# Patient Record
Sex: MALE | Race: BLACK OR AFRICAN AMERICAN | NOT HISPANIC OR LATINO | Employment: FULL TIME | URBAN - METROPOLITAN AREA
[De-identification: names, ages, dates, MRNs, and addresses within clinical notes are randomized per-mention and may not be internally consistent; named-entity substitution may affect disease eponyms.]

---

## 2020-09-30 ENCOUNTER — TELEPHONE (OUTPATIENT)
Dept: GASTROENTEROLOGY | Facility: CLINIC | Age: 60
End: 2020-09-30

## 2020-09-30 NOTE — TELEPHONE ENCOUNTER
----- Message from Roger Rollins sent at 9/22/2020  3:34 PM EDT -----  Regarding: ROUTINE COLONOSCOPY  PT WOULD LIKE TO SCHEDULE COLONOSCOPY. [phone]

## 2020-09-30 NOTE — TELEPHONE ENCOUNTER
Spoke with patient and completed colonoscopy screening questionnaire per phone.  Patient does need to call back with medication list and insurance info.

## 2020-10-01 ENCOUNTER — PREP FOR SURGERY (OUTPATIENT)
Dept: OTHER | Facility: HOSPITAL | Age: 60
End: 2020-10-01

## 2020-10-01 DIAGNOSIS — K63.5 COLON POLYPS: Primary | ICD-10-CM

## 2020-12-02 ENCOUNTER — TELEPHONE (OUTPATIENT)
Dept: GASTROENTEROLOGY | Facility: CLINIC | Age: 60
End: 2020-12-02

## 2020-12-02 NOTE — TELEPHONE ENCOUNTER
----- Message from Roger Bhat sent at 12/1/2020  1:46 PM EST -----  Regarding: FW: AUTHORIZATION/Capsule order  I'm still not seeing the order. Dr. Mars confirmed. Can you assist?  ----- Message -----  From: Clive Mars MD  Sent: 12/1/2020   9:14 AM EST  To: Lorenza Beck RN, #  Subject: RE: AUTHORIZATION/Capsule order                  Ok , please schedule thx  ----- Message -----  From: Brook Adamson RegSched Rep  Sent: 12/1/2020   8:37 AM EST  To: Clive Mars MD, Lorenza Beck RN  Subject: FW: AUTHORIZATION/Capsule order                  Dr. Mars could you place the order for the capsule for this pt and I will complete the auth before scheduling the pt.  Thank you  Deborah Adamson  ----- Message -----  From: Lorenza Beck RN  Sent: 11/30/2020  10:22 AM EST  To: Roger Bhat  Subject: FW: AUTHORIZATION/Capsule order                  I don't see an order for a capsule study.   ----- Message -----  From: Brook Adamson RegSched Rep  Sent: 11/30/2020  10:03 AM EST  To: Lorenza Beck RN  Subject: FW: AUTHORIZATION/Capsule order                  Could you place order for capsule study and let me know once completed so that I may get precert before scheduling?  Thanks  Cisalina  ----- Message -----  From: Rose Ibrahim  Sent: 11/20/2020   3:55 PM EST  To: Roger Bhat  Subject: AUTHORIZATION                                    Please authorize capsule and notify scheduling when ok to schedule

## 2021-02-23 ENCOUNTER — TELEPHONE (OUTPATIENT)
Dept: GASTROENTEROLOGY | Facility: CLINIC | Age: 61
End: 2021-02-23

## 2021-02-23 NOTE — TELEPHONE ENCOUNTER
----- Message from Roger Casas Rep sent at 2/23/2021 12:03 PM EST -----  Regarding: Reschedule  Contact: [phone]  Pt is needing to reschedule procedure

## 2021-05-03 ENCOUNTER — OFFICE VISIT (OUTPATIENT)
Dept: FAMILY MEDICINE CLINIC | Facility: CLINIC | Age: 61
End: 2021-05-03

## 2021-05-03 VITALS
TEMPERATURE: 98.2 F | WEIGHT: 215 LBS | SYSTOLIC BLOOD PRESSURE: 124 MMHG | BODY MASS INDEX: 31.84 KG/M2 | HEART RATE: 70 BPM | HEIGHT: 69 IN | OXYGEN SATURATION: 99 % | DIASTOLIC BLOOD PRESSURE: 80 MMHG

## 2021-05-03 DIAGNOSIS — Z12.11 SCREEN FOR COLON CANCER: Primary | ICD-10-CM

## 2021-05-03 DIAGNOSIS — J30.2 SEASONAL ALLERGIES: ICD-10-CM

## 2021-05-03 DIAGNOSIS — I10 HYPERTENSION, ESSENTIAL: ICD-10-CM

## 2021-05-03 DIAGNOSIS — R42 DIZZINESS: ICD-10-CM

## 2021-05-03 DIAGNOSIS — Z13.6 SCREENING FOR CARDIOVASCULAR CONDITION: ICD-10-CM

## 2021-05-03 PROCEDURE — 99204 OFFICE O/P NEW MOD 45 MIN: CPT | Performed by: FAMILY MEDICINE

## 2021-05-03 RX ORDER — MECLIZINE HYDROCHLORIDE 25 MG/1
25 TABLET ORAL 2 TIMES DAILY
COMMUNITY
Start: 2021-04-24 | End: 2022-05-18

## 2021-05-03 RX ORDER — LISINOPRIL 30 MG/1
30 TABLET ORAL DAILY
Qty: 90 TABLET | Refills: 1 | Status: SHIPPED | OUTPATIENT
Start: 2021-05-03 | End: 2021-09-09

## 2021-05-03 RX ORDER — LISINOPRIL 30 MG/1
30 TABLET ORAL DAILY
COMMUNITY
Start: 2021-03-11 | End: 2021-05-03 | Stop reason: SDUPTHER

## 2021-05-03 NOTE — PROGRESS NOTES
"Chief Complaint  Establish Care (Hypertension)    Subjective          Duy Owens presents to [facility]  History of Present Illness  New patient here to get established.  He has not seen a doc I a while.    HTN- no CP, HA, or blurred vision.  He occasionally gets some dizziness occasionally when his bp goes up over past couple of weeks.    He is due for a cscope so I will schedule it.   He has had both his covid vaccines.  Seasonal allergies with some drainage in his throat.  He takes otc med.  He will need labs today as well.    Objective   Vital Signs:   /80   Pulse 70   Temp 98.2 °F (36.8 °C)   Ht 175.3 cm (69\")   Wt 97.5 kg (215 lb)   SpO2 99%   BMI 31.75 kg/m²     Physical Exam  Constitutional:       General: He is not in acute distress.     Appearance: Normal appearance. He is normal weight. He is not ill-appearing, toxic-appearing or diaphoretic.   HENT:      Head: Normocephalic and atraumatic.      Right Ear: Tympanic membrane, ear canal and external ear normal. There is no impacted cerumen.      Left Ear: Tympanic membrane, ear canal and external ear normal. There is no impacted cerumen.      Nose: Nose normal.      Comments: Post nasal drip     Mouth/Throat:      Mouth: Mucous membranes are moist.      Pharynx: No oropharyngeal exudate or posterior oropharyngeal erythema.   Cardiovascular:      Rate and Rhythm: Normal rate and regular rhythm.      Heart sounds: Normal heart sounds. No murmur heard.     Pulmonary:      Effort: Pulmonary effort is normal. No respiratory distress.      Breath sounds: Normal breath sounds. No stridor. No wheezing or rhonchi.   Neurological:      Mental Status: He is alert and oriented to person, place, and time.   Psychiatric:         Mood and Affect: Mood normal.         Behavior: Behavior normal.        Result Review :                 Assessment and Plan    Diagnoses and all orders for this visit:    1. Screen for colon cancer " (Primary)  -     Amb referral for Screening Colonoscopy    2. Hypertension, essential  -     lisinopril (PRINIVIL,ZESTRIL) 30 MG tablet; Take 1 tablet by mouth Daily.  Dispense: 90 tablet; Refill: 1  -     Comprehensive Metabolic Panel    3. Dizziness    4. Seasonal allergies    5. Screening for cardiovascular condition  -     Lipid Panel        Follow Up   Return in about 6 months (around 11/3/2021) for hypertension.  Patient was given instructions and counseling regarding his condition or for health maintenance advice. Please see specific information pulled into the AVS if appropriate.   Will get labs today and refill med.  Given warning signs for stroke and MI.    Patient to monitor BP over next week and call me with readings at that time and we can make adjustments accordingly if needed.

## 2021-05-04 ENCOUNTER — TELEPHONE (OUTPATIENT)
Dept: GASTROENTEROLOGY | Facility: CLINIC | Age: 61
End: 2021-05-04

## 2021-05-04 PROBLEM — E78.2 HYPERLIPEMIA, MIXED: Status: ACTIVE | Noted: 2021-05-04

## 2021-05-04 LAB
ALBUMIN SERPL-MCNC: 5.2 G/DL (ref 3.5–5.2)
ALBUMIN/GLOB SERPL: 2.6 G/DL
ALP SERPL-CCNC: 92 U/L (ref 39–117)
ALT SERPL-CCNC: 39 U/L (ref 1–41)
AST SERPL-CCNC: 32 U/L (ref 1–40)
BILIRUB SERPL-MCNC: 0.3 MG/DL (ref 0–1.2)
BUN SERPL-MCNC: 15 MG/DL (ref 6–20)
BUN/CREAT SERPL: 16.3 (ref 7–25)
CALCIUM SERPL-MCNC: 10.4 MG/DL (ref 8.6–10.5)
CHLORIDE SERPL-SCNC: 104 MMOL/L (ref 98–107)
CHOLEST SERPL-MCNC: 193 MG/DL (ref 0–200)
CO2 SERPL-SCNC: 27 MMOL/L (ref 22–29)
CREAT SERPL-MCNC: 0.92 MG/DL (ref 0.76–1.27)
GLOBULIN SER CALC-MCNC: 2 GM/DL
GLUCOSE SERPL-MCNC: 99 MG/DL (ref 65–99)
HDLC SERPL-MCNC: 46 MG/DL (ref 40–60)
LDLC SERPL CALC-MCNC: 124 MG/DL (ref 0–100)
POTASSIUM SERPL-SCNC: 4.5 MMOL/L (ref 3.5–5.2)
PROT SERPL-MCNC: 7.2 G/DL (ref 6–8.5)
SODIUM SERPL-SCNC: 140 MMOL/L (ref 136–145)
TRIGL SERPL-MCNC: 126 MG/DL (ref 0–150)
VLDLC SERPL CALC-MCNC: 23 MG/DL (ref 5–40)

## 2021-05-24 ENCOUNTER — TELEPHONE (OUTPATIENT)
Dept: GASTROENTEROLOGY | Facility: CLINIC | Age: 61
End: 2021-05-24

## 2021-05-31 ENCOUNTER — PREP FOR SURGERY (OUTPATIENT)
Dept: OTHER | Facility: HOSPITAL | Age: 61
End: 2021-05-31

## 2021-05-31 DIAGNOSIS — K63.5 COLON POLYP: Primary | ICD-10-CM

## 2021-06-23 PROBLEM — K63.5 COLON POLYP: Status: ACTIVE | Noted: 2021-06-23

## 2021-09-09 ENCOUNTER — OFFICE VISIT (OUTPATIENT)
Dept: FAMILY MEDICINE CLINIC | Facility: CLINIC | Age: 61
End: 2021-09-09

## 2021-09-09 VITALS
HEART RATE: 78 BPM | OXYGEN SATURATION: 99 % | HEIGHT: 62 IN | BODY MASS INDEX: 40.3 KG/M2 | TEMPERATURE: 98.6 F | DIASTOLIC BLOOD PRESSURE: 82 MMHG | SYSTOLIC BLOOD PRESSURE: 140 MMHG | WEIGHT: 219 LBS

## 2021-09-09 DIAGNOSIS — I10 HYPERTENSION, ESSENTIAL: Primary | ICD-10-CM

## 2021-09-09 DIAGNOSIS — R05.3 CHRONIC COUGH: ICD-10-CM

## 2021-09-09 DIAGNOSIS — K21.9 GASTROESOPHAGEAL REFLUX DISEASE WITHOUT ESOPHAGITIS: ICD-10-CM

## 2021-09-09 DIAGNOSIS — J30.2 SEASONAL ALLERGIES: ICD-10-CM

## 2021-09-09 PROCEDURE — 99214 OFFICE O/P EST MOD 30 MIN: CPT | Performed by: FAMILY MEDICINE

## 2021-09-09 RX ORDER — LOSARTAN POTASSIUM 100 MG/1
100 TABLET ORAL DAILY
Qty: 30 TABLET | Refills: 2 | Status: SHIPPED | OUTPATIENT
Start: 2021-09-09 | End: 2021-12-23 | Stop reason: SDUPTHER

## 2021-09-09 NOTE — PROGRESS NOTES
"Chief Complaint  Cough    Subjective          Duy Owens presents to [facility]  History of Present Illness  Pt has been coughing for the past few months.  It is mostly dry.  He was given antibiotic at urgent care but didn't really help much.    HTN- he has been on the lisinopril for the past year or so.  No chest pains or palpitaitons  He does not some and stopped about 10 years ago.  He has occasional reflux.  He has some drainage in his throat.  Objective   Vital Signs:   /82 (BP Location: Right arm, Patient Position: Sitting)   Pulse 78   Temp 98.6 °F (37 °C)   Ht 157.5 cm (62.01\")   Wt 99.3 kg (219 lb)   SpO2 99%   BMI 40.05 kg/m²     Physical Exam  Vitals and nursing note reviewed.   Constitutional:       Appearance: Normal appearance.   HENT:      Head: Normocephalic and atraumatic.      Mouth/Throat:      Comments: Post nasal drip.  Cardiovascular:      Rate and Rhythm: Normal rate and regular rhythm.      Heart sounds: Normal heart sounds. No murmur heard.     Pulmonary:      Effort: Pulmonary effort is normal. No respiratory distress.      Breath sounds: Normal breath sounds. No stridor. No wheezing or rhonchi.   Neurological:      Mental Status: He is alert.   Psychiatric:         Mood and Affect: Mood normal.         Behavior: Behavior normal.        Result Review :                 Assessment and Plan    Diagnoses and all orders for this visit:    1. Hypertension, essential (Primary)  -     losartan (Cozaar) 100 MG tablet; Take 1 tablet by mouth Daily.  Dispense: 30 tablet; Refill: 2    2. Chronic cough  -     XR Chest 2 View    3. Gastroesophageal reflux disease without esophagitis    4. Seasonal allergies        Follow Up   No follow-ups on file.  Patient was given instructions and counseling regarding his condition or for health maintenance advice. Please see specific information pulled into the AVS if appropriate.     Will get xray ordered.  Will stop " lisin and start losartan  I discussed other possibilities such as reflux or allergies causing the cough and will evaluate accordingly after a trial of no lisinopril

## 2021-09-10 ENCOUNTER — TELEPHONE (OUTPATIENT)
Dept: FAMILY MEDICINE CLINIC | Facility: CLINIC | Age: 61
End: 2021-09-10

## 2021-09-10 ENCOUNTER — TELEPHONE (OUTPATIENT)
Dept: GASTROENTEROLOGY | Facility: CLINIC | Age: 61
End: 2021-09-10

## 2021-09-10 ENCOUNTER — HOSPITAL ENCOUNTER (OUTPATIENT)
Dept: GENERAL RADIOLOGY | Facility: HOSPITAL | Age: 61
Discharge: HOME | End: 2021-09-10
Admitting: FAMILY MEDICINE

## 2021-09-10 DIAGNOSIS — R05.3 CHRONIC COUGH: ICD-10-CM

## 2021-09-10 DIAGNOSIS — R93.89 ABNORMAL CHEST X-RAY: Primary | ICD-10-CM

## 2021-09-10 PROCEDURE — 71046 X-RAY EXAM CHEST 2 VIEWS: CPT

## 2021-09-10 NOTE — TELEPHONE ENCOUNTER
----- Message from Nara Dobson MD sent at 9/10/2021  2:13 PM EDT -----  Your x-ray shows a possible abnormality in the right lung.  In order to further evaluate it, we need to get a CT scan done.  This will help us determine what this is.  I will set this up for you.

## 2021-09-10 NOTE — TELEPHONE ENCOUNTER
lmtcb     Okay for hub to read    Your x-ray shows a possible abnormality in the right lung.  In order to further evaluate it, we need to get a CT scan done.  This will help us determine what this is.  I will set this up for you.

## 2021-09-16 ENCOUNTER — TELEPHONE (OUTPATIENT)
Dept: FAMILY MEDICINE CLINIC | Facility: CLINIC | Age: 61
End: 2021-09-16

## 2021-09-16 NOTE — TELEPHONE ENCOUNTER
Caller: Dyu Owens    Relationship: Self    Best call back number: [phone]    What is the best time to reach you: ANY TIME    Who are you requesting to speak with (clinical staff, provider,  specific staff member): CLINICAL STAFF    What was the call regarding: PATIENT CALLED INQUIRING ABOUT A CT SCAN THAT IS SCHEDULED FOR HIM ON 9/23/21. PATIENT STATES HE ALREADY HAD A CHEST XRAY ON 9/10/21 AND DIDN'T KNOW WHY HE WAS SCHEDULED FOR THE CT SCAN AFTER THE XRAY. PATIENT WOULD LIKE A CALLBACK TO DISCUSS THE REASON FOR THE CT SCAN.    PLEASE ADVISE PATIENT.    Do you require a callback: YES

## 2021-09-16 NOTE — TELEPHONE ENCOUNTER
Lmtcb    Ok for hub to read    Your x-ray shows a possible abnormality in the right lung.  In order to further evaluate it, we need to get a CT scan done.  This will help us determine what this is.  Dr. Dobson will set this up for you.   Subjective      The patient is a 66-year-old male with history of hypertension, hyperlipidemia, chronic obstructive pulmonary disease, history of chronic low back pain and lumbar stenosis.  The patient states that over the past 4-5 days, he has been weak, had poor appetite, could not eat anything.  He is coughing a lot and he has been short of breath.  However, the patient denied having fever.  He had some chest pain.  The patient presented to ER [facility].  He was found having COPD exacerbation:  COVID test came back negative.  The patient was admitted to general medical floor for further care.  He was consulted with Dr. Fairbanks.   He was getting better; he is less short of breath; he is still coughing; not in acute distress; afebrile.  He had BP on the low side in am; his BP meds are on hold for now; will follow. He states he vomited twice today.   He had CT chest done the found severely distended stomach.  The patient is NPO, he refused NGT down; he was seen by GI; the patient is had EGD done on 09/24/20; no significant problems found; the patient had swallow evlauation.  He started eating today; he is tolerating it OK so far.  Continue current [facility]    Objective     I/O's    Intake/Output Summary (Last 24 hours) at 9/26/2020 1555  Last data filed at 9/26/2020 1400  Gross per 24 hour   Intake 1080 ml   Output 3625 ml   Net -2545 ml       Last Recorded Vitals  Blood pressure 103/63, pulse 62, temperature 98.2 °F (36.8 °C), temperature source Oral, resp. rate 16, height 6' 2\" (1.88 m), weight 73.6 kg (162 lb 4.1 oz), SpO2 96 %.  Body mass index is 20.83 kg/m².      General:  A&O x 3  Skin:  Warm and dry without rash.    Head:  Normocephalic-atraumatic.   Neck:  Trachea is midline.     Eyes:  Normal conjunctivae and sclerae.   ENT:  Mucous membranes are moist.   Cardiovascular:  Symmetrical pulses.  Regular rate and rhythm without murmur.  Respiratory:   Normal respiratory effort. Very  diminished bronchial BS. Mild wheezing, no crackles  Gastrointestinal:  Soft and minimally tender. Mildly distended.  No peritoneal signs.  No hepatomegaly or splenomegaly.   Musculoskeletal:  No deformity or edema.   Back:  Normal alignment.  No costovertebral angle tenderness.  Neurologic: No focal deficits.  Psychiatric:  Cooperative.  Appropriate mood and affect.           Labs   Today’s Results:    Recent Labs   Lab 09/26/20  0528 09/25/20  0651   WBC 7.7 6.5   HCT 39.1 46.1   HGB 12.6* 14.1    133*     Recent Labs   Lab 09/26/20  0528 09/25/20  0651   SODIUM 139 137   POTASSIUM 4.6 5.1   CHLORIDE 104 104   CO2 32 32   GLUCOSE 93 141*   BUN 20 15   CREATININE 0.57* 0.50*       CT chest:    IMPRESSION:  1.  Moderate centrilobular emphysematous changes.  2.  No evidence of focal consolidation, pleural effusion or pneumothorax.  3.  Bibasilar streaky opacities likely atelectasis or scarring.  Mild mucous plugging is seen in the left lower lobe.  4.  Distended stomach seen in the upper abdomen.          Assessment & Plan     COPD; h/o heavy smoking  Shortness of breath upon admission  Vomiting, severely distended stomach.   Chronic low back pain  Bilateral lumbar radiculopathy; h/o lumbar surgery  HTN  Severe bradycardia upon admission  Hyperlipidemia  Severe protein calorie malnutrition:  poor oral intake, muscle wasting, low BMI  Smoking      DVT Prophylaxis  SCD    Smoking Cessation  Keeps smoking 0.5 ppd; smoking cessation extensively d/w the patient.        Gregory Levy MD

## 2021-09-20 NOTE — TELEPHONE ENCOUNTER
Lmtcb     Ok for hub to read     Your x-ray shows a possible abnormality in the right lung.  In order to further evaluate it, we need to get a CT scan done.  This will help us determine what this is.  Dr. Dobson will set this up for you.

## 2021-09-23 ENCOUNTER — HOSPITAL ENCOUNTER (OUTPATIENT)
Dept: CT IMAGING | Facility: HOSPITAL | Age: 61
Discharge: HOME | End: 2021-09-23
Admitting: FAMILY MEDICINE

## 2021-09-23 PROCEDURE — 82565 ASSAY OF CREATININE: CPT

## 2021-09-23 PROCEDURE — 71260 CT THORAX DX C+: CPT

## 2021-09-23 PROCEDURE — 25010000002 IOPAMIDOL 61 % SOLUTION: Performed by: FAMILY MEDICINE

## 2021-09-23 RX ADMIN — IOPAMIDOL 75 ML: 612 INJECTION, SOLUTION INTRAVENOUS at 14:28

## 2021-09-24 ENCOUNTER — HOSPITAL ENCOUNTER (OUTPATIENT)
Dept: CT IMAGING | Facility: HOSPITAL | Age: 61
Discharge: HOME | End: 2021-09-24
Admitting: NURSE PRACTITIONER

## 2021-09-24 ENCOUNTER — TELEPHONE (OUTPATIENT)
Dept: FAMILY MEDICINE CLINIC | Facility: CLINIC | Age: 61
End: 2021-09-24

## 2021-09-24 DIAGNOSIS — R93.89 ABNORMAL CT OF THE CHEST: ICD-10-CM

## 2021-09-24 DIAGNOSIS — R93.89 ABNORMAL CT OF THE CHEST: Primary | ICD-10-CM

## 2021-09-24 LAB
CREAT BLDA-MCNC: 1 MG/DL (ref 0.6–1.3)
CREAT BLDA-MCNC: 1 MG/DL (ref 0.6–1.3)

## 2021-09-24 PROCEDURE — 0 DIATRIZOATE MEGLUMINE & SODIUM PER 1 ML: Performed by: NURSE PRACTITIONER

## 2021-09-24 PROCEDURE — 74177 CT ABD & PELVIS W/CONTRAST: CPT

## 2021-09-24 PROCEDURE — 25010000002 IOPAMIDOL 61 % SOLUTION: Performed by: NURSE PRACTITIONER

## 2021-09-24 PROCEDURE — 82565 ASSAY OF CREATININE: CPT

## 2021-09-24 RX ADMIN — DIATRIZOATE MEGLUMINE AND DIATRIZOATE SODIUM 30 ML: 600; 100 SOLUTION ORAL; RECTAL at 13:47

## 2021-09-24 RX ADMIN — IOPAMIDOL 85 ML: 612 INJECTION, SOLUTION INTRAVENOUS at 13:47

## 2021-09-24 NOTE — TELEPHONE ENCOUNTER
There are findings on CT chest that are suspicious for lymphoma or metastatic disease so will need to see hematology/oncology and have further imaging with CT abdomen and pelvis with contrast.     Patient aware of results and recommendations.

## 2021-09-27 ENCOUNTER — TELEPHONE (OUTPATIENT)
Dept: FAMILY MEDICINE CLINIC | Facility: CLINIC | Age: 61
End: 2021-09-27

## 2021-09-27 NOTE — TELEPHONE ENCOUNTER
CT abdomen and pelvis shows further lymphadenopathy which likely represents lymphoma.    Patient aware of results.  Patient has appointment with oncology on 9/29/2021.

## 2021-09-27 NOTE — TELEPHONE ENCOUNTER
Caller: Duy Owens    Relationship: Self    Best call back number: [phone]    Caller requesting test results: PATIENT    What test was performed: CT SCAN    When was the test performed: 09/24/2021    Where was the test performed: [facility]    Additional notes: PLEASE CALL AND ADVISE OF THE RESULTS OF THIS TEST

## 2021-09-29 ENCOUNTER — CONSULT (OUTPATIENT)
Dept: ONCOLOGY | Facility: CLINIC | Age: 61
End: 2021-09-29

## 2021-09-29 ENCOUNTER — APPOINTMENT (OUTPATIENT)
Dept: OTHER | Facility: HOSPITAL | Age: 61
End: 2021-09-29

## 2021-09-29 ENCOUNTER — TELEPHONE (OUTPATIENT)
Dept: ONCOLOGY | Facility: CLINIC | Age: 61
End: 2021-09-29

## 2021-09-29 VITALS
SYSTOLIC BLOOD PRESSURE: 174 MMHG | WEIGHT: 215.2 LBS | DIASTOLIC BLOOD PRESSURE: 98 MMHG | HEART RATE: 79 BPM | BODY MASS INDEX: 31.87 KG/M2 | TEMPERATURE: 98.5 F | OXYGEN SATURATION: 98 % | HEIGHT: 69 IN | RESPIRATION RATE: 16 BRPM

## 2021-09-29 DIAGNOSIS — R59.1 LYMPHADENOPATHY: Primary | ICD-10-CM

## 2021-09-29 DIAGNOSIS — D50.9 MICROCYTIC ANEMIA: ICD-10-CM

## 2021-09-29 LAB
ALBUMIN SERPL-MCNC: 4.8 G/DL (ref 3.5–5.2)
ALBUMIN/GLOB SERPL: 1.8 G/DL
ALP SERPL-CCNC: 76 U/L (ref 39–117)
ALT SERPL W P-5'-P-CCNC: 21 U/L (ref 1–41)
ANION GAP SERPL CALCULATED.3IONS-SCNC: 9.9 MMOL/L (ref 5–15)
AST SERPL-CCNC: 23 U/L (ref 1–40)
B2 MICROGLOB SERPL-MCNC: 1.8 MG/L (ref 0.8–2.2)
BASOPHILS # BLD AUTO: 0.06 10*3/MM3 (ref 0–0.2)
BASOPHILS NFR BLD AUTO: 0.9 % (ref 0–1.5)
BILIRUB SERPL-MCNC: 0.3 MG/DL (ref 0–1.2)
BUN SERPL-MCNC: 17 MG/DL (ref 6–20)
BUN/CREAT SERPL: 13.6 (ref 7–25)
CALCIUM SPEC-SCNC: 9.6 MG/DL (ref 8.6–10.5)
CHLORIDE SERPL-SCNC: 104 MMOL/L (ref 98–107)
CO2 SERPL-SCNC: 25.1 MMOL/L (ref 22–29)
CREAT SERPL-MCNC: 1.25 MG/DL (ref 0.76–1.27)
DEPRECATED RDW RBC AUTO: 40.3 FL (ref 37–54)
EOSINOPHIL # BLD AUTO: 0.34 10*3/MM3 (ref 0–0.4)
EOSINOPHIL NFR BLD AUTO: 4.8 % (ref 0.3–6.2)
ERYTHROCYTE [DISTWIDTH] IN BLOOD BY AUTOMATED COUNT: 14.6 % (ref 12.3–15.4)
FERRITIN SERPL-MCNC: 368.1 NG/ML (ref 30–400)
GFR SERPL CREATININE-BSD FRML MDRD: 72 ML/MIN/1.73
GLOBULIN UR ELPH-MCNC: 2.6 GM/DL
GLUCOSE SERPL-MCNC: 99 MG/DL (ref 65–99)
HCT VFR BLD AUTO: 42.8 % (ref 37.5–51)
HGB BLD-MCNC: 13.1 G/DL (ref 13–17.7)
IMM GRANULOCYTES # BLD AUTO: 0.02 10*3/MM3 (ref 0–0.05)
IMM GRANULOCYTES NFR BLD AUTO: 0.3 % (ref 0–0.5)
IRON 24H UR-MRATE: 104 MCG/DL (ref 59–158)
IRON SATN MFR SERPL: 25 % (ref 20–50)
LDH SERPL-CCNC: 202 U/L (ref 135–225)
LYMPHOCYTES # BLD AUTO: 1.15 10*3/MM3 (ref 0.7–3.1)
LYMPHOCYTES NFR BLD AUTO: 16.3 % (ref 19.6–45.3)
MCH RBC QN AUTO: 23.3 PG (ref 26.6–33)
MCHC RBC AUTO-ENTMCNC: 30.6 G/DL (ref 31.5–35.7)
MCV RBC AUTO: 76.2 FL (ref 79–97)
MONOCYTES # BLD AUTO: 0.77 10*3/MM3 (ref 0.1–0.9)
MONOCYTES NFR BLD AUTO: 10.9 % (ref 5–12)
NEUTROPHILS NFR BLD AUTO: 4.7 10*3/MM3 (ref 1.7–7)
NEUTROPHILS NFR BLD AUTO: 66.8 % (ref 42.7–76)
NRBC BLD AUTO-RTO: 0 /100 WBC (ref 0–0.2)
PLATELET # BLD AUTO: 268 10*3/MM3 (ref 140–450)
PMV BLD AUTO: 9.7 FL (ref 6–12)
POTASSIUM SERPL-SCNC: 4.2 MMOL/L (ref 3.5–5.2)
PROT SERPL-MCNC: 7.4 G/DL (ref 6–8.5)
RBC # BLD AUTO: 5.62 10*6/MM3 (ref 4.14–5.8)
SODIUM SERPL-SCNC: 139 MMOL/L (ref 136–145)
TIBC SERPL-MCNC: 410 MCG/DL (ref 298–536)
TRANSFERRIN SERPL-MCNC: 275 MG/DL (ref 200–360)
URATE SERPL-MCNC: 6.7 MG/DL (ref 3.4–7)
WBC # BLD AUTO: 7.04 10*3/MM3 (ref 3.4–10.8)

## 2021-09-29 PROCEDURE — 82232 ASSAY OF BETA-2 PROTEIN: CPT | Performed by: INTERNAL MEDICINE

## 2021-09-29 PROCEDURE — 36415 COLL VENOUS BLD VENIPUNCTURE: CPT | Performed by: INTERNAL MEDICINE

## 2021-09-29 PROCEDURE — 99244 OFF/OP CNSLTJ NEW/EST MOD 40: CPT | Performed by: INTERNAL MEDICINE

## 2021-09-29 PROCEDURE — 85025 COMPLETE CBC W/AUTO DIFF WBC: CPT | Performed by: INTERNAL MEDICINE

## 2021-09-29 PROCEDURE — 80053 COMPREHEN METABOLIC PANEL: CPT | Performed by: INTERNAL MEDICINE

## 2021-09-29 PROCEDURE — 83615 LACTATE (LD) (LDH) ENZYME: CPT | Performed by: INTERNAL MEDICINE

## 2021-09-29 PROCEDURE — 82728 ASSAY OF FERRITIN: CPT | Performed by: INTERNAL MEDICINE

## 2021-09-29 PROCEDURE — 83540 ASSAY OF IRON: CPT | Performed by: INTERNAL MEDICINE

## 2021-09-29 PROCEDURE — 84550 ASSAY OF BLOOD/URIC ACID: CPT | Performed by: INTERNAL MEDICINE

## 2021-09-29 PROCEDURE — 84466 ASSAY OF TRANSFERRIN: CPT | Performed by: INTERNAL MEDICINE

## 2021-09-29 NOTE — TELEPHONE ENCOUNTER
Clinical Case Management/Aravind and Monica:    OSW received a referral from Dina Washburn MA due to patient's 8/10 score on the NCCN Distress Thermometer during today's consultation with Dr. Mendez. Patient marked the following problems: depression, fear, nervousness, sadness, worry, loss of interest/activities, spiritual/Islam concerns, memory/concentration, pain, and sleep.    OSW called patient to introduce self/role and to assess for needs. Patient was unable to answer this call. OSW left a message with contact information for patient to return call.     OSW to remain available.     SARAH Alatorre, CSW  Oncology Social Worker   [facility]

## 2021-09-29 NOTE — PROGRESS NOTES
Subjective     REASON FOR CONSULTATION:  Provide an opinion on any further workup or treatment on:    Lymphadenopathy                       REQUESTING PHYSICIAN: Meena Avina APRN    RECORDS OBTAINED: Records of the patients history including those obtained from the referring provider were reviewed and summarized in detail.    HISTORY OF PRESENT ILLNESS:    Duy Owens is a 60 y.o. patient who was referred for evaluation of lymphadenopathy.  Patient started about 2 months ago having cough.  It was mainly a dry cough.  It was initially suspected of being secondary to his antihypertensive medication which was changed. However, the cough did not resolve.  Therefore, he had a chest x-ray on 9/10/2021 which revealed a rounded density in the right infrahilar region.  CT of the scan was recommended which was performed on 9/23/2021.  It revealed inferior mediastinal lymphadenopathy.  In addition, there was retroperitoneal lymphadenopathy in the abdomen.  CT of the abdomen pelvis was obtained on 9/24/2021 confirming the presence of the lymphadenopathy.  He was therefore referred to our office for further evaluation.    Patient reports developing pain in the right hip posterior aspect.  Started 1-2 weeks ago.  The pain is intermittent.  It is different from her chronic back pain he has been having for some time.    Patient denies having fever or chills.  He had a few episodes of night sweats but he feels that they were related to the stress since the recent CT scan findings.  No loss of appetite or loss of weight.  He did not notice lymph node enlargement in the neck, axillary or inguinal areas.       REVIEW OF SYSTEMS:  Review of Systems   Constitutional: Negative for fatigue, fever and unexpected weight change.   HENT: Negative for nosebleeds and voice change.    Eyes: Negative for visual disturbance.   Respiratory: Positive for cough. Negative for shortness of breath.    Cardiovascular: Negative for chest pain and  "leg swelling.   Gastrointestinal: Negative for abdominal pain, blood in stool, constipation, diarrhea, nausea and vomiting.   Genitourinary: Negative for frequency and hematuria.   Musculoskeletal: Positive for back pain. Negative for joint swelling.   Skin: Negative for rash.   Neurological: Negative for dizziness and headaches.   Hematological: Negative for adenopathy. Does not bruise/bleed easily.   Psychiatric/Behavioral: Negative for dysphoric mood. The patient is not nervous/anxious.        Past Medical History:   Diagnosis Date   • GERD (gastroesophageal reflux disease)    • History of colon polyps    • History of snoring    • History of urinary frequency        Past Surgical History:   Procedure Laterality Date   • COLONOSCOPY W/ POLYPECTOMY     • CYSTOSCOPY TRANSURETHRAL RESECTION OF PROSTATE         Social History     Socioeconomic History   • Marital status: Single     Spouse name: Not on file   • Number of children: Not on file   • Years of education: Not on file   • Highest education level: Not on file   Tobacco Use   • Smoking status: Former Smoker     Types: Cigarettes     Quit date:      Years since quittin.7   • Smokeless tobacco: Never Used   • Tobacco comment: Quit 10 yrs ago   Substance and Sexual Activity   • Alcohol use: Yes     Comment: 1-2x weekly    • Drug use: Never   • Sexual activity: Defer      Family history:  Positive for cancer in the patient's brother (prostate versus testicular cancer).    MEDICATIONS:    Current Outpatient Medications:   •  losartan (Cozaar) 100 MG tablet, Take 1 tablet by mouth Daily., Disp: 30 tablet, Rfl: 2  •  meclizine (ANTIVERT) 25 MG tablet, Take 25 mg by mouth 2 (two) times a day., Disp: , Rfl:      ALLERGIES:  No Known Allergies     Objective   VITAL SIGNS:  Vitals:    21 0757   BP: 174/98   Pulse: 79   Resp: 16   Temp: 98.5 °F (36.9 °C)   TempSrc: Temporal   SpO2: 98%   Weight: 97.6 kg (215 lb 3.2 oz)   Height: 175 cm (68.9\")  Comment: New " Ht   PainSc: 0-No pain     Wt Readings from Last 3 Encounters:   09/29/21 97.6 kg (215 lb 3.2 oz)   09/09/21 99.3 kg (219 lb)   08/18/21 95.3 kg (210 lb)     PHYSICAL EXAMINATION  GENERAL:  The patient appears in good general condition, not in acute distress.  SKIN: No skin rashes. No ecchymosis.  HEAD:  Normocephalic.  EYES:  No Jaundice. No Pallor. Pupils equal. EOMI.  NECK:  Supple with Good ROM. No Thyromegaly. No Masses.  LYMPHATICS:  No cervical,  supraclavicular or axillary lymphadenopathy.  CHEST: Normal respiratory effort. Lungs clear to auscultation.   CARDIAC:  Normal S1 & S2. No murmur.   ABDOMEN:  Soft. No tenderness. No Hepatomegaly. No Splenomegaly. No masses.  NEUROLOGICAL:  No Focal neurological deficits.     RESULT REVIEW:   Results from last 7 days   Lab Units 09/29/21  0749   WBC 10*3/mm3 7.04   NEUTROS ABS 10*3/mm3 4.70   HEMOGLOBIN g/dL 13.1   HEMATOCRIT % 42.8   PLATELETS 10*3/mm3 268     Results from last 7 days   Lab Units 09/29/21  0749 09/24/21  1313 09/23/21  1427   SODIUM mmol/L 139  --   --    POTASSIUM mmol/L 4.2  --   --    CHLORIDE mmol/L 104  --   --    CO2 mmol/L 25.1  --   --    BUN mg/dL 17  --   --    CREATININE mg/dL 1.25 1.00 1.00   CALCIUM mg/dL 9.6  --   --    ALBUMIN g/dL 4.80  --   --    BILIRUBIN mg/dL 0.3  --   --    ALK PHOS U/L 76  --   --    ALT (SGPT) U/L 21  --   --    AST (SGOT) U/L 23  --   --      Component      Latest Ref Rng & Units 9/29/2021   LDH      135 - 225 U/L 202     Component      Latest Ref Rng & Units 9/29/2021   Iron      59 - 158 mcg/dL 104   Iron Saturation      20 - 50 % 25   Transferrin      200 - 360 mg/dL 275   TIBC      298 - 536 mcg/dL 410   Ferritin      30.00 - 400.00 ng/mL 368.10     CT chest on 9/23/2021:  There are multiple enlarged lower mediastinal lymph nodes and enlarged  lymph nodes in the visualized retroperitoneum. These findings are  suspicious for a lymphoproliferative disorder such as lymphoma or could  reflect metastatic  disease to lymph nodes.     CT abdomen pelvis on 9/24/2021:  There is bulky retroperitoneal lymphadenopathy and there is  lymphadenopathy throughout the pelvis which likely represents lymphoma.  Some of the retroperitoneal nodes are accessible for CT-guided biopsy if  Needed.      Assessment/Plan   *Lymphadenopathy.  Patient started having dry cough around July 2021.  CT chest revealed inferior mediastinal adenopathy.  CT abdomen revealed retroperitoneal lymphadenopathy extending to the iliac chain lymph nodes in the right.  Largest lymph node is in the retroperitoneal area and measures 4.8 x 3.5 cm.  The lymphadenopathy in the common iliac chain lymph node measured 3.5 x 2.7 cm.  The left external iliac chain lymph node measured 2.9 x 2 cm.  The lymphadenopathy is concerning for lymphoma.    I explained the findings of the CT scan to the patient and his significant other today.  I recommended obtaining a PET scan to determine the extent of lymphadenopathy and the degree of hypermetabolism.  I also recommended CT-guided biopsy of one of the retroperitoneal lymph nodes.  I explained the procedure to the patient.  He expressed his understanding and agreed to proceed.    *Microcytic anemia.  Hemoglobin is 13.1.  We obtained ferritin iron panel today and there is no evidence of iron deficiency.    PLAN:    1.  Obtain a PET scan.  2.  Obtain CT-guided biopsy of one of the enlarged retroperitoneal lymph nodes.  3.  Obtain beta-2 microglobulin and uric acid levels.  4.  We will see him in 2 weeks for follow-up.        Tima Mendez MD  09/29/21

## 2021-09-30 ENCOUNTER — TELEPHONE (OUTPATIENT)
Dept: ONCOLOGY | Facility: CLINIC | Age: 61
End: 2021-09-30

## 2021-09-30 NOTE — TELEPHONE ENCOUNTER
"Clinical Case Management/Aravind and Monica:    OSW missed a return call from patient after OSW called patient yesterday. OSW called patient back today to introduce self/role, check in, and assess for any needs.     Patient confirmed that he completed the NCCN Distress Thermometer and scored 8/10. Patient explained that he was just feeling really anxious before meeting with Dr. Mendez but that he felt more hopeful after the visit.     Patient reported that he has a CT scan tomorrow and a biopsy next week that will determine what exactly is going. Per patient, Dr. 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 Patient reported to have 3 daughters and a son, all of whom live in or around [place]. Patient lives with his girlfriend who he said is a great support to him. Patient denied any needs at this time.     OSW provided contact information and information on how to reach oncology social workers in the event needs arise. OSW to remain available.     Leisa Traylor, SARAH, CSW  Oncology Social Worker   [facility]    "

## 2021-10-01 ENCOUNTER — APPOINTMENT (OUTPATIENT)
Dept: PET IMAGING | Facility: HOSPITAL | Age: 61
End: 2021-10-01

## 2021-10-05 ENCOUNTER — HOSPITAL ENCOUNTER (OUTPATIENT)
Dept: PET IMAGING | Facility: HOSPITAL | Age: 61
Discharge: HOME | End: 2021-10-05

## 2021-10-05 DIAGNOSIS — R59.1 LYMPHADENOPATHY: ICD-10-CM

## 2021-10-05 LAB — GLUCOSE BLDC GLUCOMTR-MCNC: 90 MG/DL (ref 70–130)

## 2021-10-05 PROCEDURE — 78815 PET IMAGE W/CT SKULL-THIGH: CPT

## 2021-10-05 PROCEDURE — 0 FLUDEOXYGLUCOSE F18 SOLUTION: Performed by: INTERNAL MEDICINE

## 2021-10-05 PROCEDURE — A9552 F18 FDG: HCPCS | Performed by: INTERNAL MEDICINE

## 2021-10-05 PROCEDURE — 82962 GLUCOSE BLOOD TEST: CPT

## 2021-10-05 RX ADMIN — FLUDEOXYGLUCOSE F18 1 DOSE: 300 INJECTION INTRAVENOUS at 06:44

## 2021-10-06 ENCOUNTER — HOSPITAL ENCOUNTER (OUTPATIENT)
Dept: CT IMAGING | Facility: HOSPITAL | Age: 61
Discharge: HOME | End: 2021-10-06
Admitting: INTERNAL MEDICINE

## 2021-10-06 VITALS
RESPIRATION RATE: 20 BRPM | HEIGHT: 70 IN | TEMPERATURE: 98.4 F | WEIGHT: 215 LBS | HEART RATE: 59 BPM | DIASTOLIC BLOOD PRESSURE: 85 MMHG | OXYGEN SATURATION: 97 % | BODY MASS INDEX: 30.78 KG/M2 | SYSTOLIC BLOOD PRESSURE: 168 MMHG

## 2021-10-06 DIAGNOSIS — R59.1 LYMPHADENOPATHY: ICD-10-CM

## 2021-10-06 LAB
BASOPHILS # BLD MANUAL: 0.06 10*3/MM3 (ref 0–0.2)
BASOPHILS NFR BLD AUTO: 1 % (ref 0–1.5)
DEPRECATED RDW RBC AUTO: 36.7 FL (ref 37–54)
EOSINOPHIL # BLD MANUAL: 0.35 10*3/MM3 (ref 0–0.4)
EOSINOPHIL NFR BLD MANUAL: 6 % (ref 0.3–6.2)
ERYTHROCYTE [DISTWIDTH] IN BLOOD BY AUTOMATED COUNT: 13.8 % (ref 12.3–15.4)
HCT VFR BLD AUTO: 40.4 % (ref 37.5–51)
HGB BLD-MCNC: 12.6 G/DL (ref 13–17.7)
INR PPP: 1 (ref 0.8–1.2)
LYMPHOCYTES # BLD MANUAL: 1.05 10*3/MM3 (ref 0.7–3.1)
LYMPHOCYTES NFR BLD MANUAL: 18 % (ref 19.6–45.3)
LYMPHOCYTES NFR BLD MANUAL: 7 % (ref 5–12)
MCH RBC QN AUTO: 23.2 PG (ref 26.6–33)
MCHC RBC AUTO-ENTMCNC: 31.2 G/DL (ref 31.5–35.7)
MCV RBC AUTO: 74.5 FL (ref 79–97)
MONOCYTES # BLD AUTO: 0.41 10*3/MM3 (ref 0.1–0.9)
NEUTROPHILS # BLD AUTO: 3.96 10*3/MM3 (ref 1.7–7)
NEUTROPHILS NFR BLD MANUAL: 68 % (ref 42.7–76)
PLAT MORPH BLD: NORMAL
PLATELET # BLD AUTO: 249 10*3/MM3 (ref 140–450)
PLATELET # BLD AUTO: 257 10*3/MM3 (ref 140–450)
PMV BLD AUTO: 10.9 FL (ref 6–12)
PROTHROMBIN TIME: 11.9 SECONDS (ref 12.8–15.2)
RBC # BLD AUTO: 5.42 10*6/MM3 (ref 4.14–5.8)
RBC MORPH BLD: NORMAL
WBC # BLD AUTO: 5.82 10*3/MM3 (ref 3.4–10.8)
WBC MORPH BLD: NORMAL

## 2021-10-06 PROCEDURE — 99152 MOD SED SAME PHYS/QHP 5/>YRS: CPT

## 2021-10-06 PROCEDURE — 88341 IMHCHEM/IMCYTCHM EA ADD ANTB: CPT | Performed by: INTERNAL MEDICINE

## 2021-10-06 PROCEDURE — 88342 IMHCHEM/IMCYTCHM 1ST ANTB: CPT | Performed by: INTERNAL MEDICINE

## 2021-10-06 PROCEDURE — 99153 MOD SED SAME PHYS/QHP EA: CPT

## 2021-10-06 PROCEDURE — 85007 BL SMEAR W/DIFF WBC COUNT: CPT | Performed by: RADIOLOGY

## 2021-10-06 PROCEDURE — 88365 INSITU HYBRIDIZATION (FISH): CPT

## 2021-10-06 PROCEDURE — 88305 TISSUE EXAM BY PATHOLOGIST: CPT | Performed by: INTERNAL MEDICINE

## 2021-10-06 PROCEDURE — 88300 SURGICAL PATH GROSS: CPT | Performed by: INTERNAL MEDICINE

## 2021-10-06 PROCEDURE — 25010000002 MIDAZOLAM PER 1 MG: Performed by: RADIOLOGY

## 2021-10-06 PROCEDURE — 85049 AUTOMATED PLATELET COUNT: CPT | Performed by: RADIOLOGY

## 2021-10-06 PROCEDURE — 88380 MICRODISSECTION LASER: CPT

## 2021-10-06 PROCEDURE — 82542 COL CHROMOTOGRAPHY QUAL/QUAN: CPT

## 2021-10-06 PROCEDURE — 0 LIDOCAINE 1 % SOLUTION: Performed by: RADIOLOGY

## 2021-10-06 PROCEDURE — 85025 COMPLETE CBC W/AUTO DIFF WBC: CPT | Performed by: RADIOLOGY

## 2021-10-06 PROCEDURE — 77012 CT SCAN FOR NEEDLE BIOPSY: CPT

## 2021-10-06 PROCEDURE — 85610 PROTHROMBIN TIME: CPT

## 2021-10-06 PROCEDURE — 88313 SPECIAL STAINS GROUP 2: CPT

## 2021-10-06 PROCEDURE — 25010000002 FENTANYL CITRATE (PF) 50 MCG/ML SOLUTION: Performed by: RADIOLOGY

## 2021-10-06 PROCEDURE — 88364 INSITU HYBRIDIZATION (FISH): CPT

## 2021-10-06 PROCEDURE — 25010000003 LIDOCAINE 1 % SOLUTION: Performed by: RADIOLOGY

## 2021-10-06 RX ORDER — FENTANYL CITRATE 50 UG/ML
INJECTION, SOLUTION INTRAMUSCULAR; INTRAVENOUS
Status: COMPLETED | OUTPATIENT
Start: 2021-10-06 | End: 2021-10-06

## 2021-10-06 RX ORDER — LIDOCAINE HYDROCHLORIDE 10 MG/ML
20 INJECTION, SOLUTION INFILTRATION; PERINEURAL ONCE
Status: COMPLETED | OUTPATIENT
Start: 2021-10-06 | End: 2021-10-06

## 2021-10-06 RX ORDER — SODIUM CHLORIDE 0.9 % (FLUSH) 0.9 %
10 SYRINGE (ML) INJECTION AS NEEDED
Status: DISCONTINUED | OUTPATIENT
Start: 2021-10-06 | End: 2021-10-07 | Stop reason: HOSPADM

## 2021-10-06 RX ORDER — MIDAZOLAM HYDROCHLORIDE 1 MG/ML
INJECTION INTRAMUSCULAR; INTRAVENOUS
Status: COMPLETED | OUTPATIENT
Start: 2021-10-06 | End: 2021-10-06

## 2021-10-06 RX ORDER — SODIUM CHLORIDE 0.9 % (FLUSH) 0.9 %
3 SYRINGE (ML) INJECTION EVERY 12 HOURS SCHEDULED
Status: DISCONTINUED | OUTPATIENT
Start: 2021-10-06 | End: 2021-10-07 | Stop reason: HOSPADM

## 2021-10-06 RX ORDER — SODIUM CHLORIDE 9 MG/ML
25 INJECTION, SOLUTION INTRAVENOUS ONCE
Status: COMPLETED | OUTPATIENT
Start: 2021-10-06 | End: 2021-10-06

## 2021-10-06 RX ADMIN — LIDOCAINE HYDROCHLORIDE 20 ML: 10 INJECTION, SOLUTION INFILTRATION; PERINEURAL at 09:29

## 2021-10-06 RX ADMIN — SODIUM CHLORIDE 25 ML/HR: 9 INJECTION, SOLUTION INTRAVENOUS at 09:26

## 2021-10-06 RX ADMIN — MIDAZOLAM 1 MG: 1 INJECTION INTRAMUSCULAR; INTRAVENOUS at 09:33

## 2021-10-06 RX ADMIN — FENTANYL CITRATE 50 MCG: 0.05 INJECTION, SOLUTION INTRAMUSCULAR; INTRAVENOUS at 09:33

## 2021-10-06 NOTE — NURSING NOTE
Patient dressed and to wheelchair, taken to patient discharge with staff member, his girlfriend is driving him home now.

## 2021-10-06 NOTE — NURSING NOTE
Patient arrived to radiology triage bay 7.  Patient and girlfriend are mask compliant.  I am wearing a mask and eye protection to care for patient.

## 2021-10-06 NOTE — POST-PROCEDURE NOTE
POST PROCEDURE NOTE    Procedure: retroperit.LN bx    Pre-Procedure Diagnosis: lymphadenopathy    Post-procedure Diagnosis: same    Findings: successf.bx,gelfoam used    Complications: non    Blood loss: min    Specimen Removed: 2x18G form.,1x18g flow    Disposition:   Expected discharge home/Transfer back to inpatient room

## 2021-10-06 NOTE — DISCHARGE INSTRUCTIONS

## 2021-10-13 ENCOUNTER — OFFICE VISIT (OUTPATIENT)
Dept: ONCOLOGY | Facility: CLINIC | Age: 61
End: 2021-10-13

## 2021-10-13 ENCOUNTER — LAB (OUTPATIENT)
Dept: OTHER | Facility: HOSPITAL | Age: 61
End: 2021-10-13

## 2021-10-13 VITALS
BODY MASS INDEX: 31.61 KG/M2 | SYSTOLIC BLOOD PRESSURE: 156 MMHG | DIASTOLIC BLOOD PRESSURE: 87 MMHG | OXYGEN SATURATION: 98 % | TEMPERATURE: 98.2 F | WEIGHT: 213.4 LBS | RESPIRATION RATE: 18 BRPM | HEIGHT: 69 IN | HEART RATE: 73 BPM

## 2021-10-13 DIAGNOSIS — D50.9 MICROCYTIC ANEMIA: ICD-10-CM

## 2021-10-13 DIAGNOSIS — R59.1 LYMPHADENOPATHY: Primary | ICD-10-CM

## 2021-10-13 DIAGNOSIS — E78.2 HYPERLIPEMIA, MIXED: Primary | ICD-10-CM

## 2021-10-13 LAB
BASOPHILS # BLD AUTO: 0.05 10*3/MM3 (ref 0–0.2)
BASOPHILS NFR BLD AUTO: 0.8 % (ref 0–1.5)
DEPRECATED RDW RBC AUTO: 38.5 FL (ref 37–54)
EOSINOPHIL # BLD AUTO: 0.39 10*3/MM3 (ref 0–0.4)
EOSINOPHIL NFR BLD AUTO: 6.2 % (ref 0.3–6.2)
ERYTHROCYTE [DISTWIDTH] IN BLOOD BY AUTOMATED COUNT: 14.5 % (ref 12.3–15.4)
HCT VFR BLD AUTO: 40.6 % (ref 37.5–51)
HGB BLD-MCNC: 12.6 G/DL (ref 13–17.7)
HYPOCHROMIA BLD QL: NORMAL
IMM GRANULOCYTES # BLD AUTO: 0.04 10*3/MM3 (ref 0–0.05)
IMM GRANULOCYTES NFR BLD AUTO: 0.6 % (ref 0–0.5)
LYMPHOCYTES # BLD AUTO: 1.59 10*3/MM3 (ref 0.7–3.1)
LYMPHOCYTES NFR BLD AUTO: 25.4 % (ref 19.6–45.3)
MCH RBC QN AUTO: 22.8 PG (ref 26.6–33)
MCHC RBC AUTO-ENTMCNC: 31 G/DL (ref 31.5–35.7)
MCV RBC AUTO: 73.6 FL (ref 79–97)
MONOCYTES # BLD AUTO: 0.77 10*3/MM3 (ref 0.1–0.9)
MONOCYTES NFR BLD AUTO: 12.3 % (ref 5–12)
NEUTROPHILS NFR BLD AUTO: 3.41 10*3/MM3 (ref 1.7–7)
NEUTROPHILS NFR BLD AUTO: 54.7 % (ref 42.7–76)
NRBC BLD AUTO-RTO: 0 /100 WBC (ref 0–0.2)
PLAT MORPH BLD: NORMAL
PLATELET # BLD AUTO: 253 10*3/MM3 (ref 140–450)
PMV BLD AUTO: 10.5 FL (ref 6–12)
RBC # BLD AUTO: 5.52 10*6/MM3 (ref 4.14–5.8)
WBC # BLD AUTO: 6.25 10*3/MM3 (ref 3.4–10.8)
WBC MORPH BLD: NORMAL

## 2021-10-13 PROCEDURE — 36415 COLL VENOUS BLD VENIPUNCTURE: CPT

## 2021-10-13 PROCEDURE — 99215 OFFICE O/P EST HI 40 MIN: CPT | Performed by: INTERNAL MEDICINE

## 2021-10-13 PROCEDURE — 85025 COMPLETE CBC W/AUTO DIFF WBC: CPT | Performed by: INTERNAL MEDICINE

## 2021-10-13 PROCEDURE — 85007 BL SMEAR W/DIFF WBC COUNT: CPT | Performed by: INTERNAL MEDICINE

## 2021-10-19 ENCOUNTER — TELEPHONE (OUTPATIENT)
Dept: ONCOLOGY | Facility: CLINIC | Age: 61
End: 2021-10-19

## 2021-10-19 DIAGNOSIS — E88.09 PLASMA CELL DYSCRASIA: ICD-10-CM

## 2021-10-19 DIAGNOSIS — R59.1 LYMPHADENOPATHY: Primary | ICD-10-CM

## 2021-10-19 NOTE — TELEPHONE ENCOUNTER
Caller: Joey Owens    Relationship: Self    Best call back number: [phone]    What is the best time to reach you: ANY      Who are you requesting to speak with (clinical staff, provider,  specific staff member): DR. MESSER    Do you know the name of the person who called: JOEY    What was the call regarding:DRBindu & PATIENT SPOKE THIS MORNING & PATIENT HAS MORE QUESTIONS FOR THE DR.    Do you require a callback: YES, PLEASE

## 2021-10-25 ENCOUNTER — OFFICE VISIT (OUTPATIENT)
Dept: SURGERY | Facility: CLINIC | Age: 61
End: 2021-10-25

## 2021-10-25 VITALS — HEIGHT: 69 IN | WEIGHT: 212 LBS | BODY MASS INDEX: 31.4 KG/M2

## 2021-10-25 DIAGNOSIS — C90.00 MULTIPLE MYELOMA, REMISSION STATUS UNSPECIFIED (HCC): Primary | ICD-10-CM

## 2021-10-25 PROCEDURE — 99203 OFFICE O/P NEW LOW 30 MIN: CPT | Performed by: SURGERY

## 2021-10-27 ENCOUNTER — APPOINTMENT (OUTPATIENT)
Dept: OTHER | Facility: HOSPITAL | Age: 61
End: 2021-10-27

## 2021-10-29 ENCOUNTER — HOSPITAL ENCOUNTER (OUTPATIENT)
Dept: CT IMAGING | Facility: HOSPITAL | Age: 61
Discharge: HOME | End: 2021-10-29
Admitting: INTERNAL MEDICINE

## 2021-10-29 VITALS
BODY MASS INDEX: 30.49 KG/M2 | OXYGEN SATURATION: 99 % | WEIGHT: 213 LBS | HEIGHT: 70 IN | RESPIRATION RATE: 17 BRPM | HEART RATE: 67 BPM | DIASTOLIC BLOOD PRESSURE: 86 MMHG | SYSTOLIC BLOOD PRESSURE: 142 MMHG | TEMPERATURE: 98.7 F

## 2021-10-29 DIAGNOSIS — E88.09 PLASMA CELL DYSCRASIA: ICD-10-CM

## 2021-10-29 DIAGNOSIS — R59.1 LYMPHADENOPATHY: ICD-10-CM

## 2021-10-29 DIAGNOSIS — D50.9 MICROCYTIC ANEMIA: ICD-10-CM

## 2021-10-29 LAB
BASOPHILS # BLD AUTO: 0.06 10*3/MM3 (ref 0–0.2)
BASOPHILS NFR BLD AUTO: 1 % (ref 0–1.5)
DEPRECATED RDW RBC AUTO: 35.5 FL (ref 37–54)
EOSINOPHIL # BLD AUTO: 0.34 10*3/MM3 (ref 0–0.4)
EOSINOPHIL NFR BLD AUTO: 5.7 % (ref 0.3–6.2)
ERYTHROCYTE [DISTWIDTH] IN BLOOD BY AUTOMATED COUNT: 13.5 % (ref 12.3–15.4)
FOLATE SERPL-MCNC: 6.06 NG/ML (ref 4.78–24.2)
HCT VFR BLD AUTO: 42.5 % (ref 37.5–51)
HGB BLD-MCNC: 12.9 G/DL (ref 13–17.7)
IMM GRANULOCYTES # BLD AUTO: 0.03 10*3/MM3 (ref 0–0.05)
IMM GRANULOCYTES NFR BLD AUTO: 0.5 % (ref 0–0.5)
LDH SERPL-CCNC: 184 U/L (ref 135–225)
LYMPHOCYTES # BLD AUTO: 1.24 10*3/MM3 (ref 0.7–3.1)
LYMPHOCYTES NFR BLD AUTO: 20.7 % (ref 19.6–45.3)
MCH RBC QN AUTO: 22.8 PG (ref 26.6–33)
MCHC RBC AUTO-ENTMCNC: 30.4 G/DL (ref 31.5–35.7)
MCV RBC AUTO: 75.1 FL (ref 79–97)
MONOCYTES # BLD AUTO: 0.68 10*3/MM3 (ref 0.1–0.9)
MONOCYTES NFR BLD AUTO: 11.4 % (ref 5–12)
NEUTROPHILS NFR BLD AUTO: 3.64 10*3/MM3 (ref 1.7–7)
NEUTROPHILS NFR BLD AUTO: 60.7 % (ref 42.7–76)
NRBC BLD AUTO-RTO: 0 /100 WBC (ref 0–0.2)
PLATELET # BLD AUTO: 273 10*3/MM3 (ref 140–450)
PMV BLD AUTO: 9.8 FL (ref 6–12)
RBC # BLD AUTO: 5.66 10*6/MM3 (ref 4.14–5.8)
VIT B12 BLD-MCNC: 869 PG/ML (ref 211–946)
WBC # BLD AUTO: 5.99 10*3/MM3 (ref 3.4–10.8)

## 2021-10-29 PROCEDURE — 88313 SPECIAL STAINS GROUP 2: CPT | Performed by: INTERNAL MEDICINE

## 2021-10-29 PROCEDURE — 82607 VITAMIN B-12: CPT | Performed by: INTERNAL MEDICINE

## 2021-10-29 PROCEDURE — 85097 BONE MARROW INTERPRETATION: CPT | Performed by: INTERNAL MEDICINE

## 2021-10-29 PROCEDURE — 85025 COMPLETE CBC W/AUTO DIFF WBC: CPT | Performed by: RADIOLOGY

## 2021-10-29 PROCEDURE — 88300 SURGICAL PATH GROSS: CPT | Performed by: INTERNAL MEDICINE

## 2021-10-29 PROCEDURE — 82746 ASSAY OF FOLIC ACID SERUM: CPT | Performed by: INTERNAL MEDICINE

## 2021-10-29 PROCEDURE — 88311 DECALCIFY TISSUE: CPT | Performed by: INTERNAL MEDICINE

## 2021-10-29 PROCEDURE — 25010000002 MIDAZOLAM PER 1 MG: Performed by: RADIOLOGY

## 2021-10-29 PROCEDURE — 25010000002 FENTANYL CITRATE (PF) 50 MCG/ML SOLUTION: Performed by: RADIOLOGY

## 2021-10-29 PROCEDURE — 83615 LACTATE (LD) (LDH) ENZYME: CPT | Performed by: INTERNAL MEDICINE

## 2021-10-29 PROCEDURE — 88305 TISSUE EXAM BY PATHOLOGIST: CPT | Performed by: INTERNAL MEDICINE

## 2021-10-29 PROCEDURE — 77012 CT SCAN FOR NEEDLE BIOPSY: CPT

## 2021-10-29 PROCEDURE — 0 LIDOCAINE 1 % SOLUTION: Performed by: RADIOLOGY

## 2021-10-29 RX ORDER — FENTANYL CITRATE 50 UG/ML
INJECTION, SOLUTION INTRAMUSCULAR; INTRAVENOUS
Status: COMPLETED | OUTPATIENT
Start: 2021-10-29 | End: 2021-10-29

## 2021-10-29 RX ORDER — MIDAZOLAM HYDROCHLORIDE 1 MG/ML
INJECTION INTRAMUSCULAR; INTRAVENOUS
Status: COMPLETED | OUTPATIENT
Start: 2021-10-29 | End: 2021-10-29

## 2021-10-29 RX ORDER — LIDOCAINE HYDROCHLORIDE 10 MG/ML
20 INJECTION, SOLUTION INFILTRATION; PERINEURAL ONCE
Status: COMPLETED | OUTPATIENT
Start: 2021-10-29 | End: 2021-10-29

## 2021-10-29 RX ORDER — SODIUM CHLORIDE 0.9 % (FLUSH) 0.9 %
3 SYRINGE (ML) INJECTION EVERY 12 HOURS SCHEDULED
Status: DISCONTINUED | OUTPATIENT
Start: 2021-10-29 | End: 2021-10-30 | Stop reason: HOSPADM

## 2021-10-29 RX ORDER — SODIUM CHLORIDE 9 MG/ML
25 INJECTION, SOLUTION INTRAVENOUS ONCE
Status: COMPLETED | OUTPATIENT
Start: 2021-10-29 | End: 2021-10-29

## 2021-10-29 RX ORDER — SODIUM CHLORIDE 0.9 % (FLUSH) 0.9 %
10 SYRINGE (ML) INJECTION AS NEEDED
Status: DISCONTINUED | OUTPATIENT
Start: 2021-10-29 | End: 2021-10-30 | Stop reason: HOSPADM

## 2021-10-29 RX ADMIN — LIDOCAINE HYDROCHLORIDE 20 ML: 10 INJECTION, SOLUTION INFILTRATION; PERINEURAL at 07:53

## 2021-10-29 RX ADMIN — FENTANYL CITRATE 50 MCG: 0.05 INJECTION, SOLUTION INTRAMUSCULAR; INTRAVENOUS at 07:51

## 2021-10-29 RX ADMIN — MIDAZOLAM 1 MG: 1 INJECTION INTRAMUSCULAR; INTRAVENOUS at 07:52

## 2021-10-29 RX ADMIN — SODIUM CHLORIDE 25 ML/HR: 9 INJECTION, SOLUTION INTRAVENOUS at 07:45

## 2021-10-30 ENCOUNTER — TELEPHONE (OUTPATIENT)
Dept: INTERVENTIONAL RADIOLOGY/VASCULAR | Facility: HOSPITAL | Age: 61
End: 2021-10-30

## 2021-11-05 ENCOUNTER — TELEPHONE (OUTPATIENT)
Dept: ONCOLOGY | Facility: CLINIC | Age: 61
End: 2021-11-05

## 2021-11-05 DIAGNOSIS — E88.09 PLASMA CELL DYSCRASIA: ICD-10-CM

## 2021-11-05 DIAGNOSIS — R59.1 LYMPHADENOPATHY: Primary | ICD-10-CM

## 2021-11-05 NOTE — TELEPHONE ENCOUNTER
Reviewed Dr. Mendez's note and new appt schedule with pt and let him know scheduling will reach out to set up the follow up time, he v/u.

## 2021-11-05 NOTE — TELEPHONE ENCOUNTER
----- Message from Tima Mendez MD sent at 11/5/2021  8:07 AM EDT -----  I recommend obtaining SPEP REGINO FLC and 24 hour urine protein electrophoresis and immunofixation before his follow up visit. The follow up appointment is going to be delayed (1 wk) while waiting for more tests on the bone marrow.    Thank you

## 2021-11-09 ENCOUNTER — LAB (OUTPATIENT)
Dept: LAB | Facility: HOSPITAL | Age: 61
End: 2021-11-09

## 2021-11-09 DIAGNOSIS — R59.1 LYMPHADENOPATHY: ICD-10-CM

## 2021-11-09 DIAGNOSIS — D50.9 MICROCYTIC ANEMIA: ICD-10-CM

## 2021-11-09 DIAGNOSIS — E88.09 PLASMA CELL DYSCRASIA: ICD-10-CM

## 2021-11-09 LAB
BASOPHILS # BLD AUTO: 0.06 10*3/MM3 (ref 0–0.2)
BASOPHILS NFR BLD AUTO: 0.8 % (ref 0–1.5)
DEPRECATED RDW RBC AUTO: 38.6 FL (ref 37–54)
EOSINOPHIL # BLD AUTO: 0.52 10*3/MM3 (ref 0–0.4)
EOSINOPHIL NFR BLD AUTO: 7.2 % (ref 0.3–6.2)
ERYTHROCYTE [DISTWIDTH] IN BLOOD BY AUTOMATED COUNT: 14.4 % (ref 12.3–15.4)
HCT VFR BLD AUTO: 43.2 % (ref 37.5–51)
HGB BLD-MCNC: 13.2 G/DL (ref 13–17.7)
IMM GRANULOCYTES # BLD AUTO: 0.04 10*3/MM3 (ref 0–0.05)
IMM GRANULOCYTES NFR BLD AUTO: 0.6 % (ref 0–0.5)
LYMPHOCYTES # BLD AUTO: 1.64 10*3/MM3 (ref 0.7–3.1)
LYMPHOCYTES NFR BLD AUTO: 22.7 % (ref 19.6–45.3)
MCH RBC QN AUTO: 23 PG (ref 26.6–33)
MCHC RBC AUTO-ENTMCNC: 30.6 G/DL (ref 31.5–35.7)
MCV RBC AUTO: 75.1 FL (ref 79–97)
MONOCYTES # BLD AUTO: 0.85 10*3/MM3 (ref 0.1–0.9)
MONOCYTES NFR BLD AUTO: 11.7 % (ref 5–12)
NEUTROPHILS NFR BLD AUTO: 4.13 10*3/MM3 (ref 1.7–7)
NEUTROPHILS NFR BLD AUTO: 57 % (ref 42.7–76)
NRBC BLD AUTO-RTO: 0 /100 WBC (ref 0–0.2)
PLATELET # BLD AUTO: 254 10*3/MM3 (ref 140–450)
PMV BLD AUTO: 9.4 FL (ref 6–12)
RBC # BLD AUTO: 5.75 10*6/MM3 (ref 4.14–5.8)
WBC # BLD AUTO: 7.24 10*3/MM3 (ref 3.4–10.8)

## 2021-11-09 PROCEDURE — 36415 COLL VENOUS BLD VENIPUNCTURE: CPT

## 2021-11-09 PROCEDURE — 85025 COMPLETE CBC W/AUTO DIFF WBC: CPT

## 2021-11-10 LAB
ALBUMIN SERPL ELPH-MCNC: 4.3 G/DL (ref 2.9–4.4)
ALBUMIN/GLOB SERPL: 1.7 {RATIO} (ref 0.7–1.7)
ALPHA1 GLOB SERPL ELPH-MCNC: 0.2 G/DL (ref 0–0.4)
ALPHA2 GLOB SERPL ELPH-MCNC: 0.5 G/DL (ref 0.4–1)
B-GLOBULIN SERPL ELPH-MCNC: 1.1 G/DL (ref 0.7–1.3)
GAMMA GLOB SERPL ELPH-MCNC: 0.8 G/DL (ref 0.4–1.8)
GLOBULIN SER-MCNC: 2.6 G/DL (ref 2.2–3.9)
IGA SERPL-MCNC: 58 MG/DL (ref 90–386)
IGG SERPL-MCNC: 712 MG/DL (ref 603–1613)
IGM SERPL-MCNC: 16 MG/DL (ref 20–172)
INTERPRETATION SERPL IEP-IMP: ABNORMAL
KAPPA LC FREE SER-MCNC: 12.9 MG/L (ref 3.3–19.4)
KAPPA LC FREE/LAMBDA FREE SER: 0.05 {RATIO} (ref 0.26–1.65)
LABORATORY COMMENT REPORT: ABNORMAL
LAMBDA LC FREE SERPL-MCNC: 246.1 MG/L (ref 5.7–26.3)
M PROTEIN SERPL ELPH-MCNC: ABNORMAL G/DL
PROT SERPL-MCNC: 6.9 G/DL (ref 6–8.5)

## 2021-11-16 ENCOUNTER — OFFICE VISIT (OUTPATIENT)
Dept: ONCOLOGY | Facility: CLINIC | Age: 61
End: 2021-11-16

## 2021-11-16 ENCOUNTER — APPOINTMENT (OUTPATIENT)
Dept: LAB | Facility: HOSPITAL | Age: 61
End: 2021-11-16

## 2021-11-16 VITALS
WEIGHT: 217.4 LBS | HEIGHT: 70 IN | RESPIRATION RATE: 16 BRPM | SYSTOLIC BLOOD PRESSURE: 162 MMHG | DIASTOLIC BLOOD PRESSURE: 92 MMHG | TEMPERATURE: 98.2 F | OXYGEN SATURATION: 99 % | BODY MASS INDEX: 31.12 KG/M2 | HEART RATE: 86 BPM

## 2021-11-16 DIAGNOSIS — E88.09 PLASMA CELL DYSCRASIA: Primary | ICD-10-CM

## 2021-11-16 DIAGNOSIS — D50.9 MICROCYTIC ANEMIA: ICD-10-CM

## 2021-11-16 DIAGNOSIS — R59.1 LYMPHADENOPATHY: ICD-10-CM

## 2021-11-16 PROCEDURE — 99215 OFFICE O/P EST HI 40 MIN: CPT | Performed by: INTERNAL MEDICINE

## 2021-11-16 NOTE — PROGRESS NOTES
"Subjective     CHIEF COMPLAINT:      Chief Complaint   Patient presents with   • Follow-up       HISTORY OF PRESENT ILLNESS:     Duy Owens is a 60 y.o. male patient who returns today for follow up on his lymphadenopathy.  He returns today for follow-up accompanied by his significant other.  He reports improvement in the cough since he had a change in his antihypertensive medicine.  He reports having back pain but he has a longstanding history of back pain with frequent exacerbations.    Patient tolerated the CT-guided biopsy on 10/6/2021 well.      ROS:  Pertinent ROS is in the HPI.     Past medical, surgical, social and family history were reviewed.     MEDICATIONS:    Current Outpatient Medications:   •  losartan (Cozaar) 100 MG tablet, Take 1 tablet by mouth Daily., Disp: 30 tablet, Rfl: 2  •  meclizine (ANTIVERT) 25 MG tablet, Take 25 mg by mouth 2 (two) times a day., Disp: , Rfl:     Objective   VITAL SIGNS:     Vitals:    11/16/21 1453   Height: 177.8 cm (70\")     Body mass index is 30.56 kg/m².     Wt Readings from Last 5 Encounters:   10/29/21 96.6 kg (213 lb)   10/25/21 96.2 kg (212 lb)   10/13/21 96.8 kg (213 lb 6.4 oz)   10/06/21 97.5 kg (215 lb)   09/29/21 97.6 kg (215 lb 3.2 oz)       PHYSICAL EXAMINATION:   GENERAL: The patient appears in good general condition, not in acute distress.   SKIN: No ecchymosis.  EYES: No jaundice.  LYMPHATICS: No cervical lymphadenopathy.  CHEST: Normal respiratory effort.   CVS: No edema.    DIAGNOSTIC DATA:           Component      Latest Ref Rng & Units 9/29/2021   LDH      135 - 225 U/L 202   Beta-2 Microglobulin      0.8 - 2.2 mg/L 1.8   Uric Acid      3.4 - 7.0 mg/dL 6.7     PET scan on 10/5/2021:  1.  FDG avid mediastinal and abdominopelvic adenopathy likely  representing malignancy and most suggestive of lymphoma. Correlation  with most recent biopsy results is recommended.  2.  A few subcentimeter pulmonary nodules bilaterally are below PET  resolution and " indeterminate. Continued attention on follow-up is  recommended with chest CT in 3 months to ensure stability.    I personally reviewed the PET scan with the patient during today's visit.  There is significant hypermetabolism in the left para-aortic lymphadenopathy and the left pelvic sidewall lymph nodes.  Mild hyper metabolism was seen in the lymph nodes in the inferior mediastinum posterior to the esophagus.    Pathology exam from 10/6/2021:  1. Lymph Node, Retroperitoneal, Biopsy: Benign lymph node with                 A. Numerous foreign body giant cells surrounding homogenous slightly haematoxyphilic  material.     Comment: The lymph node section shows numerous histiocytes and foreign body giant cells.  These cells surround haematoxyphilic material.    beryl/jse      2. Lymph Node, Retroperitoneal, Flow Cytometry:                 A. Immunophenotyping fails to reveal a monoclonal B-cell papulation.               B.  Flow cytometry revealed no evidence of lymphoma or leukemia.  They were CD45 negative population suspicious for plasma cells representing 3% of the events.    Assessment/Plan   *Lymphadenopathy.    · Patient started having dry cough around July 2021.    · CT chest on 9/23/2021 revealed inferior mediastinal adenopathy.    · CT abdomen and pelvis on 9/24/2021 revealed retroperitoneal lymphadenopathy extending to the iliac chain lymph nodes in the right.    · Largest lymph node is in the retroperitoneal area and measures 4.8 x 3.5 cm.  The lymphadenopathy in the common iliac chain lymph node measured 3.5 x 2.7 cm.  The left external iliac chain lymph node measured 2.9 x 2 cm.    · The lymphadenopathy was concerning for lymphoma.  · PET scan on 10/5/2021 revealed the retroperitoneal and left pelvic lymphadenopathy to be hypermetabolic.  The left periaortic lymph nodes had SUV of 6.9 and the left pelvic sidewall lymph nodes had an SUV of 5.4.  · Patient had CT-guided biopsy on 10/6/2021.  · Pathology exam  revealed benign lymph nodes with foreign body giant cells surrounding homogeneous material.  There were numerous histiocytes and foreign body giant cells.  · Flow cytometry of the lymph node showed no leukemia or lymphoma.  There were CD45 negative population suspicious for plasma cells representing 3% of the cells.  · I discussed the case with the pathologist, Dr. Foley.  According to Dr. Foley, the findings are not consistent with sarcoidosis.  · I explained the findings to the patient.  I explained that the findings are more consistent with inflammation than malignancy.  · The patient did not have any prior history of procedures to the area of the retroperitoneum to explain the presence of the foreign body reaction.  · Since the exact diagnosis could not be made, I requested the specimen to be sent to [facility] for second opinion.    *Microcytic anemia.    · Hemoglobin was 13.1 on 9/29/2021.    · Ferritin and B12 were normal.    · Hemoglobin is 12.6 today.    · This likely represents anemia secondary to inflammation.     PLAN:    1.  Await second opinion review of the biopsy at [facility].  2.  I will see the patient in follow-up in 2 weeks.  We will obtain CBC LDH vitamin B12 and folate levels    I spent 40 minutes caring for Duy on this date of service. This time includes time spent by me in the following activities: preparing for the visit, reviewing tests, obtaining and/or reviewing a separately obtained history, counseling and educating the patient/family/caregiver, ordering medications, tests, or procedures, referring and communicating with other health care professionals, documenting information in the medical record, independently interpreting results and communicating that information with the patient/family/caregiver and care coordination     Dina Washburn MA  11/16/21

## 2021-11-16 NOTE — PROGRESS NOTES
"Subjective     CHIEF COMPLAINT:      Chief Complaint   Patient presents with   • Follow-up       HISTORY OF PRESENT ILLNESS:     Duy Owens is a 60 y.o. male patient who returns today for follow up on his lymphadenopathy. He returns today for follow up and reports having intermittent back pain. The pain is chronic and unchanged from his baseline. It usually increases in intensity if he is inactive and improves with activity. He is not having abdominal pain or early satiety. No fever or chills. He has occasional sweating that is more related to the temp of his home.     Patient reports that he has been gaining weight. No problem with fatigue.     ROS:  Pertinent ROS is in the HPI.     Past medical, surgical, social and family history were reviewed.     MEDICATIONS:    Current Outpatient Medications:   •  losartan (Cozaar) 100 MG tablet, Take 1 tablet by mouth Daily., Disp: 30 tablet, Rfl: 2  •  meclizine (ANTIVERT) 25 MG tablet, Take 25 mg by mouth 2 (two) times a day., Disp: , Rfl:     Objective   VITAL SIGNS:     Vitals:    11/16/21 1453   BP: 162/92   Pulse: 86   Resp: 16   Temp: 98.2 °F (36.8 °C)   TempSrc: Temporal   SpO2: 99%   Weight: 98.6 kg (217 lb 6.4 oz)   Height: 177.8 cm (70\")   PainSc: 0-No pain     Body mass index is 31.19 kg/m².     Wt Readings from Last 5 Encounters:   11/16/21 98.6 kg (217 lb 6.4 oz)   10/29/21 96.6 kg (213 lb)   10/25/21 96.2 kg (212 lb)   10/13/21 96.8 kg (213 lb 6.4 oz)   10/06/21 97.5 kg (215 lb)       PHYSICAL EXAMINATION:   GENERAL: The patient appears in good general condition, not in acute distress.   SKIN: No ecchymosis.  EYES: No jaundice.  LYMPHATICS: No cervical lymphadenopathy.  CHEST: Normal respiratory effort.   CVS: No edema.  ABDOMEN: Soft. No tenderness. No Hepatomegaly. No Splenomegaly. No masses.  EXTREMITIES: Bone marrow biopsy site is well healed.     DIAGNOSTIC DATA:   Component      Latest Ref Rng & Units 11/9/2021   WBC      3.40 - 10.80 10*3/mm3 7.24 "   RBC      4.14 - 5.80 10*6/mm3 5.75   Hemoglobin      13.0 - 17.7 g/dL 13.2   Hematocrit      37.5 - 51.0 % 43.2   MCV      79.0 - 97.0 fL 75.1 (L)   MCH      26.6 - 33.0 pg 23.0 (L)   MCHC      31.5 - 35.7 g/dL 30.6 (L)   RDW      12.3 - 15.4 % 14.4   RDW-SD      37.0 - 54.0 fl 38.6   MPV      6.0 - 12.0 fL 9.4   Platelets      140 - 450 10*3/mm3 254   Neutrophil Rel %      42.7 - 76.0 % 57.0   Lymphocyte Rel %      19.6 - 45.3 % 22.7   Monocyte Rel %      5.0 - 12.0 % 11.7   Eosinophil Rel %      0.3 - 6.2 % 7.2 (H)   Basophil Rel %      0.0 - 1.5 % 0.8   Immature Granulocyte Rel %      0.0 - 0.5 % 0.6 (H)   Neutrophils Absolute      1.70 - 7.00 10*3/mm3 4.13   Lymphocytes Absolute      0.70 - 3.10 10*3/mm3 1.64   Monocytes Absolute      0.10 - 0.90 10*3/mm3 0.85   Eosinophils Absolute      0.00 - 0.40 10*3/mm3 0.52 (H)   Basophils Absolute      0.00 - 0.20 10*3/mm3 0.06   Immature Grans, Absolute      0.00 - 0.05 10*3/mm3 0.04   nRBC      0.0 - 0.2 /100 WBC 0.0       Component      Latest Ref Rng & Units 11/9/2021   IgG      603 - 1613 mg/dL 712   IgA      90 - 386 mg/dL 58 (L)   IgM      20 - 172 mg/dL 16 (L)   Total Protein      6.0 - 8.5 g/dL 6.9   Albumin      2.9 - 4.4 g/dL 4.3   Alpha-1-Globulin      0.0 - 0.4 g/dL 0.2   Alpha-2-Globulin      0.4 - 1.0 g/dL 0.5   Beta Globulin      0.7 - 1.3 g/dL 1.1   Gamma Globulin      0.4 - 1.8 g/dL 0.8   M-Werner      Not Observed g/dL Comment:   Globulin      2.2 - 3.9 g/dL 2.6   A/G Ratio      0.7 - 1.7 1.7   Immunofixation Reflex, Serum       Comment:   Please note       Comment   Kappa FLC      3.3 - 19.4 mg/L 12.9   Free Lambda Light Chains      5.7 - 26.3 mg/L 246.1 (H)   Kappa/Lambda Ratio      0.26 - 1.65 0.05 (L)     Component      Latest Ref Rng & Units 9/29/2021 10/29/2021   Iron      59 - 158 mcg/dL 104    Iron Saturation      20 - 50 % 25    Transferrin      200 - 360 mg/dL 275    TIBC      298 - 536 mcg/dL 410    Ferritin      30.00 - 400.00 ng/mL 368.10     Vitamin B-12      211 - 946 pg/mL  869   Folate      4.78 - 24.20 ng/mL  6.06     Pathology exam from 10/29/2021:  A. Normocellular bone marrow (50%) with plasma cell dyscrasia (plasma cells comprise up to 20-30% of       total cells by  stain).  See comment.               B. Mildly increased iron stores.     Comment:   The patient's history of lymphadenopathy is noted (review of submitted outside medical records/clinical notes is performed).  No atypical lymphoid infiltrate is identified by flow cytometry ([id number]) or by morphologic assessment.  Given this, overall findings favor a primary plasma cell dyscrasia.     Assessment/Plan   *Plasma cell disorder concerning for multiple myeloma with lymph node involvement and deposition of amyloid in the LNs.  · Patient started having dry cough around July 2021.    · CT chest on 9/23/2021 revealed inferior mediastinal adenopathy.    · CT abdomen and pelvis on 9/24/2021 revealed retroperitoneal lymphadenopathy extending to the iliac chain lymph nodes on the right.    · The Largest lymph node was in the retroperitoneal area measuring 4.8 x 3.5 cm.  The common iliac lymph node measured 3.5 x 2.7 cm.  The left external iliac chain lymph node measured 2.9 x 2 cm.    · PET scan on 10/5/2021 revealed the retroperitoneal and left pelvic lymphadenopathy to be hypermetabolic.  The left periaortic lymph nodes had SUV of 6.9 and the left pelvic sidewall lymph nodes had an SUV of 5.4.  · Patient had CT-guided biopsy on 10/6/2021.  · The specimen was sent to [facility] for consultation. The pathologist reported involvement with monotypic lambda restricted plasma cells concerning for involvement with plasma cell dyscrasia. Lymphoma was considered less likely.  · Patient was referred to surgery for an excisional biopsy. However, the surgery was considered to be very extensive due to the posterior location of the lymph nodes.   · Bone marrow biopsy on 10/29/2021 revealed a  normocellular marrow (50%) with involvement with plasma cell dyscrasia (plasma cells representing 20-30% of total cells by  stain).   · FISH was negative for gain of 1q, monosomy/deletions of chromosomes 13 and 17, IGH rearrangement, gain of chromosomes 9 and 11, IGH-CCND1 (11;14) fusion.   · I explained the findings to the patient and his s.o. I explained that the picture is most consistent with multiple myeloma with lymph node involvement. I explained that this is an atypical presentation of multiple myeloma since he does not have evidence of bone involvement on the PET scan.   · I explained the disease, the presentation and the treatment. I explained that this is a treatable condition that can be put in remission with induction chemotherapy, bone marrow transplantation, +/- consolidation and maintenance.   · Due to the atypical presentation and the presence of amyloid deposit in the lymph node, I recommended a referral to the myeloma clinic at [facility].     *Microcytic anemia.    · Hemoglobin was 13.1 on 9/29/2021.    · Iron, folate and vitamin B12 were normal in September/October.    · Hemoglobin was 13.2 on 11/9/2021.    PLAN:    1.  Refer to the myeloma clinic at [facility].  2.  Await 24 hr urine protein electrophoresis and immunofixation.  3.  Follow up in 3 weeks. We will obtain a CBC and CMP at his return visit.     I spent 45 minutes caring for Duy on this date of service. This time includes time spent by me in the following activities: preparing for the visit, reviewing tests, obtaining and/or reviewing a separately obtained history, performing a medically appropriate examination and/or evaluation, counseling and educating the patient/family/caregiver, ordering medications, tests, or procedures, referring and communicating with other health care professionals, documenting information in the medical record, independently interpreting results and communicating that  information with the patient/family/caregiver and care coordination       Tima Mendez MD  11/16/21

## 2021-11-17 ENCOUNTER — TELEPHONE (OUTPATIENT)
Dept: ONCOLOGY | Facility: CLINIC | Age: 61
End: 2021-11-17

## 2021-11-17 LAB
ALBUMIN 24H MFR UR ELPH: 55.9 %
ALPHA1 GLOB 24H MFR UR ELPH: 5.7 %
ALPHA2 GLOB 24H MFR UR ELPH: 13.1 %
B-GLOBULIN MFR UR ELPH: 16.3 %
GAMMA GLOB 24H MFR UR ELPH: 9.1 %
HIV 1 & 2 AB SER-IMP: ABNORMAL
INTERPRETATION UR IFE-IMP: ABNORMAL
M PROTEIN 24H MFR UR ELPH: ABNORMAL %
PROT 24H UR-MRATE: 213 MG/24 HR (ref 30–150)
PROT UR-MCNC: 15.2 MG/DL

## 2021-11-18 ENCOUNTER — TELEPHONE (OUTPATIENT)
Dept: ONCOLOGY | Facility: CLINIC | Age: 61
End: 2021-11-18

## 2021-11-18 NOTE — TELEPHONE ENCOUNTER
Caller: Duy Owens    Relationship: Self    Best call back number: [phone]    What is the best time to reach you: ASAP    Who are you requesting to speak with (clinical staff, provider,  specific staff member): NURSE    Do you know the name of the person who called:     What was the call regarding: PT HAS QUESTIONS ABOUT UPCOMING APPTS    Do you require a callback: YES

## 2021-11-18 NOTE — TELEPHONE ENCOUNTER
Pt was asking to see someone at [facility] sooner than 12/4. Scheduling attempted to get an appt sooner without success. The pt talked to [facility] and was put on a wait list if someone else ahead of him cancels. Pt understands.

## 2021-11-19 NOTE — TELEPHONE ENCOUNTER
JOEY MINER CALLED TO ASK ABOUT HIS APPT. AT [facility] AS YOU TOLD HIM 12/4 BUT HE HAD WRITTEN DOWN 12/14, I CALLED [facility] & CONFIRMED IT WAS 12/14 AT 2 O'CLOCK WITH DR. RICE.  JOEY IS INQUIRING IF THERE IS ANOTHER PLACE HE COULD BE REF. TO TO GET IN SOONER.  PLEASE CALL TO [facility], IF NO ANSWER, PLEASE LEAVE A V/M.

## 2021-11-23 NOTE — TELEPHONE ENCOUNTER
Encouraged patient to keep appointment and occasionally call to see if there is an earlier appt.  Very difficult with holidays.  Pt verbalized understanding.

## 2021-12-07 ENCOUNTER — TELEPHONE (OUTPATIENT)
Dept: ONCOLOGY | Facility: CLINIC | Age: 61
End: 2021-12-07

## 2021-12-07 NOTE — TELEPHONE ENCOUNTER
Spoke with the pt and let him know that Dr. Mendez wants our scheduling department to work on the referral and choose a different physician to see the pt so he does not have to wait until January. We will keep him updated. He v/u

## 2021-12-07 NOTE — TELEPHONE ENCOUNTER
Caller: Joey Owens    Relationship: Self    Best call back number:[phone]    What is the best time to reach you: ANYTIME     Who are you requesting to speak with (clinical staff, provider,  specific staff member): DR MESSER    Do you know the name of the person who called: JOEY    What was the call regarding:   WAS SCHEDULED TO GO TO [facility] NEXT WEEK AND [facility] CALLED TODAY AND JOEY WAS TOLD THE DR HE WAS TO  SEE, WILL BE OUT AND WILL NOT BE ABLE TO BE SEE JOEY UNTIL [facility] COOK  WANTED TO KNOW WHAT DR MESSER WOULD LIKE TO DO FROM HERE SINCE NOT ABLE TO BE SEEN AT [facility] NEXT WEEK     Do you require a callback:  YES

## 2021-12-10 ENCOUNTER — APPOINTMENT (OUTPATIENT)
Dept: LAB | Facility: HOSPITAL | Age: 61
End: 2021-12-10

## 2021-12-15 ENCOUNTER — TELEPHONE (OUTPATIENT)
Dept: ONCOLOGY | Facility: CLINIC | Age: 61
End: 2021-12-15

## 2021-12-15 NOTE — TELEPHONE ENCOUNTER
Caller: Duy Owens    Relationship: Self    Best call back number: [phone]    What is the best time to reach you: ANYTIME    Who are you requesting to speak with (clinical staff, provider,  specific staff member): DR MESSER OR NURSE    What was the call regarding: PT CALLED, SAID HE WENT TO [place] LAST WEEK FOR A CONSULT AND WAS JUST WONDERING IF DR MESSER HAD REVIEWED THE TREATMENT AND WHAT THE PLAN WAS.     Do you require a callback: YES, PLS CALL PT TO [facility].

## 2021-12-15 NOTE — TELEPHONE ENCOUNTER
Let pt know that Dr. Craig will discuss all future plan of care with him on Friday 12/17/21. He v/u.

## 2021-12-15 NOTE — TELEPHONE ENCOUNTER
PATIENT CALLED BACK STATING THAT HIS PREVIOUS CALL HAD DROPPED.  ASSURED PATIENT THAT HIS MESSAGE HAD BEEN SENT THROUGH AND THAT THE OFFICE WOULD BE CALLING HIM BACK.  PATIENT WAS FRUSTRATED BUT V/U. PATIENT STATED THAT HE HAD ALSO CALLED YESTERDAY AND HAD NOT REC'D A CALL BACK.

## 2021-12-15 NOTE — TELEPHONE ENCOUNTER
----- Message from Milka Anthony RN sent at 12/15/2021  3:12 PM EST -----  Hello  This pt needs to be on Dr. Mendez's schedule tomorrow in place of Mikal Enamorado, per Dr. Mendez. Mikal does not need to follow up with our office anymore as he is receiving treatment with [facility], please cancel [facility] appt and place Duy Owens there instead. 12/16/1 @ 8:20    Thank you   Please call me with any questions

## 2021-12-16 ENCOUNTER — LAB (OUTPATIENT)
Dept: LAB | Facility: HOSPITAL | Age: 61
End: 2021-12-16

## 2021-12-16 ENCOUNTER — DOCUMENTATION (OUTPATIENT)
Dept: ONCOLOGY | Facility: CLINIC | Age: 61
End: 2021-12-16

## 2021-12-16 ENCOUNTER — DOCUMENTATION (OUTPATIENT)
Dept: PHARMACY | Facility: HOSPITAL | Age: 61
End: 2021-12-16

## 2021-12-16 ENCOUNTER — SPECIALTY PHARMACY (OUTPATIENT)
Dept: PHARMACY | Facility: HOSPITAL | Age: 61
End: 2021-12-16

## 2021-12-16 ENCOUNTER — OFFICE VISIT (OUTPATIENT)
Dept: ONCOLOGY | Facility: CLINIC | Age: 61
End: 2021-12-16

## 2021-12-16 VITALS
DIASTOLIC BLOOD PRESSURE: 82 MMHG | WEIGHT: 216.2 LBS | SYSTOLIC BLOOD PRESSURE: 135 MMHG | OXYGEN SATURATION: 100 % | TEMPERATURE: 98.5 F | HEIGHT: 70 IN | RESPIRATION RATE: 20 BRPM | HEART RATE: 68 BPM | BODY MASS INDEX: 30.95 KG/M2

## 2021-12-16 DIAGNOSIS — E85.81 AL AMYLOIDOSIS (HCC): ICD-10-CM

## 2021-12-16 DIAGNOSIS — D50.9 MICROCYTIC ANEMIA: ICD-10-CM

## 2021-12-16 DIAGNOSIS — Z79.899 ENCOUNTER FOR LONG-TERM (CURRENT) USE OF HIGH-RISK MEDICATION: ICD-10-CM

## 2021-12-16 DIAGNOSIS — R59.1 LYMPHADENOPATHY: ICD-10-CM

## 2021-12-16 DIAGNOSIS — C90.00 MULTIPLE MYELOMA NOT HAVING ACHIEVED REMISSION (HCC): Primary | ICD-10-CM

## 2021-12-16 DIAGNOSIS — E88.09 PLASMA CELL DYSCRASIA: ICD-10-CM

## 2021-12-16 DIAGNOSIS — D63.0 ANEMIA IN NEOPLASTIC DISEASE: ICD-10-CM

## 2021-12-16 LAB
ALBUMIN SERPL-MCNC: 4.8 G/DL (ref 3.5–5.2)
ALBUMIN/GLOB SERPL: 2.2 G/DL (ref 1.1–2.4)
ALP SERPL-CCNC: 84 U/L (ref 38–116)
ALT SERPL W P-5'-P-CCNC: 23 U/L (ref 0–41)
ANION GAP SERPL CALCULATED.3IONS-SCNC: 10.8 MMOL/L (ref 5–15)
AST SERPL-CCNC: 22 U/L (ref 0–40)
BASOPHILS # BLD AUTO: 0.06 10*3/MM3 (ref 0–0.2)
BASOPHILS NFR BLD AUTO: 0.8 % (ref 0–1.5)
BILIRUB SERPL-MCNC: 0.5 MG/DL (ref 0.2–1.2)
BUN SERPL-MCNC: 10 MG/DL (ref 6–20)
BUN/CREAT SERPL: 10.4 (ref 7.3–30)
CALCIUM SPEC-SCNC: 9.5 MG/DL (ref 8.5–10.2)
CHLORIDE SERPL-SCNC: 103 MMOL/L (ref 98–107)
CO2 SERPL-SCNC: 26.2 MMOL/L (ref 22–29)
CREAT SERPL-MCNC: 0.96 MG/DL (ref 0.7–1.3)
DEPRECATED RDW RBC AUTO: 39 FL (ref 37–54)
EOSINOPHIL # BLD AUTO: 0.57 10*3/MM3 (ref 0–0.4)
EOSINOPHIL NFR BLD AUTO: 8 % (ref 0.3–6.2)
ERYTHROCYTE [DISTWIDTH] IN BLOOD BY AUTOMATED COUNT: 14.5 % (ref 12.3–15.4)
GFR SERPL CREATININE-BSD FRML MDRD: 97 ML/MIN/1.73
GLOBULIN UR ELPH-MCNC: 2.2 GM/DL (ref 1.8–3.5)
GLUCOSE SERPL-MCNC: 133 MG/DL (ref 74–124)
HCT VFR BLD AUTO: 41.4 % (ref 37.5–51)
HGB BLD-MCNC: 12.8 G/DL (ref 13–17.7)
IMM GRANULOCYTES # BLD AUTO: 0.04 10*3/MM3 (ref 0–0.05)
IMM GRANULOCYTES NFR BLD AUTO: 0.6 % (ref 0–0.5)
LYMPHOCYTES # BLD AUTO: 1.73 10*3/MM3 (ref 0.7–3.1)
LYMPHOCYTES NFR BLD AUTO: 24.2 % (ref 19.6–45.3)
MCH RBC QN AUTO: 23.3 PG (ref 26.6–33)
MCHC RBC AUTO-ENTMCNC: 30.9 G/DL (ref 31.5–35.7)
MCV RBC AUTO: 75.3 FL (ref 79–97)
MONOCYTES # BLD AUTO: 0.77 10*3/MM3 (ref 0.1–0.9)
MONOCYTES NFR BLD AUTO: 10.8 % (ref 5–12)
NEUTROPHILS NFR BLD AUTO: 3.99 10*3/MM3 (ref 1.7–7)
NEUTROPHILS NFR BLD AUTO: 55.6 % (ref 42.7–76)
NRBC BLD AUTO-RTO: 0 /100 WBC (ref 0–0.2)
PLATELET # BLD AUTO: 256 10*3/MM3 (ref 140–450)
PMV BLD AUTO: 9.2 FL (ref 6–12)
POTASSIUM SERPL-SCNC: 4.6 MMOL/L (ref 3.5–4.7)
PROT SERPL-MCNC: 7 G/DL (ref 6.3–8)
RBC # BLD AUTO: 5.5 10*6/MM3 (ref 4.14–5.8)
SODIUM SERPL-SCNC: 140 MMOL/L (ref 134–145)
WBC NRBC COR # BLD: 7.16 10*3/MM3 (ref 3.4–10.8)

## 2021-12-16 PROCEDURE — 85025 COMPLETE CBC W/AUTO DIFF WBC: CPT

## 2021-12-16 PROCEDURE — 99215 OFFICE O/P EST HI 40 MIN: CPT | Performed by: INTERNAL MEDICINE

## 2021-12-16 PROCEDURE — 36415 COLL VENOUS BLD VENIPUNCTURE: CPT

## 2021-12-16 PROCEDURE — 80053 COMPREHEN METABOLIC PANEL: CPT

## 2021-12-16 NOTE — PROGRESS NOTES
"Subjective     CHIEF COMPLAINT:      Chief Complaint   Patient presents with   • Follow-up     no new concerns       HISTORY OF PRESENT ILLNESS:     Duy Owens is a 60 y.o. male patient who returns today for follow up on his multiple myeloma. He returns today for follow up reporting intermittent cough. The cough is dry. He is not currently having pain in the chest or back.       ROS:  Pertinent ROS is in the HPI.     Past medical, surgical, social and family history were reviewed.     MEDICATIONS:    Current Outpatient Medications:   •  losartan (Cozaar) 100 MG tablet, Take 1 tablet by mouth Daily., Disp: 30 tablet, Rfl: 2  •  meclizine (ANTIVERT) 25 MG tablet, Take 25 mg by mouth 2 (two) times a day., Disp: , Rfl:     Objective   VITAL SIGNS:     Vitals:    12/16/21 0816   BP: 135/82   Pulse: 68   Resp: 20   Temp: 98.5 °F (36.9 °C)   TempSrc: Oral   SpO2: 100%   Weight: 98.1 kg (216 lb 3.2 oz)   Height: 177.8 cm (70\")   PainSc: 0-No pain     Body mass index is 31.02 kg/m².     Wt Readings from Last 5 Encounters:   12/16/21 98.1 kg (216 lb 3.2 oz)   11/16/21 98.6 kg (217 lb 6.4 oz)   10/29/21 96.6 kg (213 lb)   10/25/21 96.2 kg (212 lb)   10/13/21 96.8 kg (213 lb 6.4 oz)       PHYSICAL EXAMINATION:   GENERAL: The patient appears in good general condition, not in acute distress.   SKIN: No ecchymosis.  EYES: No jaundice.  LYMPHATICS: No cervical lymphadenopathy.  CHEST: Normal respiratory effort. Lungs clear bilaterally. No added sounds.   CVS: Normal S1 and S2. No murmurs.  ABDOMEN: Soft. No tenderness. No Hepatomegaly. No Splenomegaly. No masses.  EXTREMITIES: No edema or calf tenderness.     DIAGNOSTIC DATA:     Results from last 7 days   Lab Units 12/16/21  0737   WBC 10*3/mm3 7.16   NEUTROS ABS 10*3/mm3 3.99   HEMOGLOBIN g/dL 12.8*   HEMATOCRIT % 41.4   PLATELETS 10*3/mm3 256     Results from last 7 days   Lab Units 12/16/21  0737   SODIUM mmol/L 140   POTASSIUM mmol/L 4.6   CHLORIDE mmol/L 103   CO2 mmol/L " 26.2   BUN mg/dL 10   CREATININE mg/dL 0.96   CALCIUM mg/dL 9.5   ALBUMIN g/dL 4.80   BILIRUBIN mg/dL 0.5   ALK PHOS U/L 84   ALT (SGPT) U/L 23   AST (SGOT) U/L 22   GLUCOSE mg/dL 133*       Pathology exam from [facility]:  1) LYMPH NODE, RETROPERITONEAL, CORE BIOPSY ([id number], 10/06/2021): PLASMA CELL NEOPLASM WITH AL (LAMBDA)-TYPE AMYLOIDOSIS; SEE IMPRESSION AND COMMENT #1     2) BONE MARROW - ASPIRATE SMEAR AND CORE BIOPSY ([id number], 10/29/2021): NORMOCELLULAR MARROW WITH INVOLVEMENT BY A PLASMA CELL NEOPLASM; CONGO RED STAIN NEGATIVE FOR AMYLOID;     IMPRESSION: Specimen #1 shows small fragments of lymph node involved by a lambda-restricted plasma cell infiltrate with associated AL (lambda)-type amyloid deposition. Specimen #2 shows a normocellular marrow (40% cellular) with maturing trilineage hematopoiesis and increased plasma cells (18.5% by manual differential), which show monotypic lambda light chain expression by reported flow cytometry. A Congo red stain performed on the bone marrow is negative for amyloid deposition. The findings are consistent with bone marrow involvement by a plasma cell neoplasm. Flow cytometry performed on both specimens showed no evidence of a monoclonal B cell population. Correlation with clinical and other laboratory findings is advised.       Assessment/Plan   *Plasma cell disorder most consistent with multiple myeloma with lymph node involvement and amyloid deposition in the involved lymph nodes.  · Patient started having dry cough around July 2021.    · CT chest on 9/23/2021 revealed inferior mediastinal adenopathy.    · CT abdomen and pelvis on 9/24/2021 revealed retroperitoneal lymphadenopathy extending to the iliac chain lymph nodes on the right.    · The Largest lymph node was in the retroperitoneal area measuring 4.8 x 3.5 cm.  The common iliac lymph node measured 3.5 x 2.7 cm.  The left external iliac chain lymph node measured 2.9 x 2 cm.    · PET scan on  10/5/2021 revealed the retroperitoneal and left pelvic lymphadenopathy to be hypermetabolic.  The left periaortic lymph nodes had SUV of 6.9 and the left pelvic sidewall lymph nodes had an SUV of 5.4.  · Patient had CT-guided biopsy on 10/6/2021.  · The specimen was sent to [facility] for consultation. The pathologist reported involvement with monotypic lambda restricted plasma cells concerning for involvement with plasma cell dyscrasia. Lymphoma was considered less likely.  · Patient was referred to surgery for an excisional biopsy. However, the surgery was considered to be very extensive due to the posterior location of the lymph nodes.   · Bone marrow biopsy on 10/29/2021 revealed a normocellular marrow (50%) with involvement with plasma cell dyscrasia (plasma cells representing 20-30% of total cells by  stain).   · FISH was negative for gain of 1q, monosomy/deletions of chromosomes 13 and 17, IGH rearrangement, gain of chromosomes 9 and 11, IGH-CCND1 (11;14) fusion.   · I this was considered to represent multiple myeloma with lymph node involvement and amyloid deposition.    · There was no bone involvement on PET scan.   · Patient was referred to [facility] [place], operated by [facility].  He was seen by Dr. Felix.  They concurred with the diagnosis of multiple myeloma with AL amyloidosis and involvement of the lymph nodes.  · I discussed the case with Dr. Felix.  He recommended treatment with the VRD regimen.  He recommended repeating PET scan after 4 cycles.   · He recommended obtaining cardiac MRI. However, the cardiac MRI could not be requested.  · I recommended a referral to the Cardiac Amyloid clinic at [facility].      *Microcytic anemia.    · Hemoglobin was 13.1 on 9/29/2021.    · Iron, folate and vitamin B12 were normal in September/October.    · Hemoglobin was 13.2 on 11/9/2021.   · Hemoglobin is today at 12.8.   · The anemia is attributed to the multiple myeloma.     *Prophylaxis.  · I recommended starting ASA  81 mg daily for DVT prophylaxis while on Revlimid.  · I recommended Acyclovir 400 mg twice daily for VZV prophylaxis.  · I recommended Bactrim DS three days weekly for PCP prophylaxis.     PLAN:    1.  We will refer the patient to Cardiology - Dr. Stephenson.  2.  We will schedule the patient for chemotherapy education with the Pharmacist.   3.  We will start the patient on the VRD regimen starting next week.   4.  I will see the patient in follow up in 2 weeks.     I spent 50 minutes caring for Duy on this date of service. This time includes time spent by me in the following activities: preparing for the visit, reviewing tests, obtaining and/or reviewing a separately obtained history, performing a medically appropriate examination and/or evaluation, counseling and educating the patient/family/caregiver, ordering medications, tests, or procedures, referring and communicating with other health care professionals, documenting information in the medical record, independently interpreting results and communicating that information with the patient/family/caregiver and care coordination     Tima Mendez MD  12/16/21

## 2021-12-16 NOTE — PROGRESS NOTES
I received the following staff msg:     Sepideh Avila, Diane Hou, Pharmacy Technician           Previous Messages       ----- Message -----   From: Tima Mendez MD   Sent: 12/16/2021   8:54 AM EST   To: [facility] Oral Onc Pool     I saw the patient today and I recommended starting Revlimid 25 mg daily for 14 days of a 21-day cycle as part of the VRD regimen.  We are working on starting treatment next Wednesday.     Thank you     I have submitted the Prior Auth.    Diane Higginbotham - Care Coordinator   12/16/2021  10:20 EST

## 2021-12-17 ENCOUNTER — SPECIALTY PHARMACY (OUTPATIENT)
Dept: ONCOLOGY | Facility: HOSPITAL | Age: 61
End: 2021-12-17

## 2021-12-17 ENCOUNTER — APPOINTMENT (OUTPATIENT)
Dept: LAB | Facility: HOSPITAL | Age: 61
End: 2021-12-17

## 2021-12-17 ENCOUNTER — APPOINTMENT (OUTPATIENT)
Dept: ONCOLOGY | Facility: HOSPITAL | Age: 61
End: 2021-12-17

## 2021-12-17 DIAGNOSIS — C90.00 MULTIPLE MYELOMA NOT HAVING ACHIEVED REMISSION (HCC): Primary | ICD-10-CM

## 2021-12-17 RX ORDER — SULFAMETHOXAZOLE AND TRIMETHOPRIM 800; 160 MG/1; MG/1
1 TABLET ORAL 3 TIMES WEEKLY
Qty: 12 TABLET | Refills: 5 | Status: SHIPPED | OUTPATIENT
Start: 2021-12-17 | End: 2022-05-19 | Stop reason: HOSPADM

## 2021-12-17 RX ORDER — ASPIRIN 81 MG/1
81 TABLET, CHEWABLE ORAL DAILY
COMMUNITY
End: 2022-05-04

## 2021-12-17 RX ORDER — ACYCLOVIR 400 MG/1
400 TABLET ORAL 2 TIMES DAILY
Qty: 60 TABLET | Refills: 11 | Status: SHIPPED | OUTPATIENT
Start: 2021-12-17 | End: 2022-08-24 | Stop reason: ALTCHOICE

## 2021-12-17 RX ORDER — DEXAMETHASONE 4 MG/1
40 TABLET ORAL WEEKLY
Qty: 40 TABLET | Refills: 3 | Status: SHIPPED | OUTPATIENT
Start: 2021-12-17 | End: 2022-04-11

## 2021-12-17 RX ORDER — LENALIDOMIDE 25 MG/1
25 CAPSULE ORAL DAILY
Qty: 14 CAPSULE | Refills: 0 | Status: SHIPPED | OUTPATIENT
Start: 2021-12-17 | End: 2022-01-03

## 2021-12-17 RX ORDER — ONDANSETRON HYDROCHLORIDE 8 MG/1
8 TABLET, FILM COATED ORAL 3 TIMES DAILY PRN
Qty: 30 TABLET | Refills: 5 | Status: SHIPPED | OUTPATIENT
Start: 2021-12-17

## 2021-12-17 NOTE — PROGRESS NOTES
[facility] Oral Chemo Education Appointment:     Patient Name/:  Duy Owens  1960    Medication Regimen (including dosing/administration):  Velcade 1.3 mg/m2 subcutaneously weekly, Dexamethasone 40 mg po weekly, and Revlimid 25 mg po daily for 14 days on, then 7 days off  Date to Start Medication: 21    Diagnosis/Indication: Multiple Myeloma  Expected duration of therapy: at least 4 cycle and then reevaulate  Side effects reviewed with patient including: decreased WBCs, decreased platelets, diarrhea, anemia, fatigue, constipation, rash/itchy skin, back pain/muscle spasms, nasuea/vomiting, birth defects, and blood clots  Preventative/supportive medications: ondansetron, acyclovir, imodium, and Bactrim DS, aspirin       Additional counseling:   - Reviewed proper administration: Discussed if the patient is handling their own medications, then they need to wash their hands properly after touching the medication.  If a caregiver is assisting with handling medication, need to wear disposal gloves and wash hands properly afterwards. Patient was counseled to take Revlimid with or without food, at the same time each day.   - Reviewed prior storage of medication: Store medication safe away from children and pets, away from light, and at room temperature. If you use a pill box, use a separate pill box from other medication.   - Counseled patient on safe handling of soiled linen and proper flush technique and reviewed proper disposal of medication.   - Reviewed what to do in the event of a missed dose: Do not take the missed dose if it has been more than 12 hours since you should have taken it. Simply take the next dose at the regularly scheduled time.  - Reviewed expected goals/outcomes, contraindications and safety precautions, including when to seek medical care.  - Counseled that women should not become pregnant and men should not get a partner pregnant while taking; men and women of childbearing age and potential  should use effective contraception during and after therapy.    Provided patient with:   Chemo calendar to help improve adherence., Education sheets about the medication, 24-hour clinic phone number and my contact information and instructions to call should additional questions arise.     Completed medication reconciliation today to assess for drug interactions.   Reviewed concomitant medications, allergies, labs, comorbidities/medical history, quality of life( newly diagnosed multiple myeloma dn he could not comment yet- treatment has not started, and immunization history. Verified he has taken 3 COVID 19 vaccines  Drug-drug interactions noted: no significant drug interactions noted.   Advised pt to call the clinic if any new medications are started so we can assess for drug-drug interactions     Wrap-up:  Discussed aforementioned material with patient in person, face-to-face, in clinic.   Chemo consents/CCA were signed at today's visit.   Medication availability: [facility] is calling [facility] to expedite Revlimid deliver for a 12/22 planned arrival  Patient expressed understanding.   Patient expressed ability to self-administer medication. All questions and concerns addressed.     Dexamethasone side effects were reviewed:     Increased appetite, irritability, insomnai, fluid retention, heartburn, muscle weakness, impaired wound healing, hyperglycemia, cataracts and bone thinning.    Velcade side effects were reviewed- overlapping side effects with Revlimid as well as injection site redness and peripheral neuropathy.,    Sepideh Avila RPH  12/17/2021  09:36 EST

## 2021-12-20 ENCOUNTER — DOCUMENTATION (OUTPATIENT)
Dept: PHARMACY | Facility: HOSPITAL | Age: 61
End: 2021-12-20

## 2021-12-20 PROBLEM — Z79.899 ENCOUNTER FOR LONG-TERM (CURRENT) USE OF HIGH-RISK MEDICATION: Status: ACTIVE | Noted: 2021-12-20

## 2021-12-20 NOTE — PROGRESS NOTES
On 12/17/21 I connected Mr. Owens with a rep from [facility] to coordinate shipment of his revlimid.     Diane Higginbotham - Care Coordinator   12/20/2021  10:11 EST

## 2021-12-21 ENCOUNTER — SPECIALTY PHARMACY (OUTPATIENT)
Dept: PHARMACY | Facility: HOSPITAL | Age: 61
End: 2021-12-21

## 2021-12-22 ENCOUNTER — INFUSION (OUTPATIENT)
Dept: ONCOLOGY | Facility: HOSPITAL | Age: 61
End: 2021-12-22

## 2021-12-22 ENCOUNTER — LAB (OUTPATIENT)
Dept: OTHER | Facility: HOSPITAL | Age: 61
End: 2021-12-22

## 2021-12-22 VITALS
WEIGHT: 219 LBS | OXYGEN SATURATION: 100 % | HEART RATE: 72 BPM | TEMPERATURE: 98.8 F | HEIGHT: 70 IN | SYSTOLIC BLOOD PRESSURE: 154 MMHG | DIASTOLIC BLOOD PRESSURE: 83 MMHG | BODY MASS INDEX: 31.35 KG/M2 | RESPIRATION RATE: 18 BRPM

## 2021-12-22 DIAGNOSIS — C90.00 MULTIPLE MYELOMA NOT HAVING ACHIEVED REMISSION (HCC): Primary | ICD-10-CM

## 2021-12-22 DIAGNOSIS — Z79.899 ENCOUNTER FOR LONG-TERM (CURRENT) USE OF HIGH-RISK MEDICATION: ICD-10-CM

## 2021-12-22 DIAGNOSIS — C90.00 MULTIPLE MYELOMA NOT HAVING ACHIEVED REMISSION (HCC): ICD-10-CM

## 2021-12-22 LAB
ALBUMIN SERPL-MCNC: 4.9 G/DL (ref 3.5–5.2)
ALBUMIN/GLOB SERPL: 2.2 G/DL
ALP SERPL-CCNC: 94 U/L (ref 39–117)
ALT SERPL W P-5'-P-CCNC: 25 U/L (ref 1–41)
ANION GAP SERPL CALCULATED.3IONS-SCNC: 9 MMOL/L (ref 5–15)
AST SERPL-CCNC: 25 U/L (ref 1–40)
BASOPHILS # BLD AUTO: 0.05 10*3/MM3 (ref 0–0.2)
BASOPHILS NFR BLD AUTO: 0.6 % (ref 0–1.5)
BILIRUB SERPL-MCNC: 0.2 MG/DL (ref 0–1.2)
BUN SERPL-MCNC: 13 MG/DL (ref 8–23)
BUN/CREAT SERPL: 12.7 (ref 7–25)
CALCIUM SPEC-SCNC: 9.5 MG/DL (ref 8.6–10.5)
CHLORIDE SERPL-SCNC: 104 MMOL/L (ref 98–107)
CO2 SERPL-SCNC: 26 MMOL/L (ref 22–29)
CREAT SERPL-MCNC: 1.02 MG/DL (ref 0.76–1.27)
DEPRECATED RDW RBC AUTO: 39.2 FL (ref 37–54)
EOSINOPHIL # BLD AUTO: 0.48 10*3/MM3 (ref 0–0.4)
EOSINOPHIL NFR BLD AUTO: 6 % (ref 0.3–6.2)
ERYTHROCYTE [DISTWIDTH] IN BLOOD BY AUTOMATED COUNT: 14.8 % (ref 12.3–15.4)
GFR SERPL CREATININE-BSD FRML MDRD: 90 ML/MIN/1.73
GLOBULIN UR ELPH-MCNC: 2.2 GM/DL
GLUCOSE SERPL-MCNC: 179 MG/DL (ref 65–99)
HCT VFR BLD AUTO: 42.5 % (ref 37.5–51)
HGB BLD-MCNC: 13.1 G/DL (ref 13–17.7)
HYPOCHROMIA BLD QL: NORMAL
IMM GRANULOCYTES # BLD AUTO: 0.03 10*3/MM3 (ref 0–0.05)
IMM GRANULOCYTES NFR BLD AUTO: 0.4 % (ref 0–0.5)
LYMPHOCYTES # BLD AUTO: 1.24 10*3/MM3 (ref 0.7–3.1)
LYMPHOCYTES NFR BLD AUTO: 15.6 % (ref 19.6–45.3)
MCH RBC QN AUTO: 23 PG (ref 26.6–33)
MCHC RBC AUTO-ENTMCNC: 30.8 G/DL (ref 31.5–35.7)
MCV RBC AUTO: 74.7 FL (ref 79–97)
MONOCYTES # BLD AUTO: 0.53 10*3/MM3 (ref 0.1–0.9)
MONOCYTES NFR BLD AUTO: 6.6 % (ref 5–12)
NEUTROPHILS NFR BLD AUTO: 5.64 10*3/MM3 (ref 1.7–7)
NEUTROPHILS NFR BLD AUTO: 70.8 % (ref 42.7–76)
NRBC BLD AUTO-RTO: 0 /100 WBC (ref 0–0.2)
PLAT MORPH BLD: NORMAL
PLATELET # BLD AUTO: 261 10*3/MM3 (ref 140–450)
PMV BLD AUTO: 9.8 FL (ref 6–12)
POTASSIUM SERPL-SCNC: 3.9 MMOL/L (ref 3.5–5.2)
PROT SERPL-MCNC: 7.1 G/DL (ref 6–8.5)
RBC # BLD AUTO: 5.69 10*6/MM3 (ref 4.14–5.8)
SODIUM SERPL-SCNC: 139 MMOL/L (ref 136–145)
T-UPTAKE NFR SERPL: 1.11 TBI (ref 0.8–1.3)
T4 SERPL-MCNC: 5.13 MCG/DL (ref 4.5–11.7)
TSH SERPL DL<=0.05 MIU/L-ACNC: 1.08 UIU/ML (ref 0.27–4.2)
WBC MORPH BLD: NORMAL
WBC NRBC COR # BLD: 7.97 10*3/MM3 (ref 3.4–10.8)

## 2021-12-22 PROCEDURE — 36415 COLL VENOUS BLD VENIPUNCTURE: CPT

## 2021-12-22 PROCEDURE — 25010000002 BORTEZOMIB PER 0.1 MG: Performed by: NURSE PRACTITIONER

## 2021-12-22 PROCEDURE — 84479 ASSAY OF THYROID (T3 OR T4): CPT | Performed by: INTERNAL MEDICINE

## 2021-12-22 PROCEDURE — 96401 CHEMO ANTI-NEOPL SQ/IM: CPT

## 2021-12-22 PROCEDURE — 80053 COMPREHEN METABOLIC PANEL: CPT | Performed by: INTERNAL MEDICINE

## 2021-12-22 PROCEDURE — 84443 ASSAY THYROID STIM HORMONE: CPT | Performed by: INTERNAL MEDICINE

## 2021-12-22 PROCEDURE — 84436 ASSAY OF TOTAL THYROXINE: CPT | Performed by: INTERNAL MEDICINE

## 2021-12-22 PROCEDURE — 85025 COMPLETE CBC W/AUTO DIFF WBC: CPT | Performed by: INTERNAL MEDICINE

## 2021-12-22 PROCEDURE — 85007 BL SMEAR W/DIFF WBC COUNT: CPT | Performed by: INTERNAL MEDICINE

## 2021-12-22 RX ORDER — BORTEZOMIB 3.5 MG/1
1.3 INJECTION, POWDER, LYOPHILIZED, FOR SOLUTION INTRAVENOUS; SUBCUTANEOUS ONCE
Status: COMPLETED | OUTPATIENT
Start: 2021-12-22 | End: 2021-12-22

## 2021-12-22 RX ADMIN — BORTEZOMIB 2.8 MG: 3.5 INJECTION, POWDER, LYOPHILIZED, FOR SOLUTION INTRAVENOUS; SUBCUTANEOUS at 15:40

## 2021-12-23 ENCOUNTER — TELEPHONE (OUTPATIENT)
Dept: FAMILY MEDICINE CLINIC | Facility: CLINIC | Age: 61
End: 2021-12-23

## 2021-12-23 DIAGNOSIS — I10 HYPERTENSION, ESSENTIAL: ICD-10-CM

## 2021-12-23 RX ORDER — LOSARTAN POTASSIUM 100 MG/1
100 TABLET ORAL DAILY
Qty: 30 TABLET | Refills: 11 | Status: SHIPPED | OUTPATIENT
Start: 2021-12-23 | End: 2022-12-30

## 2021-12-23 NOTE — TELEPHONE ENCOUNTER
Caller: Roman Duy CRISTIANA    Relationship: Self    Best call back number: [phone] (H)    Requested Prescriptions:   losartan (Cozaar) 100 MG tablet     Pharmacy where request should be sent:  [id number] [place] [address] - [phone]  - [phone] FX        Additional details provided by patient: PATIENT CALLED TO REQUEST A MEDICATION REFILL ON HIS MEDICATION. PATIENT STATES THAT HE IS COMPLETELY OUT OF MEDICATION. PATIENT ALSO STATES THAT HIS CANCER PCP WOULD LIKE TO KNOW IF THIS DOSAGE IS OK FOR PATIENT TO TAKE. PATIENT STATES THAT HIS BLOOD PRESSURE HAS BEEN RUNNING A LITTLE HIGH 145/85 YESTERDAY.          PLEASE CONTACT PATIENT TO ADVISE.    Does the patient have less than a 3 day supply:  [x] Yes  [] No    Roger Inman Rep   12/23/21 11:24 EST         THANKS

## 2021-12-26 DIAGNOSIS — C90.00 MULTIPLE MYELOMA NOT HAVING ACHIEVED REMISSION (HCC): Primary | ICD-10-CM

## 2021-12-26 RX ORDER — BORTEZOMIB 3.5 MG/1
1.3 INJECTION, POWDER, LYOPHILIZED, FOR SOLUTION INTRAVENOUS; SUBCUTANEOUS ONCE
Status: CANCELLED | OUTPATIENT
Start: 2022-01-05

## 2021-12-26 RX ORDER — BORTEZOMIB 3.5 MG/1
1.3 INJECTION, POWDER, LYOPHILIZED, FOR SOLUTION INTRAVENOUS; SUBCUTANEOUS ONCE
Status: CANCELLED | OUTPATIENT
Start: 2021-12-29

## 2021-12-26 RX ORDER — BORTEZOMIB 3.5 MG/1
1.3 INJECTION, POWDER, LYOPHILIZED, FOR SOLUTION INTRAVENOUS; SUBCUTANEOUS ONCE
Status: CANCELLED | OUTPATIENT
Start: 2022-01-12

## 2021-12-28 ENCOUNTER — OFFICE VISIT (OUTPATIENT)
Dept: FAMILY MEDICINE CLINIC | Facility: CLINIC | Age: 61
End: 2021-12-28

## 2021-12-28 VITALS
HEART RATE: 68 BPM | WEIGHT: 215 LBS | OXYGEN SATURATION: 100 % | HEIGHT: 70 IN | BODY MASS INDEX: 30.78 KG/M2 | SYSTOLIC BLOOD PRESSURE: 118 MMHG | DIASTOLIC BLOOD PRESSURE: 64 MMHG | TEMPERATURE: 97.7 F

## 2021-12-28 DIAGNOSIS — E78.2 HYPERLIPEMIA, MIXED: ICD-10-CM

## 2021-12-28 DIAGNOSIS — I10 HYPERTENSION, ESSENTIAL: Primary | ICD-10-CM

## 2021-12-28 DIAGNOSIS — Z12.11 SCREEN FOR COLON CANCER: ICD-10-CM

## 2021-12-28 PROCEDURE — 99214 OFFICE O/P EST MOD 30 MIN: CPT | Performed by: FAMILY MEDICINE

## 2021-12-28 NOTE — PROGRESS NOTES
"Chief Complaint  Hypertension    Subjective          Duy Owens presents to [facility]  History of Present Illness  PT has been having high blood pressure around 150s/90s even with the losartan.  He realized it was bc he has been eating a lot of berger so stopped 2 days ago and now back to normal.  He has some dizziness feeling when it is high.  No chest pains or palpitations or HA .    He is currently going thru chemo for his MM  HLD- last labs were done in May 3.  He is not on any med for this.    Objective   Vital Signs:   /64   Pulse 68   Temp 97.7 °F (36.5 °C) (Infrared)   Ht 177.8 cm (70\")   Wt 97.5 kg (215 lb)   SpO2 100%   BMI 30.85 kg/m²     Physical Exam  Vitals and nursing note reviewed.   Constitutional:       Appearance: He is well-developed.   Cardiovascular:      Rate and Rhythm: Normal rate and regular rhythm.      Heart sounds: Normal heart sounds. No murmur heard.      Pulmonary:      Effort: Pulmonary effort is normal. No respiratory distress.      Breath sounds: Normal breath sounds. No stridor. No wheezing or rhonchi.   Neurological:      General: No focal deficit present.      Mental Status: He is alert and oriented to person, place, and time.   Psychiatric:         Mood and Affect: Mood normal.         Behavior: Behavior normal.        Result Review :     CMP    CMP 11/9/21 12/16/21 12/22/21   Glucose  133 (A) 179 (A)   BUN  10 13   Creatinine  0.96 1.02   eGFR African Am  97 90   Sodium  140 139   Potassium  4.6 3.9   Chloride  103 104   Calcium  9.5 9.5   Total Protein 6.9     Albumin 4.3 4.80 4.90   Globulin 2.6     Total Bilirubin  0.5 0.2   Alkaline Phosphatase  84 94   AST (SGOT)  22 25   ALT (SGPT)  23 25   (A) Abnormal value            Lipid Panel    Lipid Panel 5/3/21   Total Cholesterol 193   Triglycerides 126   HDL Cholesterol 46   VLDL Cholesterol 23   LDL Cholesterol  124 (A)   (A) Abnormal value       Comments are available for some " flowsheets but are not being displayed.           BMP    BMP 9/29/21 12/16/21 12/22/21   BUN 17 10 13   Creatinine 1.25 0.96 1.02   Sodium 139 140 139   Potassium 4.2 4.6 3.9   Chloride 104 103 104   CO2 25.1 26.2 26.0   Calcium 9.6 9.5 9.5                     Assessment and Plan    Diagnoses and all orders for this visit:    1. Hypertension, essential (Primary)    2. Hyperlipemia, mixed  -     Lipid Panel    3. Screen for colon cancer  -     Amb referral for Screening Colonoscopy        Follow Up   Return in about 6 months (around 6/28/2022) for hypertension, hyperlipidema.  Patient was given instructions and counseling regarding his condition or for health maintenance advice. Please see specific information pulled into the AVS if appropriate.     Pt will continue to cut out berger and monitor BP.  If it goes back up, will add HCTZ 25 mg.    Given warning signs for stroke and MI.    Patient to monitor BP over next week and call me with readings at that time and we can make adjustments accordingly if needed.

## 2021-12-29 ENCOUNTER — LAB (OUTPATIENT)
Dept: OTHER | Facility: HOSPITAL | Age: 61
End: 2021-12-29

## 2021-12-29 ENCOUNTER — INFUSION (OUTPATIENT)
Dept: ONCOLOGY | Facility: HOSPITAL | Age: 61
End: 2021-12-29

## 2021-12-29 VITALS
BODY MASS INDEX: 30.75 KG/M2 | DIASTOLIC BLOOD PRESSURE: 79 MMHG | OXYGEN SATURATION: 100 % | TEMPERATURE: 96.8 F | HEART RATE: 73 BPM | HEIGHT: 70 IN | SYSTOLIC BLOOD PRESSURE: 133 MMHG | RESPIRATION RATE: 18 BRPM | WEIGHT: 214.8 LBS

## 2021-12-29 DIAGNOSIS — C90.00 MULTIPLE MYELOMA NOT HAVING ACHIEVED REMISSION (HCC): Primary | ICD-10-CM

## 2021-12-29 DIAGNOSIS — C90.00 MULTIPLE MYELOMA NOT HAVING ACHIEVED REMISSION: ICD-10-CM

## 2021-12-29 LAB
BASOPHILS # BLD AUTO: 0.05 10*3/MM3 (ref 0–0.2)
BASOPHILS NFR BLD AUTO: 0.5 % (ref 0–1.5)
CHOLEST SERPL-MCNC: 205 MG/DL (ref 100–199)
DEPRECATED RDW RBC AUTO: 39.2 FL (ref 37–54)
EOSINOPHIL # BLD AUTO: 0.4 10*3/MM3 (ref 0–0.4)
EOSINOPHIL NFR BLD AUTO: 4 % (ref 0.3–6.2)
ERYTHROCYTE [DISTWIDTH] IN BLOOD BY AUTOMATED COUNT: 15 % (ref 12.3–15.4)
HCT VFR BLD AUTO: 42.6 % (ref 37.5–51)
HDLC SERPL-MCNC: 44 MG/DL
HGB BLD-MCNC: 13.5 G/DL (ref 13–17.7)
HYPOCHROMIA BLD QL: NORMAL
IMM GRANULOCYTES # BLD AUTO: 0.17 10*3/MM3 (ref 0–0.05)
IMM GRANULOCYTES NFR BLD AUTO: 1.7 % (ref 0–0.5)
LDLC SERPL CALC-MCNC: 129 MG/DL (ref 0–99)
LYMPHOCYTES # BLD AUTO: 0.96 10*3/MM3 (ref 0.7–3.1)
LYMPHOCYTES NFR BLD AUTO: 9.6 % (ref 19.6–45.3)
MCH RBC QN AUTO: 23.3 PG (ref 26.6–33)
MCHC RBC AUTO-ENTMCNC: 31.7 G/DL (ref 31.5–35.7)
MCV RBC AUTO: 73.6 FL (ref 79–97)
MONOCYTES # BLD AUTO: 0.78 10*3/MM3 (ref 0.1–0.9)
MONOCYTES NFR BLD AUTO: 7.8 % (ref 5–12)
NEUTROPHILS NFR BLD AUTO: 7.62 10*3/MM3 (ref 1.7–7)
NEUTROPHILS NFR BLD AUTO: 76.4 % (ref 42.7–76)
NRBC BLD AUTO-RTO: 0.2 /100 WBC (ref 0–0.2)
PLAT MORPH BLD: NORMAL
PLATELET # BLD AUTO: 246 10*3/MM3 (ref 140–450)
PMV BLD AUTO: 10.6 FL (ref 6–12)
RBC # BLD AUTO: 5.79 10*6/MM3 (ref 4.14–5.8)
TRIGL SERPL-MCNC: 179 MG/DL (ref 0–149)
VLDLC SERPL CALC-MCNC: 32 MG/DL (ref 5–40)
WBC MORPH BLD: NORMAL
WBC NRBC COR # BLD: 9.98 10*3/MM3 (ref 3.4–10.8)

## 2021-12-29 PROCEDURE — 96401 CHEMO ANTI-NEOPL SQ/IM: CPT

## 2021-12-29 PROCEDURE — 85025 COMPLETE CBC W/AUTO DIFF WBC: CPT | Performed by: INTERNAL MEDICINE

## 2021-12-29 PROCEDURE — 25010000002 BORTEZOMIB PER 0.1 MG: Performed by: INTERNAL MEDICINE

## 2021-12-29 PROCEDURE — 85007 BL SMEAR W/DIFF WBC COUNT: CPT | Performed by: INTERNAL MEDICINE

## 2021-12-29 PROCEDURE — 36415 COLL VENOUS BLD VENIPUNCTURE: CPT

## 2021-12-29 RX ORDER — BORTEZOMIB 3.5 MG/1
1.3 INJECTION, POWDER, LYOPHILIZED, FOR SOLUTION INTRAVENOUS; SUBCUTANEOUS ONCE
Status: COMPLETED | OUTPATIENT
Start: 2021-12-29 | End: 2021-12-29

## 2021-12-29 RX ADMIN — BORTEZOMIB 2.8 MG: 3.5 INJECTION, POWDER, LYOPHILIZED, FOR SOLUTION INTRAVENOUS; SUBCUTANEOUS at 15:47

## 2022-01-03 RX ORDER — LENALIDOMIDE 25 MG/1
CAPSULE ORAL
Qty: 14 CAPSULE | Refills: 0 | Status: SHIPPED | OUTPATIENT
Start: 2022-01-03 | End: 2022-01-25

## 2022-01-05 ENCOUNTER — OFFICE VISIT (OUTPATIENT)
Dept: ONCOLOGY | Facility: CLINIC | Age: 62
End: 2022-01-05

## 2022-01-05 ENCOUNTER — INFUSION (OUTPATIENT)
Dept: ONCOLOGY | Facility: HOSPITAL | Age: 62
End: 2022-01-05

## 2022-01-05 ENCOUNTER — SPECIALTY PHARMACY (OUTPATIENT)
Dept: PHARMACY | Facility: HOSPITAL | Age: 62
End: 2022-01-05

## 2022-01-05 ENCOUNTER — LAB (OUTPATIENT)
Dept: OTHER | Facility: HOSPITAL | Age: 62
End: 2022-01-05

## 2022-01-05 VITALS
RESPIRATION RATE: 16 BRPM | DIASTOLIC BLOOD PRESSURE: 84 MMHG | TEMPERATURE: 98 F | WEIGHT: 211.4 LBS | HEART RATE: 67 BPM | OXYGEN SATURATION: 99 % | SYSTOLIC BLOOD PRESSURE: 124 MMHG | BODY MASS INDEX: 30.26 KG/M2 | HEIGHT: 70 IN

## 2022-01-05 DIAGNOSIS — D63.0 ANEMIA IN NEOPLASTIC DISEASE: ICD-10-CM

## 2022-01-05 DIAGNOSIS — D84.9 IMMUNOCOMPROMISED PATIENT: ICD-10-CM

## 2022-01-05 DIAGNOSIS — Z79.899 ENCOUNTER FOR LONG-TERM (CURRENT) USE OF HIGH-RISK MEDICATION: ICD-10-CM

## 2022-01-05 DIAGNOSIS — E85.81 AL AMYLOIDOSIS: ICD-10-CM

## 2022-01-05 DIAGNOSIS — C90.00 MULTIPLE MYELOMA NOT HAVING ACHIEVED REMISSION: ICD-10-CM

## 2022-01-05 DIAGNOSIS — C90.00 MULTIPLE MYELOMA NOT HAVING ACHIEVED REMISSION: Primary | ICD-10-CM

## 2022-01-05 LAB
BASOPHILS # BLD AUTO: 0.06 10*3/MM3 (ref 0–0.2)
BASOPHILS NFR BLD AUTO: 0.6 % (ref 0–1.5)
DEPRECATED RDW RBC AUTO: 37.5 FL (ref 37–54)
EOSINOPHIL # BLD AUTO: 0.43 10*3/MM3 (ref 0–0.4)
EOSINOPHIL NFR BLD AUTO: 4.6 % (ref 0.3–6.2)
ERYTHROCYTE [DISTWIDTH] IN BLOOD BY AUTOMATED COUNT: 14.4 % (ref 12.3–15.4)
HCT VFR BLD AUTO: 42 % (ref 37.5–51)
HGB BLD-MCNC: 13.3 G/DL (ref 13–17.7)
IMM GRANULOCYTES # BLD AUTO: 0.07 10*3/MM3 (ref 0–0.05)
IMM GRANULOCYTES NFR BLD AUTO: 0.7 % (ref 0–0.5)
LYMPHOCYTES # BLD AUTO: 1.33 10*3/MM3 (ref 0.7–3.1)
LYMPHOCYTES NFR BLD AUTO: 14.2 % (ref 19.6–45.3)
MCH RBC QN AUTO: 23 PG (ref 26.6–33)
MCHC RBC AUTO-ENTMCNC: 31.7 G/DL (ref 31.5–35.7)
MCV RBC AUTO: 72.7 FL (ref 79–97)
MONOCYTES # BLD AUTO: 1.82 10*3/MM3 (ref 0.1–0.9)
MONOCYTES NFR BLD AUTO: 19.5 % (ref 5–12)
NEUTROPHILS NFR BLD AUTO: 5.63 10*3/MM3 (ref 1.7–7)
NEUTROPHILS NFR BLD AUTO: 60.4 % (ref 42.7–76)
NRBC BLD AUTO-RTO: 0 /100 WBC (ref 0–0.2)
PLAT MORPH BLD: NORMAL
PLATELET # BLD AUTO: 260 10*3/MM3 (ref 140–450)
PMV BLD AUTO: 10.5 FL (ref 6–12)
RBC # BLD AUTO: 5.78 10*6/MM3 (ref 4.14–5.8)
RBC MORPH BLD: NORMAL
WBC MORPH BLD: NORMAL
WBC NRBC COR # BLD: 9.34 10*3/MM3 (ref 3.4–10.8)

## 2022-01-05 PROCEDURE — 96401 CHEMO ANTI-NEOPL SQ/IM: CPT

## 2022-01-05 PROCEDURE — 99214 OFFICE O/P EST MOD 30 MIN: CPT | Performed by: INTERNAL MEDICINE

## 2022-01-05 PROCEDURE — 85007 BL SMEAR W/DIFF WBC COUNT: CPT | Performed by: INTERNAL MEDICINE

## 2022-01-05 PROCEDURE — 25010000002 BORTEZOMIB PER 0.1 MG: Performed by: INTERNAL MEDICINE

## 2022-01-05 PROCEDURE — 85025 COMPLETE CBC W/AUTO DIFF WBC: CPT | Performed by: INTERNAL MEDICINE

## 2022-01-05 PROCEDURE — 36415 COLL VENOUS BLD VENIPUNCTURE: CPT

## 2022-01-05 RX ORDER — BORTEZOMIB 3.5 MG/1
1.3 INJECTION, POWDER, LYOPHILIZED, FOR SOLUTION INTRAVENOUS; SUBCUTANEOUS ONCE
Status: CANCELLED | OUTPATIENT
Start: 2022-02-02

## 2022-01-05 RX ORDER — BORTEZOMIB 3.5 MG/1
1.3 INJECTION, POWDER, LYOPHILIZED, FOR SOLUTION INTRAVENOUS; SUBCUTANEOUS ONCE
Status: COMPLETED | OUTPATIENT
Start: 2022-01-05 | End: 2022-01-05

## 2022-01-05 RX ORDER — BORTEZOMIB 3.5 MG/1
1.3 INJECTION, POWDER, LYOPHILIZED, FOR SOLUTION INTRAVENOUS; SUBCUTANEOUS ONCE
Status: CANCELLED | OUTPATIENT
Start: 2022-01-26

## 2022-01-05 RX ORDER — BORTEZOMIB 3.5 MG/1
1.3 INJECTION, POWDER, LYOPHILIZED, FOR SOLUTION INTRAVENOUS; SUBCUTANEOUS ONCE
Status: CANCELLED | OUTPATIENT
Start: 2022-02-09

## 2022-01-05 RX ORDER — BORTEZOMIB 3.5 MG/1
1.3 INJECTION, POWDER, LYOPHILIZED, FOR SOLUTION INTRAVENOUS; SUBCUTANEOUS ONCE
Status: CANCELLED | OUTPATIENT
Start: 2022-01-19

## 2022-01-05 RX ADMIN — BORTEZOMIB 2.8 MG: 3.5 INJECTION, POWDER, LYOPHILIZED, FOR SOLUTION INTRAVENOUS; SUBCUTANEOUS at 09:43

## 2022-01-05 NOTE — PROGRESS NOTES
Specialty Note ( VRD regimen)      Labs reviewed        1/5/2022   WBC 3.40 - 10.80 10*3/mm3 9.34   Neutrophils Absolute 1.70 - 7.00 10*3/mm3 5.63   Hemoglobin 13.0 - 17.7 g/dL 13.3   Hematocrit 37.5 - 51.0 % 42.0   Platelets 140 - 450 10*3/mm3 260       Dr Mendez's dictation is noted.      1.  We will give week #3 of Velcade today at 1.3 mg/m2.  2.  Patient will continue Decadron 40 mg weekly.  3.  Patient will start a new cycle of Revlimid next week. ( dose is 25 mg po 14/21 days)

## 2022-01-05 NOTE — PROGRESS NOTES
"Subjective     CHIEF COMPLAINT:      Chief Complaint   Patient presents with   • Follow-up     no concerns       HISTORY OF PRESENT ILLNESS:     Duy Owens is a 61 y.o. male patient who returns today for follow up on his multiple myeloma.  He returns today for follow-up and reports that he is tolerating the treatment.  However, he reports constipation after receiving Velcade.  He is not noticing numbness in the feet or toes.    Patient reports that since the start of the treatment, his back pain has improved.  He denies having chest pain or shortness of breath.  He has occasional cough.    ROS:  Pertinent ROS is in the HPI.     Past medical, surgical, social and family history were reviewed.     MEDICATIONS:    Current Outpatient Medications:   •  acyclovir (ZOVIRAX) 400 MG tablet, Take 1 tablet by mouth 2 (Two) Times a Day. Take one tablet by mouth twice daily., Disp: 60 tablet, Rfl: 11  •  aspirin 81 MG chewable tablet, Chew 81 mg Daily., Disp: , Rfl:   •  dexamethasone (DECADRON) 4 MG tablet, Take 10 tablets by mouth 1 (One) Time Per Week., Disp: 40 tablet, Rfl: 3  •  lenalidomide (Revlimid) 25 MG capsule, TAKE 1 CAPSULE BY MOUTH ONCE DAILY FOR 14 DAYS ON AND 7 DAYS OFF, Disp: 14 capsule, Rfl: 0  •  losartan (Cozaar) 100 MG tablet, Take 1 tablet by mouth Daily., Disp: 30 tablet, Rfl: 11  •  meclizine (ANTIVERT) 25 MG tablet, Take 25 mg by mouth 2 (two) times a day., Disp: , Rfl:   •  ondansetron (ZOFRAN) 8 MG tablet, Take 1 tablet by mouth 3 (Three) Times a Day As Needed for Nausea or Vomiting., Disp: 30 tablet, Rfl: 5  •  sulfamethoxazole-trimethoprim (Bactrim DS) 800-160 MG per tablet, Take 1 tablet by mouth 3 (Three) Times a Week. On Monday, Wednesday and Friday, Disp: 12 tablet, Rfl: 5    Objective   VITAL SIGNS:     Vitals:    01/05/22 0842   BP: 124/84   Pulse: 67   Resp: 16   Temp: 98 °F (36.7 °C)   TempSrc: Temporal   SpO2: 99%   Weight: 95.9 kg (211 lb 6.4 oz)   Height: 177.8 cm (70\")   PainSc: 0-No " pain     Body mass index is 30.33 kg/m².     Wt Readings from Last 5 Encounters:   01/05/22 95.9 kg (211 lb 6.4 oz)   12/29/21 97.4 kg (214 lb 12.8 oz)   12/28/21 97.5 kg (215 lb)   12/22/21 99.3 kg (219 lb)   12/16/21 98.1 kg (216 lb 3.2 oz)       PHYSICAL EXAMINATION:   GENERAL: The patient appears in good general condition, not in acute distress.   SKIN: No ecchymosis.  EYES: No jaundice.  LYMPHATICS: No cervical lymphadenopathy.  CHEST: Normal respiratory effort.  Lungs clear bilaterally.  No added sounds.  CVS: Normal S1-S2.  No murmurs.  ABDOMEN: Soft. No tenderness. No Hepatomegaly. No Splenomegaly. No masses.  EXTREMITIES: No edema.  No calf tenderness.    DIAGNOSTIC DATA:     Results from last 7 days   Lab Units 01/05/22  0803 12/29/21  1452   WBC 10*3/mm3 9.34 9.98   NEUTROS ABS 10*3/mm3 5.63 7.62*   HEMOGLOBIN g/dL 13.3 13.5   HEMATOCRIT % 42.0 42.6   PLATELETS 10*3/mm3 260 246     Component      Latest Ref Rng & Units 12/22/2021   Glucose      65 - 99 mg/dL 179 (H)   BUN      8 - 23 mg/dL 13   Creatinine      0.76 - 1.27 mg/dL 1.02   Sodium      136 - 145 mmol/L 139   Potassium      3.5 - 5.2 mmol/L 3.9   Chloride      98 - 107 mmol/L 104   CO2      22.0 - 29.0 mmol/L 26.0   Calcium      8.6 - 10.5 mg/dL 9.5   Total Protein      6.0 - 8.5 g/dL 7.1   Albumin      3.50 - 5.20 g/dL 4.90   ALT (SGPT)      1 - 41 U/L 25   AST (SGOT)      1 - 40 U/L 25   Alkaline Phosphatase      39 - 117 U/L 94   Total Bilirubin      0.0 - 1.2 mg/dL 0.2   eGFR African Am      >60 mL/min/1.73 90   Globulin      gm/dL 2.2   A/G Ratio      g/dL 2.2   BUN/Creatinine Ratio      7.0 - 25.0 12.7   Anion Gap      5.0 - 15.0 mmol/L 9.0     Component      Latest Ref Rng & Units 12/22/2021   TSH Baseline      0.270 - 4.200 uIU/mL 1.080   T Uptake      0.80 - 1.30 TBI 1.11   T4, Total      4.50 - 11.70 mcg/dL 5.13     Assessment/Plan   *Plasma cell disorder most consistent with multiple myeloma with lymph node involvement and amyloid  deposition in the involved lymph nodes.  · Patient started having dry cough around July 2021.    · CT chest on 9/23/2021 revealed inferior mediastinal adenopathy.    · CT abdomen and pelvis on 9/24/2021 revealed retroperitoneal lymphadenopathy extending to the iliac chain lymph nodes on the right.    · The Largest lymph node was in the retroperitoneal area measuring 4.8 x 3.5 cm.  The common iliac lymph node measured 3.5 x 2.7 cm.  The left external iliac chain lymph node measured 2.9 x 2 cm.    · PET scan on 10/5/2021 revealed the retroperitoneal and left pelvic lymphadenopathy to be hypermetabolic.  The left periaortic lymph nodes had SUV of 6.9 and the left pelvic sidewall lymph nodes had an SUV of 5.4.  · Patient had CT-guided biopsy on 10/6/2021.  · The specimen was sent to [facility] for consultation. The pathologist reported involvement with monotypic lambda restricted plasma cells concerning for involvement with plasma cell dyscrasia. Lymphoma was considered less likely.  · Patient was referred to surgery for an excisional biopsy. However, the surgery was considered to be very extensive due to the posterior location of the lymph nodes.   · Bone marrow biopsy on 10/29/2021 revealed a normocellular marrow (50%) with involvement with plasma cell dyscrasia (plasma cells representing 20-30% of total cells by  stain).   · FISH was negative for gain of 1q, monosomy/deletions of chromosomes 13 and 17, IGH rearrangement, gain of chromosomes 9 and 11, IGH-CCND1 (11;14) fusion.   · I this was considered to represent multiple myeloma with lymph node involvement and amyloid deposition.    · There was no bone involvement on PET scan.   · Patient was referred to [facility].  He was seen by Dr. Felix.  They concurred with the diagnosis of multiple myeloma with AL amyloidosis and involvement of the lymph nodes.  · Treatment with the VRD regimen as recommended.  · Patient started treatment on  12/22/2021.  · Patient is tolerating treatment except for some constipation.  No neuropathy.  · Since the start of treatment, he reports improvement in his back pain.  The back pain was likely secondary to the lymph node involvement with multiple myeloma.    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  · Patient was referred to cardiology.  He has an appointment in 2 weeks.  · Patient denies chest pain or shortness of breath.    *Anemia secondary to multiple myeloma  · Hemoglobin was 13.1 on 9/29/2021.    · Iron, folate and vitamin B12 were normal in September/October 2021.    · Hemoglobin is 13.3 today.  · He is not having fatigue.    *Prophylaxis.  · I recommended starting ASA 81 mg daily for DVT prophylaxis while on Revlimid.  · I recommended Acyclovir 400 mg twice daily for VZV prophylaxis.  · I recommended Bactrim DS three days weekly for PCP prophylaxis.   · Patient is at increased risk for severe COVID-19 infection.  He received Covid vaccinations.  I recommended obtaining antibody test to evaluate his response.    PLAN:    1.  We will give week #3 of Velcade today at 1.3 mg/m2.  2.  Patient will continue Decadron 40 mg weekly.  3.  Patient will start a new cycle of Revlimid next week.  4.  Await evaluation by cardiology.  5.  SPEP REGINO FLC will be repeated in 1 week.  Covid antibody test will be obtained.  6.  Patient will return weekly for his Velcade injections.  I will see him in follow-up in 3 weeks.        Tima Mendez MD  01/05/22

## 2022-01-10 ENCOUNTER — PRE-PROCEDURE SCREENING (OUTPATIENT)
Dept: GASTROENTEROLOGY | Facility: CLINIC | Age: 62
End: 2022-01-10

## 2022-01-10 RX ORDER — LENALIDOMIDE 25 MG/1
CAPSULE ORAL
Qty: 14 CAPSULE | Refills: 0 | OUTPATIENT
Start: 2022-01-10

## 2022-01-10 NOTE — TELEPHONE ENCOUNTER
Caller: Duy Owens    Relationship: Self    Best call back number: [phone]    Requested Prescriptions:   Requested Prescriptions     Pending Prescriptions Disp Refills   • lenalidomide (REVLIMID) 25 MG capsule 14 capsule 0        Pharmacy where request should be sent: [facility] - [phone]  - [phone] FX     Additional details provided by patient: PATIENT IS TO RESTART ON Wednesday, 1/12/22    Does the patient have less than a 3 day supply:  [] Yes  [] No

## 2022-01-11 ENCOUNTER — LAB (OUTPATIENT)
Dept: OTHER | Facility: HOSPITAL | Age: 62
End: 2022-01-11

## 2022-01-11 DIAGNOSIS — C90.00 MULTIPLE MYELOMA NOT HAVING ACHIEVED REMISSION: ICD-10-CM

## 2022-01-11 LAB
ALBUMIN SERPL-MCNC: 4.6 G/DL (ref 3.5–5.2)
ALBUMIN/GLOB SERPL: 2.2 G/DL
ALP SERPL-CCNC: 133 U/L (ref 39–117)
ALT SERPL W P-5'-P-CCNC: 246 U/L (ref 1–41)
ANION GAP SERPL CALCULATED.3IONS-SCNC: 7.8 MMOL/L (ref 5–15)
AST SERPL-CCNC: 56 U/L (ref 1–40)
BASOPHILS # BLD AUTO: 0.08 10*3/MM3 (ref 0–0.2)
BASOPHILS NFR BLD AUTO: 1.3 % (ref 0–1.5)
BILIRUB SERPL-MCNC: 0.2 MG/DL (ref 0–1.2)
BUN SERPL-MCNC: 14 MG/DL (ref 8–23)
BUN/CREAT SERPL: 13.6 (ref 7–25)
CALCIUM SPEC-SCNC: 9 MG/DL (ref 8.6–10.5)
CHLORIDE SERPL-SCNC: 102 MMOL/L (ref 98–107)
CO2 SERPL-SCNC: 22.2 MMOL/L (ref 22–29)
CREAT SERPL-MCNC: 1.03 MG/DL (ref 0.76–1.27)
DEPRECATED RDW RBC AUTO: 39 FL (ref 37–54)
EOSINOPHIL # BLD AUTO: 0.51 10*3/MM3 (ref 0–0.4)
EOSINOPHIL NFR BLD AUTO: 8.4 % (ref 0.3–6.2)
ERYTHROCYTE [DISTWIDTH] IN BLOOD BY AUTOMATED COUNT: 14.5 % (ref 12.3–15.4)
GFR SERPL CREATININE-BSD FRML MDRD: 89 ML/MIN/1.73
GLOBULIN UR ELPH-MCNC: 2.1 GM/DL
GLUCOSE SERPL-MCNC: 113 MG/DL (ref 65–99)
HCT VFR BLD AUTO: 38.4 % (ref 37.5–51)
HGB BLD-MCNC: 11.8 G/DL (ref 13–17.7)
IMM GRANULOCYTES # BLD AUTO: 0.02 10*3/MM3 (ref 0–0.05)
IMM GRANULOCYTES NFR BLD AUTO: 0.3 % (ref 0–0.5)
LYMPHOCYTES # BLD AUTO: 1.58 10*3/MM3 (ref 0.7–3.1)
LYMPHOCYTES NFR BLD AUTO: 26.1 % (ref 19.6–45.3)
MCH RBC QN AUTO: 23 PG (ref 26.6–33)
MCHC RBC AUTO-ENTMCNC: 30.7 G/DL (ref 31.5–35.7)
MCV RBC AUTO: 74.7 FL (ref 79–97)
MICROCYTES BLD QL: NORMAL
MONOCYTES # BLD AUTO: 0.99 10*3/MM3 (ref 0.1–0.9)
MONOCYTES NFR BLD AUTO: 16.4 % (ref 5–12)
NEUTROPHILS NFR BLD AUTO: 2.87 10*3/MM3 (ref 1.7–7)
NEUTROPHILS NFR BLD AUTO: 47.5 % (ref 42.7–76)
NRBC BLD AUTO-RTO: 0 /100 WBC (ref 0–0.2)
PLAT MORPH BLD: NORMAL
PLATELET # BLD AUTO: 283 10*3/MM3 (ref 140–450)
PMV BLD AUTO: 9.7 FL (ref 6–12)
POTASSIUM SERPL-SCNC: 4.1 MMOL/L (ref 3.5–5.2)
PROT SERPL-MCNC: 6.7 G/DL (ref 6–8.5)
RBC # BLD AUTO: 5.14 10*6/MM3 (ref 4.14–5.8)
SODIUM SERPL-SCNC: 132 MMOL/L (ref 136–145)
WBC MORPH BLD: NORMAL
WBC NRBC COR # BLD: 6.05 10*3/MM3 (ref 3.4–10.8)

## 2022-01-11 PROCEDURE — 83521 IG LIGHT CHAINS FREE EACH: CPT | Performed by: INTERNAL MEDICINE

## 2022-01-11 PROCEDURE — 36415 COLL VENOUS BLD VENIPUNCTURE: CPT

## 2022-01-11 PROCEDURE — 80053 COMPREHEN METABOLIC PANEL: CPT | Performed by: INTERNAL MEDICINE

## 2022-01-11 PROCEDURE — 85025 COMPLETE CBC W/AUTO DIFF WBC: CPT | Performed by: INTERNAL MEDICINE

## 2022-01-11 PROCEDURE — 86769 SARS-COV-2 COVID-19 ANTIBODY: CPT | Performed by: INTERNAL MEDICINE

## 2022-01-11 PROCEDURE — 85007 BL SMEAR W/DIFF WBC COUNT: CPT | Performed by: INTERNAL MEDICINE

## 2022-01-11 PROCEDURE — 84165 PROTEIN E-PHORESIS SERUM: CPT | Performed by: INTERNAL MEDICINE

## 2022-01-11 PROCEDURE — 82784 ASSAY IGA/IGD/IGG/IGM EACH: CPT | Performed by: INTERNAL MEDICINE

## 2022-01-11 PROCEDURE — 86334 IMMUNOFIX E-PHORESIS SERUM: CPT | Performed by: INTERNAL MEDICINE

## 2022-01-11 NOTE — TELEPHONE ENCOUNTER
Last scope 9/9/11 in epic-- Personal history polyps-- Family history of polyps or colon cancer--Aspirin--Medications:               acyclovir (ZOVIRAX) 400 MG tablet  aspirin 81 MG chewable tablet  dexamethasone (DECADRON) 4 MG tablet  lenalidomide (Revlimid) 25 MG capsule  losartan (Cozaar) 100 MG tablet  meclizine (ANTIVERT) 25 MG tablet  ondansetron (ZOFRAN) 8 MG tablet  sulfamethoxazole-trimethoprim (Bactrim DS) 800-160 MG per tablet              Pt verbalized and understood that it would be few weeks before been schedule          Records scan in media

## 2022-01-12 ENCOUNTER — INFUSION (OUTPATIENT)
Dept: ONCOLOGY | Facility: HOSPITAL | Age: 62
End: 2022-01-12

## 2022-01-12 ENCOUNTER — OFFICE VISIT (OUTPATIENT)
Dept: ONCOLOGY | Facility: CLINIC | Age: 62
End: 2022-01-12

## 2022-01-12 ENCOUNTER — LAB (OUTPATIENT)
Dept: OTHER | Facility: HOSPITAL | Age: 62
End: 2022-01-12

## 2022-01-12 ENCOUNTER — SPECIALTY PHARMACY (OUTPATIENT)
Dept: PHARMACY | Facility: HOSPITAL | Age: 62
End: 2022-01-12

## 2022-01-12 VITALS
RESPIRATION RATE: 18 BRPM | HEART RATE: 66 BPM | SYSTOLIC BLOOD PRESSURE: 153 MMHG | DIASTOLIC BLOOD PRESSURE: 90 MMHG | HEIGHT: 70 IN | WEIGHT: 213 LBS | BODY MASS INDEX: 30.49 KG/M2 | OXYGEN SATURATION: 99 % | TEMPERATURE: 97.8 F

## 2022-01-12 DIAGNOSIS — D63.0 ANEMIA IN NEOPLASTIC DISEASE: ICD-10-CM

## 2022-01-12 DIAGNOSIS — E85.81 AL AMYLOIDOSIS: ICD-10-CM

## 2022-01-12 DIAGNOSIS — R74.8 ELEVATED LIVER ENZYMES: ICD-10-CM

## 2022-01-12 DIAGNOSIS — C90.00 MULTIPLE MYELOMA NOT HAVING ACHIEVED REMISSION: Primary | ICD-10-CM

## 2022-01-12 DIAGNOSIS — Z79.899 ENCOUNTER FOR LONG-TERM (CURRENT) USE OF HIGH-RISK MEDICATION: ICD-10-CM

## 2022-01-12 DIAGNOSIS — C90.00 MULTIPLE MYELOMA NOT HAVING ACHIEVED REMISSION: ICD-10-CM

## 2022-01-12 LAB
ALBUMIN SERPL ELPH-MCNC: 3.8 G/DL (ref 2.9–4.4)
ALBUMIN SERPL-MCNC: 4.8 G/DL (ref 3.5–5.2)
ALBUMIN/GLOB SERPL: 1.6 {RATIO} (ref 0.7–1.7)
ALBUMIN/GLOB SERPL: 2.1 G/DL
ALP SERPL-CCNC: 131 U/L (ref 39–117)
ALPHA1 GLOB SERPL ELPH-MCNC: 0.3 G/DL (ref 0–0.4)
ALPHA2 GLOB SERPL ELPH-MCNC: 0.6 G/DL (ref 0.4–1)
ALT SERPL W P-5'-P-CCNC: 199 U/L (ref 1–41)
ANION GAP SERPL CALCULATED.3IONS-SCNC: 8.8 MMOL/L (ref 5–15)
AST SERPL-CCNC: 43 U/L (ref 1–40)
B-GLOBULIN SERPL ELPH-MCNC: 1 G/DL (ref 0.7–1.3)
BASOPHILS # BLD AUTO: 0.09 10*3/MM3 (ref 0–0.2)
BASOPHILS NFR BLD AUTO: 1.5 % (ref 0–1.5)
BILIRUB SERPL-MCNC: 0.4 MG/DL (ref 0–1.2)
BUN SERPL-MCNC: 9 MG/DL (ref 8–23)
BUN/CREAT SERPL: 10.1 (ref 7–25)
CALCIUM SPEC-SCNC: 9.3 MG/DL (ref 8.6–10.5)
CHLORIDE SERPL-SCNC: 101 MMOL/L (ref 98–107)
CO2 SERPL-SCNC: 24.2 MMOL/L (ref 22–29)
CREAT SERPL-MCNC: 0.89 MG/DL (ref 0.76–1.27)
DEPRECATED RDW RBC AUTO: 39.2 FL (ref 37–54)
EOSINOPHIL # BLD AUTO: 0.42 10*3/MM3 (ref 0–0.4)
EOSINOPHIL NFR BLD AUTO: 7.2 % (ref 0.3–6.2)
ERYTHROCYTE [DISTWIDTH] IN BLOOD BY AUTOMATED COUNT: 14.6 % (ref 12.3–15.4)
GAMMA GLOB SERPL ELPH-MCNC: 0.6 G/DL (ref 0.4–1.8)
GFR SERPL CREATININE-BSD FRML MDRD: 106 ML/MIN/1.73
GLOBULIN SER-MCNC: 2.5 G/DL (ref 2.2–3.9)
GLOBULIN UR ELPH-MCNC: 2.3 GM/DL
GLUCOSE SERPL-MCNC: 96 MG/DL (ref 65–99)
HAV IGM SERPL QL IA: NORMAL
HBV CORE IGM SERPL QL IA: NORMAL
HBV SURFACE AG SERPL QL IA: NORMAL
HCT VFR BLD AUTO: 40.5 % (ref 37.5–51)
HCV AB SER DONR QL: NORMAL
HGB BLD-MCNC: 12.4 G/DL (ref 13–17.7)
IGA SERPL-MCNC: 57 MG/DL (ref 90–386)
IGG SERPL-MCNC: 611 MG/DL (ref 603–1613)
IGM SERPL-MCNC: 23 MG/DL (ref 20–172)
IMM GRANULOCYTES # BLD AUTO: 0.02 10*3/MM3 (ref 0–0.05)
IMM GRANULOCYTES NFR BLD AUTO: 0.3 % (ref 0–0.5)
INTERPRETATION SERPL IEP-IMP: ABNORMAL
KAPPA LC FREE SER-MCNC: 10.5 MG/L (ref 3.3–19.4)
KAPPA LC FREE/LAMBDA FREE SER: 0.08 {RATIO} (ref 0.26–1.65)
LABORATORY COMMENT REPORT: ABNORMAL
LAMBDA LC FREE SERPL-MCNC: 125.4 MG/L (ref 5.7–26.3)
LYMPHOCYTES # BLD AUTO: 1.4 10*3/MM3 (ref 0.7–3.1)
LYMPHOCYTES NFR BLD AUTO: 24.1 % (ref 19.6–45.3)
M PROTEIN SERPL ELPH-MCNC: ABNORMAL G/DL
MCH RBC QN AUTO: 22.9 PG (ref 26.6–33)
MCHC RBC AUTO-ENTMCNC: 30.6 G/DL (ref 31.5–35.7)
MCV RBC AUTO: 74.7 FL (ref 79–97)
MONOCYTES # BLD AUTO: 1.13 10*3/MM3 (ref 0.1–0.9)
MONOCYTES NFR BLD AUTO: 19.4 % (ref 5–12)
NEUTROPHILS NFR BLD AUTO: 2.76 10*3/MM3 (ref 1.7–7)
NEUTROPHILS NFR BLD AUTO: 47.5 % (ref 42.7–76)
NRBC BLD AUTO-RTO: 0 /100 WBC (ref 0–0.2)
PLAT MORPH BLD: NORMAL
PLATELET # BLD AUTO: 290 10*3/MM3 (ref 140–450)
PMV BLD AUTO: 9.6 FL (ref 6–12)
POTASSIUM SERPL-SCNC: 4.5 MMOL/L (ref 3.5–5.2)
PROT SERPL-MCNC: 6.3 G/DL (ref 6–8.5)
PROT SERPL-MCNC: 7.1 G/DL (ref 6–8.5)
RBC # BLD AUTO: 5.42 10*6/MM3 (ref 4.14–5.8)
RBC MORPH BLD: NORMAL
SARS-COV-2 AB SERPL IA-ACNC: >2500 U/ML
SARS-COV-2 AB SERPL-IMP: POSITIVE
SODIUM SERPL-SCNC: 134 MMOL/L (ref 136–145)
WBC MORPH BLD: NORMAL
WBC NRBC COR # BLD: 5.82 10*3/MM3 (ref 3.4–10.8)

## 2022-01-12 PROCEDURE — 80053 COMPREHEN METABOLIC PANEL: CPT | Performed by: INTERNAL MEDICINE

## 2022-01-12 PROCEDURE — 96401 CHEMO ANTI-NEOPL SQ/IM: CPT

## 2022-01-12 PROCEDURE — 99214 OFFICE O/P EST MOD 30 MIN: CPT | Performed by: NURSE PRACTITIONER

## 2022-01-12 PROCEDURE — 25010000002 BORTEZOMIB PER 0.1 MG: Performed by: INTERNAL MEDICINE

## 2022-01-12 PROCEDURE — 80074 ACUTE HEPATITIS PANEL: CPT | Performed by: INTERNAL MEDICINE

## 2022-01-12 PROCEDURE — 85007 BL SMEAR W/DIFF WBC COUNT: CPT | Performed by: INTERNAL MEDICINE

## 2022-01-12 PROCEDURE — 85025 COMPLETE CBC W/AUTO DIFF WBC: CPT | Performed by: INTERNAL MEDICINE

## 2022-01-12 RX ORDER — BORTEZOMIB 3.5 MG/1
1.3 INJECTION, POWDER, LYOPHILIZED, FOR SOLUTION INTRAVENOUS; SUBCUTANEOUS ONCE
Status: COMPLETED | OUTPATIENT
Start: 2022-01-12 | End: 2022-01-12

## 2022-01-12 RX ADMIN — BORTEZOMIB 2.8 MG: 3.5 INJECTION, POWDER, LYOPHILIZED, FOR SOLUTION INTRAVENOUS; SUBCUTANEOUS at 15:56

## 2022-01-12 NOTE — NURSING NOTE
Seen per LOREN Scanlon with instruction to proceed with the velcade after she reviewed labs with Dr. Mendez. Liver function tests remain elevated but improving. Informed to take Revelmid today. Also pt. States needs steroid refilled; however LOREN Street states that he has 3 refills on bottle and to check that at home and if the pharmacy does not have that refill then to call the office. Pt. States that he has enough dexamethasone at home for this week. u/v

## 2022-01-12 NOTE — PROGRESS NOTES
Subjective     CHIEF COMPLAINT:      Chief Complaint   Patient presents with   • Follow-up       HISTORY OF PRESENT ILLNESS:     Duy Owens is a 61 y.o. male patient who returns today for follow up on his multiple myeloma.  He returns today for follow-up and evaluation prior to cycle 1 day 22 VRD.  He continues on Revlimid 25 mg days 1 through 14 every 21 days.  He is due to start his next cycle of Revlimid today..  On 1/11/2022, patient had elevation of liver function studies.  He has been taking ibuprofen for a headache the last 2 to 3 days, otherwise no new medications.  He denies any alcohol use.  He is feeling reasonably well today.  He is eating and drinking adequately.  He does continue to experience constipation following treatment, controlled with over-the-counter medications.  He denies fever or chills.  He denies nausea or vomiting.  He denies abdominal pain.  He denies signs or symptoms of peripheral neuropathy.  He denies any new or worsening pain.    ROS:  Pertinent ROS is in the HPI.     Past medical, surgical, social and family history were reviewed.     MEDICATIONS:    Current Outpatient Medications:   •  acyclovir (ZOVIRAX) 400 MG tablet, Take 1 tablet by mouth 2 (Two) Times a Day. Take one tablet by mouth twice daily., Disp: 60 tablet, Rfl: 11  •  aspirin 81 MG chewable tablet, Chew 81 mg Daily., Disp: , Rfl:   •  dexamethasone (DECADRON) 4 MG tablet, Take 10 tablets by mouth 1 (One) Time Per Week., Disp: 40 tablet, Rfl: 3  •  lenalidomide (Revlimid) 25 MG capsule, TAKE 1 CAPSULE BY MOUTH ONCE DAILY FOR 14 DAYS ON AND 7 DAYS OFF, Disp: 14 capsule, Rfl: 0  •  losartan (Cozaar) 100 MG tablet, Take 1 tablet by mouth Daily., Disp: 30 tablet, Rfl: 11  •  meclizine (ANTIVERT) 25 MG tablet, Take 25 mg by mouth 2 (two) times a day., Disp: , Rfl:   •  ondansetron (ZOFRAN) 8 MG tablet, Take 1 tablet by mouth 3 (Three) Times a Day As Needed for Nausea or Vomiting., Disp: 30 tablet, Rfl: 5  •   "sulfamethoxazole-trimethoprim (Bactrim DS) 800-160 MG per tablet, Take 1 tablet by mouth 3 (Three) Times a Week. On Monday, Wednesday and Friday, Disp: 12 tablet, Rfl: 5    Objective   VITAL SIGNS:     Vitals:    01/12/22 1448   BP: 153/90   Pulse: 66   Resp: 18   Temp: 97.8 °F (36.6 °C)   TempSrc: Temporal   SpO2: 99%   Weight: 96.6 kg (213 lb)   Height: 177.8 cm (70\")   PainSc: 0-No pain     Body mass index is 30.56 kg/m².     Wt Readings from Last 5 Encounters:   01/12/22 96.6 kg (213 lb)   01/05/22 95.9 kg (211 lb 6.4 oz)   12/29/21 97.4 kg (214 lb 12.8 oz)   12/28/21 97.5 kg (215 lb)   12/22/21 99.3 kg (219 lb)     PHYSICAL EXAMINATION:   GENERAL: The patient appears in good general condition, not in acute distress.   SKIN: No ecchymosis.  EYES: No jaundice.  LYMPHATICS: No cervical lymphadenopathy.  CHEST: Normal respiratory effort.  Lungs clear bilaterally.  No added sounds.  CVS: Normal S1-S2.  No murmurs.  ABDOMEN: Soft. No tenderness. No Hepatomegaly. No Splenomegaly. No masses.  EXTREMITIES: No edema.  No calf tenderness.  Physical exam completed 01/12/2022 with no changes.    DIAGNOSTIC DATA:     Results from last 7 days   Lab Units 01/12/22  1442 01/11/22  1456   WBC 10*3/mm3 5.82 6.05   NEUTROS ABS 10*3/mm3 2.76 2.87   HEMOGLOBIN g/dL 12.4* 11.8*   HEMATOCRIT % 40.5 38.4   PLATELETS 10*3/mm3 290 283     Component      Latest Ref Rng & Units 12/22/2021   Glucose      65 - 99 mg/dL 179 (H)   BUN      8 - 23 mg/dL 13   Creatinine      0.76 - 1.27 mg/dL 1.02   Sodium      136 - 145 mmol/L 139   Potassium      3.5 - 5.2 mmol/L 3.9   Chloride      98 - 107 mmol/L 104   CO2      22.0 - 29.0 mmol/L 26.0   Calcium      8.6 - 10.5 mg/dL 9.5   Total Protein      6.0 - 8.5 g/dL 7.1   Albumin      3.50 - 5.20 g/dL 4.90   ALT (SGPT)      1 - 41 U/L 25   AST (SGOT)      1 - 40 U/L 25   Alkaline Phosphatase      39 - 117 U/L 94   Total Bilirubin      0.0 - 1.2 mg/dL 0.2   eGFR African Am      >60 mL/min/1.73 90 "   Globulin      gm/dL 2.2   A/G Ratio      g/dL 2.2   BUN/Creatinine Ratio      7.0 - 25.0 12.7   Anion Gap      5.0 - 15.0 mmol/L 9.0     Component      Latest Ref Rng & Units 12/22/2021   TSH Baseline      0.270 - 4.200 uIU/mL 1.080   T Uptake      0.80 - 1.30 TBI 1.11   T4, Total      4.50 - 11.70 mcg/dL 5.13     Assessment/Plan   *Plasma cell disorder most consistent with multiple myeloma with lymph node involvement and amyloid deposition in the involved lymph nodes.  · Patient started having dry cough around July 2021.    · CT chest on 9/23/2021 revealed inferior mediastinal adenopathy.    · CT abdomen and pelvis on 9/24/2021 revealed retroperitoneal lymphadenopathy extending to the iliac chain lymph nodes on the right.    · The Largest lymph node was in the retroperitoneal area measuring 4.8 x 3.5 cm.  The common iliac lymph node measured 3.5 x 2.7 cm.  The left external iliac chain lymph node measured 2.9 x 2 cm.    · PET scan on 10/5/2021 revealed the retroperitoneal and left pelvic lymphadenopathy to be hypermetabolic.  The left periaortic lymph nodes had SUV of 6.9 and the left pelvic sidewall lymph nodes had an SUV of 5.4.  · Patient had CT-guided biopsy on 10/6/2021.  · The specimen was sent to [facility] for consultation. The pathologist reported involvement with monotypic lambda restricted plasma cells concerning for involvement with plasma cell dyscrasia. Lymphoma was considered less likely.  · Patient was referred to surgery for an excisional biopsy. However, the surgery was considered to be very extensive due to the posterior location of the lymph nodes.   · Bone marrow biopsy on 10/29/2021 revealed a normocellular marrow (50%) with involvement with plasma cell dyscrasia (plasma cells representing 20-30% of total cells by  stain).   · FISH was negative for gain of 1q, monosomy/deletions of chromosomes 13 and 17, IGH rearrangement, gain of chromosomes 9 and 11, IGH-CCND1 (11;14) fusion.   · I  this was considered to represent multiple myeloma with lymph node involvement and amyloid deposition.    · There was no bone involvement on PET scan.   · Patient was referred to [facility].  He was seen by Dr. Felix.  They concurred with the diagnosis of multiple myeloma with AL amyloidosis and involvement of the lymph nodes.  · Treatment with the VRD regimen as recommended.  · Patient started treatment on 12/22/2021.  · Patient is tolerating treatment except for some constipation.  No neuropathy.  · Since the start of treatment, he reports improvement in his back pain.  The back pain was likely secondary to the lymph node involvement with multiple myeloma.  · 1/12/2022, proceed with cycle 1 day 22 VRD today.  Patient continues on Revlimid 25 mg day 1 through 14 every 21 days.  He is scheduled to start his next cycle of Revlimid today.      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  · Patient was referred to cardiology.  He is scheduled for consultation on 1/19/2022.  · Patient denies chest pain or shortness of breath.    *Anemia secondary to multiple myeloma  · Hemoglobin was 13.1 on 9/29/2021.    · Iron, folate and vitamin B12 were normal in September/October 2021.    · Hemoglobin is 12.4.  He denies fatigue.    *Prophylaxis.  · Recommended starting ASA 81 mg daily for DVT prophylaxis while on Revlimid.  · Recommended Acyclovir 400 mg twice daily for VZV prophylaxis.  · Recommended Bactrim DS three days weekly for PCP prophylaxis.   · Patient is at increased risk for severe COVID-19 infection.  He received Covid vaccinations.  Recommended obtaining antibody test to evaluate his response.    *Elevated liver function studies  · 1/11/2022 AST 56, .  Total bilirubin normal at 0.2.   · 1/12/2022 AST declined to 43, ALT declined to 199.  Total bilirubin remains normal at 0.4.  Since liver function studies are stable/improved we will proceed with VRD today.  Hepatitis panel drawn today  and pending.  We will continue to monitor closely.    PLAN:     1.  We will give week #4 of Velcade today at 1.3 mg/m2.   2.  Patient will continue Decadron 40 mg weekly.  3.  Patient will continue Revlimid 25 mg day 1 through 14 every 21 days.  Due to start next cycle of Revlimid today.   4.  Await evaluation by cardiology, currently scheduled 1/19/2022.  6.  Patient will return weekly for his Velcade injections.  Return in 2 weeks for MD follow-up and Velcade.  7.  Hepatitis panel drawn today and pending.    The patient is on high risk medication that requires close monitoring for toxicity.  The plan was discussed with Dr. Mendez who is in agreement.    Yenifer Enamorado, AUDRA  01/12/22

## 2022-01-12 NOTE — PROGRESS NOTES
Specialty Note ( VRD regimen)    Duy reports that he is not having any side effects on VRD to report.  His reported adherence is appropriate.  However his LFTS from yesterday are elevated        1/11/2022   WBC 3.40 - 10.80 10*3/mm3 6.05   Neutrophils Absolute 1.70 - 7.00 10*3/mm3 2.87   Hemoglobin 13.0 - 17.7 g/dL 11.8 (A)   Hematocrit 37.5 - 51.0 % 38.4   Platelets 140 - 450 10*3/mm3 283   Creatinine 0.76 - 1.27 mg/dL 1.03   eGFR African Am >60 mL/min/1.73 89   BUN 8 - 23 mg/dL 14   Sodium 136 - 145 mmol/L 132 (A)   Potassium 3.5 - 5.2 mmol/L 4.1   Glucose 65 - 99 mg/dL 113 (A)   Calcium 8.6 - 10.5 mg/dL 9.0   Albumin 3.50 - 5.20 g/dL 4.60   Total Protein 6.0 - 8.5 g/dL 6.7   AST (SGOT) 1 - 40 U/L 56 (A)   ALT (SGPT) 1 - 41 U/L 246 (A)   Alkaline Phosphatase 39 - 117 U/L 133 (A)   Total Bilirubin 0.0 - 1.2 mg/dL 0.2       He denies use of acetaminophen and alcohol.  He has an appointment today with AUDRA.

## 2022-01-19 ENCOUNTER — OFFICE VISIT (OUTPATIENT)
Dept: CARDIOLOGY | Facility: CLINIC | Age: 62
End: 2022-01-19

## 2022-01-19 ENCOUNTER — INFUSION (OUTPATIENT)
Dept: ONCOLOGY | Facility: HOSPITAL | Age: 62
End: 2022-01-19

## 2022-01-19 ENCOUNTER — LAB (OUTPATIENT)
Dept: OTHER | Facility: HOSPITAL | Age: 62
End: 2022-01-19

## 2022-01-19 VITALS
WEIGHT: 211.2 LBS | HEART RATE: 71 BPM | DIASTOLIC BLOOD PRESSURE: 83 MMHG | TEMPERATURE: 96.9 F | RESPIRATION RATE: 18 BRPM | OXYGEN SATURATION: 100 % | BODY MASS INDEX: 30.24 KG/M2 | SYSTOLIC BLOOD PRESSURE: 137 MMHG | HEIGHT: 70 IN

## 2022-01-19 VITALS
SYSTOLIC BLOOD PRESSURE: 128 MMHG | WEIGHT: 211.8 LBS | HEART RATE: 75 BPM | DIASTOLIC BLOOD PRESSURE: 72 MMHG | BODY MASS INDEX: 30.32 KG/M2 | HEIGHT: 70 IN

## 2022-01-19 DIAGNOSIS — C90.00 MULTIPLE MYELOMA NOT HAVING ACHIEVED REMISSION: ICD-10-CM

## 2022-01-19 DIAGNOSIS — Z79.899 ENCOUNTER FOR LONG-TERM (CURRENT) USE OF HIGH-RISK MEDICATION: Primary | ICD-10-CM

## 2022-01-19 DIAGNOSIS — Z79.899 ENCOUNTER FOR LONG-TERM (CURRENT) USE OF HIGH-RISK MEDICATION: ICD-10-CM

## 2022-01-19 DIAGNOSIS — E85.9 AMYLOIDOSIS, UNSPECIFIED TYPE: Primary | ICD-10-CM

## 2022-01-19 LAB
BASOPHILS # BLD AUTO: 0.09 10*3/MM3 (ref 0–0.2)
BASOPHILS NFR BLD AUTO: 1 % (ref 0–1.5)
DEPRECATED RDW RBC AUTO: 38.8 FL (ref 37–54)
EOSINOPHIL # BLD AUTO: 0.69 10*3/MM3 (ref 0–0.4)
EOSINOPHIL NFR BLD AUTO: 7.5 % (ref 0.3–6.2)
ERYTHROCYTE [DISTWIDTH] IN BLOOD BY AUTOMATED COUNT: 15.2 % (ref 12.3–15.4)
HCT VFR BLD AUTO: 41.2 % (ref 37.5–51)
HGB BLD-MCNC: 13.1 G/DL (ref 13–17.7)
HYPOCHROMIA BLD QL: NORMAL
IMM GRANULOCYTES # BLD AUTO: 0.39 10*3/MM3 (ref 0–0.05)
IMM GRANULOCYTES NFR BLD AUTO: 4.2 % (ref 0–0.5)
LYMPHOCYTES # BLD AUTO: 1.14 10*3/MM3 (ref 0.7–3.1)
LYMPHOCYTES NFR BLD AUTO: 12.3 % (ref 19.6–45.3)
MCH RBC QN AUTO: 23 PG (ref 26.6–33)
MCHC RBC AUTO-ENTMCNC: 31.8 G/DL (ref 31.5–35.7)
MCV RBC AUTO: 72.4 FL (ref 79–97)
MONOCYTES # BLD AUTO: 0.69 10*3/MM3 (ref 0.1–0.9)
MONOCYTES NFR BLD AUTO: 7.5 % (ref 5–12)
NEUTROPHILS NFR BLD AUTO: 6.24 10*3/MM3 (ref 1.7–7)
NEUTROPHILS NFR BLD AUTO: 67.5 % (ref 42.7–76)
NRBC BLD AUTO-RTO: 0 /100 WBC (ref 0–0.2)
PLAT MORPH BLD: NORMAL
PLATELET # BLD AUTO: 257 10*3/MM3 (ref 140–450)
PMV BLD AUTO: 10.3 FL (ref 6–12)
RBC # BLD AUTO: 5.69 10*6/MM3 (ref 4.14–5.8)
WBC MORPH BLD: NORMAL
WBC NRBC COR # BLD: 9.24 10*3/MM3 (ref 3.4–10.8)

## 2022-01-19 PROCEDURE — 85025 COMPLETE CBC W/AUTO DIFF WBC: CPT | Performed by: INTERNAL MEDICINE

## 2022-01-19 PROCEDURE — 25010000002 BORTEZOMIB PER 0.1 MG: Performed by: INTERNAL MEDICINE

## 2022-01-19 PROCEDURE — 36415 COLL VENOUS BLD VENIPUNCTURE: CPT

## 2022-01-19 PROCEDURE — 99204 OFFICE O/P NEW MOD 45 MIN: CPT | Performed by: INTERNAL MEDICINE

## 2022-01-19 PROCEDURE — 96401 CHEMO ANTI-NEOPL SQ/IM: CPT

## 2022-01-19 PROCEDURE — 85007 BL SMEAR W/DIFF WBC COUNT: CPT | Performed by: INTERNAL MEDICINE

## 2022-01-19 PROCEDURE — 93000 ELECTROCARDIOGRAM COMPLETE: CPT | Performed by: INTERNAL MEDICINE

## 2022-01-19 RX ORDER — BORTEZOMIB 3.5 MG/1
1.3 INJECTION, POWDER, LYOPHILIZED, FOR SOLUTION INTRAVENOUS; SUBCUTANEOUS ONCE
Status: COMPLETED | OUTPATIENT
Start: 2022-01-19 | End: 2022-01-19

## 2022-01-19 RX ADMIN — BORTEZOMIB 2.8 MG: 3.5 INJECTION, POWDER, LYOPHILIZED, FOR SOLUTION INTRAVENOUS; SUBCUTANEOUS at 16:03

## 2022-01-21 LAB
DX PRELIMINARY: NORMAL
LAB AP CASE REPORT: NORMAL
LAB AP CASE REPORT: NORMAL
LAB AP CLINICAL INFORMATION: NORMAL
LAB AP CLINICAL INFORMATION: NORMAL
LAB AP DIAGNOSIS COMMENT: NORMAL
LAB AP FLOW CYTOMETRY SUMMARY: NORMAL
LAB AP SPECIAL STAINS: NORMAL
LAB AP SPECIAL STAINS: NORMAL
Lab: NORMAL
PATH REPORT.ADDENDUM SPEC: NORMAL
PATH REPORT.ADDENDUM SPEC: NORMAL
PATH REPORT.FINAL DX SPEC: NORMAL
PATH REPORT.FINAL DX SPEC: NORMAL
PATH REPORT.GROSS SPEC: NORMAL
PATH REPORT.GROSS SPEC: NORMAL

## 2022-01-23 NOTE — PROGRESS NOTES
Date of Office Visit:  2022  Encounter Provider: Manish Valdovinos MD  Place of Service: [facility] CARDIOLOGY  Patient Name: Duy Owens  :1960    Chief complaint: Multiple myeloma with AL amyloidosis    History of Present Illness:    I had the pleasure of seeing the patient in cardiology office on 2022.  He is a very pleasant 61 year-old male with a history of multiple myeloma and AL amyloidosis who presents for evaluation.    The patient is currently followed by Dr. Mendez at the [facility].  He has multiple myeloma, and is treated with Revlimid.  He has also been evaluated at [place], and has been confirmed to have AL amyloidosis with lymph node involvement.  He is here mainly to have evaluation for possible cardiac involvement of the amyloid.  On questioning, he is completely asymptomatic.  He has had no chest pain, shortness of breath, or other exertional symptoms.  He does have hypertension which is treated with losartan, and his blood pressure is good today at 128/72.  He has not had cardiac evaluation in the past.  His EKG from today is normal.    Past Medical History:   Diagnosis Date   • History of colon polyps    • History of snoring        Past Surgical History:   Procedure Laterality Date   • COLONOSCOPY W/ POLYPECTOMY     • CYSTOSCOPY TRANSURETHRAL RESECTION OF PROSTATE     • LYMPH NODE BIOPSY Right 10/06/2021    right retroperitoneal lymph node conglomerate       Current Outpatient Medications on File Prior to Visit   Medication Sig Dispense Refill   • acyclovir (ZOVIRAX) 400 MG tablet Take 1 tablet by mouth 2 (Two) Times a Day. Take one tablet by mouth twice daily. 60 tablet 11   • aspirin 81 MG chewable tablet Chew 81 mg Daily.     • dexamethasone (DECADRON) 4 MG tablet Take 10 tablets by mouth 1 (One) Time Per Week. 40 tablet 3   • lenalidomide (Revlimid) 25 MG capsule TAKE 1 CAPSULE BY MOUTH ONCE DAILY FOR 14 DAYS ON AND 7 DAYS OFF 14  "capsule 0   • losartan (Cozaar) 100 MG tablet Take 1 tablet by mouth Daily. 30 tablet 11   • meclizine (ANTIVERT) 25 MG tablet Take 25 mg by mouth 2 (two) times a day.     • ondansetron (ZOFRAN) 8 MG tablet Take 1 tablet by mouth 3 (Three) Times a Day As Needed for Nausea or Vomiting. 30 tablet 5   • sulfamethoxazole-trimethoprim (Bactrim DS) 800-160 MG per tablet Take 1 tablet by mouth 3 (Three) Times a Week. On Monday, Wednesday and Friday 12 tablet 5     No current facility-administered medications on file prior to visit.     Allergies as of 2022 - Reviewed 2022   Allergen Reaction Noted   • Lisinopril Cough 2021     Social History     Socioeconomic History   • Marital status: Single   Tobacco Use   • Smoking status: Former Smoker     Packs/day: 0.50     Years: 10.50     Pack years: 5.25     Types: Cigarettes     Quit date:      Years since quittin.0   • Smokeless tobacco: Never Used   Vaping Use   • Vaping Use: Never used   Substance and Sexual Activity   • Alcohol use: Yes     Alcohol/week: 6.0 standard drinks     Types: 6 Cans of beer per week     Comment: weekly   • Drug use: Never   • Sexual activity: Defer     Family History   Problem Relation Age of Onset   • Heart disease Mother    • Prostate cancer Brother        Review of Systems   All other systems reviewed and are negative.     Objective:     Vitals:    22 1022   BP: 128/72   Pulse: 75   Weight: 96.1 kg (211 lb 12.8 oz)   Height: 177.8 cm (70\")     Body mass index is 30.39 kg/m².    Constitutional:       Appearance: Healthy appearance. Well-developed.   Eyes:      Conjunctiva/sclera: Conjunctivae normal.   HENT:      Head: Normocephalic and atraumatic.   Pulmonary:      Effort: Pulmonary effort is normal.      Breath sounds: Normal breath sounds.   Cardiovascular:      Normal rate. Regular rhythm.      Murmurs: There is no murmur.      No gallop.   Edema:     Peripheral edema absent.   Abdominal:      Palpations: " Abdomen is soft.      Tenderness: There is no abdominal tenderness.   Musculoskeletal:      Cervical back: Neck supple. Skin:     General: Skin is warm.   Neurological:      Mental Status: Alert and oriented to person, place, and time.   Psychiatric:         Behavior: Behavior normal.       Lab Review:     ECG 12 Lead    Date/Time: 1/19/2022 10:15 AM  Performed by: Manish Valdovinos MD  Authorized by: Manish Valdovinos MD   Comparison: not compared with previous ECG   Previous ECG: no previous ECG available  Rhythm: sinus rhythm  Rate: normal  BPM: 74    Clinical impression: normal ECG            Lipid Panel    Lipid Panel 5/3/21 12/28/21   Total Cholesterol 193 205 (A)   Triglycerides 126 179 (A)   HDL Cholesterol 46 44   VLDL Cholesterol 23 32   LDL Cholesterol  124 (A) 129 (A)   (A) Abnormal value       Comments are available for some flowsheets but are not being displayed.              Cardiac Procedures:      Assessment:       Diagnosis Plan   1. Amyloidosis, unspecified type (HCC)  Adult Transthoracic Echo Complete w/ Color, Spectral and Contrast if Necessary Per Protocol     Plan:       Again, he has multiple myeloma with AL amyloidosis.  This has been confirmed at [place].  He does not have any cardiac symptoms currently.  His EKG is normal.  However, there is always a concern for cardiac involvement here.  Unfortunately, tafamidis is not effective against AL type amyloid with cardiac involvement (only for ATTR).  I am going to start with an echocardiogram for now.  Depending on the results, this will dictate care going forward.

## 2022-01-24 ENCOUNTER — TELEPHONE (OUTPATIENT)
Dept: ONCOLOGY | Facility: CLINIC | Age: 62
End: 2022-01-24

## 2022-01-24 NOTE — TELEPHONE ENCOUNTER
Caller: Duy Owens    Relationship to patient: Self    Best call back number: [phone]    Chief complaint: R/S F/U LAB AND  DUE TO ECHO APPT MOVED TO 1/27    Type of visit: F/U, LAB     Requested date: ANY DAY AFTER 1/27    IF AT [place] LOCATION CAN ONLY DO ANYTIME AFTER 3PM    IF AT [place] OFFICE CAN ONLY DO EARLY MORNING APPT    AND DOES NOT GO TO [place] OFFICE       If rescheduling, when is the original appointment: 1/26/22    Additional notes:    PLEASE PUT BACK ON APPT FOR INFUSION 1/26/22 PATIENT STATED CANCELLED THAT IN ERROR AND WOULD LIKE TO KEEP THAT APPT.

## 2022-01-25 RX ORDER — LENALIDOMIDE 25 MG/1
CAPSULE ORAL
Qty: 14 CAPSULE | Refills: 0 | Status: SHIPPED | OUTPATIENT
Start: 2022-01-25 | End: 2022-02-15 | Stop reason: SDUPTHER

## 2022-01-26 ENCOUNTER — INFUSION (OUTPATIENT)
Dept: ONCOLOGY | Facility: HOSPITAL | Age: 62
End: 2022-01-26

## 2022-01-26 ENCOUNTER — APPOINTMENT (OUTPATIENT)
Dept: OTHER | Facility: HOSPITAL | Age: 62
End: 2022-01-26

## 2022-01-26 ENCOUNTER — LAB (OUTPATIENT)
Dept: OTHER | Facility: HOSPITAL | Age: 62
End: 2022-01-26

## 2022-01-26 ENCOUNTER — APPOINTMENT (OUTPATIENT)
Dept: ONCOLOGY | Facility: HOSPITAL | Age: 62
End: 2022-01-26

## 2022-01-26 ENCOUNTER — SPECIALTY PHARMACY (OUTPATIENT)
Dept: PHARMACY | Facility: HOSPITAL | Age: 62
End: 2022-01-26

## 2022-01-26 VITALS
OXYGEN SATURATION: 100 % | TEMPERATURE: 99.1 F | DIASTOLIC BLOOD PRESSURE: 84 MMHG | HEIGHT: 70 IN | RESPIRATION RATE: 18 BRPM | HEART RATE: 77 BPM | WEIGHT: 210 LBS | BODY MASS INDEX: 30.06 KG/M2 | SYSTOLIC BLOOD PRESSURE: 130 MMHG

## 2022-01-26 DIAGNOSIS — C90.00 MULTIPLE MYELOMA NOT HAVING ACHIEVED REMISSION: ICD-10-CM

## 2022-01-26 DIAGNOSIS — Z79.899 ENCOUNTER FOR LONG-TERM (CURRENT) USE OF HIGH-RISK MEDICATION: Primary | ICD-10-CM

## 2022-01-26 DIAGNOSIS — Z79.899 ENCOUNTER FOR LONG-TERM (CURRENT) USE OF HIGH-RISK MEDICATION: ICD-10-CM

## 2022-01-26 LAB
BASOPHILS # BLD AUTO: 0.08 10*3/MM3 (ref 0–0.2)
BASOPHILS NFR BLD AUTO: 0.8 % (ref 0–1.5)
DEPRECATED RDW RBC AUTO: 38.6 FL (ref 37–54)
EOSINOPHIL # BLD AUTO: 0.86 10*3/MM3 (ref 0–0.4)
EOSINOPHIL NFR BLD AUTO: 8.2 % (ref 0.3–6.2)
ERYTHROCYTE [DISTWIDTH] IN BLOOD BY AUTOMATED COUNT: 15.4 % (ref 12.3–15.4)
HCT VFR BLD AUTO: 43.2 % (ref 37.5–51)
HGB BLD-MCNC: 13.4 G/DL (ref 13–17.7)
IMM GRANULOCYTES # BLD AUTO: 0.15 10*3/MM3 (ref 0–0.05)
IMM GRANULOCYTES NFR BLD AUTO: 1.4 % (ref 0–0.5)
LYMPHOCYTES # BLD AUTO: 1.42 10*3/MM3 (ref 0.7–3.1)
LYMPHOCYTES NFR BLD AUTO: 13.6 % (ref 19.6–45.3)
MCH RBC QN AUTO: 22.4 PG (ref 26.6–33)
MCHC RBC AUTO-ENTMCNC: 31 G/DL (ref 31.5–35.7)
MCV RBC AUTO: 72.4 FL (ref 79–97)
MICROCYTES BLD QL: NORMAL
MONOCYTES # BLD AUTO: 1.35 10*3/MM3 (ref 0.1–0.9)
MONOCYTES NFR BLD AUTO: 12.9 % (ref 5–12)
NEUTROPHILS NFR BLD AUTO: 6.57 10*3/MM3 (ref 1.7–7)
NEUTROPHILS NFR BLD AUTO: 63.1 % (ref 42.7–76)
NRBC BLD AUTO-RTO: 0.3 /100 WBC (ref 0–0.2)
PLAT MORPH BLD: NORMAL
PLATELET # BLD AUTO: 250 10*3/MM3 (ref 140–450)
PMV BLD AUTO: 11 FL (ref 6–12)
RBC # BLD AUTO: 5.97 10*6/MM3 (ref 4.14–5.8)
WBC MORPH BLD: NORMAL
WBC NRBC COR # BLD: 10.43 10*3/MM3 (ref 3.4–10.8)

## 2022-01-26 PROCEDURE — 36415 COLL VENOUS BLD VENIPUNCTURE: CPT

## 2022-01-26 PROCEDURE — 96401 CHEMO ANTI-NEOPL SQ/IM: CPT

## 2022-01-26 PROCEDURE — 85007 BL SMEAR W/DIFF WBC COUNT: CPT | Performed by: INTERNAL MEDICINE

## 2022-01-26 PROCEDURE — 85025 COMPLETE CBC W/AUTO DIFF WBC: CPT | Performed by: INTERNAL MEDICINE

## 2022-01-26 PROCEDURE — 25010000002 BORTEZOMIB PER 0.1 MG: Performed by: INTERNAL MEDICINE

## 2022-01-26 RX ORDER — BORTEZOMIB 3.5 MG/1
1.3 INJECTION, POWDER, LYOPHILIZED, FOR SOLUTION INTRAVENOUS; SUBCUTANEOUS ONCE
Status: COMPLETED | OUTPATIENT
Start: 2022-01-26 | End: 2022-01-26

## 2022-01-26 RX ADMIN — BORTEZOMIB 2.8 MG: 3.5 INJECTION, POWDER, LYOPHILIZED, FOR SOLUTION INTRAVENOUS; SUBCUTANEOUS at 16:10

## 2022-01-26 NOTE — PROGRESS NOTES
Specialty Note ( VRD regimen)      Labs reviewed        1/12/2022   WBC 3.40 - 10.80 10*3/mm3 5.82   Neutrophils Absolute 1.70 - 7.00 10*3/mm3 2.76   Hemoglobin 13.0 - 17.7 g/dL 12.4 (A)   Hematocrit 37.5 - 51.0 % 40.5   Platelets 140 - 450 10*3/mm3 290   Creatinine 0.76 - 1.27 mg/dL 0.89   eGFR African Am >60 mL/min/1.73 106   BUN 8 - 23 mg/dL 9   Sodium 136 - 145 mmol/L 134 (A)   Potassium 3.5 - 5.2 mmol/L 4.5   Glucose 65 - 99 mg/dL 96   Calcium 8.6 - 10.5 mg/dL 9.3   Albumin 3.50 - 5.20 g/dL 4.80   Total Protein 6.0 - 8.5 g/dL 7.1   AST (SGOT) 1 - 40 U/L 43 (A)   ALT (SGPT) 1 - 41 U/L 199 (A)   Alkaline Phosphatase 39 - 117 U/L 131 (A)   Total Bilirubin 0.0 - 1.2 mg/dL 0.4       · APRN dictation is noted  1/12/2022, proceed with cycle 1 day 22 VRD today.  Patient continues on Revlimid 25 mg day 1 through 14 every 21 days.  He is scheduled to start his next cycle of Revlimid today.

## 2022-01-27 ENCOUNTER — HOSPITAL ENCOUNTER (OUTPATIENT)
Dept: CARDIOLOGY | Facility: HOSPITAL | Age: 62
Discharge: HOME | End: 2022-01-27
Admitting: INTERNAL MEDICINE

## 2022-01-27 VITALS
WEIGHT: 210 LBS | DIASTOLIC BLOOD PRESSURE: 80 MMHG | HEIGHT: 70 IN | SYSTOLIC BLOOD PRESSURE: 130 MMHG | BODY MASS INDEX: 30.06 KG/M2 | HEART RATE: 85 BPM

## 2022-01-27 DIAGNOSIS — E85.9 AMYLOIDOSIS, UNSPECIFIED TYPE: ICD-10-CM

## 2022-01-27 LAB
AORTIC ARCH: 2.9 CM
AORTIC DIMENSIONLESS INDEX: 0.7 (DI)
ASCENDING AORTA: 2.5 CM
BH CV ECHO MEAS - ACS: 2 CM
BH CV ECHO MEAS - AO ARCH DIAM (PROXIMAL TRANS.): 2.9 CM
BH CV ECHO MEAS - AO MAX PG (FULL): 8 MMHG
BH CV ECHO MEAS - AO MAX PG: 15.1 MMHG
BH CV ECHO MEAS - AO MEAN PG (FULL): 4 MMHG
BH CV ECHO MEAS - AO MEAN PG: 8.6 MMHG
BH CV ECHO MEAS - AO ROOT AREA (BSA CORRECTED): 1.4
BH CV ECHO MEAS - AO ROOT AREA: 7.3 CM^2
BH CV ECHO MEAS - AO ROOT DIAM: 3.1 CM
BH CV ECHO MEAS - AO V2 MAX: 194.1 CM/SEC
BH CV ECHO MEAS - AO V2 MEAN: 141.6 CM/SEC
BH CV ECHO MEAS - AO V2 VTI: 38.1 CM
BH CV ECHO MEAS - ASC AORTA: 2.5 CM
BH CV ECHO MEAS - AVA(I,A): 2.4 CM^2
BH CV ECHO MEAS - AVA(I,D): 2.4 CM^2
BH CV ECHO MEAS - AVA(V,A): 2.3 CM^2
BH CV ECHO MEAS - AVA(V,D): 2.3 CM^2
BH CV ECHO MEAS - BSA(HAYCOCK): 2.2 M^2
BH CV ECHO MEAS - BSA: 2.1 M^2
BH CV ECHO MEAS - BZI_BMI: 30.1 KILOGRAMS/M^2
BH CV ECHO MEAS - BZI_METRIC_HEIGHT: 177.8 CM
BH CV ECHO MEAS - BZI_METRIC_WEIGHT: 95.3 KG
BH CV ECHO MEAS - EDV(CUBED): 113.8 ML
BH CV ECHO MEAS - EDV(MOD-SP2): 155 ML
BH CV ECHO MEAS - EDV(MOD-SP4): 152 ML
BH CV ECHO MEAS - EDV(TEICH): 109.9 ML
BH CV ECHO MEAS - EF(CUBED): 73.7 %
BH CV ECHO MEAS - EF(MOD-BP): 67 %
BH CV ECHO MEAS - EF(MOD-SP2): 67.1 %
BH CV ECHO MEAS - EF(MOD-SP4): 65.1 %
BH CV ECHO MEAS - EF(TEICH): 65.3 %
BH CV ECHO MEAS - ESV(CUBED): 30 ML
BH CV ECHO MEAS - ESV(MOD-SP2): 51 ML
BH CV ECHO MEAS - ESV(MOD-SP4): 53 ML
BH CV ECHO MEAS - ESV(TEICH): 38.1 ML
BH CV ECHO MEAS - FS: 35.9 %
BH CV ECHO MEAS - IVS/LVPW: 1
BH CV ECHO MEAS - IVSD: 1.2 CM
BH CV ECHO MEAS - LAT PEAK E' VEL: 11.1 CM/SEC
BH CV ECHO MEAS - LV DIASTOLIC VOL/BSA (35-75): 71.3 ML/M^2
BH CV ECHO MEAS - LV MASS(C)D: 207 GRAMS
BH CV ECHO MEAS - LV MASS(C)DI: 97.2 GRAMS/M^2
BH CV ECHO MEAS - LV MAX PG: 7.1 MMHG
BH CV ECHO MEAS - LV MEAN PG: 4.6 MMHG
BH CV ECHO MEAS - LV SYSTOLIC VOL/BSA (12-30): 24.9 ML/M^2
BH CV ECHO MEAS - LV V1 MAX: 132.8 CM/SEC
BH CV ECHO MEAS - LV V1 MEAN: 103.3 CM/SEC
BH CV ECHO MEAS - LV V1 VTI: 27 CM
BH CV ECHO MEAS - LVIDD: 4.8 CM
BH CV ECHO MEAS - LVIDS: 3.1 CM
BH CV ECHO MEAS - LVLD AP2: 9.3 CM
BH CV ECHO MEAS - LVLD AP4: 9.2 CM
BH CV ECHO MEAS - LVLS AP2: 7.8 CM
BH CV ECHO MEAS - LVLS AP4: 7.7 CM
BH CV ECHO MEAS - LVOT AREA (M): 3.5 CM^2
BH CV ECHO MEAS - LVOT AREA: 3.4 CM^2
BH CV ECHO MEAS - LVOT DIAM: 2.1 CM
BH CV ECHO MEAS - LVPWD: 1.1 CM
BH CV ECHO MEAS - MED PEAK E' VEL: 7.9 CM/SEC
BH CV ECHO MEAS - MV A DUR: 0.14 SEC
BH CV ECHO MEAS - MV A MAX VEL: 88.3 CM/SEC
BH CV ECHO MEAS - MV DEC SLOPE: 368 CM/SEC^2
BH CV ECHO MEAS - MV DEC TIME: 0.21 SEC
BH CV ECHO MEAS - MV E MAX VEL: 60.4 CM/SEC
BH CV ECHO MEAS - MV E/A: 0.68
BH CV ECHO MEAS - MV MAX PG: 4.5 MMHG
BH CV ECHO MEAS - MV MEAN PG: 1.6 MMHG
BH CV ECHO MEAS - MV P1/2T MAX VEL: 80.1 CM/SEC
BH CV ECHO MEAS - MV P1/2T: 63.7 MSEC
BH CV ECHO MEAS - MV V2 MAX: 105.9 CM/SEC
BH CV ECHO MEAS - MV V2 MEAN: 56.7 CM/SEC
BH CV ECHO MEAS - MV V2 VTI: 21.1 CM
BH CV ECHO MEAS - MVA P1/2T LCG: 2.7 CM^2
BH CV ECHO MEAS - MVA(P1/2T): 3.5 CM^2
BH CV ECHO MEAS - MVA(VTI): 4.3 CM^2
BH CV ECHO MEAS - PA ACC TIME: 0.15 SEC
BH CV ECHO MEAS - PA MAX PG (FULL): 2 MMHG
BH CV ECHO MEAS - PA MAX PG: 4.5 MMHG
BH CV ECHO MEAS - PA PR(ACCEL): 12.5 MMHG
BH CV ECHO MEAS - PA V2 MAX: 105.9 CM/SEC
BH CV ECHO MEAS - PULM A REVS DUR: 0.2 SEC
BH CV ECHO MEAS - PULM A REVS VEL: 48.2 CM/SEC
BH CV ECHO MEAS - PULM DIAS VEL: 43.2 CM/SEC
BH CV ECHO MEAS - PULM S/D: 1.5
BH CV ECHO MEAS - PULM SYS VEL: 62.9 CM/SEC
BH CV ECHO MEAS - PVA(V,A): 2.7 CM^2
BH CV ECHO MEAS - PVA(V,D): 2.7 CM^2
BH CV ECHO MEAS - QP/QS: 0.56
BH CV ECHO MEAS - RV MAX PG: 2.5 MMHG
BH CV ECHO MEAS - RV MEAN PG: 1.8 MMHG
BH CV ECHO MEAS - RV V1 MAX: 78.8 CM/SEC
BH CV ECHO MEAS - RV V1 MEAN: 65.6 CM/SEC
BH CV ECHO MEAS - RV V1 VTI: 13.7 CM
BH CV ECHO MEAS - RVOT AREA: 3.7 CM^2
BH CV ECHO MEAS - RVOT DIAM: 2.2 CM
BH CV ECHO MEAS - SI(AO): 130.8 ML/M^2
BH CV ECHO MEAS - SI(CUBED): 39.3 ML/M^2
BH CV ECHO MEAS - SI(LVOT): 42.5 ML/M^2
BH CV ECHO MEAS - SI(MOD-SP2): 48.8 ML/M^2
BH CV ECHO MEAS - SI(MOD-SP4): 46.5 ML/M^2
BH CV ECHO MEAS - SI(TEICH): 33.7 ML/M^2
BH CV ECHO MEAS - SUP REN AO DIAM: 1.7 CM
BH CV ECHO MEAS - SV(AO): 278.8 ML
BH CV ECHO MEAS - SV(CUBED): 83.8 ML
BH CV ECHO MEAS - SV(LVOT): 90.6 ML
BH CV ECHO MEAS - SV(MOD-SP2): 104 ML
BH CV ECHO MEAS - SV(MOD-SP4): 99 ML
BH CV ECHO MEAS - SV(RVOT): 50.3 ML
BH CV ECHO MEAS - SV(TEICH): 71.8 ML
BH CV ECHO MEAS - TAPSE (>1.6): 3 CM
BH CV ECHO MEASUREMENTS AVERAGE E/E' RATIO: 6.36
BH CV XLRA - RV BASE: 3.1 CM
BH CV XLRA - RV LENGTH: 7.7 CM
BH CV XLRA - RV MID: 2.3 CM
BH CV XLRA - TDI S': 17.1 CM/SEC
LEFT ATRIUM VOLUME INDEX: 15 ML/M2
MAXIMAL PREDICTED HEART RATE: 160 BPM
SINUS: 3.1 CM
STJ: 2.4 CM
STRESS TARGET HR: 136 BPM

## 2022-01-27 PROCEDURE — 93356 MYOCRD STRAIN IMG SPCKL TRCK: CPT | Performed by: INTERNAL MEDICINE

## 2022-01-27 PROCEDURE — 25010000002 PERFLUTREN (DEFINITY) 8.476 MG IN SODIUM CHLORIDE (PF) 0.9 % 10 ML INJECTION: Performed by: INTERNAL MEDICINE

## 2022-01-27 PROCEDURE — 93306 TTE W/DOPPLER COMPLETE: CPT

## 2022-01-27 PROCEDURE — 93306 TTE W/DOPPLER COMPLETE: CPT | Performed by: INTERNAL MEDICINE

## 2022-01-27 PROCEDURE — 93356 MYOCRD STRAIN IMG SPCKL TRCK: CPT

## 2022-01-27 RX ADMIN — PERFLUTREN 2 ML: 6.52 INJECTION, SUSPENSION INTRAVENOUS at 07:54

## 2022-01-28 NOTE — PROGRESS NOTES
Called and spoke with him.  No evidence of cardiac amyloidosis.  He has type AL amyloidosis, and a PYP scan will not pick this up.  I have recommended a repeat echocardiogram in 1 year.  I will get him to follow-up with me in the office in 1 year as well.  MICHAEL

## 2022-02-02 ENCOUNTER — INFUSION (OUTPATIENT)
Dept: ONCOLOGY | Facility: HOSPITAL | Age: 62
End: 2022-02-02

## 2022-02-02 ENCOUNTER — OFFICE VISIT (OUTPATIENT)
Dept: ONCOLOGY | Facility: CLINIC | Age: 62
End: 2022-02-02

## 2022-02-02 ENCOUNTER — LAB (OUTPATIENT)
Dept: OTHER | Facility: HOSPITAL | Age: 62
End: 2022-02-02

## 2022-02-02 VITALS
SYSTOLIC BLOOD PRESSURE: 123 MMHG | RESPIRATION RATE: 16 BRPM | TEMPERATURE: 98.4 F | DIASTOLIC BLOOD PRESSURE: 78 MMHG | BODY MASS INDEX: 30.12 KG/M2 | WEIGHT: 210.4 LBS | HEIGHT: 70 IN | OXYGEN SATURATION: 98 % | HEART RATE: 82 BPM

## 2022-02-02 DIAGNOSIS — D84.9 IMMUNOCOMPROMISED PATIENT: ICD-10-CM

## 2022-02-02 DIAGNOSIS — Z79.899 ENCOUNTER FOR LONG-TERM (CURRENT) USE OF HIGH-RISK MEDICATION: Primary | ICD-10-CM

## 2022-02-02 DIAGNOSIS — T45.1X5A ANEMIA DUE TO CHEMOTHERAPY: ICD-10-CM

## 2022-02-02 DIAGNOSIS — D64.81 ANEMIA DUE TO CHEMOTHERAPY: ICD-10-CM

## 2022-02-02 DIAGNOSIS — D63.0 ANEMIA IN NEOPLASTIC DISEASE: ICD-10-CM

## 2022-02-02 DIAGNOSIS — R74.8 ELEVATED LIVER ENZYMES: ICD-10-CM

## 2022-02-02 DIAGNOSIS — C90.00 MULTIPLE MYELOMA NOT HAVING ACHIEVED REMISSION: ICD-10-CM

## 2022-02-02 DIAGNOSIS — Z79.899 ENCOUNTER FOR LONG-TERM (CURRENT) USE OF HIGH-RISK MEDICATION: ICD-10-CM

## 2022-02-02 DIAGNOSIS — C90.00 MULTIPLE MYELOMA NOT HAVING ACHIEVED REMISSION: Primary | ICD-10-CM

## 2022-02-02 LAB
BASOPHILS # BLD AUTO: 0.12 10*3/MM3 (ref 0–0.2)
BASOPHILS NFR BLD AUTO: 1.1 % (ref 0–1.5)
DEPRECATED RDW RBC AUTO: 39.2 FL (ref 37–54)
EOSINOPHIL # BLD AUTO: 1.07 10*3/MM3 (ref 0–0.4)
EOSINOPHIL NFR BLD AUTO: 9.7 % (ref 0.3–6.2)
ERYTHROCYTE [DISTWIDTH] IN BLOOD BY AUTOMATED COUNT: 15.4 % (ref 12.3–15.4)
HCT VFR BLD AUTO: 40.9 % (ref 37.5–51)
HGB BLD-MCNC: 12.8 G/DL (ref 13–17.7)
HYPOCHROMIA BLD QL: NORMAL
IMM GRANULOCYTES # BLD AUTO: 0.08 10*3/MM3 (ref 0–0.05)
IMM GRANULOCYTES NFR BLD AUTO: 0.7 % (ref 0–0.5)
LYMPHOCYTES # BLD AUTO: 2.3 10*3/MM3 (ref 0.7–3.1)
LYMPHOCYTES NFR BLD AUTO: 20.9 % (ref 19.6–45.3)
MCH RBC QN AUTO: 22.7 PG (ref 26.6–33)
MCHC RBC AUTO-ENTMCNC: 31.3 G/DL (ref 31.5–35.7)
MCV RBC AUTO: 72.6 FL (ref 79–97)
MONOCYTES # BLD AUTO: 1.52 10*3/MM3 (ref 0.1–0.9)
MONOCYTES NFR BLD AUTO: 13.8 % (ref 5–12)
NEUTROPHILS NFR BLD AUTO: 5.9 10*3/MM3 (ref 1.7–7)
NEUTROPHILS NFR BLD AUTO: 53.8 % (ref 42.7–76)
NRBC BLD AUTO-RTO: 0 /100 WBC (ref 0–0.2)
PLAT MORPH BLD: NORMAL
PLATELET # BLD AUTO: 238 10*3/MM3 (ref 140–450)
PMV BLD AUTO: 10 FL (ref 6–12)
RBC # BLD AUTO: 5.63 10*6/MM3 (ref 4.14–5.8)
WBC MORPH BLD: NORMAL
WBC NRBC COR # BLD: 10.99 10*3/MM3 (ref 3.4–10.8)

## 2022-02-02 PROCEDURE — 25010000002 BORTEZOMIB PER 0.1 MG: Performed by: INTERNAL MEDICINE

## 2022-02-02 PROCEDURE — 99214 OFFICE O/P EST MOD 30 MIN: CPT | Performed by: INTERNAL MEDICINE

## 2022-02-02 PROCEDURE — 36415 COLL VENOUS BLD VENIPUNCTURE: CPT

## 2022-02-02 PROCEDURE — 85007 BL SMEAR W/DIFF WBC COUNT: CPT | Performed by: INTERNAL MEDICINE

## 2022-02-02 PROCEDURE — 96401 CHEMO ANTI-NEOPL SQ/IM: CPT

## 2022-02-02 PROCEDURE — 85025 COMPLETE CBC W/AUTO DIFF WBC: CPT | Performed by: INTERNAL MEDICINE

## 2022-02-02 RX ORDER — BORTEZOMIB 3.5 MG/1
1.3 INJECTION, POWDER, LYOPHILIZED, FOR SOLUTION INTRAVENOUS; SUBCUTANEOUS ONCE
Status: CANCELLED | OUTPATIENT
Start: 2022-03-02

## 2022-02-02 RX ORDER — BORTEZOMIB 3.5 MG/1
1.3 INJECTION, POWDER, LYOPHILIZED, FOR SOLUTION INTRAVENOUS; SUBCUTANEOUS ONCE
Status: COMPLETED | OUTPATIENT
Start: 2022-02-02 | End: 2022-02-02

## 2022-02-02 RX ORDER — BORTEZOMIB 3.5 MG/1
1.3 INJECTION, POWDER, LYOPHILIZED, FOR SOLUTION INTRAVENOUS; SUBCUTANEOUS ONCE
Status: CANCELLED | OUTPATIENT
Start: 2022-02-16

## 2022-02-02 RX ORDER — BORTEZOMIB 3.5 MG/1
1.3 INJECTION, POWDER, LYOPHILIZED, FOR SOLUTION INTRAVENOUS; SUBCUTANEOUS ONCE
Status: CANCELLED | OUTPATIENT
Start: 2022-02-23

## 2022-02-02 RX ORDER — BORTEZOMIB 3.5 MG/1
1.3 INJECTION, POWDER, LYOPHILIZED, FOR SOLUTION INTRAVENOUS; SUBCUTANEOUS ONCE
Status: CANCELLED | OUTPATIENT
Start: 2022-03-09

## 2022-02-02 RX ADMIN — BORTEZOMIB 2.8 MG: 3.5 INJECTION, POWDER, LYOPHILIZED, FOR SOLUTION INTRAVENOUS; SUBCUTANEOUS at 16:09

## 2022-02-02 NOTE — PROGRESS NOTES
Subjective     CHIEF COMPLAINT:      Chief Complaint   Patient presents with   • Follow-up     no concerns       HISTORY OF PRESENT ILLNESS:     Duy Owens is a 61 y.o. male patient who returns today for follow up on his multiple myeloma.  He returns today for follow-up reports that he is tolerating the treatment.  However, he has episodes of constipation.  He also has a few episodes of diarrhea.  He is not having numbness in the legs or feet.    Patient reports that his abdominal and back pain improved since he started treatment.    Patient reports that he is having occasional problem with sleeping especially the days that he takes his Decadron.    Patient is due to start a new cycle of Revlimid today.    ROS:  Pertinent ROS is in the HPI.     Past medical, surgical, social and family history were reviewed.     MEDICATIONS:    Current Outpatient Medications:   •  acyclovir (ZOVIRAX) 400 MG tablet, Take 1 tablet by mouth 2 (Two) Times a Day. Take one tablet by mouth twice daily., Disp: 60 tablet, Rfl: 11  •  aspirin 81 MG chewable tablet, Chew 81 mg Daily., Disp: , Rfl:   •  dexamethasone (DECADRON) 4 MG tablet, Take 10 tablets by mouth 1 (One) Time Per Week., Disp: 40 tablet, Rfl: 3  •  lenalidomide (Revlimid) 25 MG capsule, TAKE 1 CAPSULE BY MOUTH ONCE DAILY FOR 14 DAYS ON AND 7 DAYS OFF, Disp: 14 capsule, Rfl: 0  •  losartan (Cozaar) 100 MG tablet, Take 1 tablet by mouth Daily., Disp: 30 tablet, Rfl: 11  •  meclizine (ANTIVERT) 25 MG tablet, Take 25 mg by mouth 2 (two) times a day., Disp: , Rfl:   •  ondansetron (ZOFRAN) 8 MG tablet, Take 1 tablet by mouth 3 (Three) Times a Day As Needed for Nausea or Vomiting., Disp: 30 tablet, Rfl: 5  •  sulfamethoxazole-trimethoprim (Bactrim DS) 800-160 MG per tablet, Take 1 tablet by mouth 3 (Three) Times a Week. On Monday, Wednesday and Friday, Disp: 12 tablet, Rfl: 5    Objective   VITAL SIGNS:     Vitals:    02/02/22 1514   BP: 123/78   Pulse: 82   Resp: 16   Temp:  "98.4 °F (36.9 °C)   TempSrc: Temporal   SpO2: 98%   Weight: 95.4 kg (210 lb 6.4 oz)   Height: 177.8 cm (70\")   PainSc: 0-No pain     Body mass index is 30.19 kg/m².     Wt Readings from Last 5 Encounters:   02/02/22 95.4 kg (210 lb 6.4 oz)   01/27/22 95.3 kg (210 lb)   01/26/22 95.3 kg (210 lb)   01/19/22 95.8 kg (211 lb 3.2 oz)   01/19/22 96.1 kg (211 lb 12.8 oz)     PHYSICAL EXAMINATION:   GENERAL: The patient appears in good general condition, not in acute distress.   SKIN: No ecchymosis.  EYES: No jaundice.  LYMPHATICS: No cervical lymphadenopathy.  CHEST: Normal respiratory effort.  Lungs clear bilaterally.  No added sounds.  CVS: Normal S1-S2.  No murmurs.  ABDOMEN: Soft. No tenderness. No Hepatomegaly. No Splenomegaly. No masses.  EXTREMITIES: No edema.  No calf tenderness..     DIAGNOSTIC DATA:     Results from last 7 days   Lab Units 02/02/22  1445   WBC 10*3/mm3 10.99*   NEUTROS ABS 10*3/mm3 5.90   HEMOGLOBIN g/dL 12.8*   HEMATOCRIT % 40.9   PLATELETS 10*3/mm3 238     Component      Latest Ref Rng & Units 1/12/2022   Glucose      65 - 99 mg/dL 96   BUN      8 - 23 mg/dL 9   Creatinine      0.76 - 1.27 mg/dL 0.89   Sodium      136 - 145 mmol/L 134 (L)   Potassium      3.5 - 5.2 mmol/L 4.5   Chloride      98 - 107 mmol/L 101   CO2      22.0 - 29.0 mmol/L 24.2   Calcium      8.6 - 10.5 mg/dL 9.3   Total Protein      6.0 - 8.5 g/dL 7.1   Albumin      3.50 - 5.20 g/dL 4.80   ALT (SGPT)      1 - 41 U/L 199 (H)   AST (SGOT)      1 - 40 U/L 43 (H)   Alkaline Phosphatase      39 - 117 U/L 131 (H)   Total Bilirubin      0.0 - 1.2 mg/dL 0.4   eGFR African Am      >60 mL/min/1.73 106   Globulin      gm/dL 2.3   A/G Ratio      g/dL 2.1   BUN/Creatinine Ratio      7.0 - 25.0 10.1   Anion Gap      5.0 - 15.0 mmol/L 8.8     Component      Latest Ref Rng & Units 11/9/2021 12/9/2021 1/11/2022   IgG      603 - 1613 mg/dL 712  611   IgA      90 - 386 mg/dL 58 (L)  57 (L)   IgM      20 - 172 mg/dL 16 (L)  23   Total Protein    "   6.0 - 8.5 g/dL 6.9  6.3   Albumin      2.9 - 4.4 g/dL 4.3  3.8   Alpha-1-Globulin      0.0 - 0.4 g/dL 0.2  0.3   Alpha-2-Globulin      0.4 - 1.0 g/dL 0.5  0.6   Beta Globulin      0.7 - 1.3 g/dL 1.1  1.0   Gamma Globulin      0.4 - 1.8 g/dL 0.8  0.6   M-Werner      Not Observed g/dL Comment:  Not Observed   Globulin      2.2 - 3.9 g/dL 2.6  2.5   A/G Ratio      0.7 - 1.7 1.7  1.6   Immunofixation Reflex, Serum       Comment:  Comment:   Please note       Comment  Comment   Kappa FLC      3.3 - 19.4 mg/L 12.9  10.5   Free Lambda Light Chains      5.7 - 26.3 mg/L 246.1 (H) 27.09 (H) 125.4 (H)   Kappa/Lambda Ratio      0.26 - 1.65 0.05 (L)  0.08 (L)   Free Light Chain, Kappa      0.33 - 1.94 mg/dL  1.28    Kappa/Lambda FluidC Ratio      0.26 - 1.65  0.05 (L)      Component      Latest Ref Rng & Units 1/11/2022   SARS-CoV-2 Semi-Quant Ab      Negative<0.8 U/mL >2500.0     Component      Latest Ref Rng & Units 1/12/2022   Hepatitis B Surface Ag      Non-Reactive Non-Reactive   Hep A IgM      Non-Reactive Non-Reactive   Hep B Core IgM      Non-Reactive Non-Reactive   Hepatitis C Ab      Non-Reactive Non-Reactive     Assessment/Plan   *Plasma cell disorder most consistent with multiple myeloma with lymph node involvement and amyloid deposition in the involved lymph nodes.  · Patient started having dry cough around July 2021.    · CT chest on 9/23/2021 revealed inferior mediastinal adenopathy.    · CT abdomen and pelvis on 9/24/2021 revealed retroperitoneal lymphadenopathy extending to the iliac chain lymph nodes on the right.    · The Largest lymph node was in the retroperitoneal area measuring 4.8 x 3.5 cm.  The common iliac lymph node measured 3.5 x 2.7 cm.  The left external iliac chain lymph node measured 2.9 x 2 cm.    · PET scan on 10/5/2021 revealed the retroperitoneal and left pelvic lymphadenopathy to be hypermetabolic.  The left periaortic lymph nodes had SUV of 6.9 and the left pelvic sidewall lymph nodes had  an SUV of 5.4.  · Patient had CT-guided biopsy on 10/6/2021.  · The pathologist at [facility] reported involvement with monotypic lambda restricted plasma cells concerning for involvement with plasma cell dyscrasia.  · Bone marrow biopsy on 10/29/2021 revealed a normocellular marrow (50%) with involvement with plasma cell dyscrasia (plasma cells representing 20-30% of total cells by  stain).   · FISH was negative for gain of 1q, monosomy/deletions of chromosomes 13 and 17, IGH rearrangement, gain of chromosomes 9 and 11, IGH-CCND1 (11;14) fusion.   · I this was considered to represent multiple myeloma with lymph node involvement and amyloid deposition.    · There was no bone involvement on PET scan.   · Patient was referred to [facility].  He was seen by Dr. Felix.  They concurred with the diagnosis of multiple myeloma with AL amyloidosis and involvement of the lymph nodes.  · Treatment with the VRD started on 12/22/2021.  · Patient is tolerating the treatment.  · Free lambda light chain decreased to 125 on 1/11/2022.  Kappa to lambda ratio was 0.08.  · I recommended continuing treatment.  Plan to repeat scans after 4 months of treatment.    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  · Patient was seen by cardiology.  There was mild wall thickening.  Ejection fraction was normal.    *Anemia secondary to multiple myeloma  · Hemoglobin was 13.1 on 9/29/2021.    · Iron, folate and vitamin B12 were normal in September/October 2021.    · Hemoglobin is 12.8 today, reflecting anemia secondary to chemotherapy.    *Elevated liver enzymes.  · ALT increased to 246 and AST increased to 56 on 1/11/2022.  · Liver enzymes improved on 1/12/2022 with ALT improving to 199 and AST improving to 43.  Bilirubin remains normal at 0.4.  · Hepatitis serology test was negative.    *Prophylaxis.  · I recommended starting ASA 81 mg daily for DVT prophylaxis while on Revlimid.  · I recommended Acyclovir 400 mg  twice daily for VZV prophylaxis.  · I recommended Bactrim DS three days weekly for PCP prophylaxis.   · Covid antibody level was >2500 on 1/11/2022.  Therefore, patient does not need Evusheld at this point.    PLAN:    1.  We will continue Velcade 1.3 mg/m² weekly.  He will receive a dose today.   2.  Patient will start a new cycle of Revlimid today.   3.  Continue Decadron 40 mg weekly.   4.  I recommended taking melatonin nightly the night of and the night after he takes the Decadron.  5.  Continue prophylactic aspirin, acyclovir and Bactrim.  6.  CMP and SPEP REGINO FLC will be repeated in 2 weeks.  I will see him in follow-up in 3 weeks..        Tima Mendez MD  02/02/22

## 2022-02-03 ENCOUNTER — TELEPHONE (OUTPATIENT)
Dept: ONCOLOGY | Facility: CLINIC | Age: 62
End: 2022-02-03

## 2022-02-07 ENCOUNTER — SPECIALTY PHARMACY (OUTPATIENT)
Dept: PHARMACY | Facility: HOSPITAL | Age: 62
End: 2022-02-07

## 2022-02-07 NOTE — PROGRESS NOTES
Specialty Note ( VRD regimen)      Labs reviewed        2/2/2022   WBC 3.40 - 10.80 10*3/mm3 10.99 (A)   Neutrophils Absolute 1.70 - 7.00 10*3/mm3 5.90   Hemoglobin 13.0 - 17.7 g/dL 12.8 (A)   Hematocrit 37.5 - 51.0 % 40.9   Platelets 140 - 450 10*3/mm3 238     Dr Mendez's dictation is noted       1.  We will continue Velcade 1.3 mg/m² weekly.    2.  Patient will start a new cycle of Revlimid today ( 2/2).   3.  Continue Decadron 40 mg weekly.   4.  I recommended taking melatonin nightly the night of and the night after he takes the Decadron.  5.  Continue prophylactic aspirin, acyclovir and Bactrim.

## 2022-02-09 ENCOUNTER — INFUSION (OUTPATIENT)
Dept: ONCOLOGY | Facility: HOSPITAL | Age: 62
End: 2022-02-09

## 2022-02-09 ENCOUNTER — LAB (OUTPATIENT)
Dept: OTHER | Facility: HOSPITAL | Age: 62
End: 2022-02-09

## 2022-02-09 VITALS
DIASTOLIC BLOOD PRESSURE: 82 MMHG | BODY MASS INDEX: 29.98 KG/M2 | HEART RATE: 67 BPM | WEIGHT: 209.4 LBS | OXYGEN SATURATION: 100 % | SYSTOLIC BLOOD PRESSURE: 136 MMHG | TEMPERATURE: 99 F | RESPIRATION RATE: 18 BRPM | HEIGHT: 70 IN

## 2022-02-09 DIAGNOSIS — C90.00 MULTIPLE MYELOMA NOT HAVING ACHIEVED REMISSION: ICD-10-CM

## 2022-02-09 DIAGNOSIS — Z79.899 ENCOUNTER FOR LONG-TERM (CURRENT) USE OF HIGH-RISK MEDICATION: Primary | ICD-10-CM

## 2022-02-09 DIAGNOSIS — Z79.899 ENCOUNTER FOR LONG-TERM (CURRENT) USE OF HIGH-RISK MEDICATION: ICD-10-CM

## 2022-02-09 LAB
BASOPHILS # BLD AUTO: 0.09 10*3/MM3 (ref 0–0.2)
BASOPHILS NFR BLD AUTO: 1.2 % (ref 0–1.5)
DEPRECATED RDW RBC AUTO: 39.7 FL (ref 37–54)
EOSINOPHIL # BLD AUTO: 1.24 10*3/MM3 (ref 0–0.4)
EOSINOPHIL NFR BLD AUTO: 16.8 % (ref 0.3–6.2)
ERYTHROCYTE [DISTWIDTH] IN BLOOD BY AUTOMATED COUNT: 15.8 % (ref 12.3–15.4)
HCT VFR BLD AUTO: 38.7 % (ref 37.5–51)
HGB BLD-MCNC: 12.3 G/DL (ref 13–17.7)
HYPOCHROMIA BLD QL: NORMAL
IMM GRANULOCYTES # BLD AUTO: 0.23 10*3/MM3 (ref 0–0.05)
IMM GRANULOCYTES NFR BLD AUTO: 3.1 % (ref 0–0.5)
LYMPHOCYTES # BLD AUTO: 1.63 10*3/MM3 (ref 0.7–3.1)
LYMPHOCYTES NFR BLD AUTO: 22.1 % (ref 19.6–45.3)
MCH RBC QN AUTO: 22.7 PG (ref 26.6–33)
MCHC RBC AUTO-ENTMCNC: 31.8 G/DL (ref 31.5–35.7)
MCV RBC AUTO: 71.4 FL (ref 79–97)
MONOCYTES # BLD AUTO: 0.73 10*3/MM3 (ref 0.1–0.9)
MONOCYTES NFR BLD AUTO: 9.9 % (ref 5–12)
NEUTROPHILS NFR BLD AUTO: 3.45 10*3/MM3 (ref 1.7–7)
NEUTROPHILS NFR BLD AUTO: 46.9 % (ref 42.7–76)
NRBC BLD AUTO-RTO: 0 /100 WBC (ref 0–0.2)
PLAT MORPH BLD: NORMAL
PLATELET # BLD AUTO: 193 10*3/MM3 (ref 140–450)
PMV BLD AUTO: 11.3 FL (ref 6–12)
RBC # BLD AUTO: 5.42 10*6/MM3 (ref 4.14–5.8)
WBC MORPH BLD: NORMAL
WBC NRBC COR # BLD: 7.37 10*3/MM3 (ref 3.4–10.8)

## 2022-02-09 PROCEDURE — 25010000002 BORTEZOMIB PER 0.1 MG: Performed by: INTERNAL MEDICINE

## 2022-02-09 PROCEDURE — 85025 COMPLETE CBC W/AUTO DIFF WBC: CPT | Performed by: INTERNAL MEDICINE

## 2022-02-09 PROCEDURE — 36415 COLL VENOUS BLD VENIPUNCTURE: CPT

## 2022-02-09 PROCEDURE — 96401 CHEMO ANTI-NEOPL SQ/IM: CPT

## 2022-02-09 PROCEDURE — 85007 BL SMEAR W/DIFF WBC COUNT: CPT | Performed by: INTERNAL MEDICINE

## 2022-02-09 RX ORDER — BORTEZOMIB 3.5 MG/1
1.3 INJECTION, POWDER, LYOPHILIZED, FOR SOLUTION INTRAVENOUS; SUBCUTANEOUS ONCE
Status: COMPLETED | OUTPATIENT
Start: 2022-02-09 | End: 2022-02-09

## 2022-02-09 RX ADMIN — BORTEZOMIB 2.8 MG: 3.5 INJECTION, POWDER, LYOPHILIZED, FOR SOLUTION INTRAVENOUS; SUBCUTANEOUS at 16:07

## 2022-02-11 ENCOUNTER — SPECIALTY PHARMACY (OUTPATIENT)
Dept: PHARMACY | Facility: HOSPITAL | Age: 62
End: 2022-02-11

## 2022-02-15 ENCOUNTER — SPECIALTY PHARMACY (OUTPATIENT)
Dept: PHARMACY | Facility: HOSPITAL | Age: 62
End: 2022-02-15

## 2022-02-15 RX ORDER — LENALIDOMIDE 25 MG/1
CAPSULE ORAL
Refills: 0 | OUTPATIENT
Start: 2022-02-15

## 2022-02-15 RX ORDER — LENALIDOMIDE 25 MG/1
25 CAPSULE ORAL DAILY
Qty: 14 CAPSULE | Refills: 0 | Status: SHIPPED | OUTPATIENT
Start: 2022-02-15 | End: 2022-03-08

## 2022-02-15 NOTE — PROGRESS NOTES
Refill requested from pharmacy. Per last chart note, patient to continue revlimid 25 mg po daily for 14 days on, then 7 days off. Will route to MD for cosignature.

## 2022-02-16 ENCOUNTER — LAB (OUTPATIENT)
Dept: OTHER | Facility: HOSPITAL | Age: 62
End: 2022-02-16

## 2022-02-16 ENCOUNTER — INFUSION (OUTPATIENT)
Dept: ONCOLOGY | Facility: HOSPITAL | Age: 62
End: 2022-02-16

## 2022-02-16 ENCOUNTER — TELEPHONE (OUTPATIENT)
Dept: ONCOLOGY | Facility: CLINIC | Age: 62
End: 2022-02-16

## 2022-02-16 VITALS
RESPIRATION RATE: 18 BRPM | SYSTOLIC BLOOD PRESSURE: 150 MMHG | HEART RATE: 90 BPM | OXYGEN SATURATION: 99 % | WEIGHT: 207.2 LBS | BODY MASS INDEX: 29.66 KG/M2 | TEMPERATURE: 96.4 F | DIASTOLIC BLOOD PRESSURE: 84 MMHG | HEIGHT: 70 IN

## 2022-02-16 DIAGNOSIS — Z79.899 ENCOUNTER FOR LONG-TERM (CURRENT) USE OF HIGH-RISK MEDICATION: ICD-10-CM

## 2022-02-16 DIAGNOSIS — C90.00 MULTIPLE MYELOMA NOT HAVING ACHIEVED REMISSION: ICD-10-CM

## 2022-02-16 DIAGNOSIS — Z79.899 ENCOUNTER FOR LONG-TERM (CURRENT) USE OF HIGH-RISK MEDICATION: Primary | ICD-10-CM

## 2022-02-16 LAB
ALBUMIN SERPL-MCNC: 4.5 G/DL (ref 3.5–5.2)
ALBUMIN/GLOB SERPL: 2.3 G/DL
ALP SERPL-CCNC: 103 U/L (ref 39–117)
ALT SERPL W P-5'-P-CCNC: 100 U/L (ref 1–41)
ANION GAP SERPL CALCULATED.3IONS-SCNC: 11.6 MMOL/L (ref 5–15)
ANISOCYTOSIS BLD QL: NORMAL
AST SERPL-CCNC: 30 U/L (ref 1–40)
BASOPHILS # BLD AUTO: 0.08 10*3/MM3 (ref 0–0.2)
BASOPHILS NFR BLD AUTO: 1 % (ref 0–1.5)
BILIRUB SERPL-MCNC: 0.3 MG/DL (ref 0–1.2)
BUN SERPL-MCNC: 12 MG/DL (ref 8–23)
BUN/CREAT SERPL: 11.5 (ref 7–25)
CALCIUM SPEC-SCNC: 9.1 MG/DL (ref 8.6–10.5)
CHLORIDE SERPL-SCNC: 99 MMOL/L (ref 98–107)
CO2 SERPL-SCNC: 24.4 MMOL/L (ref 22–29)
CREAT SERPL-MCNC: 1.04 MG/DL (ref 0.76–1.27)
DEPRECATED RDW RBC AUTO: 40.4 FL (ref 37–54)
EOSINOPHIL # BLD AUTO: 0.82 10*3/MM3 (ref 0–0.4)
EOSINOPHIL NFR BLD AUTO: 10.6 % (ref 0.3–6.2)
ERYTHROCYTE [DISTWIDTH] IN BLOOD BY AUTOMATED COUNT: 15.9 % (ref 12.3–15.4)
GFR SERPL CREATININE-BSD FRML MDRD: 88 ML/MIN/1.73
GLOBULIN UR ELPH-MCNC: 2 GM/DL
GLUCOSE SERPL-MCNC: 209 MG/DL (ref 65–99)
HCT VFR BLD AUTO: 39.7 % (ref 37.5–51)
HGB BLD-MCNC: 12.3 G/DL (ref 13–17.7)
HYPOCHROMIA BLD QL: NORMAL
IMM GRANULOCYTES # BLD AUTO: 0.1 10*3/MM3 (ref 0–0.05)
IMM GRANULOCYTES NFR BLD AUTO: 1.3 % (ref 0–0.5)
LYMPHOCYTES # BLD AUTO: 1.43 10*3/MM3 (ref 0.7–3.1)
LYMPHOCYTES NFR BLD AUTO: 18.4 % (ref 19.6–45.3)
MCH RBC QN AUTO: 22.4 PG (ref 26.6–33)
MCHC RBC AUTO-ENTMCNC: 31 G/DL (ref 31.5–35.7)
MCV RBC AUTO: 72.4 FL (ref 79–97)
MONOCYTES # BLD AUTO: 1.34 10*3/MM3 (ref 0.1–0.9)
MONOCYTES NFR BLD AUTO: 17.3 % (ref 5–12)
NEUTROPHILS NFR BLD AUTO: 3.99 10*3/MM3 (ref 1.7–7)
NEUTROPHILS NFR BLD AUTO: 51.4 % (ref 42.7–76)
NRBC BLD AUTO-RTO: 0 /100 WBC (ref 0–0.2)
PLAT MORPH BLD: NORMAL
PLATELET # BLD AUTO: 211 10*3/MM3 (ref 140–450)
PMV BLD AUTO: 10.4 FL (ref 6–12)
POTASSIUM SERPL-SCNC: 3.7 MMOL/L (ref 3.5–5.2)
PROT SERPL-MCNC: 6.5 G/DL (ref 6–8.5)
RBC # BLD AUTO: 5.48 10*6/MM3 (ref 4.14–5.8)
SODIUM SERPL-SCNC: 135 MMOL/L (ref 136–145)
T-UPTAKE NFR SERPL: 1.12 TBI (ref 0.8–1.3)
T4 SERPL-MCNC: 5.5 MCG/DL (ref 4.5–11.7)
TSH SERPL DL<=0.05 MIU/L-ACNC: 1.37 UIU/ML (ref 0.27–4.2)
WBC MORPH BLD: NORMAL
WBC NRBC COR # BLD: 7.76 10*3/MM3 (ref 3.4–10.8)

## 2022-02-16 PROCEDURE — 85007 BL SMEAR W/DIFF WBC COUNT: CPT | Performed by: INTERNAL MEDICINE

## 2022-02-16 PROCEDURE — 36415 COLL VENOUS BLD VENIPUNCTURE: CPT

## 2022-02-16 PROCEDURE — 80050 GENERAL HEALTH PANEL: CPT | Performed by: INTERNAL MEDICINE

## 2022-02-16 PROCEDURE — 84436 ASSAY OF TOTAL THYROXINE: CPT | Performed by: INTERNAL MEDICINE

## 2022-02-16 PROCEDURE — 84479 ASSAY OF THYROID (T3 OR T4): CPT | Performed by: INTERNAL MEDICINE

## 2022-02-16 PROCEDURE — 83521 IG LIGHT CHAINS FREE EACH: CPT | Performed by: INTERNAL MEDICINE

## 2022-02-16 PROCEDURE — 86334 IMMUNOFIX E-PHORESIS SERUM: CPT | Performed by: INTERNAL MEDICINE

## 2022-02-16 PROCEDURE — 84165 PROTEIN E-PHORESIS SERUM: CPT | Performed by: INTERNAL MEDICINE

## 2022-02-16 PROCEDURE — 96401 CHEMO ANTI-NEOPL SQ/IM: CPT

## 2022-02-16 PROCEDURE — 25010000002 BORTEZOMIB PER 0.1 MG: Performed by: INTERNAL MEDICINE

## 2022-02-16 PROCEDURE — 82784 ASSAY IGA/IGD/IGG/IGM EACH: CPT | Performed by: INTERNAL MEDICINE

## 2022-02-16 PROCEDURE — 84155 ASSAY OF PROTEIN SERUM: CPT | Performed by: INTERNAL MEDICINE

## 2022-02-16 RX ORDER — BORTEZOMIB 3.5 MG/1
1.3 INJECTION, POWDER, LYOPHILIZED, FOR SOLUTION INTRAVENOUS; SUBCUTANEOUS ONCE
Status: COMPLETED | OUTPATIENT
Start: 2022-02-16 | End: 2022-02-16

## 2022-02-16 RX ADMIN — BORTEZOMIB 2.8 MG: 3.5 INJECTION, POWDER, LYOPHILIZED, FOR SOLUTION INTRAVENOUS; SUBCUTANEOUS at 16:00

## 2022-02-16 NOTE — TELEPHONE ENCOUNTER
"Caller: Duy Owens CRISTIANA    Relationship: Self    Best call back number: [phone]    What form or medical record are you requesting: PROOF OF DX    Who is requesting this form or medical record from you: [employer]    How would you like to receive the form or medical records (pick-up, mail, fax):   If mail, what is the address:  [address] [place] [address]    Timeframe paperwork needed: ASAP    Additional notes: PATIENT WAS TOLD FROM SOMEONE TO GET IT  OFF HIS \"MYCHART\" BUT HE COULD NOT FIND.  BASICALLY WHAT HE NEEDS IS PROOF THAT HE HAS MALIGNANT CANCER, WEATHER THAT IS A REPORT FROM DR. MESSER OR A REPORT FROM [facility].        "

## 2022-02-17 LAB
ALBUMIN SERPL ELPH-MCNC: 3.7 G/DL (ref 2.9–4.4)
ALBUMIN/GLOB SERPL: 1.4 {RATIO} (ref 0.7–1.7)
ALPHA1 GLOB SERPL ELPH-MCNC: 0.3 G/DL (ref 0–0.4)
ALPHA2 GLOB SERPL ELPH-MCNC: 0.6 G/DL (ref 0.4–1)
B-GLOBULIN SERPL ELPH-MCNC: 1.2 G/DL (ref 0.7–1.3)
GAMMA GLOB SERPL ELPH-MCNC: 0.5 G/DL (ref 0.4–1.8)
GLOBULIN SER-MCNC: 2.7 G/DL (ref 2.2–3.9)
IGA SERPL-MCNC: 65 MG/DL (ref 90–386)
IGG SERPL-MCNC: 530 MG/DL (ref 603–1613)
IGM SERPL-MCNC: 17 MG/DL (ref 20–172)
INTERPRETATION SERPL IEP-IMP: ABNORMAL
KAPPA LC FREE SER-MCNC: 12.6 MG/L (ref 3.3–19.4)
KAPPA LC FREE/LAMBDA FREE SER: 0.17 {RATIO} (ref 0.26–1.65)
LABORATORY COMMENT REPORT: ABNORMAL
LAMBDA LC FREE SERPL-MCNC: 74 MG/L (ref 5.7–26.3)
M PROTEIN SERPL ELPH-MCNC: ABNORMAL G/DL
PROT SERPL-MCNC: 6.4 G/DL (ref 6–8.5)

## 2022-02-23 ENCOUNTER — OFFICE VISIT (OUTPATIENT)
Dept: ONCOLOGY | Facility: CLINIC | Age: 62
End: 2022-02-23

## 2022-02-23 ENCOUNTER — LAB (OUTPATIENT)
Dept: OTHER | Facility: HOSPITAL | Age: 62
End: 2022-02-23

## 2022-02-23 ENCOUNTER — INFUSION (OUTPATIENT)
Dept: ONCOLOGY | Facility: HOSPITAL | Age: 62
End: 2022-02-23

## 2022-02-23 VITALS
HEART RATE: 68 BPM | WEIGHT: 209.5 LBS | DIASTOLIC BLOOD PRESSURE: 79 MMHG | RESPIRATION RATE: 18 BRPM | BODY MASS INDEX: 29.99 KG/M2 | SYSTOLIC BLOOD PRESSURE: 148 MMHG | HEIGHT: 70 IN | TEMPERATURE: 98.2 F | OXYGEN SATURATION: 98 %

## 2022-02-23 DIAGNOSIS — D64.81 ANEMIA DUE TO CHEMOTHERAPY: ICD-10-CM

## 2022-02-23 DIAGNOSIS — D84.9 IMMUNOCOMPROMISED PATIENT: ICD-10-CM

## 2022-02-23 DIAGNOSIS — Z79.899 ENCOUNTER FOR LONG-TERM (CURRENT) USE OF HIGH-RISK MEDICATION: Primary | ICD-10-CM

## 2022-02-23 DIAGNOSIS — D63.0 ANEMIA IN NEOPLASTIC DISEASE: ICD-10-CM

## 2022-02-23 DIAGNOSIS — Z79.899 ENCOUNTER FOR LONG-TERM (CURRENT) USE OF HIGH-RISK MEDICATION: ICD-10-CM

## 2022-02-23 DIAGNOSIS — R59.1 LYMPHADENOPATHY: ICD-10-CM

## 2022-02-23 DIAGNOSIS — T45.1X5A ANEMIA DUE TO CHEMOTHERAPY: ICD-10-CM

## 2022-02-23 DIAGNOSIS — C90.00 MULTIPLE MYELOMA NOT HAVING ACHIEVED REMISSION: Primary | ICD-10-CM

## 2022-02-23 DIAGNOSIS — C90.00 MULTIPLE MYELOMA NOT HAVING ACHIEVED REMISSION: ICD-10-CM

## 2022-02-23 DIAGNOSIS — R74.8 ELEVATED LIVER ENZYMES: ICD-10-CM

## 2022-02-23 LAB
BASOPHILS # BLD AUTO: 0.14 10*3/MM3 (ref 0–0.2)
BASOPHILS NFR BLD AUTO: 1.6 % (ref 0–1.5)
DEPRECATED RDW RBC AUTO: 40.8 FL (ref 37–54)
EOSINOPHIL # BLD AUTO: 0.76 10*3/MM3 (ref 0–0.4)
EOSINOPHIL NFR BLD AUTO: 8.7 % (ref 0.3–6.2)
ERYTHROCYTE [DISTWIDTH] IN BLOOD BY AUTOMATED COUNT: 16.1 % (ref 12.3–15.4)
HCT VFR BLD AUTO: 40.7 % (ref 37.5–51)
HGB BLD-MCNC: 12.6 G/DL (ref 13–17.7)
HYPOCHROMIA BLD QL: NORMAL
IMM GRANULOCYTES # BLD AUTO: 0.03 10*3/MM3 (ref 0–0.05)
IMM GRANULOCYTES NFR BLD AUTO: 0.3 % (ref 0–0.5)
LYMPHOCYTES # BLD AUTO: 2.44 10*3/MM3 (ref 0.7–3.1)
LYMPHOCYTES NFR BLD AUTO: 27.9 % (ref 19.6–45.3)
MCH RBC QN AUTO: 22.5 PG (ref 26.6–33)
MCHC RBC AUTO-ENTMCNC: 31 G/DL (ref 31.5–35.7)
MCV RBC AUTO: 72.5 FL (ref 79–97)
MONOCYTES # BLD AUTO: 1.25 10*3/MM3 (ref 0.1–0.9)
MONOCYTES NFR BLD AUTO: 14.3 % (ref 5–12)
NEUTROPHILS NFR BLD AUTO: 4.13 10*3/MM3 (ref 1.7–7)
NEUTROPHILS NFR BLD AUTO: 47.2 % (ref 42.7–76)
NRBC BLD AUTO-RTO: 0 /100 WBC (ref 0–0.2)
PLAT MORPH BLD: NORMAL
PLATELET # BLD AUTO: 211 10*3/MM3 (ref 140–450)
PMV BLD AUTO: 10.2 FL (ref 6–12)
RBC # BLD AUTO: 5.61 10*6/MM3 (ref 4.14–5.8)
TARGETS BLD QL SMEAR: NORMAL
WBC MORPH BLD: NORMAL
WBC NRBC COR # BLD: 8.75 10*3/MM3 (ref 3.4–10.8)

## 2022-02-23 PROCEDURE — 36415 COLL VENOUS BLD VENIPUNCTURE: CPT

## 2022-02-23 PROCEDURE — 85025 COMPLETE CBC W/AUTO DIFF WBC: CPT | Performed by: INTERNAL MEDICINE

## 2022-02-23 PROCEDURE — 25010000002 BORTEZOMIB PER 0.1 MG: Performed by: INTERNAL MEDICINE

## 2022-02-23 PROCEDURE — 96401 CHEMO ANTI-NEOPL SQ/IM: CPT

## 2022-02-23 PROCEDURE — 85007 BL SMEAR W/DIFF WBC COUNT: CPT | Performed by: INTERNAL MEDICINE

## 2022-02-23 PROCEDURE — 99214 OFFICE O/P EST MOD 30 MIN: CPT | Performed by: INTERNAL MEDICINE

## 2022-02-23 RX ORDER — BORTEZOMIB 3.5 MG/1
1.3 INJECTION, POWDER, LYOPHILIZED, FOR SOLUTION INTRAVENOUS; SUBCUTANEOUS ONCE
Status: CANCELLED | OUTPATIENT
Start: 2022-03-16

## 2022-02-23 RX ORDER — BORTEZOMIB 3.5 MG/1
1.3 INJECTION, POWDER, LYOPHILIZED, FOR SOLUTION INTRAVENOUS; SUBCUTANEOUS ONCE
Status: COMPLETED | OUTPATIENT
Start: 2022-02-23 | End: 2022-02-23

## 2022-02-23 RX ADMIN — BORTEZOMIB 2.8 MG: 3.5 INJECTION, POWDER, LYOPHILIZED, FOR SOLUTION INTRAVENOUS; SUBCUTANEOUS at 16:05

## 2022-02-23 NOTE — PROGRESS NOTES
Subjective     CHIEF COMPLAINT:      Chief Complaint   Patient presents with   • Follow-up     no concerns       HISTORY OF PRESENT ILLNESS:     Duy Owens is a 61 y.o. male patient who returns today for follow up on his multiple myeloma.  He is on Velcade Revlimid and Decadron.  He is tolerating the treatment well.  He is not having nausea or vomiting.  He is not having diarrhea from Revlimid.    Patient reports an episode of right-sided pain that developed several days ago.  It did not recur.    Patient is no longer having cough.  Cough was one of the first symptoms he had that led to the diagnosis of the multiple myeloma and associated lymphadenopathy.    Patient was seen at [facility].  He is going to have further testing including echocardiogram and pulmonary function test on 4/21/2022.    ROS:  Pertinent ROS is in the HPI.     Past medical, surgical, social and family history were reviewed.     MEDICATIONS:    Current Outpatient Medications:   •  acyclovir (ZOVIRAX) 400 MG tablet, Take 1 tablet by mouth 2 (Two) Times a Day. Take one tablet by mouth twice daily., Disp: 60 tablet, Rfl: 11  •  aspirin 81 MG chewable tablet, Chew 81 mg Daily., Disp: , Rfl:   •  dexamethasone (DECADRON) 4 MG tablet, Take 10 tablets by mouth 1 (One) Time Per Week., Disp: 40 tablet, Rfl: 3  •  lenalidomide (REVLIMID) 25 MG capsule, Take 1 capsule by mouth Daily. Take for 14 days on, then 7 days off, then repeat., Disp: 14 capsule, Rfl: 0  •  losartan (Cozaar) 100 MG tablet, Take 1 tablet by mouth Daily., Disp: 30 tablet, Rfl: 11  •  meclizine (ANTIVERT) 25 MG tablet, Take 25 mg by mouth 2 (two) times a day., Disp: , Rfl:   •  ondansetron (ZOFRAN) 8 MG tablet, Take 1 tablet by mouth 3 (Three) Times a Day As Needed for Nausea or Vomiting., Disp: 30 tablet, Rfl: 5  •  sulfamethoxazole-trimethoprim (Bactrim DS) 800-160 MG per tablet, Take 1 tablet by mouth 3 (Three) Times a Week. On Monday, Wednesday and Friday, Disp: 12  "tablet, Rfl: 5    Objective   VITAL SIGNS:     Vitals:    02/23/22 1519   BP: 148/79   Pulse: 68   Resp: 18   Temp: 98.2 °F (36.8 °C)   TempSrc: Temporal   SpO2: 98%   Weight: 95 kg (209 lb 8 oz)   Height: 177.8 cm (70\")   PainSc: 0-No pain     Body mass index is 30.06 kg/m².     Wt Readings from Last 5 Encounters:   02/23/22 95 kg (209 lb 8 oz)   02/16/22 94 kg (207 lb 3.2 oz)   02/09/22 95 kg (209 lb 6.4 oz)   02/02/22 95.4 kg (210 lb 6.4 oz)   01/27/22 95.3 kg (210 lb)     PHYSICAL EXAMINATION:   GENERAL: The patient appears in good general condition, not in acute distress.   SKIN: No ecchymosis.  EYES: No jaundice.  CHEST: Normal respiratory effort.  Lungs clear bilaterally.  No added sounds.  CVS: Normal S1-S2.  No murmurs.  ABDOMEN: Soft. No tenderness. No Hepatomegaly. No Splenomegaly. No masses.  EXTREMITIES: No edema.    DIAGNOSTIC DATA:     Results from last 7 days   Lab Units 02/23/22  1446   WBC 10*3/mm3 8.75   NEUTROS ABS 10*3/mm3 4.13   HEMOGLOBIN g/dL 12.6*   HEMATOCRIT % 40.7   PLATELETS 10*3/mm3 211     Component      Latest Ref Rng & Units 1/11/2022 1/12/2022 2/16/2022   Glucose      65 - 99 mg/dL 113 (H) 96 209 (H)   BUN      8 - 23 mg/dL 14 9 12   Creatinine      0.76 - 1.27 mg/dL 1.03 0.89 1.04   Sodium      136 - 145 mmol/L 132 (L) 134 (L) 135 (L)   Potassium      3.5 - 5.2 mmol/L 4.1 4.5 3.7   Chloride      98 - 107 mmol/L 102 101 99   CO2      22.0 - 29.0 mmol/L 22.2 24.2 24.4   Calcium      8.6 - 10.5 mg/dL 9.0 9.3 9.1   Total Protein      6.0 - 8.5 g/dL 6.7 7.1 6.5   Albumin      3.50 - 5.20 g/dL 4.60 4.80 4.50   ALT (SGPT)      1 - 41 U/L 246 (H) 199 (H) 100 (H)   AST (SGOT)      1 - 40 U/L 56 (H) 43 (H) 30   Alkaline Phosphatase      39 - 117 U/L 133 (H) 131 (H) 103   Total Bilirubin      0.0 - 1.2 mg/dL 0.2 0.4 0.3   eGFR African Am      >60 mL/min/1.73 89 106 88   Globulin      gm/dL 2.1 2.3 2.0   A/G Ratio      g/dL 2.2 2.1 2.3   BUN/Creatinine Ratio      7.0 - 25.0 13.6 10.1 11.5 "   Anion Gap      5.0 - 15.0 mmol/L 7.8 8.8 11.6     Component      Latest Ref Rng & Units 11/9/2021 1/11/2022 2/16/2022   IgG      603 - 1613 mg/dL 712 611 530 (L)   IgA      90 - 386 mg/dL 58 (L) 57 (L) 65 (L)   IgM      20 - 172 mg/dL 16 (L) 23 17 (L)   Total Protein      6.0 - 8.5 g/dL 6.9 6.3 6.4   Albumin      2.9 - 4.4 g/dL 4.3 3.8 3.7   Alpha-1-Globulin      0.0 - 0.4 g/dL 0.2 0.3 0.3   Alpha-2-Globulin      0.4 - 1.0 g/dL 0.5 0.6 0.6   Beta Globulin      0.7 - 1.3 g/dL 1.1 1.0 1.2   Gamma Globulin      0.4 - 1.8 g/dL 0.8 0.6 0.5   M-Werner      Not Observed g/dL Comment: Not Observed Not Observed   Globulin      2.2 - 3.9 g/dL 2.6 2.5 2.7   A/G Ratio      0.7 - 1.7 1.7 1.6 1.4   Immunofixation Reflex, Serum       Comment: Comment: Comment   Please note       Comment Comment Comment   Kappa FLC      3.3 - 19.4 mg/L 12.9 10.5 12.6   Free Lambda Light Chains      5.7 - 26.3 mg/L 246.1 (H) 125.4 (H) 74.0 (H)   Kappa/Lambda Ratio      0.26 - 1.65 0.05 (L) 0.08 (L) 0.17 (L)       Assessment/Plan   *Plasma cell disorder most consistent with multiple myeloma with lymph node involvement and amyloid deposition in the involved lymph nodes.  · Patient started having dry cough around July 2021.    · CT chest on 9/23/2021 revealed inferior mediastinal adenopathy.    · CT abdomen and pelvis on 9/24/2021 revealed retroperitoneal lymphadenopathy extending to the iliac chain lymph nodes on the right.    · The Largest lymph node was in the retroperitoneal area measuring 4.8 x 3.5 cm.  The common iliac lymph node measured 3.5 x 2.7 cm.  The left external iliac chain lymph node measured 2.9 x 2 cm.    · PET scan on 10/5/2021 revealed the retroperitoneal and left pelvic lymphadenopathy to be hypermetabolic.  The left periaortic lymph nodes had SUV of 6.9 and the left pelvic sidewall lymph nodes had an SUV of 5.4.  · Patient had CT-guided biopsy on 10/6/2021.  · The pathologist at [facility] reported involvement with monotypic  lambda restricted plasma cells concerning for involvement with plasma cell dyscrasia.  · Bone marrow biopsy on 10/29/2021 revealed a normocellular marrow (50%) with involvement with plasma cell dyscrasia (plasma cells representing 20-30% of total cells by  stain).   · FISH was negative for gain of 1q, monosomy/deletions of chromosomes 13 and 17, IGH rearrangement, gain of chromosomes 9 and 11, IGH-CCND1 (11;14) fusion.   · I this was considered to represent multiple myeloma with lymph node involvement and amyloid deposition.    · There was no bone involvement on PET scan.   · Patient was referred to [facility].  He was seen by Dr. Felix.  They concurred with the diagnosis of multiple myeloma with AL amyloidosis and involvement of the lymph nodes.  · Treatment with the VRD started on 12/22/2021.  · Patient is tolerating the treatment.  · Free lambda light chain decreased to 125 on 1/11/2022.  Kappa to lambda ratio was 0.08.  · Free lambda light chain decreased to 74 and kappa to lambda ratio improved to 0.17 on 2/16/2022.  This is indicating response to treatment.  · Patient is tolerating the treatment well.  · Patient was seen in follow-up at [facility].  They are going to proceed with additional testing including echocardiogram and pulmonary function test on 4/21/2022.    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  · Patient was seen by cardiology.  There was mild wall thickening.  Ejection fraction was normal.  · Patient is going to have echocardiogram on 4/21/2022 at [facility].    *Anemia secondary to multiple myeloma and secondary to treatment.  · Hemoglobin was 13.1 on 9/29/2021.    · Iron, folate and vitamin B12 were normal in September/October 2021.    · Hemoglobin is 12.6 today.    *Elevated liver enzymes.  · ALT increased to 246 and AST increased to 56 on 1/11/2022.  · Liver enzymes improved on 1/12/2022 with ALT improving to 199 and AST improving to 43.   Bilirubin remains normal at 0.4.  · Hepatitis serology test was negative.  · ALT improved to 100 on 2/16/2022 but increased to 107 and AST increased to 43 on 2/22/2022.    *Prophylaxis.  · I recommended starting ASA 81 mg daily for DVT prophylaxis while on Revlimid.  · I recommended Acyclovir 400 mg twice daily for VZV prophylaxis.  · I recommended Bactrim DS three days weekly for PCP prophylaxis.   · Covid antibody level was >2500 on 1/11/2022.  Therefore, patient does not need Evusheld at this point.    PLAN:    1.  Continue treatment with the current regimen.  He will receive Velcade today and continue weekly.   2.  Patient will start a new cycle of Revlimid today.   3.  Continue Decadron 40 mg weekly.   4.  Repeat CMP SPEP REGINO FLC in 2 weeks.    5.  I will obtain a follow-up CT scan of the chest abdomen pelvis in 2 weeks to reevaluate the lymphadenopathy.  In addition, we would evaluate the liver.    6.  I will see him in follow-up in 3 weeks.          Tima Mendez MD  02/23/22

## 2022-02-24 ENCOUNTER — TELEPHONE (OUTPATIENT)
Dept: ONCOLOGY | Facility: CLINIC | Age: 62
End: 2022-02-24

## 2022-02-24 NOTE — TELEPHONE ENCOUNTER
Caller: JOEY FLETCHER    Relationship: SELF    Best call back number: [phone]    What is the best time to reach you: ANY    Who are you requesting to speak with (clinical staff, provider,  specific staff member): DR MESSER    What was the call regarding: PT IS ASKING DR MESSER TO CALL HIM. STATES IT IS VERY IMPORTANT. WOULD NOT PROVIDE FURTHER DETAILS    Do you require a callback: YES

## 2022-02-24 NOTE — TELEPHONE ENCOUNTER
Patient calling wanting to speak with Dr. Mendez, would not provide details of what he wants to speak with him about.

## 2022-02-25 ENCOUNTER — TELEPHONE (OUTPATIENT)
Dept: ONCOLOGY | Facility: CLINIC | Age: 62
End: 2022-02-25

## 2022-02-25 NOTE — TELEPHONE ENCOUNTER
Caller: Duy Owens    Relationship: Self    Best call back number: [phone]    What is the best time to reach you: ANYTIME, CAN LEAVE VM IF NO ANSWER WITH APPT DATE/TIME DETAILS.     Who are you requesting to speak with (clinical staff, provider,  specific staff member): SCHEDULING    What was the call regarding: PT WOULD LIKE TO MOVE HIS 3/2 APPT TIME TO A LITTLE LATER THAT AFTERNOON IF POSSIBLE, IT IS CURRENT 210 AND HE WOULD LIKE 240 IF POSSIBLE FOR LAB AND THEN INFUSION AFTER.     Do you require a callback: YES

## 2022-03-01 ENCOUNTER — SPECIALTY PHARMACY (OUTPATIENT)
Dept: PHARMACY | Facility: HOSPITAL | Age: 62
End: 2022-03-01

## 2022-03-01 ENCOUNTER — TELEPHONE (OUTPATIENT)
Dept: ONCOLOGY | Facility: CLINIC | Age: 62
End: 2022-03-01

## 2022-03-01 NOTE — TELEPHONE ENCOUNTER
Caller: Joey Owens    Relationship: Self    Best call back number: [phone]    What is the best time to reach you: NOT NEED, JUST AN FYI    Who are you requesting to speak with (clinical staff, provider,  specific staff member): STAFF    Do you know the name of the person who called: JOEY    What was the call regarding: PATIENTS SU[employer]. [employer] NEEDS PAPERS FILLED OUT SO PATIENT IS FAXING THEM -[phone] & JUST WAS INFORMING US TO LOOK OUT FOR THEM.    Do you require a callback: NO

## 2022-03-01 NOTE — PROGRESS NOTES
Specialty Note ( VRD regimen)    Labs reviewed        2/23/2022   WBC 3.40 - 10.80 10*3/mm3 8.75   Neutrophils Absolute 1.70 - 7.00 10*3/mm3 4.13   Hemoglobin 13.0 - 17.7 g/dL 12.6 (A)   Hematocrit 37.5 - 51.0 % 40.7   Platelets 140 - 450 10*3/mm3 211       Dr Mendez's dictation is noted from 2/23/22    Continue treatment with the current regimen.  He will receive Velcade today and continue weekly.   2.  Patient will start a new cycle of Revlimid today. ( Revlimid 25 mg po 14/21 days , Dexamethasone 40 mg po weekly and Velcade 1.3 mg/m2 weekly)    An echo is planned for 4/21/22 at [facility]

## 2022-03-02 ENCOUNTER — APPOINTMENT (OUTPATIENT)
Dept: ONCOLOGY | Facility: HOSPITAL | Age: 62
End: 2022-03-02

## 2022-03-02 ENCOUNTER — LAB (OUTPATIENT)
Dept: OTHER | Facility: HOSPITAL | Age: 62
End: 2022-03-02

## 2022-03-02 ENCOUNTER — INFUSION (OUTPATIENT)
Dept: ONCOLOGY | Facility: HOSPITAL | Age: 62
End: 2022-03-02

## 2022-03-02 ENCOUNTER — APPOINTMENT (OUTPATIENT)
Dept: OTHER | Facility: HOSPITAL | Age: 62
End: 2022-03-02

## 2022-03-02 VITALS
SYSTOLIC BLOOD PRESSURE: 132 MMHG | OXYGEN SATURATION: 98 % | HEART RATE: 73 BPM | TEMPERATURE: 99.1 F | BODY MASS INDEX: 30.09 KG/M2 | WEIGHT: 210.2 LBS | HEIGHT: 70 IN | DIASTOLIC BLOOD PRESSURE: 80 MMHG | RESPIRATION RATE: 18 BRPM

## 2022-03-02 DIAGNOSIS — C90.00 MULTIPLE MYELOMA NOT HAVING ACHIEVED REMISSION: ICD-10-CM

## 2022-03-02 DIAGNOSIS — Z79.899 ENCOUNTER FOR LONG-TERM (CURRENT) USE OF HIGH-RISK MEDICATION: Primary | ICD-10-CM

## 2022-03-02 DIAGNOSIS — Z79.899 ENCOUNTER FOR LONG-TERM (CURRENT) USE OF HIGH-RISK MEDICATION: ICD-10-CM

## 2022-03-02 LAB
BASOPHILS # BLD AUTO: 0.06 10*3/MM3 (ref 0–0.2)
BASOPHILS NFR BLD AUTO: 0.8 % (ref 0–1.5)
DEPRECATED RDW RBC AUTO: 42 FL (ref 37–54)
EOSINOPHIL # BLD AUTO: 0.87 10*3/MM3 (ref 0–0.4)
EOSINOPHIL NFR BLD AUTO: 11.7 % (ref 0.3–6.2)
ERYTHROCYTE [DISTWIDTH] IN BLOOD BY AUTOMATED COUNT: 16.5 % (ref 12.3–15.4)
HCT VFR BLD AUTO: 38 % (ref 37.5–51)
HGB BLD-MCNC: 11.9 G/DL (ref 13–17.7)
HYPOCHROMIA BLD QL: NORMAL
IMM GRANULOCYTES # BLD AUTO: 0.33 10*3/MM3 (ref 0–0.05)
IMM GRANULOCYTES NFR BLD AUTO: 4.4 % (ref 0–0.5)
LYMPHOCYTES # BLD AUTO: 1.58 10*3/MM3 (ref 0.7–3.1)
LYMPHOCYTES NFR BLD AUTO: 21.2 % (ref 19.6–45.3)
MCH RBC QN AUTO: 22.8 PG (ref 26.6–33)
MCHC RBC AUTO-ENTMCNC: 31.3 G/DL (ref 31.5–35.7)
MCV RBC AUTO: 72.7 FL (ref 79–97)
MONOCYTES # BLD AUTO: 0.64 10*3/MM3 (ref 0.1–0.9)
MONOCYTES NFR BLD AUTO: 8.6 % (ref 5–12)
NEUTROPHILS NFR BLD AUTO: 3.98 10*3/MM3 (ref 1.7–7)
NEUTROPHILS NFR BLD AUTO: 53.3 % (ref 42.7–76)
NRBC BLD AUTO-RTO: 0 /100 WBC (ref 0–0.2)
PLAT MORPH BLD: NORMAL
PLATELET # BLD AUTO: 195 10*3/MM3 (ref 140–450)
PMV BLD AUTO: 10.7 FL (ref 6–12)
RBC # BLD AUTO: 5.23 10*6/MM3 (ref 4.14–5.8)
TARGETS BLD QL SMEAR: NORMAL
WBC MORPH BLD: NORMAL
WBC NRBC COR # BLD: 7.46 10*3/MM3 (ref 3.4–10.8)

## 2022-03-02 PROCEDURE — 36415 COLL VENOUS BLD VENIPUNCTURE: CPT

## 2022-03-02 PROCEDURE — 96401 CHEMO ANTI-NEOPL SQ/IM: CPT

## 2022-03-02 PROCEDURE — 85007 BL SMEAR W/DIFF WBC COUNT: CPT | Performed by: INTERNAL MEDICINE

## 2022-03-02 PROCEDURE — 25010000002 BORTEZOMIB PER 0.1 MG: Performed by: INTERNAL MEDICINE

## 2022-03-02 PROCEDURE — 85025 COMPLETE CBC W/AUTO DIFF WBC: CPT | Performed by: INTERNAL MEDICINE

## 2022-03-02 RX ORDER — BORTEZOMIB 3.5 MG/1
1.3 INJECTION, POWDER, LYOPHILIZED, FOR SOLUTION INTRAVENOUS; SUBCUTANEOUS ONCE
Status: COMPLETED | OUTPATIENT
Start: 2022-03-02 | End: 2022-03-02

## 2022-03-02 RX ADMIN — BORTEZOMIB 2.8 MG: 3.5 INJECTION, POWDER, LYOPHILIZED, FOR SOLUTION INTRAVENOUS; SUBCUTANEOUS at 15:29

## 2022-03-03 ENCOUNTER — TELEPHONE (OUTPATIENT)
Dept: ONCOLOGY | Facility: CLINIC | Age: 62
End: 2022-03-03

## 2022-03-03 NOTE — TELEPHONE ENCOUNTER
Caller: Duy Owens    Relationship: Self    Best call back number: [phone]    What form or medical record are you requesting: APPLICATION FOR INS CLAIM [employer]    Who is requesting this form or medical record from you: PATIENT/ [employer]    How would you like to receive the form or medical records (pick-up, mail, fax):  PATIENT WOULD LIKE TO DROP OFF INFORMATION  AND TO PICK IT UP    PLEASE CALL PATIENT TO ARRANGE DROP OFF.

## 2022-03-07 ENCOUNTER — TELEPHONE (OUTPATIENT)
Dept: ONCOLOGY | Facility: CLINIC | Age: 62
End: 2022-03-07

## 2022-03-07 NOTE — TELEPHONE ENCOUNTER
Called back the number given by Melanie, this RN spoke with the [facility] . The  looked through the pts chart and most recent note and stated that Melanie is forwarding the pts most recent office note with treatment plan updates. This RN gave their direct number in case Melanie has any additional information or questions.

## 2022-03-08 ENCOUNTER — TELEPHONE (OUTPATIENT)
Dept: ONCOLOGY | Facility: CLINIC | Age: 62
End: 2022-03-08

## 2022-03-08 RX ORDER — LENALIDOMIDE 25 MG/1
CAPSULE ORAL
Qty: 14 CAPSULE | Refills: 0 | Status: SHIPPED | OUTPATIENT
Start: 2022-03-08 | End: 2022-03-25 | Stop reason: SDUPTHER

## 2022-03-08 NOTE — TELEPHONE ENCOUNTER
Caller: Duy Owens    Relationship to patient: Self    Best call back number: [phone]    Patient is needing: TO LET [place] KNOW HE HAS [place] BC/BS AND [employer] INSUR[employer]. THESE ARE IN THE SYSTEM.

## 2022-03-09 ENCOUNTER — INFUSION (OUTPATIENT)
Dept: ONCOLOGY | Facility: HOSPITAL | Age: 62
End: 2022-03-09

## 2022-03-09 ENCOUNTER — LAB (OUTPATIENT)
Dept: OTHER | Facility: HOSPITAL | Age: 62
End: 2022-03-09

## 2022-03-09 VITALS
RESPIRATION RATE: 18 BRPM | HEART RATE: 70 BPM | WEIGHT: 215 LBS | TEMPERATURE: 99 F | HEIGHT: 70 IN | OXYGEN SATURATION: 100 % | DIASTOLIC BLOOD PRESSURE: 81 MMHG | SYSTOLIC BLOOD PRESSURE: 134 MMHG | BODY MASS INDEX: 30.78 KG/M2

## 2022-03-09 DIAGNOSIS — T45.1X5A ANEMIA DUE TO CHEMOTHERAPY: ICD-10-CM

## 2022-03-09 DIAGNOSIS — D84.9 IMMUNOCOMPROMISED PATIENT: ICD-10-CM

## 2022-03-09 DIAGNOSIS — D63.0 ANEMIA IN NEOPLASTIC DISEASE: ICD-10-CM

## 2022-03-09 DIAGNOSIS — R59.1 LYMPHADENOPATHY: ICD-10-CM

## 2022-03-09 DIAGNOSIS — Z79.899 ENCOUNTER FOR LONG-TERM (CURRENT) USE OF HIGH-RISK MEDICATION: ICD-10-CM

## 2022-03-09 DIAGNOSIS — R74.8 ELEVATED LIVER ENZYMES: ICD-10-CM

## 2022-03-09 DIAGNOSIS — C90.00 MULTIPLE MYELOMA NOT HAVING ACHIEVED REMISSION: ICD-10-CM

## 2022-03-09 DIAGNOSIS — Z79.899 ENCOUNTER FOR LONG-TERM (CURRENT) USE OF HIGH-RISK MEDICATION: Primary | ICD-10-CM

## 2022-03-09 DIAGNOSIS — D64.81 ANEMIA DUE TO CHEMOTHERAPY: ICD-10-CM

## 2022-03-09 LAB
ALBUMIN SERPL-MCNC: 4.3 G/DL (ref 3.5–5.2)
ALBUMIN/GLOB SERPL: 2.2 G/DL
ALP SERPL-CCNC: 97 U/L (ref 39–117)
ALT SERPL W P-5'-P-CCNC: 40 U/L (ref 1–41)
ANION GAP SERPL CALCULATED.3IONS-SCNC: 11 MMOL/L (ref 5–15)
AST SERPL-CCNC: 19 U/L (ref 1–40)
B2 MICROGLOB SERPL-MCNC: 2.1 MG/L (ref 0.8–2.2)
BASOPHILS # BLD AUTO: 0.03 10*3/MM3 (ref 0–0.2)
BASOPHILS NFR BLD AUTO: 0.4 % (ref 0–1.5)
BILIRUB SERPL-MCNC: 0.4 MG/DL (ref 0–1.2)
BUN SERPL-MCNC: 9 MG/DL (ref 8–23)
BUN/CREAT SERPL: 9.9 (ref 7–25)
CALCIUM SPEC-SCNC: 9.1 MG/DL (ref 8.6–10.5)
CHLORIDE SERPL-SCNC: 103 MMOL/L (ref 98–107)
CO2 SERPL-SCNC: 24 MMOL/L (ref 22–29)
CREAT SERPL-MCNC: 0.91 MG/DL (ref 0.76–1.27)
DEPRECATED RDW RBC AUTO: 43.7 FL (ref 37–54)
EGFRCR SERPLBLD CKD-EPI 2021: 96.5 ML/MIN/1.73
EOSINOPHIL # BLD AUTO: 0.95 10*3/MM3 (ref 0–0.4)
EOSINOPHIL NFR BLD AUTO: 11.4 % (ref 0.3–6.2)
ERYTHROCYTE [DISTWIDTH] IN BLOOD BY AUTOMATED COUNT: 16.6 % (ref 12.3–15.4)
GLOBULIN UR ELPH-MCNC: 2 GM/DL
GLUCOSE SERPL-MCNC: 108 MG/DL (ref 65–99)
HCT VFR BLD AUTO: 37.9 % (ref 37.5–51)
HGB BLD-MCNC: 11.8 G/DL (ref 13–17.7)
HYPOCHROMIA BLD QL: NORMAL
IMM GRANULOCYTES # BLD AUTO: 0.14 10*3/MM3 (ref 0–0.05)
IMM GRANULOCYTES NFR BLD AUTO: 1.7 % (ref 0–0.5)
LYMPHOCYTES # BLD AUTO: 1.09 10*3/MM3 (ref 0.7–3.1)
LYMPHOCYTES NFR BLD AUTO: 13.1 % (ref 19.6–45.3)
MCH RBC QN AUTO: 23 PG (ref 26.6–33)
MCHC RBC AUTO-ENTMCNC: 31.1 G/DL (ref 31.5–35.7)
MCV RBC AUTO: 74 FL (ref 79–97)
MONOCYTES # BLD AUTO: 1.04 10*3/MM3 (ref 0.1–0.9)
MONOCYTES NFR BLD AUTO: 12.5 % (ref 5–12)
NEUTROPHILS NFR BLD AUTO: 5.05 10*3/MM3 (ref 1.7–7)
NEUTROPHILS NFR BLD AUTO: 60.9 % (ref 42.7–76)
NRBC BLD AUTO-RTO: 0 /100 WBC (ref 0–0.2)
PLAT MORPH BLD: NORMAL
PLATELET # BLD AUTO: 210 10*3/MM3 (ref 140–450)
PMV BLD AUTO: 10.4 FL (ref 6–12)
POTASSIUM SERPL-SCNC: 3.6 MMOL/L (ref 3.5–5.2)
PROT SERPL-MCNC: 6.3 G/DL (ref 6–8.5)
RBC # BLD AUTO: 5.12 10*6/MM3 (ref 4.14–5.8)
SODIUM SERPL-SCNC: 138 MMOL/L (ref 136–145)
WBC MORPH BLD: NORMAL
WBC NRBC COR # BLD: 8.3 10*3/MM3 (ref 3.4–10.8)

## 2022-03-09 PROCEDURE — 25010000002 BORTEZOMIB PER 0.1 MG: Performed by: INTERNAL MEDICINE

## 2022-03-09 PROCEDURE — 82232 ASSAY OF BETA-2 PROTEIN: CPT | Performed by: INTERNAL MEDICINE

## 2022-03-09 PROCEDURE — 83521 IG LIGHT CHAINS FREE EACH: CPT | Performed by: INTERNAL MEDICINE

## 2022-03-09 PROCEDURE — 80053 COMPREHEN METABOLIC PANEL: CPT | Performed by: INTERNAL MEDICINE

## 2022-03-09 PROCEDURE — 85007 BL SMEAR W/DIFF WBC COUNT: CPT | Performed by: INTERNAL MEDICINE

## 2022-03-09 PROCEDURE — 84165 PROTEIN E-PHORESIS SERUM: CPT | Performed by: INTERNAL MEDICINE

## 2022-03-09 PROCEDURE — 36415 COLL VENOUS BLD VENIPUNCTURE: CPT

## 2022-03-09 PROCEDURE — 86334 IMMUNOFIX E-PHORESIS SERUM: CPT | Performed by: INTERNAL MEDICINE

## 2022-03-09 PROCEDURE — 85025 COMPLETE CBC W/AUTO DIFF WBC: CPT | Performed by: INTERNAL MEDICINE

## 2022-03-09 PROCEDURE — 82784 ASSAY IGA/IGD/IGG/IGM EACH: CPT | Performed by: INTERNAL MEDICINE

## 2022-03-09 PROCEDURE — 96401 CHEMO ANTI-NEOPL SQ/IM: CPT

## 2022-03-09 RX ORDER — BORTEZOMIB 3.5 MG/1
1.3 INJECTION, POWDER, LYOPHILIZED, FOR SOLUTION INTRAVENOUS; SUBCUTANEOUS ONCE
Status: COMPLETED | OUTPATIENT
Start: 2022-03-09 | End: 2022-03-09

## 2022-03-09 RX ADMIN — BORTEZOMIB 2.8 MG: 3.5 INJECTION, POWDER, LYOPHILIZED, FOR SOLUTION INTRAVENOUS; SUBCUTANEOUS at 16:22

## 2022-03-11 ENCOUNTER — HOSPITAL ENCOUNTER (OUTPATIENT)
Dept: CT IMAGING | Facility: HOSPITAL | Age: 62
Discharge: HOME | End: 2022-03-11
Admitting: INTERNAL MEDICINE

## 2022-03-11 DIAGNOSIS — R59.1 LYMPHADENOPATHY: ICD-10-CM

## 2022-03-11 DIAGNOSIS — D64.81 ANEMIA DUE TO CHEMOTHERAPY: ICD-10-CM

## 2022-03-11 DIAGNOSIS — T45.1X5A ANEMIA DUE TO CHEMOTHERAPY: ICD-10-CM

## 2022-03-11 DIAGNOSIS — C90.00 MULTIPLE MYELOMA NOT HAVING ACHIEVED REMISSION: ICD-10-CM

## 2022-03-11 DIAGNOSIS — Z79.899 ENCOUNTER FOR LONG-TERM (CURRENT) USE OF HIGH-RISK MEDICATION: ICD-10-CM

## 2022-03-11 DIAGNOSIS — R74.8 ELEVATED LIVER ENZYMES: ICD-10-CM

## 2022-03-11 DIAGNOSIS — D63.0 ANEMIA IN NEOPLASTIC DISEASE: ICD-10-CM

## 2022-03-11 DIAGNOSIS — D84.9 IMMUNOCOMPROMISED PATIENT: ICD-10-CM

## 2022-03-11 LAB
ALBUMIN SERPL ELPH-MCNC: 4 G/DL (ref 2.9–4.4)
ALBUMIN/GLOB SERPL: 1.9 {RATIO} (ref 0.7–1.7)
ALPHA1 GLOB SERPL ELPH-MCNC: 0.3 G/DL (ref 0–0.4)
ALPHA2 GLOB SERPL ELPH-MCNC: 0.6 G/DL (ref 0.4–1)
B-GLOBULIN SERPL ELPH-MCNC: 1 G/DL (ref 0.7–1.3)
GAMMA GLOB SERPL ELPH-MCNC: 0.4 G/DL (ref 0.4–1.8)
GLOBULIN SER-MCNC: 2.2 G/DL (ref 2.2–3.9)
IGA SERPL-MCNC: 52 MG/DL (ref 90–386)
IGG SERPL-MCNC: 446 MG/DL (ref 603–1613)
IGM SERPL-MCNC: 9 MG/DL (ref 20–172)
INTERPRETATION SERPL IEP-IMP: ABNORMAL
KAPPA LC FREE SER-MCNC: 14 MG/L (ref 3.3–19.4)
KAPPA LC FREE/LAMBDA FREE SER: 0.22 {RATIO} (ref 0.26–1.65)
LABORATORY COMMENT REPORT: ABNORMAL
LAMBDA LC FREE SERPL-MCNC: 64.6 MG/L (ref 5.7–26.3)
M PROTEIN SERPL ELPH-MCNC: ABNORMAL G/DL
PROT SERPL-MCNC: 6.2 G/DL (ref 6–8.5)

## 2022-03-11 PROCEDURE — 25010000002 IOPAMIDOL 61 % SOLUTION: Performed by: INTERNAL MEDICINE

## 2022-03-11 PROCEDURE — 0 DIATRIZOATE MEGLUMINE & SODIUM PER 1 ML: Performed by: INTERNAL MEDICINE

## 2022-03-11 PROCEDURE — 71260 CT THORAX DX C+: CPT

## 2022-03-11 PROCEDURE — 74177 CT ABD & PELVIS W/CONTRAST: CPT

## 2022-03-11 RX ADMIN — IOPAMIDOL 95 ML: 612 INJECTION, SOLUTION INTRAVENOUS at 13:50

## 2022-03-11 RX ADMIN — DIATRIZOATE MEGLUMINE AND DIATRIZOATE SODIUM 30 ML: 660; 100 LIQUID ORAL; RECTAL at 12:35

## 2022-03-16 ENCOUNTER — INFUSION (OUTPATIENT)
Dept: ONCOLOGY | Facility: HOSPITAL | Age: 62
End: 2022-03-16

## 2022-03-16 ENCOUNTER — OFFICE VISIT (OUTPATIENT)
Dept: ONCOLOGY | Facility: CLINIC | Age: 62
End: 2022-03-16

## 2022-03-16 ENCOUNTER — LAB (OUTPATIENT)
Dept: OTHER | Facility: HOSPITAL | Age: 62
End: 2022-03-16

## 2022-03-16 VITALS
HEIGHT: 70 IN | HEART RATE: 66 BPM | OXYGEN SATURATION: 98 % | BODY MASS INDEX: 30.05 KG/M2 | DIASTOLIC BLOOD PRESSURE: 78 MMHG | SYSTOLIC BLOOD PRESSURE: 135 MMHG | WEIGHT: 209.9 LBS | TEMPERATURE: 97.8 F | RESPIRATION RATE: 16 BRPM

## 2022-03-16 DIAGNOSIS — Z79.899 ENCOUNTER FOR LONG-TERM (CURRENT) USE OF HIGH-RISK MEDICATION: Primary | ICD-10-CM

## 2022-03-16 DIAGNOSIS — C90.00 MULTIPLE MYELOMA NOT HAVING ACHIEVED REMISSION: Primary | ICD-10-CM

## 2022-03-16 DIAGNOSIS — R74.8 ELEVATED LIVER ENZYMES: ICD-10-CM

## 2022-03-16 DIAGNOSIS — Z79.899 ENCOUNTER FOR LONG-TERM (CURRENT) USE OF HIGH-RISK MEDICATION: ICD-10-CM

## 2022-03-16 DIAGNOSIS — D63.0 ANEMIA IN NEOPLASTIC DISEASE: ICD-10-CM

## 2022-03-16 DIAGNOSIS — D84.9 IMMUNOCOMPROMISED PATIENT: ICD-10-CM

## 2022-03-16 DIAGNOSIS — C90.00 MULTIPLE MYELOMA NOT HAVING ACHIEVED REMISSION: ICD-10-CM

## 2022-03-16 LAB
ANISOCYTOSIS BLD QL: NORMAL
BASOPHILS # BLD AUTO: 0.08 10*3/MM3 (ref 0–0.2)
BASOPHILS NFR BLD AUTO: 0.8 % (ref 0–1.5)
DEPRECATED RDW RBC AUTO: 43.5 FL (ref 37–54)
EOSINOPHIL # BLD AUTO: 1.45 10*3/MM3 (ref 0–0.4)
EOSINOPHIL NFR BLD AUTO: 14 % (ref 0.3–6.2)
ERYTHROCYTE [DISTWIDTH] IN BLOOD BY AUTOMATED COUNT: 17.2 % (ref 12.3–15.4)
HCT VFR BLD AUTO: 41 % (ref 37.5–51)
HGB BLD-MCNC: 12.9 G/DL (ref 13–17.7)
HYPOCHROMIA BLD QL: NORMAL
IMM GRANULOCYTES # BLD AUTO: 0.1 10*3/MM3 (ref 0–0.05)
IMM GRANULOCYTES NFR BLD AUTO: 1 % (ref 0–0.5)
LYMPHOCYTES # BLD AUTO: 2.94 10*3/MM3 (ref 0.7–3.1)
LYMPHOCYTES NFR BLD AUTO: 28.3 % (ref 19.6–45.3)
MCH RBC QN AUTO: 23 PG (ref 26.6–33)
MCHC RBC AUTO-ENTMCNC: 31.5 G/DL (ref 31.5–35.7)
MCV RBC AUTO: 73 FL (ref 79–97)
MONOCYTES # BLD AUTO: 1.83 10*3/MM3 (ref 0.1–0.9)
MONOCYTES NFR BLD AUTO: 17.6 % (ref 5–12)
NEUTROPHILS NFR BLD AUTO: 3.99 10*3/MM3 (ref 1.7–7)
NEUTROPHILS NFR BLD AUTO: 38.3 % (ref 42.7–76)
NRBC BLD AUTO-RTO: 0 /100 WBC (ref 0–0.2)
PLAT MORPH BLD: NORMAL
PLATELET # BLD AUTO: 230 10*3/MM3 (ref 140–450)
PMV BLD AUTO: 10.1 FL (ref 6–12)
RBC # BLD AUTO: 5.62 10*6/MM3 (ref 4.14–5.8)
WBC MORPH BLD: NORMAL
WBC NRBC COR # BLD: 10.39 10*3/MM3 (ref 3.4–10.8)

## 2022-03-16 PROCEDURE — 99215 OFFICE O/P EST HI 40 MIN: CPT | Performed by: INTERNAL MEDICINE

## 2022-03-16 PROCEDURE — 36415 COLL VENOUS BLD VENIPUNCTURE: CPT

## 2022-03-16 PROCEDURE — 25010000002 BORTEZOMIB PER 0.1 MG: Performed by: INTERNAL MEDICINE

## 2022-03-16 PROCEDURE — 85007 BL SMEAR W/DIFF WBC COUNT: CPT | Performed by: INTERNAL MEDICINE

## 2022-03-16 PROCEDURE — 96401 CHEMO ANTI-NEOPL SQ/IM: CPT

## 2022-03-16 PROCEDURE — 85025 COMPLETE CBC W/AUTO DIFF WBC: CPT | Performed by: INTERNAL MEDICINE

## 2022-03-16 RX ORDER — BORTEZOMIB 3.5 MG/1
1.3 INJECTION, POWDER, LYOPHILIZED, FOR SOLUTION INTRAVENOUS; SUBCUTANEOUS ONCE
Status: CANCELLED | OUTPATIENT
Start: 2022-04-06

## 2022-03-16 RX ORDER — BORTEZOMIB 3.5 MG/1
1.3 INJECTION, POWDER, LYOPHILIZED, FOR SOLUTION INTRAVENOUS; SUBCUTANEOUS ONCE
Status: CANCELLED | OUTPATIENT
Start: 2022-03-23

## 2022-03-16 RX ORDER — BORTEZOMIB 3.5 MG/1
1.3 INJECTION, POWDER, LYOPHILIZED, FOR SOLUTION INTRAVENOUS; SUBCUTANEOUS ONCE
Status: CANCELLED | OUTPATIENT
Start: 2022-03-30

## 2022-03-16 RX ORDER — BORTEZOMIB 3.5 MG/1
1.3 INJECTION, POWDER, LYOPHILIZED, FOR SOLUTION INTRAVENOUS; SUBCUTANEOUS ONCE
Status: COMPLETED | OUTPATIENT
Start: 2022-03-16 | End: 2022-03-16

## 2022-03-16 RX ADMIN — BORTEZOMIB 2.8 MG: 3.5 INJECTION, POWDER, LYOPHILIZED, FOR SOLUTION INTRAVENOUS; SUBCUTANEOUS at 16:33

## 2022-03-16 NOTE — PROGRESS NOTES
Subjective     CHIEF COMPLAINT:      Chief Complaint   Patient presents with   • Follow-up     No concerns       HISTORY OF PRESENT ILLNESS:     Duy Owens is a 61 y.o. male patient who returns today for follow up on his multiple myeloma.  He is on the VRD regimen.  He is tolerating the treatment without nausea or vomiting.  No fatigue.  He reports occasional constipation likely secondary to the Velcade.  No problem with diarrhea.  No skin rash.    Patient states that he is no longer having back or abdominal pain.  He reports that prior to the diagnosis and initiation of treatment, he was having abdominal pain that he thought was heartburn.  This abdominal pain completely resolved after treatment.  He is not having any chest pain or shortness of breath.    ROS:  Pertinent ROS is in the HPI.     Past medical, surgical, social and family history were reviewed.     MEDICATIONS:    Current Outpatient Medications:   •  acyclovir (ZOVIRAX) 400 MG tablet, Take 1 tablet by mouth 2 (Two) Times a Day. Take one tablet by mouth twice daily., Disp: 60 tablet, Rfl: 11  •  aspirin 81 MG chewable tablet, Chew 81 mg Daily., Disp: , Rfl:   •  dexamethasone (DECADRON) 4 MG tablet, Take 10 tablets by mouth 1 (One) Time Per Week., Disp: 40 tablet, Rfl: 3  •  lenalidomide (Revlimid) 25 MG capsule, TAKE 1 CAPSULE BY MOUTH ONCE DAILY FOR 14 DAYS ON AND 7 DAYS OFF, Disp: 14 capsule, Rfl: 0  •  losartan (Cozaar) 100 MG tablet, Take 1 tablet by mouth Daily., Disp: 30 tablet, Rfl: 11  •  meclizine (ANTIVERT) 25 MG tablet, Take 25 mg by mouth 2 (two) times a day., Disp: , Rfl:   •  ondansetron (ZOFRAN) 8 MG tablet, Take 1 tablet by mouth 3 (Three) Times a Day As Needed for Nausea or Vomiting., Disp: 30 tablet, Rfl: 5  •  sulfamethoxazole-trimethoprim (Bactrim DS) 800-160 MG per tablet, Take 1 tablet by mouth 3 (Three) Times a Week. On Monday, Wednesday and Friday, Disp: 12 tablet, Rfl: 5    Objective   VITAL SIGNS:     Vitals:    03/16/22  "1526   BP: 135/78   Pulse: 66   Resp: 16   Temp: 97.8 °F (36.6 °C)   TempSrc: Temporal   SpO2: 98%   Weight: 95.2 kg (209 lb 14.4 oz)   Height: 177.8 cm (70\")   PainSc: 0-No pain     Body mass index is 30.12 kg/m².     Wt Readings from Last 5 Encounters:   03/16/22 95.2 kg (209 lb 14.4 oz)   03/09/22 97.5 kg (215 lb)   03/02/22 95.3 kg (210 lb 3.2 oz)   02/23/22 95 kg (209 lb 8 oz)   02/16/22 94 kg (207 lb 3.2 oz)       PHYSICAL EXAMINATION:   GENERAL: The patient appears in good general condition, not in acute distress.   SKIN: No ecchymosis.  EYES: No jaundice. No pallor.  LYMPHATICS: No cervical or axillary lymphadenopathy.  CHEST: Normal respiratory effort.  Lungs clear bilaterally.  No added sounds.  CVS: Normal S1-S2.  No murmurs.  ABDOMEN: Soft. No tenderness. No Hepatomegaly. No Splenomegaly. No masses.  EXTREMITIES: No edema.  No calf tenderness.     DIAGNOSTIC DATA:     Results from last 7 days   Lab Units 03/16/22  1445 03/09/22  1458   WBC 10*3/mm3 10.39 8.30   NEUTROS ABS 10*3/mm3 3.99 5.05   HEMOGLOBIN g/dL 12.9* 11.8*   HEMATOCRIT % 41.0 37.9   PLATELETS 10*3/mm3 230 210     Results from last 7 days   Lab Units 03/09/22  1458   SODIUM mmol/L 138   POTASSIUM mmol/L 3.6   CHLORIDE mmol/L 103   CO2 mmol/L 24.0   BUN mg/dL 9   CREATININE mg/dL 0.91   CALCIUM mg/dL 9.1   ALBUMIN g/dL 4.30  4.0   BILIRUBIN mg/dL 0.4   ALK PHOS U/L 97   ALT (SGPT) U/L 40   AST (SGOT) U/L 19   GLUCOSE mg/dL 108*     Component      Latest Ref Rng & Units 9/29/2021 12/9/2021 3/9/2022   Beta-2 Microglobulin      0.8 - 2.2 mg/L 1.8 2.1 2.1     Component      Latest Ref Rng & Units 1/11/2022 2/16/2022 3/9/2022   IgG      603 - 1613 mg/dL 611 530 (L) 446 (L)   IgA      90 - 386 mg/dL 57 (L) 65 (L) 52 (L)   IgM      20 - 172 mg/dL 23 17 (L) 9 (L)   Total Protein      6.0 - 8.5 g/dL 6.3 6.4 6.2   Albumin      2.9 - 4.4 g/dL 3.8 3.7 4.0   Alpha-1-Globulin      0.0 - 0.4 g/dL 0.3 0.3 0.3   Alpha-2-Globulin      0.4 - 1.0 g/dL 0.6 0.6 " 0.6   Beta Globulin      0.7 - 1.3 g/dL 1.0 1.2 1.0   Gamma Globulin      0.4 - 1.8 g/dL 0.6 0.5 0.4   M-Werner      Not Observed g/dL Not Observed Not Observed Not Observed   Globulin      2.2 - 3.9 g/dL 2.5 2.7 2.2   A/G Ratio      0.7 - 1.7 1.6 1.4 1.9 (H)   Immunofixation Reflex, Serum       Comment: Comment Comment   Please note       Comment Comment Comment   Kappa FLC      3.3 - 19.4 mg/L 10.5 12.6 14.0   Free Lambda Light Chains      5.7 - 26.3 mg/L 125.4 (H) 74.0 (H) 64.6 (H)   Kappa/Lambda Ratio      0.26 - 1.65 0.08 (L) 0.17 (L) 0.22 (L)       CT scan chest abdomen pelvis on 3/11/2022:  1.  Adenopathy within chest, abdomen and pelvis, as before. Findings are  2.  With a left axillary node demonstrating interval increase in size  and left periaortic node mildly decreased in size. The remainder of the  index nodes are grossly stable.  3.  Stable 1.7 cm hyperdense lesion left hepatic dome and subcentimeter  pulmonary nodule. While stability is reassuring, continued attention on  follow-up is recommended to ensure stability.    Assessment/Plan   *Plasma cell disorder most consistent with multiple myeloma with lymph node involvement and amyloid deposition in the involved lymph nodes.  · Patient started having dry cough around July 2021.    · CT chest on 9/23/2021 revealed inferior mediastinal adenopathy.    · CT abdomen and pelvis on 9/24/2021 revealed retroperitoneal lymphadenopathy extending to the iliac chain lymph nodes on the right.    · The Largest lymph node was in the retroperitoneal area measuring 4.8 x 3.5 cm.  The common iliac lymph node measured 3.5 x 2.7 cm.  The left external iliac chain lymph node measured 2.9 x 2 cm.    · PET scan on 10/5/2021 revealed the retroperitoneal and left pelvic lymphadenopathy to be hypermetabolic.  The left periaortic lymph nodes had SUV of 6.9 and the left pelvic sidewall lymph nodes had an SUV of 5.4.  · Patient had CT-guided biopsy on 10/6/2021.  · The pathologist  at [facility] reported involvement with monotypic lambda restricted plasma cells concerning for involvement with plasma cell dyscrasia.  · Bone marrow biopsy on 10/29/2021 revealed a normocellular marrow (50%) with involvement with plasma cell dyscrasia (plasma cells representing 20-30% of total cells by  stain).   · FISH was negative for gain of 1q, monosomy/deletions of chromosomes 13 and 17, IGH rearrangement, gain of chromosomes 9 and 11, IGH-CCND1 (11;14) fusion.   · I this was considered to represent multiple myeloma with lymph node involvement and amyloid deposition.    · There was no bone involvement on PET scan.   · Patient was referred to [facility].  He was seen by Dr. Felix.  They concurred with the diagnosis of multiple myeloma with AL amyloidosis and involvement of the lymph nodes.  · Treatment with the VRD started on 12/22/2021.  · Patient is tolerating the treatment.  · Free lambda light chain decreased to 125 on 1/11/2022.  Kappa to lambda ratio was 0.08.  · Free lambda light chain decreased to 74 and kappa to lambda ratio improved to 0.17 on 2/16/2022.  This is indicating response to treatment.  · Free lambda light chain decreased to 64 on 3/9/2022.  · CT scan on 3/11/2022 revealed decrease in the size of lymph nodes in the periaortic area.  There is a slight increase in a lymph node in the left axillary area that increased from 7 to 10 mm.  This was not palpable on today's exam.  · Due to the presence of significant amyloid deposit in the lymph nodes, it is likely that the lymph nodes are not going to decrease significantly in size and response to treatment.  · I recommended obtaining PET scan to evaluate for hypermetabolism of the enlarged lymph nodes with comparison to the baseline PET scan.    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  · There was mild wall thickening.  Ejection fraction was normal.  · Patient is going to have a follow-up echocardiogram on  4/21/2022.  At [facility]    *Anemia secondary to multiple myeloma and secondary to treatment.  · Hemoglobin was 13.1 on 9/29/2021.    · Iron, folate and vitamin B12 were normal in September/October 2021.    · Hemoglobin is 12.9 today.    *Elevated liver enzymes.  · ALT increased to 246 and AST increased to 56 on 1/11/2022.  · Liver enzymes improved on 1/12/2022 with ALT improving to 199 and AST improving to 43.  Bilirubin remains normal at 0.4.  · Hepatitis serology test was negative.  · ALT improved to 100 on 2/16/2022 but increased to 107 and AST increased to 43 on 2/22/2022.  · Liver enzymes on 3/9/2022 normalized with ALT of 40 and AST of 19.    · No suspicious liver abnormality was seen on the CT scan on 3/11/2022.  There was a stable 1.7 cm hyperdense region in the left hepatic dome ? Hemangioma.    *Prophylaxis.  · Patient is on aspirin 81 mg daily for DVT prophylaxis.    · Due to his immunocompromise state, he is on acyclovir 400 mg twice daily.  · He is on Bactrim DS three days weekly for PCP prophylaxis.   · Covid antibody level was >2500 on 1/11/2022.    · Based on that, Evusheld was not recommended.    PLAN:    1.  Continue treatment with Velcade weekly along with Revlimid daily for 21/28-day cycle and Decadron at 40 mg weekly.  He is starting cycle #4 today.   2.  Repeat CMP SPEP REGINO FLC in 3 weeks.   3.  Obtain PET scan in mid April 2022.   4.  He has follow-up at [facility] on 4/21/2022.  I will see him in follow-up the week after with a CBC.     I spent 45 minutes caring for Duy on this date of service. This time includes time spent by me in the following activities: preparing for the visit, reviewing tests, obtaining and/or reviewing a separately obtained history, performing a medically appropriate examination and/or evaluation, counseling and educating the patient/family/caregiver, ordering medications, tests, or procedures, documenting information in the medical record,  independently interpreting results and communicating that information with the patient/family/caregiver and care coordination     Tima Mendez MD  03/16/22

## 2022-03-17 ENCOUNTER — SPECIALTY PHARMACY (OUTPATIENT)
Dept: PHARMACY | Facility: HOSPITAL | Age: 62
End: 2022-03-17

## 2022-03-17 NOTE — PROGRESS NOTES
Specialty Note ( VRD regimen)      Labs reviewed        3/16/2022   WBC 3.40 - 10.80 10*3/mm3 10.39   Neutrophils Absolute 1.70 - 7.00 10*3/mm3 3.99   Hemoglobin 13.0 - 17.7 g/dL 12.9 (A)   Hematocrit 37.5 - 51.0 % 41.0   Platelets 140 - 450 10*3/mm3 230     Dr Mendez's dictation from yesterday is noted    Continue treatment with Velcade weekly along with Revlimid daily for 21/28-day cycle and Decadron at 40 mg weekly.  He is starting cycle #4 today.     Revlimid 25 mg po 14/21 days  Dexamethasone 40 mg po weekly   Velcade 1.3 mg/m2 weekly

## 2022-03-23 ENCOUNTER — LAB (OUTPATIENT)
Dept: OTHER | Facility: HOSPITAL | Age: 62
End: 2022-03-23

## 2022-03-23 ENCOUNTER — INFUSION (OUTPATIENT)
Dept: ONCOLOGY | Facility: HOSPITAL | Age: 62
End: 2022-03-23

## 2022-03-23 VITALS
TEMPERATURE: 99.3 F | RESPIRATION RATE: 18 BRPM | WEIGHT: 208 LBS | SYSTOLIC BLOOD PRESSURE: 128 MMHG | DIASTOLIC BLOOD PRESSURE: 81 MMHG | HEART RATE: 69 BPM | BODY MASS INDEX: 29.78 KG/M2 | HEIGHT: 70 IN | OXYGEN SATURATION: 100 %

## 2022-03-23 DIAGNOSIS — Z79.899 ENCOUNTER FOR LONG-TERM (CURRENT) USE OF HIGH-RISK MEDICATION: ICD-10-CM

## 2022-03-23 DIAGNOSIS — C90.00 MULTIPLE MYELOMA NOT HAVING ACHIEVED REMISSION: ICD-10-CM

## 2022-03-23 DIAGNOSIS — Z79.899 ENCOUNTER FOR LONG-TERM (CURRENT) USE OF HIGH-RISK MEDICATION: Primary | ICD-10-CM

## 2022-03-23 LAB
BASOPHILS # BLD AUTO: 0.08 10*3/MM3 (ref 0–0.2)
BASOPHILS NFR BLD AUTO: 1.2 % (ref 0–1.5)
DEPRECATED RDW RBC AUTO: 44.2 FL (ref 37–54)
EOSINOPHIL # BLD AUTO: 1.38 10*3/MM3 (ref 0–0.4)
EOSINOPHIL NFR BLD AUTO: 20.1 % (ref 0.3–6.2)
ERYTHROCYTE [DISTWIDTH] IN BLOOD BY AUTOMATED COUNT: 17.1 % (ref 12.3–15.4)
HCT VFR BLD AUTO: 38 % (ref 37.5–51)
HGB BLD-MCNC: 12 G/DL (ref 13–17.7)
HYPOCHROMIA BLD QL: NORMAL
IMM GRANULOCYTES # BLD AUTO: 0.13 10*3/MM3 (ref 0–0.05)
IMM GRANULOCYTES NFR BLD AUTO: 1.9 % (ref 0–0.5)
LYMPHOCYTES # BLD AUTO: 1.44 10*3/MM3 (ref 0.7–3.1)
LYMPHOCYTES NFR BLD AUTO: 21 % (ref 19.6–45.3)
MCH RBC QN AUTO: 23.1 PG (ref 26.6–33)
MCHC RBC AUTO-ENTMCNC: 31.6 G/DL (ref 31.5–35.7)
MCV RBC AUTO: 73.2 FL (ref 79–97)
MONOCYTES # BLD AUTO: 0.51 10*3/MM3 (ref 0.1–0.9)
MONOCYTES NFR BLD AUTO: 7.4 % (ref 5–12)
NEUTROPHILS NFR BLD AUTO: 3.31 10*3/MM3 (ref 1.7–7)
NEUTROPHILS NFR BLD AUTO: 48.4 % (ref 42.7–76)
NRBC BLD AUTO-RTO: 0 /100 WBC (ref 0–0.2)
PLAT MORPH BLD: NORMAL
PLATELET # BLD AUTO: 210 10*3/MM3 (ref 140–450)
PMV BLD AUTO: 11 FL (ref 6–12)
RBC # BLD AUTO: 5.19 10*6/MM3 (ref 4.14–5.8)
WBC MORPH BLD: NORMAL
WBC NRBC COR # BLD: 6.85 10*3/MM3 (ref 3.4–10.8)

## 2022-03-23 PROCEDURE — 85025 COMPLETE CBC W/AUTO DIFF WBC: CPT | Performed by: INTERNAL MEDICINE

## 2022-03-23 PROCEDURE — 36415 COLL VENOUS BLD VENIPUNCTURE: CPT

## 2022-03-23 PROCEDURE — 85007 BL SMEAR W/DIFF WBC COUNT: CPT | Performed by: INTERNAL MEDICINE

## 2022-03-23 PROCEDURE — 96401 CHEMO ANTI-NEOPL SQ/IM: CPT

## 2022-03-23 PROCEDURE — 25010000002 BORTEZOMIB PER 0.1 MG: Performed by: INTERNAL MEDICINE

## 2022-03-23 RX ORDER — BORTEZOMIB 3.5 MG/1
1.3 INJECTION, POWDER, LYOPHILIZED, FOR SOLUTION INTRAVENOUS; SUBCUTANEOUS ONCE
Status: COMPLETED | OUTPATIENT
Start: 2022-03-23 | End: 2022-03-23

## 2022-03-23 RX ADMIN — BORTEZOMIB 2.8 MG: 3.5 INJECTION, POWDER, LYOPHILIZED, FOR SOLUTION INTRAVENOUS; SUBCUTANEOUS at 15:34

## 2022-03-23 NOTE — PROGRESS NOTES
Pathology review from [facility] revealed monotypic lambda light chain restricted plasma cells.  The protein material was positive for amyloid protein.    I contacted the patient with the results.  I recommended excisional biopsy of one of the involved lymph nodes.  I recommended referral to general surgery for this.  I explained to him that pathology exam of an entire lymph node will provide us with more information regarding the exact subtype (lymphoma versus myeloma).    I also recommended CT-guided bone marrow biopsy to evaluate for involvement of the bone marrow with the same pathologic process.    I explained the process to the patient.  He agreed to proceed.    Tima Mendez MD    Statement Selected

## 2022-03-25 RX ORDER — LENALIDOMIDE 25 MG/1
25 CAPSULE ORAL DAILY
Qty: 14 CAPSULE | Refills: 0 | Status: SHIPPED | OUTPATIENT
Start: 2022-03-25 | End: 2022-05-04

## 2022-03-25 RX ORDER — LENALIDOMIDE 25 MG/1
CAPSULE ORAL
Refills: 0 | OUTPATIENT
Start: 2022-03-25

## 2022-03-30 ENCOUNTER — LAB (OUTPATIENT)
Dept: OTHER | Facility: HOSPITAL | Age: 62
End: 2022-03-30

## 2022-03-30 ENCOUNTER — INFUSION (OUTPATIENT)
Dept: ONCOLOGY | Facility: HOSPITAL | Age: 62
End: 2022-03-30

## 2022-03-30 VITALS
RESPIRATION RATE: 18 BRPM | DIASTOLIC BLOOD PRESSURE: 86 MMHG | SYSTOLIC BLOOD PRESSURE: 139 MMHG | WEIGHT: 208.4 LBS | OXYGEN SATURATION: 100 % | BODY MASS INDEX: 29.84 KG/M2 | HEIGHT: 70 IN | HEART RATE: 64 BPM | TEMPERATURE: 98.9 F

## 2022-03-30 DIAGNOSIS — Z79.899 ENCOUNTER FOR LONG-TERM (CURRENT) USE OF HIGH-RISK MEDICATION: ICD-10-CM

## 2022-03-30 DIAGNOSIS — Z79.899 ENCOUNTER FOR LONG-TERM (CURRENT) USE OF HIGH-RISK MEDICATION: Primary | ICD-10-CM

## 2022-03-30 DIAGNOSIS — C90.00 MULTIPLE MYELOMA NOT HAVING ACHIEVED REMISSION: ICD-10-CM

## 2022-03-30 LAB
BASOPHILS # BLD AUTO: 0.11 10*3/MM3 (ref 0–0.2)
BASOPHILS NFR BLD AUTO: 1.2 % (ref 0–1.5)
DEPRECATED RDW RBC AUTO: 44.6 FL (ref 37–54)
EOSINOPHIL # BLD AUTO: 1.17 10*3/MM3 (ref 0–0.4)
EOSINOPHIL NFR BLD AUTO: 12.9 % (ref 0.3–6.2)
ERYTHROCYTE [DISTWIDTH] IN BLOOD BY AUTOMATED COUNT: 17.2 % (ref 12.3–15.4)
HCT VFR BLD AUTO: 38.1 % (ref 37.5–51)
HGB BLD-MCNC: 11.9 G/DL (ref 13–17.7)
HYPOCHROMIA BLD QL: NORMAL
IMM GRANULOCYTES # BLD AUTO: 0.16 10*3/MM3 (ref 0–0.05)
IMM GRANULOCYTES NFR BLD AUTO: 1.8 % (ref 0–0.5)
LYMPHOCYTES # BLD AUTO: 1.27 10*3/MM3 (ref 0.7–3.1)
LYMPHOCYTES NFR BLD AUTO: 14 % (ref 19.6–45.3)
MCH RBC QN AUTO: 23.1 PG (ref 26.6–33)
MCHC RBC AUTO-ENTMCNC: 31.2 G/DL (ref 31.5–35.7)
MCV RBC AUTO: 73.8 FL (ref 79–97)
MONOCYTES # BLD AUTO: 1.33 10*3/MM3 (ref 0.1–0.9)
MONOCYTES NFR BLD AUTO: 14.6 % (ref 5–12)
NEUTROPHILS NFR BLD AUTO: 5.05 10*3/MM3 (ref 1.7–7)
NEUTROPHILS NFR BLD AUTO: 55.5 % (ref 42.7–76)
NRBC BLD AUTO-RTO: 0 /100 WBC (ref 0–0.2)
PLAT MORPH BLD: NORMAL
PLATELET # BLD AUTO: 207 10*3/MM3 (ref 140–450)
PMV BLD AUTO: 10.9 FL (ref 6–12)
RBC # BLD AUTO: 5.16 10*6/MM3 (ref 4.14–5.8)
WBC MORPH BLD: NORMAL
WBC NRBC COR # BLD: 9.09 10*3/MM3 (ref 3.4–10.8)

## 2022-03-30 PROCEDURE — 36415 COLL VENOUS BLD VENIPUNCTURE: CPT

## 2022-03-30 PROCEDURE — 25010000002 BORTEZOMIB PER 0.1 MG: Performed by: INTERNAL MEDICINE

## 2022-03-30 PROCEDURE — 85025 COMPLETE CBC W/AUTO DIFF WBC: CPT | Performed by: INTERNAL MEDICINE

## 2022-03-30 PROCEDURE — 96401 CHEMO ANTI-NEOPL SQ/IM: CPT

## 2022-03-30 PROCEDURE — 85007 BL SMEAR W/DIFF WBC COUNT: CPT | Performed by: INTERNAL MEDICINE

## 2022-03-30 RX ORDER — BORTEZOMIB 3.5 MG/1
1.3 INJECTION, POWDER, LYOPHILIZED, FOR SOLUTION INTRAVENOUS; SUBCUTANEOUS ONCE
Status: COMPLETED | OUTPATIENT
Start: 2022-03-30 | End: 2022-03-30

## 2022-03-30 RX ADMIN — BORTEZOMIB 2.8 MG: 3.5 INJECTION, POWDER, LYOPHILIZED, FOR SOLUTION INTRAVENOUS; SUBCUTANEOUS at 14:54

## 2022-04-06 ENCOUNTER — INFUSION (OUTPATIENT)
Dept: ONCOLOGY | Facility: HOSPITAL | Age: 62
End: 2022-04-06

## 2022-04-06 ENCOUNTER — LAB (OUTPATIENT)
Dept: OTHER | Facility: HOSPITAL | Age: 62
End: 2022-04-06

## 2022-04-06 VITALS
RESPIRATION RATE: 16 BRPM | WEIGHT: 206.8 LBS | TEMPERATURE: 98.7 F | BODY MASS INDEX: 29.67 KG/M2 | OXYGEN SATURATION: 94 % | HEART RATE: 100 BPM | SYSTOLIC BLOOD PRESSURE: 129 MMHG | DIASTOLIC BLOOD PRESSURE: 79 MMHG

## 2022-04-06 DIAGNOSIS — Z79.899 ENCOUNTER FOR LONG-TERM (CURRENT) USE OF HIGH-RISK MEDICATION: ICD-10-CM

## 2022-04-06 DIAGNOSIS — Z79.899 ENCOUNTER FOR LONG-TERM (CURRENT) USE OF HIGH-RISK MEDICATION: Primary | ICD-10-CM

## 2022-04-06 DIAGNOSIS — C90.00 MULTIPLE MYELOMA NOT HAVING ACHIEVED REMISSION: ICD-10-CM

## 2022-04-06 DIAGNOSIS — C90.00 MULTIPLE MYELOMA NOT HAVING ACHIEVED REMISSION: Primary | ICD-10-CM

## 2022-04-06 LAB
BASOPHILS # BLD AUTO: 0.07 10*3/MM3 (ref 0–0.2)
BASOPHILS NFR BLD AUTO: 1 % (ref 0–1.5)
DEPRECATED RDW RBC AUTO: 46.4 FL (ref 37–54)
EOSINOPHIL # BLD AUTO: 0.61 10*3/MM3 (ref 0–0.4)
EOSINOPHIL NFR BLD AUTO: 9 % (ref 0.3–6.2)
ERYTHROCYTE [DISTWIDTH] IN BLOOD BY AUTOMATED COUNT: 17.5 % (ref 12.3–15.4)
HCT VFR BLD AUTO: 38.4 % (ref 37.5–51)
HGB BLD-MCNC: 11.9 G/DL (ref 13–17.7)
IMM GRANULOCYTES # BLD AUTO: 0.03 10*3/MM3 (ref 0–0.05)
IMM GRANULOCYTES NFR BLD AUTO: 0.4 % (ref 0–0.5)
LYMPHOCYTES # BLD AUTO: 1.62 10*3/MM3 (ref 0.7–3.1)
LYMPHOCYTES NFR BLD AUTO: 23.8 % (ref 19.6–45.3)
MCH RBC QN AUTO: 23.2 PG (ref 26.6–33)
MCHC RBC AUTO-ENTMCNC: 31 G/DL (ref 31.5–35.7)
MCV RBC AUTO: 75 FL (ref 79–97)
MONOCYTES # BLD AUTO: 1.17 10*3/MM3 (ref 0.1–0.9)
MONOCYTES NFR BLD AUTO: 17.2 % (ref 5–12)
NEUTROPHILS NFR BLD AUTO: 3.3 10*3/MM3 (ref 1.7–7)
NEUTROPHILS NFR BLD AUTO: 48.6 % (ref 42.7–76)
NRBC BLD AUTO-RTO: 0 /100 WBC (ref 0–0.2)
PLATELET # BLD AUTO: 219 10*3/MM3 (ref 140–450)
PMV BLD AUTO: 10 FL (ref 6–12)
RBC # BLD AUTO: 5.12 10*6/MM3 (ref 4.14–5.8)
WBC NRBC COR # BLD: 6.8 10*3/MM3 (ref 3.4–10.8)

## 2022-04-06 PROCEDURE — 82784 ASSAY IGA/IGD/IGG/IGM EACH: CPT | Performed by: INTERNAL MEDICINE

## 2022-04-06 PROCEDURE — 83521 IG LIGHT CHAINS FREE EACH: CPT | Performed by: INTERNAL MEDICINE

## 2022-04-06 PROCEDURE — 86334 IMMUNOFIX E-PHORESIS SERUM: CPT | Performed by: INTERNAL MEDICINE

## 2022-04-06 PROCEDURE — 96401 CHEMO ANTI-NEOPL SQ/IM: CPT

## 2022-04-06 PROCEDURE — 25010000002 BORTEZOMIB PER 0.1 MG: Performed by: INTERNAL MEDICINE

## 2022-04-06 PROCEDURE — 85025 COMPLETE CBC W/AUTO DIFF WBC: CPT | Performed by: INTERNAL MEDICINE

## 2022-04-06 PROCEDURE — 84155 ASSAY OF PROTEIN SERUM: CPT | Performed by: INTERNAL MEDICINE

## 2022-04-06 PROCEDURE — 84165 PROTEIN E-PHORESIS SERUM: CPT | Performed by: INTERNAL MEDICINE

## 2022-04-06 PROCEDURE — 36415 COLL VENOUS BLD VENIPUNCTURE: CPT

## 2022-04-06 RX ORDER — BORTEZOMIB 3.5 MG/1
1.3 INJECTION, POWDER, LYOPHILIZED, FOR SOLUTION INTRAVENOUS; SUBCUTANEOUS ONCE
Status: COMPLETED | OUTPATIENT
Start: 2022-04-06 | End: 2022-04-06

## 2022-04-06 RX ADMIN — BORTEZOMIB 2.8 MG: 3.5 INJECTION, POWDER, LYOPHILIZED, FOR SOLUTION INTRAVENOUS; SUBCUTANEOUS at 16:15

## 2022-04-10 DIAGNOSIS — C90.00 MULTIPLE MYELOMA NOT HAVING ACHIEVED REMISSION: ICD-10-CM

## 2022-04-11 RX ORDER — DEXAMETHASONE 4 MG/1
TABLET ORAL
Qty: 40 TABLET | Refills: 3 | Status: SHIPPED | OUTPATIENT
Start: 2022-04-11 | End: 2022-08-24

## 2022-04-12 LAB
ALBUMIN SERPL ELPH-MCNC: 3.7 G/DL (ref 2.9–4.4)
ALBUMIN/GLOB SERPL: 1.7 {RATIO} (ref 0.7–1.7)
ALPHA1 GLOB SERPL ELPH-MCNC: 0.2 G/DL (ref 0–0.4)
ALPHA2 GLOB SERPL ELPH-MCNC: 0.6 G/DL (ref 0.4–1)
B-GLOBULIN SERPL ELPH-MCNC: 1.1 G/DL (ref 0.7–1.3)
GAMMA GLOB SERPL ELPH-MCNC: 0.4 G/DL (ref 0.4–1.8)
GLOBULIN SER-MCNC: 2.3 G/DL (ref 2.2–3.9)
IGA SERPL-MCNC: 51 MG/DL (ref 90–386)
IGG SERPL-MCNC: 453 MG/DL (ref 603–1613)
IGM SERPL-MCNC: 10 MG/DL (ref 20–172)
INTERPRETATION SERPL IEP-IMP: ABNORMAL
KAPPA LC FREE SER-MCNC: 12.2 MG/L (ref 3.3–19.4)
KAPPA LC FREE/LAMBDA FREE SER: 0.25 {RATIO} (ref 0.26–1.65)
LABORATORY COMMENT REPORT: ABNORMAL
LAMBDA LC FREE SERPL-MCNC: 48.8 MG/L (ref 5.7–26.3)
M PROTEIN SERPL ELPH-MCNC: ABNORMAL G/DL
PROT SERPL-MCNC: 6 G/DL (ref 6–8.5)

## 2022-04-13 ENCOUNTER — LAB (OUTPATIENT)
Dept: OTHER | Facility: HOSPITAL | Age: 62
End: 2022-04-13

## 2022-04-13 ENCOUNTER — INFUSION (OUTPATIENT)
Dept: ONCOLOGY | Facility: HOSPITAL | Age: 62
End: 2022-04-13

## 2022-04-13 VITALS
WEIGHT: 203.6 LBS | DIASTOLIC BLOOD PRESSURE: 84 MMHG | TEMPERATURE: 98.7 F | HEART RATE: 83 BPM | BODY MASS INDEX: 29.15 KG/M2 | SYSTOLIC BLOOD PRESSURE: 126 MMHG | HEIGHT: 70 IN | OXYGEN SATURATION: 98 % | RESPIRATION RATE: 18 BRPM

## 2022-04-13 DIAGNOSIS — C90.00 MULTIPLE MYELOMA NOT HAVING ACHIEVED REMISSION: Primary | ICD-10-CM

## 2022-04-13 LAB
BASOPHILS # BLD AUTO: 0.05 10*3/MM3 (ref 0–0.2)
BASOPHILS NFR BLD AUTO: 0.8 % (ref 0–1.5)
DEPRECATED RDW RBC AUTO: 45 FL (ref 37–54)
EOSINOPHIL # BLD AUTO: 0.46 10*3/MM3 (ref 0–0.4)
EOSINOPHIL NFR BLD AUTO: 7.5 % (ref 0.3–6.2)
ERYTHROCYTE [DISTWIDTH] IN BLOOD BY AUTOMATED COUNT: 17.1 % (ref 12.3–15.4)
HCT VFR BLD AUTO: 39.9 % (ref 37.5–51)
HGB BLD-MCNC: 12.7 G/DL (ref 13–17.7)
HYPOCHROMIA BLD QL: NORMAL
IMM GRANULOCYTES # BLD AUTO: 0.15 10*3/MM3 (ref 0–0.05)
IMM GRANULOCYTES NFR BLD AUTO: 2.4 % (ref 0–0.5)
LYMPHOCYTES # BLD AUTO: 1.19 10*3/MM3 (ref 0.7–3.1)
LYMPHOCYTES NFR BLD AUTO: 19.4 % (ref 19.6–45.3)
MCH RBC QN AUTO: 23.5 PG (ref 26.6–33)
MCHC RBC AUTO-ENTMCNC: 31.8 G/DL (ref 31.5–35.7)
MCV RBC AUTO: 73.9 FL (ref 79–97)
MONOCYTES # BLD AUTO: 0.8 10*3/MM3 (ref 0.1–0.9)
MONOCYTES NFR BLD AUTO: 13.1 % (ref 5–12)
NEUTROPHILS NFR BLD AUTO: 3.48 10*3/MM3 (ref 1.7–7)
NEUTROPHILS NFR BLD AUTO: 56.8 % (ref 42.7–76)
NRBC BLD AUTO-RTO: 0 /100 WBC (ref 0–0.2)
PLAT MORPH BLD: NORMAL
PLATELET # BLD AUTO: 172 10*3/MM3 (ref 140–450)
PMV BLD AUTO: 11.1 FL (ref 6–12)
RBC # BLD AUTO: 5.4 10*6/MM3 (ref 4.14–5.8)
WBC MORPH BLD: NORMAL
WBC NRBC COR # BLD: 6.13 10*3/MM3 (ref 3.4–10.8)

## 2022-04-13 PROCEDURE — 85007 BL SMEAR W/DIFF WBC COUNT: CPT | Performed by: INTERNAL MEDICINE

## 2022-04-13 PROCEDURE — 85025 COMPLETE CBC W/AUTO DIFF WBC: CPT | Performed by: INTERNAL MEDICINE

## 2022-04-13 PROCEDURE — 36415 COLL VENOUS BLD VENIPUNCTURE: CPT

## 2022-04-13 NOTE — NURSING NOTE
Pt here and thought he had a treatment scheduled for today for velcade. Spoke with Dr Mendez on his cell phone and per MD pt has completed 4 cycles of velcade and revlimid and has an appt scheduled for [facility] on the  April 21st to reassess for the next cycle. The pt will see Dr Mendez on the April 27th for possibly starting cycle 5. Explained to pt that he has completed 4 cycles and is not to start the next cycle of velcade and revlimid until after he is seen at [facility]. Pt vu and appt scheduled given to pt and is aware that he will have a PET scan on the 20th, [facility] on the 21st and will see Dr Mendez on April 27th. Pt discharged in stable condition and instructed to call the office for any concerns or questions.

## 2022-04-20 ENCOUNTER — TELEPHONE (OUTPATIENT)
Dept: ONCOLOGY | Facility: CLINIC | Age: 62
End: 2022-04-20

## 2022-04-20 ENCOUNTER — HOSPITAL ENCOUNTER (OUTPATIENT)
Dept: PET IMAGING | Facility: HOSPITAL | Age: 62
End: 2022-04-20

## 2022-04-20 NOTE — TELEPHONE ENCOUNTER
----- Message from Cristal Sage sent at 4/20/2022 10:44 AM EDT -----  Regarding: Cancel PET- Patient ate  PET was cancelled, patient ate before arriving

## 2022-04-22 ENCOUNTER — SPECIALTY PHARMACY (OUTPATIENT)
Dept: PHARMACY | Facility: HOSPITAL | Age: 62
End: 2022-04-22

## 2022-04-22 ENCOUNTER — TELEPHONE (OUTPATIENT)
Dept: GASTROENTEROLOGY | Facility: CLINIC | Age: 62
End: 2022-04-22

## 2022-04-22 RX ORDER — LENALIDOMIDE 25 MG/1
CAPSULE ORAL
Refills: 0 | OUTPATIENT
Start: 2022-04-22

## 2022-04-22 NOTE — TELEPHONE ENCOUNTER
DELETE AFTER REVIEWING: Telephone encounter to be sent to the clinical pool     Caller: JOEY FLETCHER    Relationship to patient: SELF    Best call back number: [phone]    Chief complaint: COLONOSCOPY APPT    Type of visit: TESTING    Additional notes: PT STATES HE HAD A COLONOSCOPY SCHEDULED IN THE PAST AND NEEDS TO SEE ABOUT HAVING IT RESCHEDULED. HE SAID THAT HE WAS IN TREATMENT AND MISSED THE ORIGINAL DATE. PLEASE CALL PATIENT

## 2022-04-22 NOTE — PROGRESS NOTES
Tima Mendez MD Kaufman, Janna, [facility]  Cc: Milka Anthony RN  Yes. I agree with holding Revlimid for now.     Thank you             Previous Messages       ----- Message -----   From: Gina Naylor [facility]   Sent: 4/22/2022  10:39 AM EDT   To: Tima Mendez MD, Milka Anthony, TONY   Subject: revlimid                                         Dr. Mnedez,     Would you like to hold off on sending Revlimid refill until PET results?       I saw this note from [place]:   He is s/p 4 cycles RVD with nice reduction of serum markers per outside studies. PET next week. BMbx today. Plan to proceed with transplant as long as marrow and PET are showing good disease control.   We will dc revlimid and cont VD only if going for transplant to minimize impact on stem cell collection.       Just let me know.     Thanks,   Gina

## 2022-04-22 NOTE — TELEPHONE ENCOUNTER
----- Message from Faviola Barrientos RN sent at 4/22/2022  8:13 AM EDT -----  Regarding: FW: Schedule colonoscopy    ----- Message -----  From: Duy Owens  Sent: 4/21/2022   6:53 PM EDT  To: Moises [facility]  Subject: Schedule colonoscopy                             Need to schedule colonoscopy asap.

## 2022-04-25 ENCOUNTER — PREP FOR SURGERY (OUTPATIENT)
Dept: OTHER | Facility: HOSPITAL | Age: 62
End: 2022-04-25

## 2022-04-25 DIAGNOSIS — Z12.11 ENCOUNTER FOR SCREENING FOR MALIGNANT NEOPLASM OF COLON: Primary | ICD-10-CM

## 2022-04-27 ENCOUNTER — TELEPHONE (OUTPATIENT)
Dept: GASTROENTEROLOGY | Facility: CLINIC | Age: 62
End: 2022-04-27

## 2022-04-27 ENCOUNTER — LAB (OUTPATIENT)
Dept: OTHER | Facility: HOSPITAL | Age: 62
End: 2022-04-27

## 2022-04-27 ENCOUNTER — INFUSION (OUTPATIENT)
Dept: ONCOLOGY | Facility: HOSPITAL | Age: 62
End: 2022-04-27

## 2022-04-27 VITALS
OXYGEN SATURATION: 100 % | WEIGHT: 204.4 LBS | TEMPERATURE: 99.4 F | DIASTOLIC BLOOD PRESSURE: 77 MMHG | BODY MASS INDEX: 29.26 KG/M2 | SYSTOLIC BLOOD PRESSURE: 123 MMHG | HEIGHT: 70 IN | RESPIRATION RATE: 18 BRPM | HEART RATE: 71 BPM

## 2022-04-27 DIAGNOSIS — C90.00 MULTIPLE MYELOMA NOT HAVING ACHIEVED REMISSION: ICD-10-CM

## 2022-04-27 DIAGNOSIS — Z79.899 ENCOUNTER FOR LONG-TERM (CURRENT) USE OF HIGH-RISK MEDICATION: ICD-10-CM

## 2022-04-27 DIAGNOSIS — Z79.899 ENCOUNTER FOR LONG-TERM (CURRENT) USE OF HIGH-RISK MEDICATION: Primary | ICD-10-CM

## 2022-04-27 LAB
ALBUMIN SERPL-MCNC: 4.2 G/DL (ref 3.5–5.2)
ALBUMIN/GLOB SERPL: 1.6 G/DL
ALP SERPL-CCNC: 119 U/L (ref 39–117)
ALT SERPL W P-5'-P-CCNC: 41 U/L (ref 1–41)
ANION GAP SERPL CALCULATED.3IONS-SCNC: 7.3 MMOL/L (ref 5–15)
AST SERPL-CCNC: 18 U/L (ref 1–40)
BASOPHILS # BLD AUTO: 0.11 10*3/MM3 (ref 0–0.2)
BASOPHILS NFR BLD AUTO: 1.8 % (ref 0–1.5)
BILIRUB SERPL-MCNC: 0.3 MG/DL (ref 0–1.2)
BUN SERPL-MCNC: 11 MG/DL (ref 8–23)
BUN/CREAT SERPL: 12.6 (ref 7–25)
CALCIUM SPEC-SCNC: 9.2 MG/DL (ref 8.6–10.5)
CHLORIDE SERPL-SCNC: 106 MMOL/L (ref 98–107)
CO2 SERPL-SCNC: 26.7 MMOL/L (ref 22–29)
CREAT SERPL-MCNC: 0.87 MG/DL (ref 0.76–1.27)
DEPRECATED RDW RBC AUTO: 45.7 FL (ref 37–54)
EGFRCR SERPLBLD CKD-EPI 2021: 98.8 ML/MIN/1.73
EOSINOPHIL # BLD AUTO: 0.46 10*3/MM3 (ref 0–0.4)
EOSINOPHIL NFR BLD AUTO: 7.6 % (ref 0.3–6.2)
ERYTHROCYTE [DISTWIDTH] IN BLOOD BY AUTOMATED COUNT: 16.1 % (ref 12.3–15.4)
GLOBULIN UR ELPH-MCNC: 2.6 GM/DL
GLUCOSE SERPL-MCNC: 148 MG/DL (ref 65–99)
HCT VFR BLD AUTO: 35.7 % (ref 37.5–51)
HGB BLD-MCNC: 10.6 G/DL (ref 13–17.7)
IMM GRANULOCYTES # BLD AUTO: 0.04 10*3/MM3 (ref 0–0.05)
IMM GRANULOCYTES NFR BLD AUTO: 0.7 % (ref 0–0.5)
LYMPHOCYTES # BLD AUTO: 1.38 10*3/MM3 (ref 0.7–3.1)
LYMPHOCYTES NFR BLD AUTO: 22.9 % (ref 19.6–45.3)
MCH RBC QN AUTO: 23.1 PG (ref 26.6–33)
MCHC RBC AUTO-ENTMCNC: 29.7 G/DL (ref 31.5–35.7)
MCV RBC AUTO: 77.9 FL (ref 79–97)
MONOCYTES # BLD AUTO: 0.92 10*3/MM3 (ref 0.1–0.9)
MONOCYTES NFR BLD AUTO: 15.3 % (ref 5–12)
NEUTROPHILS NFR BLD AUTO: 3.12 10*3/MM3 (ref 1.7–7)
NEUTROPHILS NFR BLD AUTO: 51.7 % (ref 42.7–76)
NRBC BLD AUTO-RTO: 0 /100 WBC (ref 0–0.2)
PLATELET # BLD AUTO: 250 10*3/MM3 (ref 140–450)
PMV BLD AUTO: 9.8 FL (ref 6–12)
POTASSIUM SERPL-SCNC: 4 MMOL/L (ref 3.5–5.2)
PROT SERPL-MCNC: 6.8 G/DL (ref 6–8.5)
RBC # BLD AUTO: 4.58 10*6/MM3 (ref 4.14–5.8)
SODIUM SERPL-SCNC: 140 MMOL/L (ref 136–145)
WBC NRBC COR # BLD: 6.03 10*3/MM3 (ref 3.4–10.8)

## 2022-04-27 PROCEDURE — 85025 COMPLETE CBC W/AUTO DIFF WBC: CPT | Performed by: INTERNAL MEDICINE

## 2022-04-27 PROCEDURE — 84165 PROTEIN E-PHORESIS SERUM: CPT | Performed by: INTERNAL MEDICINE

## 2022-04-27 PROCEDURE — 86334 IMMUNOFIX E-PHORESIS SERUM: CPT | Performed by: INTERNAL MEDICINE

## 2022-04-27 PROCEDURE — 25010000002 BORTEZOMIB PER 0.1 MG: Performed by: INTERNAL MEDICINE

## 2022-04-27 PROCEDURE — 83521 IG LIGHT CHAINS FREE EACH: CPT | Performed by: INTERNAL MEDICINE

## 2022-04-27 PROCEDURE — 80053 COMPREHEN METABOLIC PANEL: CPT | Performed by: INTERNAL MEDICINE

## 2022-04-27 PROCEDURE — 36415 COLL VENOUS BLD VENIPUNCTURE: CPT

## 2022-04-27 PROCEDURE — 82784 ASSAY IGA/IGD/IGG/IGM EACH: CPT | Performed by: INTERNAL MEDICINE

## 2022-04-27 PROCEDURE — 96401 CHEMO ANTI-NEOPL SQ/IM: CPT

## 2022-04-27 RX ORDER — BORTEZOMIB 3.5 MG/1
1.3 INJECTION, POWDER, LYOPHILIZED, FOR SOLUTION INTRAVENOUS; SUBCUTANEOUS ONCE
Status: CANCELLED | OUTPATIENT
Start: 2022-05-04

## 2022-04-27 RX ORDER — BORTEZOMIB 3.5 MG/1
1.3 INJECTION, POWDER, LYOPHILIZED, FOR SOLUTION INTRAVENOUS; SUBCUTANEOUS ONCE
Status: CANCELLED | OUTPATIENT
Start: 2022-05-11

## 2022-04-27 RX ORDER — BORTEZOMIB 3.5 MG/1
1.3 INJECTION, POWDER, LYOPHILIZED, FOR SOLUTION INTRAVENOUS; SUBCUTANEOUS ONCE
Status: COMPLETED | OUTPATIENT
Start: 2022-04-27 | End: 2022-04-27

## 2022-04-27 RX ORDER — BORTEZOMIB 3.5 MG/1
1.3 INJECTION, POWDER, LYOPHILIZED, FOR SOLUTION INTRAVENOUS; SUBCUTANEOUS ONCE
Status: CANCELLED | OUTPATIENT
Start: 2022-05-18

## 2022-04-27 RX ADMIN — BORTEZOMIB 2.8 MG: 3.5 INJECTION, POWDER, LYOPHILIZED, FOR SOLUTION INTRAVENOUS; SUBCUTANEOUS at 15:52

## 2022-04-27 NOTE — TELEPHONE ENCOUNTER
NEREIDA Banegas for colonoscopy on 08/16/2022  arrive at 10AM   . Mailed Prep instructions to Mailing address on-file. ----[facility]iral[facility]    Advised PT  that  will call with final arrival time  24 hrs before procedure. If they do not get a phone call, arrival time will stay the same as given on instructions

## 2022-04-28 ENCOUNTER — SPECIALTY PHARMACY (OUTPATIENT)
Dept: PHARMACY | Facility: HOSPITAL | Age: 62
End: 2022-04-28

## 2022-04-28 ENCOUNTER — HOSPITAL ENCOUNTER (OUTPATIENT)
Dept: PET IMAGING | Facility: HOSPITAL | Age: 62
Discharge: HOME | End: 2022-04-28

## 2022-04-28 DIAGNOSIS — Z79.899 ENCOUNTER FOR LONG-TERM (CURRENT) USE OF HIGH-RISK MEDICATION: ICD-10-CM

## 2022-04-28 DIAGNOSIS — C90.00 MULTIPLE MYELOMA NOT HAVING ACHIEVED REMISSION: ICD-10-CM

## 2022-04-28 LAB — GLUCOSE BLDC GLUCOMTR-MCNC: 97 MG/DL (ref 70–130)

## 2022-04-28 PROCEDURE — A9552 F18 FDG: HCPCS | Performed by: INTERNAL MEDICINE

## 2022-04-28 PROCEDURE — 82962 GLUCOSE BLOOD TEST: CPT

## 2022-04-28 PROCEDURE — 0 FLUDEOXYGLUCOSE F18 SOLUTION: Performed by: INTERNAL MEDICINE

## 2022-04-28 PROCEDURE — 78816 PET IMAGE W/CT FULL BODY: CPT

## 2022-04-28 RX ADMIN — FLUDEOXYGLUCOSE F18 1 DOSE: 300 INJECTION INTRAVENOUS at 07:01

## 2022-04-29 LAB
ALBUMIN SERPL ELPH-MCNC: 3.5 G/DL (ref 2.9–4.4)
ALBUMIN/GLOB SERPL: 1.3 {RATIO} (ref 0.7–1.7)
ALPHA1 GLOB SERPL ELPH-MCNC: 0.3 G/DL (ref 0–0.4)
ALPHA2 GLOB SERPL ELPH-MCNC: 0.8 G/DL (ref 0.4–1)
B-GLOBULIN SERPL ELPH-MCNC: 1.1 G/DL (ref 0.7–1.3)
GAMMA GLOB SERPL ELPH-MCNC: 0.5 G/DL (ref 0.4–1.8)
GLOBULIN SER-MCNC: 2.7 G/DL (ref 2.2–3.9)
IGA SERPL-MCNC: 79 MG/DL (ref 90–386)
IGG SERPL-MCNC: 645 MG/DL (ref 603–1613)
IGM SERPL-MCNC: 17 MG/DL (ref 20–172)
INTERPRETATION SERPL IEP-IMP: ABNORMAL
KAPPA LC FREE SER-MCNC: 17.6 MG/L (ref 3.3–19.4)
KAPPA LC FREE/LAMBDA FREE SER: 0.32 {RATIO} (ref 0.26–1.65)
LABORATORY COMMENT REPORT: ABNORMAL
LAMBDA LC FREE SERPL-MCNC: 55.4 MG/L (ref 5.7–26.3)
M PROTEIN SERPL ELPH-MCNC: ABNORMAL G/DL
PROT SERPL-MCNC: 6.2 G/DL (ref 6–8.5)

## 2022-05-02 ENCOUNTER — TELEPHONE (OUTPATIENT)
Dept: GASTROENTEROLOGY | Facility: CLINIC | Age: 62
End: 2022-05-02

## 2022-05-02 NOTE — TELEPHONE ENCOUNTER
DEYANIRA AMEZCUA CALLED ON BEHALF OF PATIENT, PATIENT IS GOING TO HAVE A STEM CELL TRANSPLANT AND NEEDS COLONOSCOPY THAT IS SCHEDULED ON 8/16/22 TO BE DONE PRIOR TO STEM CELL TRANSPLANT. THEY ARE EXPECTING TO DO PATIENTS TRANSPLANT ANYTIME BETWEEN NOW AND 6/2/2022. WOULD LIKE TO MOVE COLONOSCOPY UP AS SOON AS POSSIBLE.  CALLBACK NUMBER: [phone]  CELLPHONE:[phone]

## 2022-05-03 NOTE — TELEPHONE ENCOUNTER
Clive Mars MD Haag, Shelley D, RN; Rhianna Angel, RN  Caller: Unspecified (Yesterday, 12:24 PM)  Next week Wednesday 10 AM thank you           Msg sent to Janis.

## 2022-05-04 ENCOUNTER — LAB (OUTPATIENT)
Dept: OTHER | Facility: HOSPITAL | Age: 62
End: 2022-05-04

## 2022-05-04 ENCOUNTER — INFUSION (OUTPATIENT)
Dept: ONCOLOGY | Facility: HOSPITAL | Age: 62
End: 2022-05-04

## 2022-05-04 ENCOUNTER — APPOINTMENT (OUTPATIENT)
Dept: ONCOLOGY | Facility: HOSPITAL | Age: 62
End: 2022-05-04

## 2022-05-04 ENCOUNTER — OFFICE VISIT (OUTPATIENT)
Dept: ONCOLOGY | Facility: CLINIC | Age: 62
End: 2022-05-04

## 2022-05-04 VITALS
HEART RATE: 62 BPM | WEIGHT: 206.9 LBS | BODY MASS INDEX: 29.62 KG/M2 | SYSTOLIC BLOOD PRESSURE: 149 MMHG | OXYGEN SATURATION: 98 % | RESPIRATION RATE: 18 BRPM | TEMPERATURE: 97.3 F | DIASTOLIC BLOOD PRESSURE: 81 MMHG | HEIGHT: 70 IN

## 2022-05-04 DIAGNOSIS — D64.81 ANEMIA DUE TO CHEMOTHERAPY: ICD-10-CM

## 2022-05-04 DIAGNOSIS — D63.0 ANEMIA IN NEOPLASTIC DISEASE: ICD-10-CM

## 2022-05-04 DIAGNOSIS — R74.8 ELEVATED LIVER ENZYMES: ICD-10-CM

## 2022-05-04 DIAGNOSIS — C90.00 MULTIPLE MYELOMA NOT HAVING ACHIEVED REMISSION: ICD-10-CM

## 2022-05-04 DIAGNOSIS — Z79.899 ENCOUNTER FOR LONG-TERM (CURRENT) USE OF HIGH-RISK MEDICATION: ICD-10-CM

## 2022-05-04 DIAGNOSIS — T45.1X5A ANEMIA DUE TO CHEMOTHERAPY: ICD-10-CM

## 2022-05-04 DIAGNOSIS — C90.00 MULTIPLE MYELOMA NOT HAVING ACHIEVED REMISSION: Primary | ICD-10-CM

## 2022-05-04 DIAGNOSIS — Z79.899 ENCOUNTER FOR LONG-TERM (CURRENT) USE OF HIGH-RISK MEDICATION: Primary | ICD-10-CM

## 2022-05-04 DIAGNOSIS — D84.9 IMMUNOCOMPROMISED PATIENT: ICD-10-CM

## 2022-05-04 LAB
BASOPHILS # BLD AUTO: 0.1 10*3/MM3 (ref 0–0.2)
BASOPHILS NFR BLD AUTO: 1.4 % (ref 0–1.5)
DEPRECATED RDW RBC AUTO: 43.2 FL (ref 37–54)
EOSINOPHIL # BLD AUTO: 0.58 10*3/MM3 (ref 0–0.4)
EOSINOPHIL NFR BLD AUTO: 8.2 % (ref 0.3–6.2)
ERYTHROCYTE [DISTWIDTH] IN BLOOD BY AUTOMATED COUNT: 15.6 % (ref 12.3–15.4)
HCT VFR BLD AUTO: 39.9 % (ref 37.5–51)
HGB BLD-MCNC: 12.2 G/DL (ref 13–17.7)
IMM GRANULOCYTES # BLD AUTO: 0.04 10*3/MM3 (ref 0–0.05)
IMM GRANULOCYTES NFR BLD AUTO: 0.6 % (ref 0–0.5)
LYMPHOCYTES # BLD AUTO: 2.18 10*3/MM3 (ref 0.7–3.1)
LYMPHOCYTES NFR BLD AUTO: 30.7 % (ref 19.6–45.3)
MCH RBC QN AUTO: 23.5 PG (ref 26.6–33)
MCHC RBC AUTO-ENTMCNC: 30.6 G/DL (ref 31.5–35.7)
MCV RBC AUTO: 76.7 FL (ref 79–97)
MONOCYTES # BLD AUTO: 0.97 10*3/MM3 (ref 0.1–0.9)
MONOCYTES NFR BLD AUTO: 13.6 % (ref 5–12)
NEUTROPHILS NFR BLD AUTO: 3.24 10*3/MM3 (ref 1.7–7)
NEUTROPHILS NFR BLD AUTO: 45.5 % (ref 42.7–76)
NRBC BLD AUTO-RTO: 0 /100 WBC (ref 0–0.2)
PLATELET # BLD AUTO: 244 10*3/MM3 (ref 140–450)
PMV BLD AUTO: 10.3 FL (ref 6–12)
RBC # BLD AUTO: 5.2 10*6/MM3 (ref 4.14–5.8)
WBC NRBC COR # BLD: 7.11 10*3/MM3 (ref 3.4–10.8)

## 2022-05-04 PROCEDURE — 25010000002 BORTEZOMIB PER 0.1 MG: Performed by: INTERNAL MEDICINE

## 2022-05-04 PROCEDURE — 36415 COLL VENOUS BLD VENIPUNCTURE: CPT

## 2022-05-04 PROCEDURE — 85025 COMPLETE CBC W/AUTO DIFF WBC: CPT | Performed by: INTERNAL MEDICINE

## 2022-05-04 PROCEDURE — 99215 OFFICE O/P EST HI 40 MIN: CPT | Performed by: INTERNAL MEDICINE

## 2022-05-04 PROCEDURE — 96401 CHEMO ANTI-NEOPL SQ/IM: CPT

## 2022-05-04 RX ORDER — BORTEZOMIB 3.5 MG/1
1.3 INJECTION, POWDER, LYOPHILIZED, FOR SOLUTION INTRAVENOUS; SUBCUTANEOUS ONCE
Status: COMPLETED | OUTPATIENT
Start: 2022-05-04 | End: 2022-05-04

## 2022-05-04 RX ORDER — BORTEZOMIB 3.5 MG/1
1.3 INJECTION, POWDER, LYOPHILIZED, FOR SOLUTION INTRAVENOUS; SUBCUTANEOUS ONCE
Status: CANCELLED | OUTPATIENT
Start: 2022-05-25

## 2022-05-04 RX ADMIN — BORTEZOMIB 2.8 MG: 3.5 INJECTION, POWDER, LYOPHILIZED, FOR SOLUTION INTRAVENOUS; SUBCUTANEOUS at 16:37

## 2022-05-04 NOTE — PROGRESS NOTES
"Subjective     CHIEF COMPLAINT:      Chief Complaint   Patient presents with   • Follow-up     Discuss PET Scan       HISTORY OF PRESENT ILLNESS:     Duy Owens is a 61 y.o. male patient who returns today for follow up on his multiple myeloma.  He returns today for follow-up reporting no new symptoms.  He is tolerating the treatment without problem with nausea, vomiting or constipation.  He is not having numbness in the fingers or toes.  He denies having chest pain or shortness of breath.  He is no longer having dry cough.    Patient states that he mistakenly stopped taking the dexamethasone.    ROS:  Pertinent ROS is in the HPI.     Past medical, surgical, social and family history were reviewed.     MEDICATIONS:    Current Outpatient Medications:   •  acyclovir (ZOVIRAX) 400 MG tablet, Take 1 tablet by mouth 2 (Two) Times a Day. Take one tablet by mouth twice daily., Disp: 60 tablet, Rfl: 11  •  aspirin 81 MG chewable tablet, Chew 81 mg Daily., Disp: , Rfl:   •  losartan (Cozaar) 100 MG tablet, Take 1 tablet by mouth Daily., Disp: 30 tablet, Rfl: 11  •  meclizine (ANTIVERT) 25 MG tablet, Take 25 mg by mouth 2 (two) times a day., Disp: , Rfl:   •  ondansetron (ZOFRAN) 8 MG tablet, Take 1 tablet by mouth 3 (Three) Times a Day As Needed for Nausea or Vomiting., Disp: 30 tablet, Rfl: 5  •  dexamethasone (DECADRON) 4 MG tablet, TAKE 10 TABLETS BY MOUTH ONCE WEEKLY, Disp: 40 tablet, Rfl: 3  •  lenalidomide (REVLIMID) 25 MG capsule, Take 1 capsule by mouth Daily. Take for 14 days on, then 7 days off., Disp: 14 capsule, Rfl: 0  •  sulfamethoxazole-trimethoprim (Bactrim DS) 800-160 MG per tablet, Take 1 tablet by mouth 3 (Three) Times a Week. On Monday, Wednesday and Friday, Disp: 12 tablet, Rfl: 5    Objective   VITAL SIGNS:     Vitals:    05/04/22 1545   BP: 149/81   Pulse: 62   Resp: 18   Temp: 97.3 °F (36.3 °C)   TempSrc: Temporal   SpO2: 98%   Weight: 93.8 kg (206 lb 14.4 oz)   Height: 177.8 cm (70\")   PainSc: " 0-No pain     Body mass index is 29.69 kg/m².     Wt Readings from Last 5 Encounters:   05/04/22 93.8 kg (206 lb 14.4 oz)   04/27/22 92.7 kg (204 lb 6.4 oz)   04/13/22 92.4 kg (203 lb 9.6 oz)   04/06/22 93.8 kg (206 lb 12.8 oz)   03/30/22 94.5 kg (208 lb 6.4 oz)     PHYSICAL EXAMINATION:   GENERAL: The patient appears in good general condition, not in acute distress.   SKIN: No ecchymosis.  EYES: No jaundice. No pallor.  LYMPHATICS: No cervical, supraclavicular or axillary lymphadenopathy.  CHEST: Normal respiratory effort.  Lungs clear bilaterally.  No added sounds.  CVS: Normal S1-S2.  No murmurs.  ABDOMEN: Soft. No tenderness. No Hepatomegaly. No Splenomegaly. No masses.  EXTREMITIES: No noted deformity.     DIAGNOSTIC DATA:     Results from last 7 days   Lab Units 05/04/22  1526   WBC 10*3/mm3 7.11   NEUTROS ABS 10*3/mm3 3.24   HEMOGLOBIN g/dL 12.2*   HEMATOCRIT % 39.9   PLATELETS 10*3/mm3 244   I  mmunofixation on 4/21/2022:  No monoclonal protein identified.  Free kappa was 1.7.  Free lambda was 5.02.  Kappa to lambda ratio was 0.34.    PET scan on 4/28/2022:  1. Significant regression of the lymphadenopathy at the chest, abdomen,  and pelvis.  2. There are no hypermetabolic bone lesions.    Echocardiogram on 4/21/2022:  Mild LVH with normal biventricular function.   Sclerotic aortic valve with trivial insufficiency.   Normal filling pressures. Unable to estimate RVSP.   Ejection fraction 55-60%.    Assessment/Plan   *Plasma cell disorder most consistent with multiple myeloma with lymph node involvement and amyloid deposition in the involved lymph nodes.  · Patient started having dry cough around July 2021.    · CT chest on 9/23/2021 revealed inferior mediastinal adenopathy.    · CT abdomen and pelvis on 9/24/2021 revealed retroperitoneal lymphadenopathy extending to the iliac chain lymph nodes on the right.    · The Largest lymph node was in the retroperitoneal area measuring 4.8 x 3.5 cm.  The common iliac  lymph node measured 3.5 x 2.7 cm.  The left external iliac chain lymph node measured 2.9 x 2 cm.    · PET scan on 10/5/2021 revealed the retroperitoneal and left pelvic lymphadenopathy to be hypermetabolic.  The left periaortic lymph nodes had SUV of 6.9 and the left pelvic sidewall lymph nodes had an SUV of 5.4.  · Patient had CT-guided biopsy on 10/6/2021.  · The pathologist at [facility] reported involvement with monotypic lambda restricted plasma cells concerning for involvement with plasma cell dyscrasia.  · Bone marrow biopsy on 10/29/2021 revealed a normocellular marrow (50%) with involvement with plasma cell dyscrasia (plasma cells representing 20-30% of total cells by  stain).   · FISH was negative for gain of 1q, monosomy/deletions of chromosomes 13 and 17, IGH rearrangement, gain of chromosomes 9 and 11, IGH-CCND1 (11;14) fusion.   · I this was considered to represent multiple myeloma with lymph node involvement and amyloid deposition.    · There was no bone involvement on PET scan.   · Patient was referred to [facility].  He was seen by Dr. Felix.  They concurred with the diagnosis of multiple myeloma with AL amyloidosis and involvement of the lymph nodes.  · Treatment with the VRD started on 12/22/2021.  · Free lambda light chain decreased to 125 on 1/11/2022.  Kappa to lambda ratio was 0.08.  · Free lambda light chain decreased to 74 and kappa to lambda ratio improved to 0.17 on 2/16/2022.  This is indicating response to treatment.  · Free lambda light chain decreased to 64 on 3/9/2022.  · CT scan on 3/11/2022 revealed decrease in the size of lymph nodes in the periaortic area.  There is a slight increase in a lymph node in the left axillary area that increased from 7 to 10 mm.  This was not palpable on today's exam.  · PET scan on 4/28/2022 revealed significant reduction in the size and hypermetabolism of the involved lymph nodes.  · Labs on 4/21/2022 showed significant reduction  in the lambda light chain to 5.02.  M the ratio normalized.  · Patient was seen at [facility] in follow-up.  They recommended proceeding with autologous stem cell transplantation.  · We recommended changing treatment to Velcade and dexamethasone without Revlimid.  · I explained to the patient that we will continue with dexamethasone 40 mg weekly for him to take at home the same day as the Velcade injection.  · He is tolerating treatment without side effects.    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  · There was mild wall thickening.  Ejection fraction was normal.  · Echocardiogram at [facility] on 4/21/2022 did not reveal wall thickening.    *Anemia secondary to multiple myeloma and secondary to treatment.  · Hemoglobin was 13.1 on 9/29/2021.    · Iron, folate and vitamin B12 were normal in September/October 2021.    · Hemoglobin is 12.2 today.  · The anemia is attributed to the current treatment.    *Elevated liver enzymes.  · ALT increased to 246 and AST increased to 56 on 1/11/2022.  · Liver enzymes improved on 1/12/2022 with ALT improving to 199 and AST improving to 43.  Bilirubin remains normal at 0.4.  · Hepatitis serology test was negative.  · ALT improved to 100 on 2/16/2022 but increased to 107 and AST increased to 43 on 2/22/2022.  · Liver enzymes on 3/9/2022 normalized with ALT of 40 and AST of 19.    · No suspicious liver abnormality was seen on the CT scan on 3/11/2022.  There was a stable 1.7 cm hyperdense region in the left hepatic dome ? Hemangioma.  · ALT and AST were normal on 4/27/2022.    *Prophylaxis.  · He is on acyclovir 400 mg twice daily.  · He is on Bactrim DS three days weekly for PCP prophylaxis.   · Covid antibody level was >2500 on 1/11/2022.    · Based on that, Evusheld was not recommended.  · Patient was previously on aspirin 81 mg daily.  · Since he is not taking Revlimid at this point, he does not need to take aspirin.    PLAN:    1.  Continue  treatment with Velcade 1.3 mg/m2 weekly along with Decadron 40 mg weekly.  2.  Continue prophylaxis with acyclovir twice daily and Bactrim 3 days weekly.  3.  He will return weekly for CBC and Velcade.  4.  Repeat CBC CMP SPEP REGINO FLC B2 M in 3 weeks.  I will see him in follow-up in 4 weeks.    I spent 45 minutes caring for Duy on this date of service. This time includes time spent by me in the following activities: preparing for the visit, reviewing tests, obtaining and/or reviewing a separately obtained history, performing a medically appropriate examination and/or evaluation, counseling and educating the patient/family/caregiver, ordering medications, tests, or procedures, documenting information in the medical record, independently interpreting results and communicating that information with the patient/family/caregiver and care coordination       Tima Mendez MD  05/04/22

## 2022-05-04 NOTE — TELEPHONE ENCOUNTER
[facility] for colonoscopy on 08/16 to 05/19  arrive at  0830am  . PT already has prep instructions . ----miralax

## 2022-05-11 ENCOUNTER — LAB (OUTPATIENT)
Dept: OTHER | Facility: HOSPITAL | Age: 62
End: 2022-05-11

## 2022-05-11 ENCOUNTER — INFUSION (OUTPATIENT)
Dept: ONCOLOGY | Facility: HOSPITAL | Age: 62
End: 2022-05-11

## 2022-05-11 VITALS
BODY MASS INDEX: 29.35 KG/M2 | SYSTOLIC BLOOD PRESSURE: 118 MMHG | DIASTOLIC BLOOD PRESSURE: 75 MMHG | OXYGEN SATURATION: 95 % | TEMPERATURE: 96.9 F | WEIGHT: 205 LBS | HEIGHT: 70 IN | RESPIRATION RATE: 18 BRPM | HEART RATE: 72 BPM

## 2022-05-11 DIAGNOSIS — C90.00 MULTIPLE MYELOMA NOT HAVING ACHIEVED REMISSION: ICD-10-CM

## 2022-05-11 DIAGNOSIS — Z79.899 ENCOUNTER FOR LONG-TERM (CURRENT) USE OF HIGH-RISK MEDICATION: ICD-10-CM

## 2022-05-11 DIAGNOSIS — Z79.899 ENCOUNTER FOR LONG-TERM (CURRENT) USE OF HIGH-RISK MEDICATION: Primary | ICD-10-CM

## 2022-05-11 LAB
BASOPHILS # BLD AUTO: 0.08 10*3/MM3 (ref 0–0.2)
BASOPHILS NFR BLD AUTO: 0.9 % (ref 0–1.5)
DEPRECATED RDW RBC AUTO: 42.1 FL (ref 37–54)
EOSINOPHIL # BLD AUTO: 0.52 10*3/MM3 (ref 0–0.4)
EOSINOPHIL NFR BLD AUTO: 6 % (ref 0.3–6.2)
ERYTHROCYTE [DISTWIDTH] IN BLOOD BY AUTOMATED COUNT: 15.4 % (ref 12.3–15.4)
HCT VFR BLD AUTO: 40.4 % (ref 37.5–51)
HGB BLD-MCNC: 12.6 G/DL (ref 13–17.7)
IMM GRANULOCYTES # BLD AUTO: 0.13 10*3/MM3 (ref 0–0.05)
IMM GRANULOCYTES NFR BLD AUTO: 1.5 % (ref 0–0.5)
LYMPHOCYTES # BLD AUTO: 2.28 10*3/MM3 (ref 0.7–3.1)
LYMPHOCYTES NFR BLD AUTO: 26.1 % (ref 19.6–45.3)
MCH RBC QN AUTO: 23.6 PG (ref 26.6–33)
MCHC RBC AUTO-ENTMCNC: 31.2 G/DL (ref 31.5–35.7)
MCV RBC AUTO: 75.8 FL (ref 79–97)
MONOCYTES # BLD AUTO: 0.83 10*3/MM3 (ref 0.1–0.9)
MONOCYTES NFR BLD AUTO: 9.5 % (ref 5–12)
NEUTROPHILS NFR BLD AUTO: 4.89 10*3/MM3 (ref 1.7–7)
NEUTROPHILS NFR BLD AUTO: 56 % (ref 42.7–76)
NRBC BLD AUTO-RTO: 0 /100 WBC (ref 0–0.2)
PLATELET # BLD AUTO: 283 10*3/MM3 (ref 140–450)
PMV BLD AUTO: 10.7 FL (ref 6–12)
RBC # BLD AUTO: 5.33 10*6/MM3 (ref 4.14–5.8)
WBC NRBC COR # BLD: 8.73 10*3/MM3 (ref 3.4–10.8)

## 2022-05-11 PROCEDURE — 36415 COLL VENOUS BLD VENIPUNCTURE: CPT

## 2022-05-11 PROCEDURE — 25010000002 BORTEZOMIB PER 0.1 MG: Performed by: INTERNAL MEDICINE

## 2022-05-11 PROCEDURE — 85025 COMPLETE CBC W/AUTO DIFF WBC: CPT | Performed by: INTERNAL MEDICINE

## 2022-05-11 PROCEDURE — 96401 CHEMO ANTI-NEOPL SQ/IM: CPT

## 2022-05-11 RX ORDER — BORTEZOMIB 3.5 MG/1
1.3 INJECTION, POWDER, LYOPHILIZED, FOR SOLUTION INTRAVENOUS; SUBCUTANEOUS ONCE
Status: COMPLETED | OUTPATIENT
Start: 2022-05-11 | End: 2022-05-11

## 2022-05-11 RX ADMIN — BORTEZOMIB 2.8 MG: 3.5 INJECTION, POWDER, LYOPHILIZED, FOR SOLUTION INTRAVENOUS; SUBCUTANEOUS at 15:28

## 2022-05-18 ENCOUNTER — INFUSION (OUTPATIENT)
Dept: ONCOLOGY | Facility: HOSPITAL | Age: 62
End: 2022-05-18

## 2022-05-18 ENCOUNTER — LAB (OUTPATIENT)
Dept: OTHER | Facility: HOSPITAL | Age: 62
End: 2022-05-18

## 2022-05-18 VITALS
HEART RATE: 68 BPM | TEMPERATURE: 97.5 F | OXYGEN SATURATION: 99 % | SYSTOLIC BLOOD PRESSURE: 120 MMHG | DIASTOLIC BLOOD PRESSURE: 77 MMHG | WEIGHT: 208 LBS | RESPIRATION RATE: 18 BRPM | BODY MASS INDEX: 29.78 KG/M2 | HEIGHT: 70 IN

## 2022-05-18 DIAGNOSIS — C90.00 MULTIPLE MYELOMA NOT HAVING ACHIEVED REMISSION: ICD-10-CM

## 2022-05-18 DIAGNOSIS — Z79.899 ENCOUNTER FOR LONG-TERM (CURRENT) USE OF HIGH-RISK MEDICATION: ICD-10-CM

## 2022-05-18 DIAGNOSIS — Z79.899 ENCOUNTER FOR LONG-TERM (CURRENT) USE OF HIGH-RISK MEDICATION: Primary | ICD-10-CM

## 2022-05-18 LAB
BASOPHILS # BLD AUTO: 0.05 10*3/MM3 (ref 0–0.2)
BASOPHILS NFR BLD AUTO: 0.5 % (ref 0–1.5)
DEPRECATED RDW RBC AUTO: 41.6 FL (ref 37–54)
EOSINOPHIL # BLD AUTO: 0.43 10*3/MM3 (ref 0–0.4)
EOSINOPHIL NFR BLD AUTO: 4.6 % (ref 0.3–6.2)
ERYTHROCYTE [DISTWIDTH] IN BLOOD BY AUTOMATED COUNT: 15 % (ref 12.3–15.4)
HCT VFR BLD AUTO: 39.2 % (ref 37.5–51)
HGB BLD-MCNC: 12.2 G/DL (ref 13–17.7)
IMM GRANULOCYTES # BLD AUTO: 0.07 10*3/MM3 (ref 0–0.05)
IMM GRANULOCYTES NFR BLD AUTO: 0.8 % (ref 0–0.5)
LYMPHOCYTES # BLD AUTO: 2.31 10*3/MM3 (ref 0.7–3.1)
LYMPHOCYTES NFR BLD AUTO: 24.8 % (ref 19.6–45.3)
MCH RBC QN AUTO: 23.7 PG (ref 26.6–33)
MCHC RBC AUTO-ENTMCNC: 31.1 G/DL (ref 31.5–35.7)
MCV RBC AUTO: 76.1 FL (ref 79–97)
MONOCYTES # BLD AUTO: 0.97 10*3/MM3 (ref 0.1–0.9)
MONOCYTES NFR BLD AUTO: 10.4 % (ref 5–12)
NEUTROPHILS NFR BLD AUTO: 5.48 10*3/MM3 (ref 1.7–7)
NEUTROPHILS NFR BLD AUTO: 58.9 % (ref 42.7–76)
NRBC BLD AUTO-RTO: 0.2 /100 WBC (ref 0–0.2)
PLATELET # BLD AUTO: 188 10*3/MM3 (ref 140–450)
PMV BLD AUTO: 10.7 FL (ref 6–12)
RBC # BLD AUTO: 5.15 10*6/MM3 (ref 4.14–5.8)
WBC NRBC COR # BLD: 9.31 10*3/MM3 (ref 3.4–10.8)

## 2022-05-18 PROCEDURE — 36415 COLL VENOUS BLD VENIPUNCTURE: CPT

## 2022-05-18 PROCEDURE — 25010000002 BORTEZOMIB PER 0.1 MG: Performed by: INTERNAL MEDICINE

## 2022-05-18 PROCEDURE — 85025 COMPLETE CBC W/AUTO DIFF WBC: CPT | Performed by: INTERNAL MEDICINE

## 2022-05-18 PROCEDURE — 96401 CHEMO ANTI-NEOPL SQ/IM: CPT

## 2022-05-18 RX ORDER — BORTEZOMIB 3.5 MG/1
1.3 INJECTION, POWDER, LYOPHILIZED, FOR SOLUTION INTRAVENOUS; SUBCUTANEOUS ONCE
Status: COMPLETED | OUTPATIENT
Start: 2022-05-18 | End: 2022-05-18

## 2022-05-18 RX ADMIN — BORTEZOMIB 2.8 MG: 3.5 INJECTION, POWDER, LYOPHILIZED, FOR SOLUTION INTRAVENOUS; SUBCUTANEOUS at 15:38

## 2022-05-19 ENCOUNTER — ANESTHESIA EVENT (OUTPATIENT)
Dept: GASTROENTEROLOGY | Facility: HOSPITAL | Age: 62
End: 2022-05-19

## 2022-05-19 ENCOUNTER — ANESTHESIA (OUTPATIENT)
Dept: GASTROENTEROLOGY | Facility: HOSPITAL | Age: 62
End: 2022-05-19

## 2022-05-19 ENCOUNTER — HOSPITAL ENCOUNTER (OUTPATIENT)
Facility: HOSPITAL | Age: 62
Setting detail: HOSPITAL OUTPATIENT SURGERY
Discharge: HOME | End: 2022-05-19
Attending: INTERNAL MEDICINE | Admitting: INTERNAL MEDICINE

## 2022-05-19 VITALS
DIASTOLIC BLOOD PRESSURE: 80 MMHG | OXYGEN SATURATION: 97 % | HEART RATE: 75 BPM | SYSTOLIC BLOOD PRESSURE: 139 MMHG | TEMPERATURE: 97.8 F | HEIGHT: 70 IN | RESPIRATION RATE: 16 BRPM | BODY MASS INDEX: 28.92 KG/M2 | WEIGHT: 202 LBS

## 2022-05-19 PROCEDURE — S0260 H&P FOR SURGERY: HCPCS | Performed by: INTERNAL MEDICINE

## 2022-05-19 PROCEDURE — 25010000002 PROPOFOL 10 MG/ML EMULSION: Performed by: NURSE ANESTHETIST, CERTIFIED REGISTERED

## 2022-05-19 PROCEDURE — 45378 DIAGNOSTIC COLONOSCOPY: CPT | Performed by: INTERNAL MEDICINE

## 2022-05-19 RX ORDER — PROPOFOL 10 MG/ML
VIAL (ML) INTRAVENOUS AS NEEDED
Status: DISCONTINUED | OUTPATIENT
Start: 2022-05-19 | End: 2022-05-19 | Stop reason: SURG

## 2022-05-19 RX ORDER — PROPOFOL 10 MG/ML
VIAL (ML) INTRAVENOUS CONTINUOUS PRN
Status: DISCONTINUED | OUTPATIENT
Start: 2022-05-19 | End: 2022-05-19 | Stop reason: SURG

## 2022-05-19 RX ORDER — LIDOCAINE HYDROCHLORIDE 20 MG/ML
INJECTION, SOLUTION INFILTRATION; PERINEURAL AS NEEDED
Status: DISCONTINUED | OUTPATIENT
Start: 2022-05-19 | End: 2022-05-19 | Stop reason: SURG

## 2022-05-19 RX ORDER — SODIUM CHLORIDE, SODIUM LACTATE, POTASSIUM CHLORIDE, CALCIUM CHLORIDE 600; 310; 30; 20 MG/100ML; MG/100ML; MG/100ML; MG/100ML
1000 INJECTION, SOLUTION INTRAVENOUS CONTINUOUS
Status: DISCONTINUED | OUTPATIENT
Start: 2022-05-19 | End: 2022-05-19 | Stop reason: HOSPADM

## 2022-05-19 RX ADMIN — LIDOCAINE HYDROCHLORIDE 60 MG: 20 INJECTION, SOLUTION INFILTRATION; PERINEURAL at 10:09

## 2022-05-19 RX ADMIN — PROPOFOL 180 MCG/KG/MIN: 10 INJECTION, EMULSION INTRAVENOUS at 10:09

## 2022-05-19 RX ADMIN — Medication 50 MG: at 10:09

## 2022-05-19 RX ADMIN — SODIUM CHLORIDE, POTASSIUM CHLORIDE, SODIUM LACTATE AND CALCIUM CHLORIDE 1000 ML: 600; 310; 30; 20 INJECTION, SOLUTION INTRAVENOUS at 09:19

## 2022-05-19 NOTE — H&P
"[facility]  Pre Procedure History & Physical    Chief Complaint:   Time for my colonoscopy    Subjective     HPI:   61 y.o. male     Past Medical History:   Past Medical History:   Diagnosis Date   • Amyloidosis (HCC)    • GERD (gastroesophageal reflux disease)    • History of colon polyps    • History of snoring    • Hypertension    • Multiple myeloma (HCC)        Family History:  Family History   Problem Relation Age of Onset   • Heart disease Mother         CHF   • Prostate cancer Brother    • Malig Hyperthermia Neg Hx        Social History:   reports that he quit smoking about 12 years ago. His smoking use included cigarettes. He has a 5.25 pack-year smoking history. He has never used smokeless tobacco. He reports previous alcohol use of about 6.0 standard drinks of alcohol per week. He reports that he does not use drugs.    Medications:   Medications Prior to Admission   Medication Sig Dispense Refill Last Dose   • acyclovir (ZOVIRAX) 400 MG tablet Take 1 tablet by mouth 2 (Two) Times a Day. Take one tablet by mouth twice daily. 60 tablet 11 5/18/2022 at Unknown time   • dexamethasone (DECADRON) 4 MG tablet TAKE 10 TABLETS BY MOUTH ONCE WEEKLY 40 tablet 3 5/18/2022 at Unknown time   • losartan (Cozaar) 100 MG tablet Take 1 tablet by mouth Daily. 30 tablet 11 5/19/2022 at Unknown time   • sulfamethoxazole-trimethoprim (Bactrim DS) 800-160 MG per tablet Take 1 tablet by mouth 3 (Three) Times a Week. On Monday, Wednesday and Friday 12 tablet 5    • ondansetron (ZOFRAN) 8 MG tablet Take 1 tablet by mouth 3 (Three) Times a Day As Needed for Nausea or Vomiting. 30 tablet 5 Unknown at Unknown time       Allergies:  Lisinopril    ROS:    Pertinent items are noted in HPI     Objective     Blood pressure 140/91, pulse 83, temperature 97.8 °F (36.6 °C), temperature source Oral, resp. rate 18, height 177.8 cm (70\"), weight 91.6 kg (202 lb), SpO2 98 %.    Physical Exam   Constitutional: Pt is " oriented to person, place, and time and well-developed, well-nourished, and in no distress.   Abdominal: Soft.   Psychiatric: Mood, memory, affect and judgment normal.     Assessment & Plan     Diagnosis:  Encounter for screening for colon cancer    Anticipated Surgical Procedure:  Colonoscopy    The risks, benefits, and alternatives of this procedure have been discussed with the patient or the responsible party- the patient understands and agrees to proceed.

## 2022-05-19 NOTE — ANESTHESIA PREPROCEDURE EVALUATION
Anesthesia Evaluation     Patient summary reviewed   no history of anesthetic complications:  NPO Solid Status: > 8 hours  NPO Liquid Status: > 2 hours           Airway   Mallampati: I  TM distance: >3 FB  Neck ROM: full  Dental      Pulmonary     breath sounds clear to auscultation  (+) a smoker Former,   (-) shortness of breath  Cardiovascular   Exercise tolerance: good (4-7 METS)    Rhythm: regular  Rate: normal    (+) hypertension (took losartan today), hyperlipidemia,   (-) angina, SANTANA    ROS comment: 4/21/22 TTE summary: Mild LVH with normal biventricular function.   Sclerotic aortic valve with trivial insufficiency.   Normal filling pressures. Unable to estimate RVSP.       Neuro/Psych  (+) dizziness/light headedness (denies issue lately),    GI/Hepatic/Renal/Endo    (+)  GERD,      Musculoskeletal     Abdominal    Substance History      OB/GYN          Other      history of cancer                    Anesthesia Plan    ASA 2     MAC   (MAC anesthesia discussed with patient and/or patient representative. Risks (including but not limited to intra-op awareness), benefits, and alternatives were discussed. Understanding was voiced with an agreement to proceed with a MAC technique and General as a backup option. I have explained other risks of anesthesia including but not limited to dental damage, corneal abrasion, nerve injury, MI, stroke, and death. All questions asked and answered.   )  intravenous induction     Anesthetic plan, all risks, benefits, and alternatives have been provided, discussed and informed consent has been obtained with: patient.        CODE STATUS:

## 2022-05-19 NOTE — ANESTHESIA POSTPROCEDURE EVALUATION
"Patient: Duy Owens    Procedure Summary     Date: 05/19/22 Room / Location: [facility] ENDOSCOPY 8 / [facility] ENDOSCOPY    Anesthesia Start: 1006 Anesthesia Stop: 1022    Procedure: COLONOSCOPY to cecum and TI (N/A ) Diagnosis:       Encounter for screening for malignant neoplasm of colon      (Encounter for screening for malignant neoplasm of colon [Z12.11])    Surgeons: Clive Mars MD Provider: Clive Matos MD    Anesthesia Type: MAC ASA Status: 2          Anesthesia Type: MAC    Vitals  Vitals Value Taken Time   /80 05/19/22 1040   Temp     Pulse 75 05/19/22 1040   Resp 16 05/19/22 1040   SpO2 97 % 05/19/22 1040           Post Anesthesia Care and Evaluation    Patient location during evaluation: bedside  Patient participation: complete - patient participated  Level of consciousness: awake and alert  Pain management: adequate  Airway patency: patent  Anesthetic complications: No anesthetic complications    Cardiovascular status: acceptable  Respiratory status: acceptable  Hydration status: acceptable    Comments: /80 (BP Location: Left arm, Patient Position: Lying)   Pulse 75   Temp 36.6 °C (97.8 °F) (Oral)   Resp 16   Ht 177.8 cm (70\")   Wt 91.6 kg (202 lb)   SpO2 97%   BMI 28.98 kg/m²       "

## 2022-05-25 ENCOUNTER — INFUSION (OUTPATIENT)
Dept: ONCOLOGY | Facility: HOSPITAL | Age: 62
End: 2022-05-25

## 2022-05-25 ENCOUNTER — LAB (OUTPATIENT)
Dept: OTHER | Facility: HOSPITAL | Age: 62
End: 2022-05-25

## 2022-05-25 VITALS
OXYGEN SATURATION: 99 % | DIASTOLIC BLOOD PRESSURE: 71 MMHG | SYSTOLIC BLOOD PRESSURE: 105 MMHG | RESPIRATION RATE: 18 BRPM | TEMPERATURE: 99.4 F | HEART RATE: 92 BPM | HEIGHT: 70 IN | WEIGHT: 205 LBS | BODY MASS INDEX: 29.35 KG/M2

## 2022-05-25 DIAGNOSIS — Z79.899 ENCOUNTER FOR LONG-TERM (CURRENT) USE OF HIGH-RISK MEDICATION: Primary | ICD-10-CM

## 2022-05-25 DIAGNOSIS — Z79.899 ENCOUNTER FOR LONG-TERM (CURRENT) USE OF HIGH-RISK MEDICATION: ICD-10-CM

## 2022-05-25 DIAGNOSIS — C90.00 MULTIPLE MYELOMA NOT HAVING ACHIEVED REMISSION: ICD-10-CM

## 2022-05-25 LAB
ALBUMIN SERPL-MCNC: 4.6 G/DL (ref 3.5–5.2)
ALBUMIN/GLOB SERPL: 2.2 G/DL
ALP SERPL-CCNC: 116 U/L (ref 39–117)
ALT SERPL W P-5'-P-CCNC: 28 U/L (ref 1–41)
ANION GAP SERPL CALCULATED.3IONS-SCNC: 9.5 MMOL/L (ref 5–15)
AST SERPL-CCNC: 16 U/L (ref 1–40)
B2 MICROGLOB SERPL-MCNC: 1.6 MG/L (ref 0.8–2.2)
BASOPHILS # BLD AUTO: 0.04 10*3/MM3 (ref 0–0.2)
BASOPHILS NFR BLD AUTO: 0.5 % (ref 0–1.5)
BILIRUB SERPL-MCNC: 0.3 MG/DL (ref 0–1.2)
BUN SERPL-MCNC: 21 MG/DL (ref 8–23)
BUN/CREAT SERPL: 18.6 (ref 7–25)
CALCIUM SPEC-SCNC: 9.4 MG/DL (ref 8.6–10.5)
CHLORIDE SERPL-SCNC: 101 MMOL/L (ref 98–107)
CO2 SERPL-SCNC: 26.5 MMOL/L (ref 22–29)
CREAT SERPL-MCNC: 1.13 MG/DL (ref 0.76–1.27)
DEPRECATED RDW RBC AUTO: 42.5 FL (ref 37–54)
EGFRCR SERPLBLD CKD-EPI 2021: 74.4 ML/MIN/1.73
EOSINOPHIL # BLD AUTO: 0.37 10*3/MM3 (ref 0–0.4)
EOSINOPHIL NFR BLD AUTO: 4.2 % (ref 0.3–6.2)
ERYTHROCYTE [DISTWIDTH] IN BLOOD BY AUTOMATED COUNT: 15.2 % (ref 12.3–15.4)
GLOBULIN UR ELPH-MCNC: 2.1 GM/DL
GLUCOSE SERPL-MCNC: 259 MG/DL (ref 65–99)
HCT VFR BLD AUTO: 40.8 % (ref 37.5–51)
HGB BLD-MCNC: 12.5 G/DL (ref 13–17.7)
IMM GRANULOCYTES # BLD AUTO: 0.05 10*3/MM3 (ref 0–0.05)
IMM GRANULOCYTES NFR BLD AUTO: 0.6 % (ref 0–0.5)
LYMPHOCYTES # BLD AUTO: 1.81 10*3/MM3 (ref 0.7–3.1)
LYMPHOCYTES NFR BLD AUTO: 20.7 % (ref 19.6–45.3)
MCH RBC QN AUTO: 23.7 PG (ref 26.6–33)
MCHC RBC AUTO-ENTMCNC: 30.6 G/DL (ref 31.5–35.7)
MCV RBC AUTO: 77.4 FL (ref 79–97)
MONOCYTES # BLD AUTO: 0.81 10*3/MM3 (ref 0.1–0.9)
MONOCYTES NFR BLD AUTO: 9.3 % (ref 5–12)
NEUTROPHILS NFR BLD AUTO: 5.65 10*3/MM3 (ref 1.7–7)
NEUTROPHILS NFR BLD AUTO: 64.7 % (ref 42.7–76)
NRBC BLD AUTO-RTO: 0 /100 WBC (ref 0–0.2)
PLATELET # BLD AUTO: 208 10*3/MM3 (ref 140–450)
PMV BLD AUTO: 10.7 FL (ref 6–12)
POTASSIUM SERPL-SCNC: 5.1 MMOL/L (ref 3.5–5.2)
PROT SERPL-MCNC: 6.7 G/DL (ref 6–8.5)
RBC # BLD AUTO: 5.27 10*6/MM3 (ref 4.14–5.8)
SODIUM SERPL-SCNC: 137 MMOL/L (ref 136–145)
T-UPTAKE NFR SERPL: 1.11 TBI (ref 0.8–1.3)
T4 SERPL-MCNC: 5.51 MCG/DL (ref 4.5–11.7)
TSH SERPL DL<=0.05 MIU/L-ACNC: 1.22 UIU/ML (ref 0.27–4.2)
WBC NRBC COR # BLD: 8.73 10*3/MM3 (ref 3.4–10.8)

## 2022-05-25 PROCEDURE — 83521 IG LIGHT CHAINS FREE EACH: CPT | Performed by: INTERNAL MEDICINE

## 2022-05-25 PROCEDURE — 84479 ASSAY OF THYROID (T3 OR T4): CPT | Performed by: INTERNAL MEDICINE

## 2022-05-25 PROCEDURE — 82232 ASSAY OF BETA-2 PROTEIN: CPT | Performed by: NURSE PRACTITIONER

## 2022-05-25 PROCEDURE — 80050 GENERAL HEALTH PANEL: CPT | Performed by: INTERNAL MEDICINE

## 2022-05-25 PROCEDURE — 84155 ASSAY OF PROTEIN SERUM: CPT | Performed by: INTERNAL MEDICINE

## 2022-05-25 PROCEDURE — 86334 IMMUNOFIX E-PHORESIS SERUM: CPT | Performed by: INTERNAL MEDICINE

## 2022-05-25 PROCEDURE — 84165 PROTEIN E-PHORESIS SERUM: CPT | Performed by: INTERNAL MEDICINE

## 2022-05-25 PROCEDURE — 82784 ASSAY IGA/IGD/IGG/IGM EACH: CPT | Performed by: INTERNAL MEDICINE

## 2022-05-25 PROCEDURE — 84436 ASSAY OF TOTAL THYROXINE: CPT | Performed by: INTERNAL MEDICINE

## 2022-05-25 PROCEDURE — 25010000002 BORTEZOMIB PER 0.1 MG: Performed by: INTERNAL MEDICINE

## 2022-05-25 PROCEDURE — 36415 COLL VENOUS BLD VENIPUNCTURE: CPT

## 2022-05-25 PROCEDURE — 96401 CHEMO ANTI-NEOPL SQ/IM: CPT

## 2022-05-25 RX ORDER — BORTEZOMIB 3.5 MG/1
1.3 INJECTION, POWDER, LYOPHILIZED, FOR SOLUTION INTRAVENOUS; SUBCUTANEOUS ONCE
Status: COMPLETED | OUTPATIENT
Start: 2022-05-25 | End: 2022-05-25

## 2022-05-25 RX ADMIN — BORTEZOMIB 2.8 MG: 3.5 INJECTION, POWDER, LYOPHILIZED, FOR SOLUTION INTRAVENOUS; SUBCUTANEOUS at 16:19

## 2022-05-26 LAB
ALBUMIN SERPL ELPH-MCNC: 3.9 G/DL (ref 2.9–4.4)
ALBUMIN/GLOB SERPL: 1.7 {RATIO} (ref 0.7–1.7)
ALPHA1 GLOB SERPL ELPH-MCNC: 0.3 G/DL (ref 0–0.4)
ALPHA2 GLOB SERPL ELPH-MCNC: 0.6 G/DL (ref 0.4–1)
B-GLOBULIN SERPL ELPH-MCNC: 1 G/DL (ref 0.7–1.3)
GAMMA GLOB SERPL ELPH-MCNC: 0.6 G/DL (ref 0.4–1.8)
GLOBULIN SER-MCNC: 2.4 G/DL (ref 2.2–3.9)
IGA SERPL-MCNC: 66 MG/DL (ref 90–386)
IGG SERPL-MCNC: 604 MG/DL (ref 603–1613)
IGM SERPL-MCNC: 16 MG/DL (ref 20–172)
INTERPRETATION SERPL IEP-IMP: ABNORMAL
KAPPA LC FREE SER-MCNC: 10.9 MG/L (ref 3.3–19.4)
KAPPA LC FREE/LAMBDA FREE SER: 0.26 {RATIO} (ref 0.26–1.65)
LABORATORY COMMENT REPORT: ABNORMAL
LAMBDA LC FREE SERPL-MCNC: 41.8 MG/L (ref 5.7–26.3)
M PROTEIN SERPL ELPH-MCNC: ABNORMAL G/DL
PROT SERPL-MCNC: 6.3 G/DL (ref 6–8.5)

## 2022-06-01 ENCOUNTER — LAB (OUTPATIENT)
Dept: OTHER | Facility: HOSPITAL | Age: 62
End: 2022-06-01

## 2022-06-01 ENCOUNTER — INFUSION (OUTPATIENT)
Dept: ONCOLOGY | Facility: HOSPITAL | Age: 62
End: 2022-06-01

## 2022-06-01 ENCOUNTER — OFFICE VISIT (OUTPATIENT)
Dept: ONCOLOGY | Facility: CLINIC | Age: 62
End: 2022-06-01

## 2022-06-01 VITALS
DIASTOLIC BLOOD PRESSURE: 78 MMHG | WEIGHT: 205.2 LBS | OXYGEN SATURATION: 97 % | TEMPERATURE: 97.7 F | HEART RATE: 88 BPM | RESPIRATION RATE: 18 BRPM | SYSTOLIC BLOOD PRESSURE: 133 MMHG | BODY MASS INDEX: 29.38 KG/M2 | HEIGHT: 70 IN

## 2022-06-01 DIAGNOSIS — Z79.899 ENCOUNTER FOR LONG-TERM (CURRENT) USE OF HIGH-RISK MEDICATION: Primary | ICD-10-CM

## 2022-06-01 DIAGNOSIS — C90.00 MULTIPLE MYELOMA NOT HAVING ACHIEVED REMISSION: ICD-10-CM

## 2022-06-01 DIAGNOSIS — T45.1X5A ANEMIA DUE TO CHEMOTHERAPY: ICD-10-CM

## 2022-06-01 DIAGNOSIS — E85.81 AL AMYLOIDOSIS: ICD-10-CM

## 2022-06-01 DIAGNOSIS — Z79.899 ENCOUNTER FOR LONG-TERM (CURRENT) USE OF HIGH-RISK MEDICATION: ICD-10-CM

## 2022-06-01 DIAGNOSIS — D84.9 IMMUNOCOMPROMISED PATIENT: ICD-10-CM

## 2022-06-01 DIAGNOSIS — D64.81 ANEMIA DUE TO CHEMOTHERAPY: ICD-10-CM

## 2022-06-01 DIAGNOSIS — C90.00 MULTIPLE MYELOMA NOT HAVING ACHIEVED REMISSION: Primary | ICD-10-CM

## 2022-06-01 LAB
BASOPHILS # BLD AUTO: 0.03 10*3/MM3 (ref 0–0.2)
BASOPHILS NFR BLD AUTO: 0.4 % (ref 0–1.5)
DEPRECATED RDW RBC AUTO: 38.9 FL (ref 37–54)
EOSINOPHIL # BLD AUTO: 0.34 10*3/MM3 (ref 0–0.4)
EOSINOPHIL NFR BLD AUTO: 4.2 % (ref 0.3–6.2)
ERYTHROCYTE [DISTWIDTH] IN BLOOD BY AUTOMATED COUNT: 14.6 % (ref 12.3–15.4)
HCT VFR BLD AUTO: 38.1 % (ref 37.5–51)
HGB BLD-MCNC: 12.1 G/DL (ref 13–17.7)
IMM GRANULOCYTES # BLD AUTO: 0.06 10*3/MM3 (ref 0–0.05)
IMM GRANULOCYTES NFR BLD AUTO: 0.7 % (ref 0–0.5)
LYMPHOCYTES # BLD AUTO: 1.98 10*3/MM3 (ref 0.7–3.1)
LYMPHOCYTES NFR BLD AUTO: 24.2 % (ref 19.6–45.3)
MCH RBC QN AUTO: 23.9 PG (ref 26.6–33)
MCHC RBC AUTO-ENTMCNC: 31.8 G/DL (ref 31.5–35.7)
MCV RBC AUTO: 75.1 FL (ref 79–97)
MONOCYTES # BLD AUTO: 0.8 10*3/MM3 (ref 0.1–0.9)
MONOCYTES NFR BLD AUTO: 9.8 % (ref 5–12)
NEUTROPHILS NFR BLD AUTO: 4.98 10*3/MM3 (ref 1.7–7)
NEUTROPHILS NFR BLD AUTO: 60.7 % (ref 42.7–76)
NRBC BLD AUTO-RTO: 0 /100 WBC (ref 0–0.2)
PLATELET # BLD AUTO: 230 10*3/MM3 (ref 140–450)
PMV BLD AUTO: 10.2 FL (ref 6–12)
RBC # BLD AUTO: 5.07 10*6/MM3 (ref 4.14–5.8)
WBC NRBC COR # BLD: 8.19 10*3/MM3 (ref 3.4–10.8)

## 2022-06-01 PROCEDURE — 85025 COMPLETE CBC W/AUTO DIFF WBC: CPT | Performed by: INTERNAL MEDICINE

## 2022-06-01 PROCEDURE — 96401 CHEMO ANTI-NEOPL SQ/IM: CPT

## 2022-06-01 PROCEDURE — 99215 OFFICE O/P EST HI 40 MIN: CPT | Performed by: INTERNAL MEDICINE

## 2022-06-01 PROCEDURE — 25010000002 BORTEZOMIB PER 0.1 MG: Performed by: INTERNAL MEDICINE

## 2022-06-01 PROCEDURE — 36415 COLL VENOUS BLD VENIPUNCTURE: CPT

## 2022-06-01 RX ORDER — BORTEZOMIB 3.5 MG/1
1.3 INJECTION, POWDER, LYOPHILIZED, FOR SOLUTION INTRAVENOUS; SUBCUTANEOUS ONCE
Status: CANCELLED | OUTPATIENT
Start: 2022-06-01

## 2022-06-01 RX ORDER — SULFAMETHOXAZOLE AND TRIMETHOPRIM 800; 160 MG/1; MG/1
1 TABLET ORAL 3 TIMES WEEKLY
COMMUNITY
Start: 2022-05-08 | End: 2022-08-24 | Stop reason: SDDI

## 2022-06-01 RX ORDER — BORTEZOMIB 3.5 MG/1
1.3 INJECTION, POWDER, LYOPHILIZED, FOR SOLUTION INTRAVENOUS; SUBCUTANEOUS ONCE
Status: CANCELLED | OUTPATIENT
Start: 2022-06-08

## 2022-06-01 RX ORDER — BORTEZOMIB 3.5 MG/1
1.3 INJECTION, POWDER, LYOPHILIZED, FOR SOLUTION INTRAVENOUS; SUBCUTANEOUS ONCE
Status: COMPLETED | OUTPATIENT
Start: 2022-06-01 | End: 2022-06-01

## 2022-06-01 RX ADMIN — BORTEZOMIB 2.8 MG: 3.5 INJECTION, POWDER, LYOPHILIZED, FOR SOLUTION INTRAVENOUS; SUBCUTANEOUS at 15:29

## 2022-06-01 NOTE — NURSING NOTE
Labs reviewed and seen per MD with ok to proceed with treatment today. Pt verified that he is taking dexamethasone as prescribed.

## 2022-06-01 NOTE — PROGRESS NOTES
"Subjective     CHIEF COMPLAINT:      Chief Complaint   Patient presents with   • Follow-up     No concerns       HISTORY OF PRESENT ILLNESS:     Duy Owens is a 61 y.o. male patient who returns today for follow up on his multiple myeloma and AL amyloidosis.  He returns today for follow-up reporting no new symptoms.  He is tolerating his treatment and denies having any numbness in his hands or feet.  No difficulty with walking.  No chest pain, shortness of breath or abdominal pain.    Patient follows with Dr. Felix at [facility] and he is going to start Neupogen injections on 6/23/2022.    ROS:  Pertinent ROS is in the HPI.     Past medical, surgical, social and family history were reviewed.     MEDICATIONS:    Current Outpatient Medications:   •  acyclovir (ZOVIRAX) 400 MG tablet, Take 1 tablet by mouth 2 (Two) Times a Day. Take one tablet by mouth twice daily., Disp: 60 tablet, Rfl: 11  •  dexamethasone (DECADRON) 4 MG tablet, TAKE 10 TABLETS BY MOUTH ONCE WEEKLY, Disp: 40 tablet, Rfl: 3  •  losartan (Cozaar) 100 MG tablet, Take 1 tablet by mouth Daily., Disp: 30 tablet, Rfl: 11  •  ondansetron (ZOFRAN) 8 MG tablet, Take 1 tablet by mouth 3 (Three) Times a Day As Needed for Nausea or Vomiting., Disp: 30 tablet, Rfl: 5  •  sulfamethoxazole-trimethoprim (BACTRIM DS,SEPTRA DS) 800-160 MG per tablet, Take 1 tablet by mouth 3 (Three) Times a Week., Disp: , Rfl:   Objective     VITAL SIGNS:     Vitals:    06/01/22 1407   BP: 133/78   Pulse: 88   Resp: 18   Temp: 97.7 °F (36.5 °C)   TempSrc: Temporal   SpO2: 97%   Weight: 93.1 kg (205 lb 3.2 oz)   Height: 177.8 cm (70\")   PainSc: 0-No pain     Body mass index is 29.44 kg/m².     Wt Readings from Last 5 Encounters:   06/01/22 93.1 kg (205 lb 3.2 oz)   05/25/22 93 kg (205 lb)   05/19/22 91.6 kg (202 lb)   05/18/22 94.3 kg (208 lb)   05/11/22 93 kg (205 lb)     PHYSICAL EXAMINATION:   GENERAL: The patient appears in good general condition, not in acute " distress.   SKIN: No ecchymosis.  Hyperpigmentation at the prior site of Velcade injections.  EYES: No jaundice. No pallor.  LYMPHATICS: No cervical, supraclavicular or axillary lymphadenopathy.  CHEST: Normal respiratory effort.  Lungs clear bilaterally.  No added sounds.  CVS: Normal S1-S2.  No murmurs.  ABDOMEN: Soft. No tenderness. No Hepatomegaly. No Splenomegaly. No masses.  EXTREMITIES: No edema or calf tenderness.    DIAGNOSTIC DATA:     Results from last 7 days   Lab Units 06/01/22  1359 05/25/22  1459   WBC 10*3/mm3 8.19 8.73   NEUTROS ABS 10*3/mm3 4.98 5.65   HEMOGLOBIN g/dL 12.1* 12.5*   HEMATOCRIT % 38.1 40.8   PLATELETS 10*3/mm3 230 208     Results from last 7 days   Lab Units 05/25/22  1459   SODIUM mmol/L 137   POTASSIUM mmol/L 5.1   CHLORIDE mmol/L 101   CO2 mmol/L 26.5   BUN mg/dL 21   CREATININE mg/dL 1.13   CALCIUM mg/dL 9.4   ALBUMIN g/dL 4.60  3.9   BILIRUBIN mg/dL 0.3   ALK PHOS U/L 116   ALT (SGPT) U/L 28   AST (SGOT) U/L 16   GLUCOSE mg/dL 259*     Component      Latest Ref Rng & Units 4/6/2022 4/27/2022 5/25/2022   IgG      603 - 1613 mg/dL 453 (L) 645 604   IgA      90 - 386 mg/dL 51 (L) 79 (L) 66 (L)   IgM      20 - 172 mg/dL 10 (L) 17 (L) 16 (L)   Total Protein      6.0 - 8.5 g/dL 6.0 6.2 6.3   Albumin      2.9 - 4.4 g/dL 3.7 3.5 3.9   Alpha-1-Globulin      0.0 - 0.4 g/dL 0.2 0.3 0.3   Alpha-2-Globulin      0.4 - 1.0 g/dL 0.6 0.8 0.6   Beta Globulin      0.7 - 1.3 g/dL 1.1 1.1 1.0   Gamma Globulin      0.4 - 1.8 g/dL 0.4 0.5 0.6   M-Werner      Not Observed g/dL Not Observed Not Observed Not Observed   Globulin      2.2 - 3.9 g/dL 2.3 2.7 2.4   A/G Ratio      0.7 - 1.7 1.7 1.3 1.7   Immunofixation Reflex, Serum       Comment Comment Comment   Please note       Comment Comment Comment   Kappa FLC      3.3 - 19.4 mg/L 12.2 17.6 10.9   Free Lambda Light Chains      5.7 - 26.3 mg/L 48.8 (H) 55.4 (H) 41.8 (H)   Kappa/Lambda Ratio      0.26 - 1.65 0.25 (L) 0.32 0.26     Component      Latest  Ref Rng & Units 9/29/2021 12/9/2021 3/9/2022 5/25/2022   Beta-2 Microglobulin      0.8 - 2.2 mg/L 1.8 2.1 2.1 1.6     Pathology exam of bone marrow biopsy from 5/24/2022:  BONE MARROW - PERIPHERAL BLOOD SMEAR, ASPIRATE SMEAR, PARTICLE PREPARATION, BIOPSY AND FLOW CYTOMETRY: NORMOCELLULAR MARROW (30-40%) WITH TRILINEAGE HEMATOPOIESIS; INVOLVED BY A PLASMA CELL NEOPLASM (5-10% BY IHC); NEGATIVE FOR AMYLOID     Assessment & Plan    *Plasma cell disorder most consistent with multiple myeloma with lymph node involvement and amyloid deposition in the involved lymph nodes.  · Patient started having dry cough around July 2021.    · CT chest on 9/23/2021 revealed inferior mediastinal adenopathy.    · CT abdomen and pelvis on 9/24/2021 revealed retroperitoneal lymphadenopathy extending to the iliac chain lymph nodes on the right.    · The Largest lymph node was in the retroperitoneal area measuring 4.8 x 3.5 cm.  The common iliac lymph node measured 3.5 x 2.7 cm.  The left external iliac chain lymph node measured 2.9 x 2 cm.    · PET scan on 10/5/2021 revealed the retroperitoneal and left pelvic lymphadenopathy to be hypermetabolic.  The left periaortic lymph nodes had SUV of 6.9 and the left pelvic sidewall lymph nodes had an SUV of 5.4.  · Patient had CT-guided biopsy on 10/6/2021.  · The pathologist at [facility] reported involvement with monotypic lambda restricted plasma cells concerning for involvement with plasma cell dyscrasia.  · Bone marrow biopsy on 10/29/2021 revealed a normocellular marrow (50%) with involvement with plasma cell dyscrasia (plasma cells representing 20-30% of total cells by  stain).   · FISH was negative for gain of 1q, monosomy/deletions of chromosomes 13 and 17, IGH rearrangement, gain of chromosomes 9 and 11, IGH-CCND1 (11;14) fusion.   · I this was considered to represent multiple myeloma with lymph node involvement and amyloid deposition.    · There was no bone involvement on PET scan.    · Patient was referred to [facility].  He was seen by Dr. Felix.  They concurred with the diagnosis of multiple myeloma with AL amyloidosis and involvement of the lymph nodes.  · Treatment with the VRD started on 12/22/2021.  · Free lambda light chain started to improve after the patient was started on treatment.  · CT scan on 3/11/2022 revealed decrease in the size of lymph nodes in the periaortic area.  There is a slight increase in a lymph node in the left axillary area that increased from 7 to 10 mm.  This was not palpable on today's exam.  · PET scan on 4/28/2022 revealed significant reduction in the size and hypermetabolism of the involved lymph nodes.  · SPEP REGINO FLC on 5/25/2022 revealed no M protein.  Free lambda light chain was 41.8.  Kappa to lambda ratio was normal at 0.26.  B2 M was 1.6.  · Bone marrow biopsy on 5/24/2022 revealed normocellular marrow with 30-40% cellularity with involvement by plasma cell neoplasm representing 5-10% by IHC.  Negative for amyloid.  · Patient is going to have stem cell collection later this month and will start Neupogen for stem cell mobilization on 6/23/2012  · I discussed the case with Dr. Felix.  He recommended continuing treatment until 2 weeks before the start of the stem cell collection process.    *Anemia secondary to multiple myeloma and secondary to treatment.  · Hemoglobin was 13.1 on 9/29/2021.    · Iron, folate and vitamin B12 were normal in September/October 2021.    · Hemoglobin is 12.1 today.  · The anemia is currently attributed to his treatment.    *Prophylaxis.  · He is on acyclovir 400 mg twice daily.  · He is on Bactrim DS three days weekly for PCP prophylaxis.   · Patient was previously on aspirin 81 mg daily.  · Aspirin was discontinued after stopping Revlimid.    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  · There was mild wall thickening.  Ejection fraction was normal.  · Echocardiogram at [facility] on  4/21/2022 did not reveal wall thickening.    PLAN:    1.  Continue treatment with Velcade 1.3 mg/m2 with Decadron 40 mg weekly.  He will receive a dose of Velcade today.   2.  He will return in 1 week for a CBC and he will be scheduled to receive Velcade.  This will be the final dose of Velcade and Decadron before he starts the stem cell mobilization and collection.   3.  Since the patient will be undergoing the bone marrow transplantation and will be following up closely with [facility] after discharge, we will not schedule him for follow-up now.  I asked him to notify us when he is advised to do so by [facility] bone marrow transplant team.      I spent 45 minutes caring for Duy on this date of service. This time includes time spent by me in the following activities: preparing for the visit, reviewing tests, obtaining and/or reviewing a separately obtained history, performing a medically appropriate examination and/or evaluation, counseling and educating the patient/family/caregiver, ordering medications, tests, or procedures, referring and communicating with other health care professionals, documenting information in the medical record, independently interpreting results and communicating that information with the patient/family/caregiver and care coordination       Tima Mendez MD  06/01/22

## 2022-06-08 ENCOUNTER — LAB (OUTPATIENT)
Dept: OTHER | Facility: HOSPITAL | Age: 62
End: 2022-06-08

## 2022-06-08 ENCOUNTER — INFUSION (OUTPATIENT)
Dept: ONCOLOGY | Facility: HOSPITAL | Age: 62
End: 2022-06-08

## 2022-06-08 VITALS
SYSTOLIC BLOOD PRESSURE: 111 MMHG | TEMPERATURE: 98.1 F | WEIGHT: 207.1 LBS | HEIGHT: 70 IN | BODY MASS INDEX: 29.65 KG/M2 | DIASTOLIC BLOOD PRESSURE: 76 MMHG | OXYGEN SATURATION: 100 % | HEART RATE: 71 BPM | RESPIRATION RATE: 18 BRPM

## 2022-06-08 DIAGNOSIS — Z79.899 ENCOUNTER FOR LONG-TERM (CURRENT) USE OF HIGH-RISK MEDICATION: ICD-10-CM

## 2022-06-08 DIAGNOSIS — C90.00 MULTIPLE MYELOMA NOT HAVING ACHIEVED REMISSION: ICD-10-CM

## 2022-06-08 DIAGNOSIS — Z79.899 ENCOUNTER FOR LONG-TERM (CURRENT) USE OF HIGH-RISK MEDICATION: Primary | ICD-10-CM

## 2022-06-08 LAB
BASOPHILS # BLD AUTO: 0.04 10*3/MM3 (ref 0–0.2)
BASOPHILS NFR BLD AUTO: 0.5 % (ref 0–1.5)
DEPRECATED RDW RBC AUTO: 41.6 FL (ref 37–54)
EOSINOPHIL # BLD AUTO: 0.26 10*3/MM3 (ref 0–0.4)
EOSINOPHIL NFR BLD AUTO: 3.2 % (ref 0.3–6.2)
ERYTHROCYTE [DISTWIDTH] IN BLOOD BY AUTOMATED COUNT: 15 % (ref 12.3–15.4)
HCT VFR BLD AUTO: 39.1 % (ref 37.5–51)
HGB BLD-MCNC: 12 G/DL (ref 13–17.7)
IMM GRANULOCYTES # BLD AUTO: 0.08 10*3/MM3 (ref 0–0.05)
IMM GRANULOCYTES NFR BLD AUTO: 1 % (ref 0–0.5)
LYMPHOCYTES # BLD AUTO: 1.62 10*3/MM3 (ref 0.7–3.1)
LYMPHOCYTES NFR BLD AUTO: 20.1 % (ref 19.6–45.3)
MCH RBC QN AUTO: 23.7 PG (ref 26.6–33)
MCHC RBC AUTO-ENTMCNC: 30.7 G/DL (ref 31.5–35.7)
MCV RBC AUTO: 77.3 FL (ref 79–97)
MONOCYTES # BLD AUTO: 0.83 10*3/MM3 (ref 0.1–0.9)
MONOCYTES NFR BLD AUTO: 10.3 % (ref 5–12)
NEUTROPHILS NFR BLD AUTO: 5.22 10*3/MM3 (ref 1.7–7)
NEUTROPHILS NFR BLD AUTO: 64.9 % (ref 42.7–76)
NRBC BLD AUTO-RTO: 0 /100 WBC (ref 0–0.2)
PLATELET # BLD AUTO: 242 10*3/MM3 (ref 140–450)
PMV BLD AUTO: 10 FL (ref 6–12)
RBC # BLD AUTO: 5.06 10*6/MM3 (ref 4.14–5.8)
WBC NRBC COR # BLD: 8.05 10*3/MM3 (ref 3.4–10.8)

## 2022-06-08 PROCEDURE — 36415 COLL VENOUS BLD VENIPUNCTURE: CPT

## 2022-06-08 PROCEDURE — 25010000002 BORTEZOMIB PER 0.1 MG: Performed by: INTERNAL MEDICINE

## 2022-06-08 PROCEDURE — 85025 COMPLETE CBC W/AUTO DIFF WBC: CPT | Performed by: INTERNAL MEDICINE

## 2022-06-08 PROCEDURE — 96401 CHEMO ANTI-NEOPL SQ/IM: CPT

## 2022-06-08 RX ORDER — BORTEZOMIB 3.5 MG/1
1.3 INJECTION, POWDER, LYOPHILIZED, FOR SOLUTION INTRAVENOUS; SUBCUTANEOUS ONCE
Status: COMPLETED | OUTPATIENT
Start: 2022-06-08 | End: 2022-06-08

## 2022-06-08 RX ADMIN — BORTEZOMIB 2.8 MG: 3.5 INJECTION, POWDER, LYOPHILIZED, FOR SOLUTION INTRAVENOUS; SUBCUTANEOUS at 15:15

## 2022-07-06 ENCOUNTER — SPECIALTY PHARMACY (OUTPATIENT)
Dept: PHARMACY | Facility: HOSPITAL | Age: 62
End: 2022-07-06

## 2022-08-09 ENCOUNTER — TELEPHONE (OUTPATIENT)
Dept: ONCOLOGY | Facility: CLINIC | Age: 62
End: 2022-08-09

## 2022-08-09 NOTE — TELEPHONE ENCOUNTER
Caller: Joey Owens    Relationship: Self    Best call back number: [phone]    Who are you requesting to speak with (clinical staff, provider,  specific staff member): CLINICAL      What was the call regarding: JOEY HAS BEEN TO [place] THE PAST 6 WEEKS AND HE IS NEEDING TO MAKE AN APPT WITH DR MESSER.    Do you require a callback: YES

## 2022-08-24 ENCOUNTER — OFFICE VISIT (OUTPATIENT)
Dept: ONCOLOGY | Facility: CLINIC | Age: 62
End: 2022-08-24

## 2022-08-24 ENCOUNTER — LAB (OUTPATIENT)
Dept: OTHER | Facility: HOSPITAL | Age: 62
End: 2022-08-24

## 2022-08-24 VITALS
BODY MASS INDEX: 27.84 KG/M2 | RESPIRATION RATE: 16 BRPM | HEIGHT: 70 IN | OXYGEN SATURATION: 98 % | DIASTOLIC BLOOD PRESSURE: 76 MMHG | SYSTOLIC BLOOD PRESSURE: 124 MMHG | WEIGHT: 194.5 LBS | TEMPERATURE: 97.5 F | HEART RATE: 80 BPM

## 2022-08-24 DIAGNOSIS — Z79.899 ENCOUNTER FOR LONG-TERM (CURRENT) USE OF HIGH-RISK MEDICATION: ICD-10-CM

## 2022-08-24 DIAGNOSIS — E85.81 AL AMYLOIDOSIS: ICD-10-CM

## 2022-08-24 DIAGNOSIS — C90.00 MULTIPLE MYELOMA NOT HAVING ACHIEVED REMISSION: ICD-10-CM

## 2022-08-24 DIAGNOSIS — C90.00 MULTIPLE MYELOMA NOT HAVING ACHIEVED REMISSION: Primary | ICD-10-CM

## 2022-08-24 LAB
ALBUMIN SERPL-MCNC: 4.7 G/DL (ref 3.5–5.2)
ALBUMIN/GLOB SERPL: 2.5 G/DL
ALP SERPL-CCNC: 67 U/L (ref 39–117)
ALT SERPL W P-5'-P-CCNC: 15 U/L (ref 1–41)
ANION GAP SERPL CALCULATED.3IONS-SCNC: 9.7 MMOL/L (ref 5–15)
AST SERPL-CCNC: 24 U/L (ref 1–40)
BASOPHILS # BLD AUTO: 0.05 10*3/MM3 (ref 0–0.2)
BASOPHILS NFR BLD AUTO: 0.8 % (ref 0–1.5)
BILIRUB SERPL-MCNC: 0.5 MG/DL (ref 0–1.2)
BUN SERPL-MCNC: 12 MG/DL (ref 8–23)
BUN/CREAT SERPL: 15 (ref 7–25)
CALCIUM SPEC-SCNC: 9.8 MG/DL (ref 8.6–10.5)
CHLORIDE SERPL-SCNC: 104 MMOL/L (ref 98–107)
CO2 SERPL-SCNC: 25.3 MMOL/L (ref 22–29)
CREAT SERPL-MCNC: 0.8 MG/DL (ref 0.76–1.27)
DEPRECATED RDW RBC AUTO: 44.4 FL (ref 37–54)
EGFRCR SERPLBLD CKD-EPI 2021: 101.3 ML/MIN/1.73
EOSINOPHIL # BLD AUTO: 0.41 10*3/MM3 (ref 0–0.4)
EOSINOPHIL NFR BLD AUTO: 6.5 % (ref 0.3–6.2)
ERYTHROCYTE [DISTWIDTH] IN BLOOD BY AUTOMATED COUNT: 16.3 % (ref 12.3–15.4)
GLOBULIN UR ELPH-MCNC: 1.9 GM/DL
GLUCOSE SERPL-MCNC: 90 MG/DL (ref 65–99)
HBA1C MFR BLD: 7.3 % (ref 4.8–5.6)
HCT VFR BLD AUTO: 36.1 % (ref 37.5–51)
HGB BLD-MCNC: 11.4 G/DL (ref 13–17.7)
IMM GRANULOCYTES # BLD AUTO: 0.03 10*3/MM3 (ref 0–0.05)
IMM GRANULOCYTES NFR BLD AUTO: 0.5 % (ref 0–0.5)
LYMPHOCYTES # BLD AUTO: 1.92 10*3/MM3 (ref 0.7–3.1)
LYMPHOCYTES NFR BLD AUTO: 30.4 % (ref 19.6–45.3)
MAGNESIUM SERPL-MCNC: 1.7 MG/DL (ref 1.6–2.4)
MCH RBC QN AUTO: 23.8 PG (ref 26.6–33)
MCHC RBC AUTO-ENTMCNC: 31.6 G/DL (ref 31.5–35.7)
MCV RBC AUTO: 75.5 FL (ref 79–97)
MONOCYTES # BLD AUTO: 0.98 10*3/MM3 (ref 0.1–0.9)
MONOCYTES NFR BLD AUTO: 15.5 % (ref 5–12)
NEUTROPHILS NFR BLD AUTO: 2.92 10*3/MM3 (ref 1.7–7)
NEUTROPHILS NFR BLD AUTO: 46.3 % (ref 42.7–76)
NRBC BLD AUTO-RTO: 0 /100 WBC (ref 0–0.2)
PLATELET # BLD AUTO: 186 10*3/MM3 (ref 140–450)
PMV BLD AUTO: 9.6 FL (ref 6–12)
POTASSIUM SERPL-SCNC: 4 MMOL/L (ref 3.5–5.2)
PROT SERPL-MCNC: 6.6 G/DL (ref 6–8.5)
RBC # BLD AUTO: 4.78 10*6/MM3 (ref 4.14–5.8)
SODIUM SERPL-SCNC: 139 MMOL/L (ref 136–145)
WBC NRBC COR # BLD: 6.31 10*3/MM3 (ref 3.4–10.8)

## 2022-08-24 PROCEDURE — 80053 COMPREHEN METABOLIC PANEL: CPT | Performed by: INTERNAL MEDICINE

## 2022-08-24 PROCEDURE — 99215 OFFICE O/P EST HI 40 MIN: CPT | Performed by: INTERNAL MEDICINE

## 2022-08-24 PROCEDURE — 85025 COMPLETE CBC W/AUTO DIFF WBC: CPT | Performed by: INTERNAL MEDICINE

## 2022-08-24 PROCEDURE — 36415 COLL VENOUS BLD VENIPUNCTURE: CPT

## 2022-08-24 PROCEDURE — 83735 ASSAY OF MAGNESIUM: CPT | Performed by: INTERNAL MEDICINE

## 2022-08-24 PROCEDURE — 83036 HEMOGLOBIN GLYCOSYLATED A1C: CPT | Performed by: INTERNAL MEDICINE

## 2022-08-24 RX ORDER — VALACYCLOVIR HYDROCHLORIDE 500 MG/1
500 TABLET, FILM COATED ORAL 2 TIMES DAILY
COMMUNITY
End: 2022-11-09 | Stop reason: SDDI

## 2022-08-24 NOTE — PROGRESS NOTES
Subjective     CHIEF COMPLAINT:      Chief Complaint   Patient presents with   • Follow-up     No concerns       HISTORY OF PRESENT ILLNESS:     Duy Owens is a 61 y.o. male patient who returns today for follow up on his multiple myeloma and AL amyloidosis.  He returns today for follow-up reporting some fatigue.  Energy level is gradually improving.  No nausea or vomiting.      Patient underwent high-dose melphalan and stem cell transplant.  Day 0 was 7/7/2022.  He developed constipation, hiccups, mucositis/oral ulcers.  He engrafted WBCs on Day +11 (7/18/2022) and platelets Day +18 (7/25/2022).    Patient was hospitalized between 7/16/2022 and 7/18/2022 with neutropenic fever.  He was started on cefepime.  Chest x-ray was negative.  Respiratory viral panel was negative.  Blood cultures and urine culture were negative.  No source was identified.    Patient has follow-up on 9/29/2022.  The plan at [facility] is to assess disease status on day +30 with 24-hour urine and serum multiple myeloma panel.  Plan for CT chest abdomen pelvis with contrast on day +80 as well as bone marrow biopsy.    Due to the patient having elevated blood glucose and high A1C, he was placed on metformin 500 mg twice daily.  The dose was increased to 1000 mg twice daily.  He reports occasional diarrhea.    ROS:  Pertinent ROS is in the HPI.     Past medical, surgical, social and family history were reviewed.     MEDICATIONS:    Current Outpatient Medications:   •  losartan (Cozaar) 100 MG tablet, Take 1 tablet by mouth Daily., Disp: 30 tablet, Rfl: 11  •  metFORMIN (GLUCOPHAGE) 500 MG tablet, Take 500 mg by mouth 2 (Two) Times a Day., Disp: , Rfl:   •  ondansetron (ZOFRAN) 8 MG tablet, Take 1 tablet by mouth 3 (Three) Times a Day As Needed for Nausea or Vomiting., Disp: 30 tablet, Rfl: 5  •  valACYclovir (VALTREX) 500 MG tablet, Take 500 mg by mouth 2 (Two) Times a Day., Disp: , Rfl:   Objective     VITAL SIGNS:     Vitals:     "08/24/22 0800   BP: 124/76   Pulse: 80   Resp: 16   Temp: 97.5 °F (36.4 °C)   TempSrc: Temporal   SpO2: 98%   Weight: 88.2 kg (194 lb 8 oz)  Comment: aware of wt loss/apptitie fine   Height: 177.8 cm (70\")   PainSc: 0-No pain     Body mass index is 27.91 kg/m².     Wt Readings from Last 5 Encounters:   08/24/22 88.2 kg (194 lb 8 oz)   06/08/22 93.9 kg (207 lb 1.6 oz)   06/01/22 93.1 kg (205 lb 3.2 oz)   05/25/22 93 kg (205 lb)   05/19/22 91.6 kg (202 lb)     PHYSICAL EXAMINATION:   GENERAL: The patient appears in good general condition, not in acute distress.   SKIN: No ecchymosis.  EYES: No jaundice. No pallor.  CHEST: Normal respiratory effort.  Lungs clear bilaterally.  No added sounds.  Previous site of the central line in the right upper chest is well-healed.  CVS: Normal S1-S2.  No murmurs.    ABDOMEN: Soft. No tenderness. No Hepatomegaly. No Splenomegaly. No masses.  EXTREMITIES: No edema.  No calf tenderness.    DIAGNOSTIC DATA:     Results from last 7 days   Lab Units 08/24/22  0752   WBC 10*3/mm3 6.31   NEUTROS ABS 10*3/mm3 2.92   HEMOGLOBIN g/dL 11.4*   HEMATOCRIT % 36.1*   PLATELETS 10*3/mm3 186     Results from last 7 days   Lab Units 08/24/22  0838   SODIUM mmol/L 139   POTASSIUM mmol/L 4.0   CHLORIDE mmol/L 104   CO2 mmol/L 25.3   BUN mg/dL 12   CREATININE mg/dL 0.80   CALCIUM mg/dL 9.8   ALBUMIN g/dL 4.70   BILIRUBIN mg/dL 0.5   ALK PHOS U/L 67   ALT (SGPT) U/L 15   AST (SGOT) U/L 24   GLUCOSE mg/dL 90   MAGNESIUM mg/dL 1.7     Component      Latest Ref Rng & Units 8/3/2022   Free Light Chain, Kappa      0.33 - 1.94 mg/dL 0.96   Free Lambda Light Chains      0.57 - 2.63 mg/dL 1.50   Kappa/Lambda FluidC Ratio      0.26 - 1.65 0.64     Component      Latest Ref Rng & Units 8/3/2022   Beta-2 Microglobulin      <=3.0 mg/L 1.9   IgG      540 - 1,822 mg/dL 563   IgM      22 - 240 mg/dL 20 (L)   IgA      101 - 645 mg/dL 54 (L)     Assessment & Plan    *Lambda light chain multiple myeloma with " lymphadenopathy and development of AL amyloidosis.  · Patient was found to have lymph node involvement and amyloid deposition in the involved lymph nodes.  · Patient started having dry cough around July 2021.    · CT scans 9/23/2021-9/24/2021 revealed inferior mediastinal adenopathy and retroperitoneal adenopathy extending to the right iliac chain.  · The Largest lymph node was in the retroperitoneal area measuring 4.8 x 3.5 cm.  The common iliac lymph node measured 3.5 x 2.7 cm.  The left external iliac chain lymph node measured 2.9 x 2 cm.    · PET scan on 10/5/2021 revealed the retroperitoneal and left pelvic lymphadenopathy to be hypermetabolic.  The left periaortic lymph nodes had SUV of 6.9 and the left pelvic sidewall lymph nodes had an SUV of 5.4.  No bone involvement.  · CT-guided biopsy on 10/6/2021- pathologist at [facility] reported involvement with monotypic lambda restricted plasma cells concerning for involvement with plasma cell dyscrasia.  · Bone marrow biopsy on 10/29/2021 revealed a normocellular marrow (50%) with involvement with plasma cell dyscrasia (plasma cells representing 20-30% of total cells by  stain).   · FISH was negative for gain of 1q, monosomy/deletions of chromosomes 13 and 17, IGH rearrangement, gain of chromosomes 9 and 11, IGH-CCND1 (11;14) fusion.   · Treatment with the VRD started on 12/22/2021.  · Free lambda light chain started to improve after the patient was started on treatment.  · CT scan on 3/11/2022 revealed decrease in the size of lymph nodes in the periaortic area.  There is a slight increase in a lymph node in the left axillary area that increased from 7 to 10 mm.    · PET scan on 4/28/2022 revealed significant reduction in the size and hypermetabolism of the involved lymph nodes.  · SPEP REGINO FLC on 5/25/2022 revealed no M protein.  Free lambda light chain was 41.8.  Kappa to lambda ratio was normal at 0.26.  B2 M was 1.6.  · Bone marrow biopsy on 5/24/2022  revealed normocellular marrow with 30-40% cellularity with involvement by plasma cell neoplasm representing 5-10% by IHC.  Negative for amyloid.  · He was considered to be MN-1.  · S/p high-dose melphalan with autologous stem cell transplant.  Day 0 was 7/7/2022.  · Patient did well with the transplant but had neutropenic fever necessitating hospitalization between 7/16/2022 and 7/18/2022.  Source of fever cannot be determined.  · Counts improved afterwards.  · Free lambda light chain was normal with normal kappa to lambda ratio of 0.64 on 8/3/2022.  · Labs from today revealed normal WBC and neutrophil counts.  Platelet count is normal.    *Anemia secondary to multiple myeloma and secondary to treatment.  · Hemoglobin was 13.1 on 9/29/2021.    · Iron, folate and vitamin B12 were normal in September/October 2021.    · Hemoglobin is 11.4 today.    *Prophylaxis.  · He was previously on acyclovir 400 mg twice daily.    · He is now on Valtrex with plan for 1 year posttreatment.  · He received Evusheld on 8/20/2022.  · Patient will receive vaccinations as recommended by [facility].    *Diabetes mellitus.  · A1c was 8.8 during mobilization.  · He was started on metformin during mobilization.  · Dose was increased to 1000 mg twice daily on 7/9/2022.    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  · There was mild wall thickening.  Ejection fraction was normal.  · Echocardiogram at [facility] on 4/21/2022 did not reveal wall thickening.    PLAN:    1.  I will obtain hemoglobin A1c today.  2.  Continue Valtrex 500 mg twice daily.  3.  Await follow-up at [facility].  On 9/29/2022, he will be at day + 80 and would have CT chest abdomen pelvis and follow-up with bone marrow biopsy.  Paraprotein studies will be ordered.  4.  Await follow-up on 10/21/2022-day +100.  5.  Plan to start maintenance Revlimid after day +100.  I will see him in follow-up in early November 2022 with CBC CMP SPEP REGINO FLC B2M.    I  spent 50 minutes caring for Duy on this date of service. This time includes time spent by me in the following activities: preparing for the visit, reviewing tests, obtaining and/or reviewing a separately obtained history, performing a medically appropriate examination and/or evaluation, counseling and educating the patient/family/caregiver, ordering medications, tests, or procedures, documenting information in the medical record, independently interpreting results and communicating that information with the patient/family/caregiver and care coordination     Tima Mendez MD  08/24/22

## 2022-09-06 ENCOUNTER — SPECIALTY PHARMACY (OUTPATIENT)
Dept: PHARMACY | Facility: HOSPITAL | Age: 62
End: 2022-09-06

## 2022-09-19 ENCOUNTER — TELEPHONE (OUTPATIENT)
Dept: ONCOLOGY | Facility: CLINIC | Age: 62
End: 2022-09-19

## 2022-09-19 DIAGNOSIS — E85.81 AL AMYLOIDOSIS: ICD-10-CM

## 2022-09-19 DIAGNOSIS — C90.00 MULTIPLE MYELOMA NOT HAVING ACHIEVED REMISSION: Primary | ICD-10-CM

## 2022-09-19 RX ORDER — SULFAMETHOXAZOLE AND TRIMETHOPRIM 800; 160 MG/1; MG/1
TABLET ORAL
Qty: 12 TABLET | Refills: 5 | Status: SHIPPED | OUTPATIENT
Start: 2022-09-19 | End: 2023-03-22

## 2022-09-19 NOTE — TELEPHONE ENCOUNTER
Returned call to patient who is reporting pain in both shoulders, he also reports that his hip pain has returned.  He states this pain has been ongoing for weeks now, but he wants to make sure Dr. Mendez is aware of this.  He is due to see the MD in [place] next week and will also discuss with them.

## 2022-09-19 NOTE — TELEPHONE ENCOUNTER
Caller: Joey Owens    Relationship: Self    Best call back number: [phone]    Who are you requesting to speak with (clinical staff, provider,  specific staff member): CLINICAL    What was the call regarding: JOEY IS HAVING PAIN IN HIS SHOULDER AND HIS RT FOOT, HIS ARCH WAS TIGHT. ALSO HIS HIP HURTS SO BAD HE CANT HARDLY GET UP THE STEPS.  HE IS UNSURE WHAT IS GOING ON    Do you require a callback: YES

## 2022-09-20 NOTE — TELEPHONE ENCOUNTER
Spoke with patient to let him know that Dr. Mendez recommends he come in this week for labs and an NP visit, he v/u.

## 2022-09-22 NOTE — PROGRESS NOTES
Subjective     CHIEF COMPLAINT:      Chief Complaint   Patient presents with   • Follow-up       HISTORY OF PRESENT ILLNESS:     Duy Owens is a 61 y.o. male patient who returns today for follow up on his multiple myeloma and AL amyloidosis.  He returns today for follow-up reporting some fatigue.  Energy level is gradually improving.  No nausea or vomiting.      Patient underwent high-dose melphalan and stem cell transplant.  Day 0 was 7/7/2022.  He developed constipation, hiccups, mucositis/oral ulcers.  He engrafted WBCs on Day +11 (7/18/2022) and platelets Day +18 (7/25/2022).    Patient was hospitalized between 7/16/2022 and 7/18/2022 with neutropenic fever.  He was started on cefepime.  Chest x-ray was negative.  Respiratory viral panel was negative.  Blood cultures and urine culture were negative.  No source was identified.    Patient has follow-up on 9/29/2022.  The plan at [facility] is to assess disease status on day +30 with 24-hour urine and serum multiple myeloma panel.  Plan for CT chest abdomen pelvis with contrast on day +80 as well as bone marrow biopsy.    Due to the patient having elevated blood glucose and high A1C, he was placed on metformin 500 mg twice daily.  The dose was increased to 1000 mg twice daily.  He reports occasional diarrhea.    Today, the patient is seen for triage visit.  For at least the last 6 months, he reports he has been experiencing pain in his right hip and right shoulder.  The pain is intermittent.  He feels like the pain is in his joints.  The pain is not present at rest, but exacerbated by certain movements such as going up the stairs, getting into his truck, or sleeping on his right shoulder.  The pain only lasts while he is performing these movements, then resolves.  Because of this, he has not attempted to take any medication like Tylenol or ibuprofen, as he does not feel it is necessary.  He is still able to perform all daily activities, and reports  "that he even is able to jog several days a week without pain in his hip or shoulder.      ROS:  Pertinent ROS is in the HPI.     Past medical, surgical, social and family history were reviewed.     MEDICATIONS:    Current Outpatient Medications:   •  losartan (Cozaar) 100 MG tablet, Take 1 tablet by mouth Daily., Disp: 30 tablet, Rfl: 11  •  metFORMIN (GLUCOPHAGE) 500 MG tablet, Take 500 mg by mouth 2 (Two) Times a Day., Disp: , Rfl:   •  ondansetron (ZOFRAN) 8 MG tablet, Take 1 tablet by mouth 3 (Three) Times a Day As Needed for Nausea or Vomiting., Disp: 30 tablet, Rfl: 5  •  valACYclovir (VALTREX) 500 MG tablet, Take 500 mg by mouth 2 (Two) Times a Day., Disp: , Rfl:   •  sulfamethoxazole-trimethoprim (BACTRIM DS,SEPTRA DS) 800-160 MG per tablet, TAKE ONE TABLET BY MOUTH THREE TIMES WEEKLY, MONDAY, WEDNESDAY, AND FRIDAY, Disp: 12 tablet, Rfl: 5  Objective     VITAL SIGNS:     Vitals:    09/23/22 0918   BP: 111/72   Pulse: 79   Resp: 16   Temp: 97.7 °F (36.5 °C)   TempSrc: Temporal   SpO2: 99%   Weight: 87.8 kg (193 lb 9.6 oz)   Height: 177 cm (69.69\")   PainSc: 0-No pain     Body mass index is 28.03 kg/m².     Wt Readings from Last 5 Encounters:   09/23/22 87.8 kg (193 lb 9.6 oz)   08/24/22 88.2 kg (194 lb 8 oz)   06/08/22 93.9 kg (207 lb 1.6 oz)   06/01/22 93.1 kg (205 lb 3.2 oz)   05/25/22 93 kg (205 lb)     PHYSICAL EXAMINATION:   GENERAL: The patient appears in good general condition, not in acute distress.   SKIN: No ecchymosis.  EYES: No jaundice. No pallor.  CHEST: Normal respiratory effort.  Lungs clear bilaterally.  No added sounds.  Previous site of the central line in the right upper chest is well-healed.  CVS: Normal S1-S2.  No murmurs.    ABDOMEN: Soft. No tenderness. No Hepatomegaly. No Splenomegaly. No masses.  EXTREMITIES: No edema.  No calf tenderness.    DIAGNOSTIC DATA:     Results from last 7 days   Lab Units 09/23/22  0909   WBC 10*3/mm3 6.73   NEUTROS ABS 10*3/mm3 3.87   HEMOGLOBIN g/dL " 11.6*   HEMATOCRIT % 37.8   PLATELETS 10*3/mm3 241         Component      Latest Ref Rng & Units 8/3/2022   Free Light Chain, Kappa      0.33 - 1.94 mg/dL 0.96   Free Lambda Light Chains      0.57 - 2.63 mg/dL 1.50   Kappa/Lambda FluidC Ratio      0.26 - 1.65 0.64     Component      Latest Ref Rng & Units 8/3/2022   Beta-2 Microglobulin      <=3.0 mg/L 1.9   IgG      540 - 1,822 mg/dL 563   IgM      22 - 240 mg/dL 20 (L)   IgA      101 - 645 mg/dL 54 (L)     Assessment & Plan    *Lambda light chain multiple myeloma with lymphadenopathy and development of AL amyloidosis.  · Patient was found to have lymph node involvement and amyloid deposition in the involved lymph nodes.  · Patient started having dry cough around July 2021.    · CT scans 9/23/2021-9/24/2021 revealed inferior mediastinal adenopathy and retroperitoneal adenopathy extending to the right iliac chain.  · The Largest lymph node was in the retroperitoneal area measuring 4.8 x 3.5 cm.  The common iliac lymph node measured 3.5 x 2.7 cm.  The left external iliac chain lymph node measured 2.9 x 2 cm.    · PET scan on 10/5/2021 revealed the retroperitoneal and left pelvic lymphadenopathy to be hypermetabolic.  The left periaortic lymph nodes had SUV of 6.9 and the left pelvic sidewall lymph nodes had an SUV of 5.4.  No bone involvement.  · CT-guided biopsy on 10/6/2021- pathologist at [facility] reported involvement with monotypic lambda restricted plasma cells concerning for involvement with plasma cell dyscrasia.  · Bone marrow biopsy on 10/29/2021 revealed a normocellular marrow (50%) with involvement with plasma cell dyscrasia (plasma cells representing 20-30% of total cells by  stain).   · FISH was negative for gain of 1q, monosomy/deletions of chromosomes 13 and 17, IGH rearrangement, gain of chromosomes 9 and 11, IGH-CCND1 (11;14) fusion.   · Treatment with the VRD started on 12/22/2021.  · Free lambda light chain started to improve after the  patient was started on treatment.  · CT scan on 3/11/2022 revealed decrease in the size of lymph nodes in the periaortic area.  There is a slight increase in a lymph node in the left axillary area that increased from 7 to 10 mm.    · PET scan on 4/28/2022 revealed significant reduction in the size and hypermetabolism of the involved lymph nodes.  · SPEP REGINO FLC on 5/25/2022 revealed no M protein.  Free lambda light chain was 41.8.  Kappa to lambda ratio was normal at 0.26.  B2 M was 1.6.  · Bone marrow biopsy on 5/24/2022 revealed normocellular marrow with 30-40% cellularity with involvement by plasma cell neoplasm representing 5-10% by IHC.  Negative for amyloid.  · He was considered to be TX-1.  · S/p high-dose melphalan with autologous stem cell transplant.  Day 0 was 7/7/2022.  · Patient did well with the transplant but had neutropenic fever necessitating hospitalization between 7/16/2022 and 7/18/2022.  Source of fever cannot be determined.  · Counts improved afterwards.  · Free lambda light chain was normal with normal kappa to lambda ratio of 0.64 on 8/3/2022.  · Labs from 8/24/2022 revealed normal WBC and neutrophil counts.  Platelet count is normal.    *Anemia secondary to multiple myeloma and secondary to treatment.  · Hemoglobin was 13.1 on 9/29/2021.    · Iron, folate and vitamin B12 were normal in September/October 2021.    · Hemoglobin is 11.6 today.     *Prophylaxis.  · He was previously on acyclovir 400 mg twice daily.    · He is now on Valtrex with plan for 1 year posttreatment.  · He received Evusheld on 8/20/2022.  · Patient will receive vaccinations as recommended by [facility].    *Diabetes mellitus.  · A1c was 8.8 during mobilization.  · He was started on metformin during mobilization.  · Dose was increased to 1000 mg twice daily on 7/9/2022.    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  · There was mild wall thickening.  Ejection fraction was normal.  · Echocardiogram at [facility] on 4/21/2022 did not reveal wall thickening.    *Right hip and shoulder pain  · 9/23/2022: Seen for triage concerning right hip and shoulder pain.  Pain has been present for at least 6 months.  Pain is intermittent, not present at rest.  It is exacerbated by certain movements such as going up stairs, getting into his truck, or sleeping on his right shoulder.  He does not take anything for his pain, as once he stops the movement that is causing pain, his pain resolves.  We discussed that he may have arthritis in his right hip and right shoulder.  Offered to order x-rays of the right hip and right shoulder today, however patient will be seen at [facility] next week and believes he will have imaging done at this visit.  He would like to hold off on x-rays today and discussed with [facility] next week.  Also recommended he could take Tylenol as needed for pain, however he does not feel this will be necessary as pain is relieved with rest.  · Duy Owens reports a pain score of 0.  Given his pain assessment as noted, treatment options were discussed and the following options were decided upon as a follow-up plan to address the patient's pain: recommended tylenol prn.    PLAN:    1.  Recommended x-ray of the right shoulder and right hip, patient would like to hold off on imaging until he sees [facility] next week.  2.  Continue Valtrex 500 mg twice daily.  3.  Await follow-up at [facility].  On 9/29/2022, he will be at day + 80 and would have CT chest abdomen pelvis and follow-up with bone marrow biopsy.  Paraprotein studies will be ordered.  4.  Await follow-up on 10/21/2022-day +100.  5.  Plan to start maintenance Revlimid after day +100.  Scheduled with Dr. Mendez 11/9/2022 with CBC CMP SPEP REGINO FLC B2M.    I spent 35 minutes caring for Duy on this date of service. This time includes time spent by me in the following activities: preparing for the visit, obtaining and/or reviewing a separately obtained  history, performing a medically appropriate examination and/or evaluation, counseling and educating the patient/family/caregiver, documenting information in the medical record and care coordination.     Yenifer Enamorado, APRN  09/23/22

## 2022-09-23 ENCOUNTER — OFFICE VISIT (OUTPATIENT)
Dept: ONCOLOGY | Facility: CLINIC | Age: 62
End: 2022-09-23

## 2022-09-23 ENCOUNTER — LAB (OUTPATIENT)
Dept: OTHER | Facility: HOSPITAL | Age: 62
End: 2022-09-23

## 2022-09-23 VITALS
SYSTOLIC BLOOD PRESSURE: 111 MMHG | HEART RATE: 79 BPM | BODY MASS INDEX: 27.72 KG/M2 | WEIGHT: 193.6 LBS | OXYGEN SATURATION: 99 % | HEIGHT: 70 IN | TEMPERATURE: 97.7 F | DIASTOLIC BLOOD PRESSURE: 72 MMHG | RESPIRATION RATE: 16 BRPM

## 2022-09-23 DIAGNOSIS — M25.511 CHRONIC RIGHT SHOULDER PAIN: ICD-10-CM

## 2022-09-23 DIAGNOSIS — C90.00 MULTIPLE MYELOMA NOT HAVING ACHIEVED REMISSION: Primary | ICD-10-CM

## 2022-09-23 DIAGNOSIS — M25.551 CHRONIC HIP PAIN, RIGHT: ICD-10-CM

## 2022-09-23 DIAGNOSIS — G89.29 CHRONIC RIGHT SHOULDER PAIN: ICD-10-CM

## 2022-09-23 DIAGNOSIS — D63.0 ANEMIA IN NEOPLASTIC DISEASE: ICD-10-CM

## 2022-09-23 DIAGNOSIS — E85.81 AL AMYLOIDOSIS: ICD-10-CM

## 2022-09-23 DIAGNOSIS — C90.00 MULTIPLE MYELOMA NOT HAVING ACHIEVED REMISSION: ICD-10-CM

## 2022-09-23 DIAGNOSIS — G89.29 CHRONIC HIP PAIN, RIGHT: ICD-10-CM

## 2022-09-23 LAB
ALBUMIN SERPL-MCNC: 4.6 G/DL (ref 3.5–5.2)
ALBUMIN/GLOB SERPL: 1.9 G/DL
ALP SERPL-CCNC: 69 U/L (ref 39–117)
ALT SERPL W P-5'-P-CCNC: 12 U/L (ref 1–41)
ANION GAP SERPL CALCULATED.3IONS-SCNC: 8.7 MMOL/L (ref 5–15)
AST SERPL-CCNC: 17 U/L (ref 1–40)
BASOPHILS # BLD AUTO: 0.02 10*3/MM3 (ref 0–0.2)
BASOPHILS NFR BLD AUTO: 0.3 % (ref 0–1.5)
BILIRUB SERPL-MCNC: 0.4 MG/DL (ref 0–1.2)
BUN SERPL-MCNC: 16 MG/DL (ref 8–23)
BUN/CREAT SERPL: 15.8 (ref 7–25)
CALCIUM SPEC-SCNC: 9.6 MG/DL (ref 8.6–10.5)
CHLORIDE SERPL-SCNC: 104 MMOL/L (ref 98–107)
CO2 SERPL-SCNC: 25.3 MMOL/L (ref 22–29)
CREAT SERPL-MCNC: 1.01 MG/DL (ref 0.76–1.27)
DEPRECATED RDW RBC AUTO: 41.9 FL (ref 37–54)
EGFRCR SERPLBLD CKD-EPI 2021: 85.1 ML/MIN/1.73
EOSINOPHIL # BLD AUTO: 0.26 10*3/MM3 (ref 0–0.4)
EOSINOPHIL NFR BLD AUTO: 3.9 % (ref 0.3–6.2)
ERYTHROCYTE [DISTWIDTH] IN BLOOD BY AUTOMATED COUNT: 15.2 % (ref 12.3–15.4)
GLOBULIN UR ELPH-MCNC: 2.4 GM/DL
GLUCOSE SERPL-MCNC: 86 MG/DL (ref 65–99)
HCT VFR BLD AUTO: 37.8 % (ref 37.5–51)
HGB BLD-MCNC: 11.6 G/DL (ref 13–17.7)
IMM GRANULOCYTES # BLD AUTO: 0.04 10*3/MM3 (ref 0–0.05)
IMM GRANULOCYTES NFR BLD AUTO: 0.6 % (ref 0–0.5)
LYMPHOCYTES # BLD AUTO: 1.74 10*3/MM3 (ref 0.7–3.1)
LYMPHOCYTES NFR BLD AUTO: 25.9 % (ref 19.6–45.3)
MCH RBC QN AUTO: 23.6 PG (ref 26.6–33)
MCHC RBC AUTO-ENTMCNC: 30.7 G/DL (ref 31.5–35.7)
MCV RBC AUTO: 77 FL (ref 79–97)
MONOCYTES # BLD AUTO: 0.8 10*3/MM3 (ref 0.1–0.9)
MONOCYTES NFR BLD AUTO: 11.9 % (ref 5–12)
NEUTROPHILS NFR BLD AUTO: 3.87 10*3/MM3 (ref 1.7–7)
NEUTROPHILS NFR BLD AUTO: 57.4 % (ref 42.7–76)
NRBC BLD AUTO-RTO: 0 /100 WBC (ref 0–0.2)
PLATELET # BLD AUTO: 241 10*3/MM3 (ref 140–450)
PMV BLD AUTO: 9.5 FL (ref 6–12)
POTASSIUM SERPL-SCNC: 4.2 MMOL/L (ref 3.5–5.2)
PROT SERPL-MCNC: 7 G/DL (ref 6–8.5)
RBC # BLD AUTO: 4.91 10*6/MM3 (ref 4.14–5.8)
SODIUM SERPL-SCNC: 138 MMOL/L (ref 136–145)
WBC NRBC COR # BLD: 6.73 10*3/MM3 (ref 3.4–10.8)

## 2022-09-23 PROCEDURE — 36415 COLL VENOUS BLD VENIPUNCTURE: CPT

## 2022-09-23 PROCEDURE — 84165 PROTEIN E-PHORESIS SERUM: CPT | Performed by: INTERNAL MEDICINE

## 2022-09-23 PROCEDURE — 80053 COMPREHEN METABOLIC PANEL: CPT | Performed by: INTERNAL MEDICINE

## 2022-09-23 PROCEDURE — 85025 COMPLETE CBC W/AUTO DIFF WBC: CPT | Performed by: INTERNAL MEDICINE

## 2022-09-23 PROCEDURE — 86334 IMMUNOFIX E-PHORESIS SERUM: CPT | Performed by: INTERNAL MEDICINE

## 2022-09-23 PROCEDURE — 99214 OFFICE O/P EST MOD 30 MIN: CPT | Performed by: NURSE PRACTITIONER

## 2022-09-23 PROCEDURE — 83521 IG LIGHT CHAINS FREE EACH: CPT | Performed by: INTERNAL MEDICINE

## 2022-09-23 PROCEDURE — 82784 ASSAY IGA/IGD/IGG/IGM EACH: CPT | Performed by: INTERNAL MEDICINE

## 2022-09-27 LAB
ALBUMIN SERPL ELPH-MCNC: 4.1 G/DL (ref 2.9–4.4)
ALBUMIN/GLOB SERPL: 1.6 {RATIO} (ref 0.7–1.7)
ALPHA1 GLOB SERPL ELPH-MCNC: 0.2 G/DL (ref 0–0.4)
ALPHA2 GLOB SERPL ELPH-MCNC: 0.6 G/DL (ref 0.4–1)
B-GLOBULIN SERPL ELPH-MCNC: 1 G/DL (ref 0.7–1.3)
GAMMA GLOB SERPL ELPH-MCNC: 0.9 G/DL (ref 0.4–1.8)
GLOBULIN SER-MCNC: 2.7 G/DL (ref 2.2–3.9)
IGA SERPL-MCNC: 19 MG/DL (ref 90–386)
IGG SERPL-MCNC: 826 MG/DL (ref 603–1613)
IGM SERPL-MCNC: 26 MG/DL (ref 20–172)
INTERPRETATION SERPL IEP-IMP: ABNORMAL
KAPPA LC FREE SER-MCNC: 14.6 MG/L (ref 3.3–19.4)
KAPPA LC FREE/LAMBDA FREE SER: 0.59 {RATIO} (ref 0.26–1.65)
LABORATORY COMMENT REPORT: ABNORMAL
LAMBDA LC FREE SERPL-MCNC: 24.7 MG/L (ref 5.7–26.3)
M PROTEIN SERPL ELPH-MCNC: ABNORMAL G/DL
PROT SERPL-MCNC: 6.8 G/DL (ref 6–8.5)

## 2022-10-03 ENCOUNTER — SPECIALTY PHARMACY (OUTPATIENT)
Dept: PHARMACY | Facility: HOSPITAL | Age: 62
End: 2022-10-03

## 2022-10-04 ENCOUNTER — TELEPHONE (OUTPATIENT)
Dept: GASTROENTEROLOGY | Facility: CLINIC | Age: 62
End: 2022-10-04

## 2022-10-04 NOTE — TELEPHONE ENCOUNTER
Caller: Duy Owens    Relationship to patient: Self    Best call back number: [phone]     Patient is needing: D PATIENT HAS A COLONOSCOPY SCHEDULED FOR 11/30/22, PATIENT STATED THAT HAS ALREADY HAD A COLONOSCOPY AND WANTED TO KNOW IF NECESSARY TO HAVE ANOTHER.

## 2022-10-04 NOTE — TELEPHONE ENCOUNTER
Called pt and advised per Dr Mars's post procedure note, he does not need another colonoscopy for 5 years.  Placed colonoscopy in recall and HM for 5/19/27.

## 2022-11-09 ENCOUNTER — TELEPHONE (OUTPATIENT)
Dept: ONCOLOGY | Facility: CLINIC | Age: 62
End: 2022-11-09

## 2022-11-09 ENCOUNTER — LAB (OUTPATIENT)
Dept: OTHER | Facility: HOSPITAL | Age: 62
End: 2022-11-09

## 2022-11-09 ENCOUNTER — OFFICE VISIT (OUTPATIENT)
Dept: ONCOLOGY | Facility: CLINIC | Age: 62
End: 2022-11-09

## 2022-11-09 VITALS
SYSTOLIC BLOOD PRESSURE: 127 MMHG | BODY MASS INDEX: 28.16 KG/M2 | DIASTOLIC BLOOD PRESSURE: 71 MMHG | HEIGHT: 70 IN | WEIGHT: 196.7 LBS | TEMPERATURE: 97.3 F | HEART RATE: 68 BPM | RESPIRATION RATE: 16 BRPM | OXYGEN SATURATION: 100 %

## 2022-11-09 DIAGNOSIS — E85.81 AL AMYLOIDOSIS: ICD-10-CM

## 2022-11-09 DIAGNOSIS — C90.00 MULTIPLE MYELOMA NOT HAVING ACHIEVED REMISSION: ICD-10-CM

## 2022-11-09 DIAGNOSIS — Z79.899 ENCOUNTER FOR LONG-TERM (CURRENT) USE OF HIGH-RISK MEDICATION: ICD-10-CM

## 2022-11-09 DIAGNOSIS — D84.9 IMMUNOCOMPROMISED PATIENT: ICD-10-CM

## 2022-11-09 DIAGNOSIS — C90.00 MULTIPLE MYELOMA NOT HAVING ACHIEVED REMISSION: Primary | ICD-10-CM

## 2022-11-09 DIAGNOSIS — D63.0 ANEMIA IN NEOPLASTIC DISEASE: ICD-10-CM

## 2022-11-09 LAB
ALBUMIN SERPL-MCNC: 4.5 G/DL (ref 3.5–5.2)
ALBUMIN/GLOB SERPL: 1.6 G/DL
ALP SERPL-CCNC: 73 U/L (ref 39–117)
ALT SERPL W P-5'-P-CCNC: 17 U/L (ref 1–41)
ANION GAP SERPL CALCULATED.3IONS-SCNC: 8.6 MMOL/L (ref 5–15)
AST SERPL-CCNC: 20 U/L (ref 1–40)
B2 MICROGLOB SERPL-MCNC: 1.4 MG/L (ref 0.8–2.2)
BASOPHILS # BLD AUTO: 0.05 10*3/MM3 (ref 0–0.2)
BASOPHILS NFR BLD AUTO: 0.7 % (ref 0–1.5)
BILIRUB SERPL-MCNC: 0.3 MG/DL (ref 0–1.2)
BUN SERPL-MCNC: 13 MG/DL (ref 8–23)
BUN/CREAT SERPL: 15.1 (ref 7–25)
CALCIUM SPEC-SCNC: 10 MG/DL (ref 8.6–10.5)
CHLORIDE SERPL-SCNC: 102 MMOL/L (ref 98–107)
CO2 SERPL-SCNC: 26.4 MMOL/L (ref 22–29)
CREAT SERPL-MCNC: 0.86 MG/DL (ref 0.76–1.27)
DEPRECATED RDW RBC AUTO: 38.8 FL (ref 37–54)
EGFRCR SERPLBLD CKD-EPI 2021: 99.1 ML/MIN/1.73
EOSINOPHIL # BLD AUTO: 0.61 10*3/MM3 (ref 0–0.4)
EOSINOPHIL NFR BLD AUTO: 8.9 % (ref 0.3–6.2)
ERYTHROCYTE [DISTWIDTH] IN BLOOD BY AUTOMATED COUNT: 14.3 % (ref 12.3–15.4)
GLOBULIN UR ELPH-MCNC: 2.8 GM/DL
GLUCOSE SERPL-MCNC: 79 MG/DL (ref 65–99)
HCT VFR BLD AUTO: 42.4 % (ref 37.5–51)
HGB BLD-MCNC: 12.8 G/DL (ref 13–17.7)
IMM GRANULOCYTES # BLD AUTO: 0.04 10*3/MM3 (ref 0–0.05)
IMM GRANULOCYTES NFR BLD AUTO: 0.6 % (ref 0–0.5)
LYMPHOCYTES # BLD AUTO: 1.56 10*3/MM3 (ref 0.7–3.1)
LYMPHOCYTES NFR BLD AUTO: 22.6 % (ref 19.6–45.3)
MCH RBC QN AUTO: 22.9 PG (ref 26.6–33)
MCHC RBC AUTO-ENTMCNC: 30.2 G/DL (ref 31.5–35.7)
MCV RBC AUTO: 76 FL (ref 79–97)
MONOCYTES # BLD AUTO: 0.7 10*3/MM3 (ref 0.1–0.9)
MONOCYTES NFR BLD AUTO: 10.2 % (ref 5–12)
NEUTROPHILS NFR BLD AUTO: 3.93 10*3/MM3 (ref 1.7–7)
NEUTROPHILS NFR BLD AUTO: 57 % (ref 42.7–76)
NRBC BLD AUTO-RTO: 0 /100 WBC (ref 0–0.2)
PLATELET # BLD AUTO: 228 10*3/MM3 (ref 140–450)
PMV BLD AUTO: 9.4 FL (ref 6–12)
POTASSIUM SERPL-SCNC: 4 MMOL/L (ref 3.5–5.2)
PROT SERPL-MCNC: 7.3 G/DL (ref 6–8.5)
RBC # BLD AUTO: 5.58 10*6/MM3 (ref 4.14–5.8)
SODIUM SERPL-SCNC: 137 MMOL/L (ref 136–145)
WBC NRBC COR # BLD: 6.89 10*3/MM3 (ref 3.4–10.8)

## 2022-11-09 PROCEDURE — 99214 OFFICE O/P EST MOD 30 MIN: CPT | Performed by: INTERNAL MEDICINE

## 2022-11-09 PROCEDURE — 83521 IG LIGHT CHAINS FREE EACH: CPT | Performed by: INTERNAL MEDICINE

## 2022-11-09 PROCEDURE — 85025 COMPLETE CBC W/AUTO DIFF WBC: CPT | Performed by: INTERNAL MEDICINE

## 2022-11-09 PROCEDURE — 80053 COMPREHEN METABOLIC PANEL: CPT | Performed by: INTERNAL MEDICINE

## 2022-11-09 PROCEDURE — 82232 ASSAY OF BETA-2 PROTEIN: CPT | Performed by: INTERNAL MEDICINE

## 2022-11-09 PROCEDURE — 36415 COLL VENOUS BLD VENIPUNCTURE: CPT

## 2022-11-09 PROCEDURE — 82784 ASSAY IGA/IGD/IGG/IGM EACH: CPT | Performed by: INTERNAL MEDICINE

## 2022-11-09 PROCEDURE — 84165 PROTEIN E-PHORESIS SERUM: CPT | Performed by: INTERNAL MEDICINE

## 2022-11-09 PROCEDURE — 86334 IMMUNOFIX E-PHORESIS SERUM: CPT | Performed by: INTERNAL MEDICINE

## 2022-11-09 RX ORDER — VALACYCLOVIR HYDROCHLORIDE 500 MG/1
500 TABLET, FILM COATED ORAL 2 TIMES DAILY
Qty: 60 TABLET | Refills: 5 | Status: SHIPPED | OUTPATIENT
Start: 2022-11-09

## 2022-11-09 RX ORDER — LENALIDOMIDE 10 MG/1
CAPSULE ORAL
COMMUNITY
Start: 2022-10-27 | End: 2022-12-01 | Stop reason: SDUPTHER

## 2022-11-09 RX ORDER — ACYCLOVIR 400 MG/1
400 TABLET ORAL 2 TIMES DAILY
COMMUNITY
Start: 2022-10-21 | End: 2022-11-09

## 2022-11-09 RX ORDER — ASPIRIN 81 MG/1
81 TABLET ORAL DAILY
Start: 2022-11-09

## 2022-11-09 NOTE — TELEPHONE ENCOUNTER
----- Message from Tima Mendez MD sent at 11/9/2022 12:16 PM EST -----  Please inform the patient that it is recommended to continue Valtrex 500 mg twice daily for prophylaxis against shingles.  This was also recommended by [facility].    Thank you

## 2022-11-09 NOTE — PROGRESS NOTES
"Subjective     CHIEF COMPLAINT:      Chief Complaint   Patient presents with   • Follow-up     Discuss Covid Booster/scalp itchy      HISTORY OF PRESENT ILLNESS:     Duy Owens is a 61 y.o. male patient who returns today for follow up on her ears multiple myeloma.  He returns today for follow-up after his recent visit to [facility].  He had bone marrow biopsy and CT scans done.  He was started on maintenance Revlimid on 10/1/2022.  He tolerated it without nausea or vomiting.  No diarrhea.  He is taking the aspirin regularly.  No leg pain or swelling.    Patient reports that he developed itching of the scalp 2 weeks prior to starting Revlimid.  He did not notice any skin rash.    ROS:  Pertinent ROS is in the HPI.     Past medical, surgical, social and family history were reviewed.     MEDICATIONS:    Current Outpatient Medications:   •  acyclovir (ZOVIRAX) 400 MG tablet, Take 1 tablet by mouth 2 (Two) Times a Day., Disp: , Rfl:   •  lenalidomide (REVLIMID) 10 MG capsule, Take one capsule by mouth daily for 21 days of 28 day cycle. Auth#[id number] Patient Category :Adult Male, Disp: , Rfl:   •  losartan (Cozaar) 100 MG tablet, Take 1 tablet by mouth Daily., Disp: 30 tablet, Rfl: 11  •  metFORMIN (GLUCOPHAGE) 500 MG tablet, Take 500 mg by mouth 2 (Two) Times a Day., Disp: , Rfl:   •  ondansetron (ZOFRAN) 8 MG tablet, Take 1 tablet by mouth 3 (Three) Times a Day As Needed for Nausea or Vomiting., Disp: 30 tablet, Rfl: 5  •  sulfamethoxazole-trimethoprim (BACTRIM DS,SEPTRA DS) 800-160 MG per tablet, TAKE ONE TABLET BY MOUTH THREE TIMES WEEKLY, MONDAY, WEDNESDAY, AND FRIDAY, Disp: 12 tablet, Rfl: 5  Objective     VITAL SIGNS:     Vitals:    11/09/22 1132   BP: 127/71   Pulse: 68   Resp: 16   Temp: 97.3 °F (36.3 °C)   TempSrc: Temporal   SpO2: 100%   Weight: 89.2 kg (196 lb 11.2 oz)   Height: 177.8 cm (70\")   PainSc: 0-No pain     Body mass index is 28.22 kg/m².     Wt Readings from Last 5 Encounters: "   11/09/22 89.2 kg (196 lb 11.2 oz)   09/23/22 87.8 kg (193 lb 9.6 oz)   08/24/22 88.2 kg (194 lb 8 oz)   06/08/22 93.9 kg (207 lb 1.6 oz)   06/01/22 93.1 kg (205 lb 3.2 oz)     PHYSICAL EXAMINATION:   GENERAL: The patient appears in good general condition, not in acute distress.   SKIN: No ecchymosis.  EYES: No jaundice. No pallor.  LYMPHATICS: No cervical lymphadenopathy.  CHEST: Normal respiratory effort.  Lungs clear bilaterally.  No added sounds.  CVS: Normal S1-S2.  No murmurs.  ABDOMEN: Soft. No tenderness. No Hepatomegaly. No Splenomegaly. No masses.  EXTREMITIES: No edema or calf tenderness.    DIAGNOSTIC DATA:     Results from last 7 days   Lab Units 11/09/22  1035   WBC 10*3/mm3 6.89   NEUTROS ABS 10*3/mm3 3.93   HEMOGLOBIN g/dL 12.8*   HEMATOCRIT % 42.4   PLATELETS 10*3/mm3 228     Results from last 7 days   Lab Units 11/09/22  1035   SODIUM mmol/L 137   POTASSIUM mmol/L 4.0   CHLORIDE mmol/L 102   CO2 mmol/L 26.4   BUN mg/dL 13   CREATININE mg/dL 0.86   CALCIUM mg/dL 10.0   ALBUMIN g/dL 4.50   BILIRUBIN mg/dL 0.3   ALK PHOS U/L 73   ALT (SGPT) U/L 17   AST (SGOT) U/L 20   GLUCOSE mg/dL 79       Component      Latest Ref Rng & Units 9/23/2022   IgG      603 - 1613 mg/dL 826   IgA      90 - 386 mg/dL 19 (L)   IgM      20 - 172 mg/dL 26   Total Protein      6.0 - 8.5 g/dL 6.8   Albumin      2.9 - 4.4 g/dL 4.1   Alpha-1-Globulin      0.0 - 0.4 g/dL 0.2   Alpha-2-Globulin      0.4 - 1.0 g/dL 0.6   Beta Globulin      0.7 - 1.3 g/dL 1.0   Gamma Globulin      0.4 - 1.8 g/dL 0.9   M-Werner      Not Observed g/dL Not Observed   Globulin      2.2 - 3.9 g/dL 2.7   A/G Ratio      0.7 - 1.7 1.6   Immunofixation Reflex, Serum       Comment:   Please note       Comment   Kappa FLC      3.3 - 19.4 mg/L 14.6   Free Lambda Light Chains      5.7 - 26.3 mg/L 24.7   Kappa/Lambda Ratio      0.26 - 1.65 0.59     Pathology exam from 9/29/2022:  BONE MARROW - PERIPHERAL BLOOD SMEAR, ASPIRATE SMEAR, PARTICLE PREPARATION AND  BIOPSY: LIMITED SAMPLE; CELLULAR MARROW WITH TRILINEAGE HEMATOPOIESIS; MINUTE POPULATION OF MONOTYPIC PLASMA CELLS BY FLOW CYTOMETRY.     IMPRESSION:   Evaluation of this specimen is limited by a small fragmented core biopsy. The marrow is cellular with trilineage hematopoiesis. There is no increase in plasma cells by morphology or immunohistochemistry. However, flow cytometry detects a minute population of monotypic and immunophenotypically aberrant plasma cells, consistent with minimal persistence of the patient's previously diagnosed plasma cell neoplasm.    CT scan on 9/29/2022:  Compared to 10/5/2021, interval decrease in bulky retroperitoneal and pelvic adenopathy with multiple now partially calcified lymph nodes. Index lymph nodes as follows (measured on series 7):   - Shivani conglomerate aortocaval region measuring 3.3 x 3.7 cm per previously 5.6 x 4.7 cm (image 124).   - Anterior aortocaval measuring 1.6 cm short axis, previously 2.9 cm (image 134).   -Left pelvic sidewall 0.7 cm, previously 1.8 cm (image 173).     Assessment & Plan    *Lambda light chain multiple myeloma with lymphadenopathy and development of AL amyloidosis.  · Patient was found to have lymph node involvement and amyloid deposition in the involved lymph nodes.  · Patient started having dry cough around July 2021.    · CT scans 9/23/2021-9/24/2021 revealed inferior mediastinal adenopathy and retroperitoneal adenopathy extending to the right iliac chain.  · The Largest lymph node was in the retroperitoneal area measuring 4.8 x 3.5 cm.  The common iliac lymph node measured 3.5 x 2.7 cm.  The left external iliac chain lymph node measured 2.9 x 2 cm.    · PET scan on 10/5/2021 revealed the retroperitoneal and left pelvic lymphadenopathy to be hypermetabolic.  The left periaortic lymph nodes had SUV of 6.9 and the left pelvic sidewall lymph nodes had an SUV of 5.4.  No bone involvement.  · CT-guided biopsy on 10/6/2021- pathologist at [facility] reported involvement with monotypic lambda restricted plasma cells concerning for involvement with plasma cell dyscrasia.  · Bone marrow biopsy on 10/29/2021 revealed a normocellular marrow (50%) with involvement with plasma cell dyscrasia (plasma cells representing 20-30% of total cells by  stain).   · FISH was negative for gain of 1q, monosomy/deletions of chromosomes 13 and 17, IGH rearrangement, gain of chromosomes 9 and 11, IGH-CCND1 (11;14) fusion.   · Treatment with the VRD started on 12/22/2021.  · Free lambda light chain started to improve after the patient started treatment.  · CT scan on 3/11/2022 revealed decrease in the size of lymph nodes in the periaortic area.  There is a slight increase in a lymph node in the left axillary area that increased from 7 to 10 mm.    · PET scan on 4/28/2022 revealed significant reduction in the size and hypermetabolism of the involved lymph nodes.  · SPEP REGINO FLC on 5/25/2022 revealed no M protein.  Free lambda light chain was 41.8.  Kappa to lambda ratio was normal at 0.26.  B2 M was 1.6.  · Bone marrow biopsy on 5/24/2022 revealed normocellular marrow with 30-40% cellularity with involvement by plasma cell neoplasm representing 5-10% by IHC.  Negative for amyloid.  · He was considered to be FL-1.  · S/p high-dose melphalan with autologous stem cell transplant.  Day 0 was 7/7/2022.  · Patient did well with the transplant but had neutropenic fever necessitating hospitalization between 7/16/2022 and 7/18/2022.  Source of fever cannot be determined.  · Bone marrow biopsy on 9/29/2022 revealed monotypic plasma cells identified on on flow cytometry but not on IHC.  · CT on 9/29/2022 showed decrease in the size of the enlarged lymph nodes.  · Maintenance Revlimid 10 mg daily for 21 out of 28-day cycle was recommended.  · Patient started Revlimid on 11/1/2022.    · He is tolerating it well.   · We will obtain a new baseline SPEP REGINO FLC and B2M today.    *Anemia  secondary to multiple myeloma and secondary to treatment.  · Hemoglobin was 13.1 on 9/29/2021.    · Iron, folate and vitamin B12 were normal in September/October 2021.    · Hemoglobin was 11.4 on 8/24/2022.  · Hemoglobin improved to 12.8 today.    *Prophylaxis.  · He was previously on acyclovir 400 mg twice daily.    · He is now on Valtrex with plan for 1 year posttreatment.  · He received Evusheld on 8/20/2022.  · Patient received vaccinations as recommended by [facility].  · He received COVID-19 Moderna booster on 10/25/2022.  · I recommended receiving the next booster after 6 months-late April 2023.    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  · There was mild wall thickening.  Ejection fraction was normal.  · Echocardiogram at [facility] on 4/21/2022 did not reveal wall thickening.    PLAN:    1.  Continue maintenance Revlimid 10 mg daily for 21 out of 28-day cycle.    2.  Continue aspirin 81 mg daily.    3.  Continue Valtrex 500 mg twice daily.    4.  Return in 3 weeks for follow-up with NP.  He will be due to start the next cycle of Revlimid on 11/29/2022.  I recommended delaying by 1 day to be on Wednesday.  5.  Follow-up in 7 weeks.  CBC CMP SPEP REGINO FLC B2 M will be obtained.  6.  He will have follow-up CT scan at [facility] 4 months from the last studies -late January 2023.      Tima Mendez MD  11/09/22

## 2022-11-10 ENCOUNTER — SPECIALTY PHARMACY (OUTPATIENT)
Dept: PHARMACY | Facility: HOSPITAL | Age: 62
End: 2022-11-10

## 2022-11-10 LAB
ALBUMIN SERPL ELPH-MCNC: 4.3 G/DL (ref 2.9–4.4)
ALBUMIN/GLOB SERPL: 1.6 {RATIO} (ref 0.7–1.7)
ALPHA1 GLOB SERPL ELPH-MCNC: 0.2 G/DL (ref 0–0.4)
ALPHA2 GLOB SERPL ELPH-MCNC: 0.6 G/DL (ref 0.4–1)
B-GLOBULIN SERPL ELPH-MCNC: 1 G/DL (ref 0.7–1.3)
GAMMA GLOB SERPL ELPH-MCNC: 0.9 G/DL (ref 0.4–1.8)
GLOBULIN SER-MCNC: 2.8 G/DL (ref 2.2–3.9)
IGA SERPL-MCNC: 26 MG/DL (ref 90–386)
IGG SERPL-MCNC: 1026 MG/DL (ref 603–1613)
IGM SERPL-MCNC: 32 MG/DL (ref 20–172)
INTERPRETATION SERPL IEP-IMP: ABNORMAL
KAPPA LC FREE SER-MCNC: 21.7 MG/L (ref 3.3–19.4)
KAPPA LC FREE/LAMBDA FREE SER: 0.91 {RATIO} (ref 0.26–1.65)
LABORATORY COMMENT REPORT: ABNORMAL
LAMBDA LC FREE SERPL-MCNC: 23.9 MG/L (ref 5.7–26.3)
M PROTEIN SERPL ELPH-MCNC: ABNORMAL G/DL
PROT SERPL-MCNC: 7.1 G/DL (ref 6–8.5)

## 2022-11-10 NOTE — PROGRESS NOTES
Specialty Note ( revlimid)      Labs reviewed        11/9/2022   WBC 3.40 - 10.80 10*3/mm3 6.89   Neutrophils Absolute 1.70 - 7.00 10*3/mm3 3.93   Hemoglobin 13.0 - 17.7 g/dL 12.8 (A)   Hematocrit 37.5 - 51.0 % 42.4   Platelets 140 - 450 10*3/mm3 228   Creatinine 0.76 - 1.27 mg/dL 0.86   BUN 8 - 23 mg/dL 13   Sodium 136 - 145 mmol/L 137   Potassium 3.5 - 5.2 mmol/L 4.0   Glucose 65 - 99 mg/dL 79   Calcium 8.6 - 10.5 mg/dL 10.0   Albumin 3.50 - 5.20 g/dL 4.50   Total Protein 6.0 - 8.5 g/dL 7.3   AST (SGOT) 1 - 40 U/L 20   ALT (SGPT) 1 - 41 U/L 17   Alkaline Phosphatase 39 - 117 U/L 73   Total Bilirubin 0.0 - 1.2 mg/dL 0.3     Dr Mendez's dictation is noted- started maintenance Revlimid 10 mg po 21/28 days on 11/1 per [facility] recommendation ( [facility] sent in rx)  and is on Valtrex 500 mg po bid.

## 2022-11-30 ENCOUNTER — LAB (OUTPATIENT)
Dept: OTHER | Facility: HOSPITAL | Age: 62
End: 2022-11-30

## 2022-11-30 ENCOUNTER — TELEPHONE (OUTPATIENT)
Dept: ONCOLOGY | Facility: CLINIC | Age: 62
End: 2022-11-30

## 2022-11-30 ENCOUNTER — OFFICE VISIT (OUTPATIENT)
Dept: ONCOLOGY | Facility: CLINIC | Age: 62
End: 2022-11-30

## 2022-11-30 VITALS
HEIGHT: 70 IN | DIASTOLIC BLOOD PRESSURE: 75 MMHG | OXYGEN SATURATION: 100 % | RESPIRATION RATE: 18 BRPM | HEART RATE: 67 BPM | WEIGHT: 197.8 LBS | SYSTOLIC BLOOD PRESSURE: 116 MMHG | TEMPERATURE: 98.7 F | BODY MASS INDEX: 28.32 KG/M2

## 2022-11-30 DIAGNOSIS — D63.0 ANEMIA IN NEOPLASTIC DISEASE: ICD-10-CM

## 2022-11-30 DIAGNOSIS — C90.00 MULTIPLE MYELOMA NOT HAVING ACHIEVED REMISSION: ICD-10-CM

## 2022-11-30 DIAGNOSIS — C90.00 MULTIPLE MYELOMA NOT HAVING ACHIEVED REMISSION: Primary | ICD-10-CM

## 2022-11-30 DIAGNOSIS — Z79.899 ENCOUNTER FOR LONG-TERM (CURRENT) USE OF HIGH-RISK MEDICATION: ICD-10-CM

## 2022-11-30 DIAGNOSIS — E85.81 AL AMYLOIDOSIS: ICD-10-CM

## 2022-11-30 DIAGNOSIS — D84.9 IMMUNOCOMPROMISED PATIENT: ICD-10-CM

## 2022-11-30 LAB
ALBUMIN SERPL-MCNC: 4.4 G/DL (ref 3.5–5.2)
ALBUMIN/GLOB SERPL: 1.5 G/DL
ALP SERPL-CCNC: 124 U/L (ref 39–117)
ALT SERPL W P-5'-P-CCNC: 185 U/L (ref 1–41)
ANION GAP SERPL CALCULATED.3IONS-SCNC: 11.1 MMOL/L (ref 5–15)
AST SERPL-CCNC: 152 U/L (ref 1–40)
B2 MICROGLOB SERPL-MCNC: 1.7 MG/L (ref 0.8–2.2)
BASOPHILS # BLD AUTO: 0.08 10*3/MM3 (ref 0–0.2)
BASOPHILS NFR BLD AUTO: 1.5 % (ref 0–1.5)
BILIRUB SERPL-MCNC: 0.3 MG/DL (ref 0–1.2)
BUN SERPL-MCNC: 14 MG/DL (ref 8–23)
BUN/CREAT SERPL: 16.1 (ref 7–25)
CALCIUM SPEC-SCNC: 9.6 MG/DL (ref 8.6–10.5)
CHLORIDE SERPL-SCNC: 104 MMOL/L (ref 98–107)
CO2 SERPL-SCNC: 23.9 MMOL/L (ref 22–29)
CREAT SERPL-MCNC: 0.87 MG/DL (ref 0.76–1.27)
DEPRECATED RDW RBC AUTO: 39.4 FL (ref 37–54)
EGFRCR SERPLBLD CKD-EPI 2021: 98.8 ML/MIN/1.73
EOSINOPHIL # BLD AUTO: 0.31 10*3/MM3 (ref 0–0.4)
EOSINOPHIL NFR BLD AUTO: 5.7 % (ref 0.3–6.2)
ERYTHROCYTE [DISTWIDTH] IN BLOOD BY AUTOMATED COUNT: 15 % (ref 12.3–15.4)
GLOBULIN UR ELPH-MCNC: 3 GM/DL
GLUCOSE SERPL-MCNC: 96 MG/DL (ref 65–99)
HCT VFR BLD AUTO: 40.4 % (ref 37.5–51)
HGB BLD-MCNC: 12.3 G/DL (ref 13–17.7)
IMM GRANULOCYTES # BLD AUTO: 0.03 10*3/MM3 (ref 0–0.05)
IMM GRANULOCYTES NFR BLD AUTO: 0.6 % (ref 0–0.5)
LYMPHOCYTES # BLD AUTO: 1.32 10*3/MM3 (ref 0.7–3.1)
LYMPHOCYTES NFR BLD AUTO: 24.4 % (ref 19.6–45.3)
MCH RBC QN AUTO: 22.3 PG (ref 26.6–33)
MCHC RBC AUTO-ENTMCNC: 30.4 G/DL (ref 31.5–35.7)
MCV RBC AUTO: 73.3 FL (ref 79–97)
MONOCYTES # BLD AUTO: 1.02 10*3/MM3 (ref 0.1–0.9)
MONOCYTES NFR BLD AUTO: 18.8 % (ref 5–12)
NEUTROPHILS NFR BLD AUTO: 2.66 10*3/MM3 (ref 1.7–7)
NEUTROPHILS NFR BLD AUTO: 49 % (ref 42.7–76)
NRBC BLD AUTO-RTO: 0 /100 WBC (ref 0–0.2)
PLAT MORPH BLD: NORMAL
PLATELET # BLD AUTO: 270 10*3/MM3 (ref 140–450)
PMV BLD AUTO: 9.4 FL (ref 6–12)
POTASSIUM SERPL-SCNC: 3.9 MMOL/L (ref 3.5–5.2)
PROT SERPL-MCNC: 7.4 G/DL (ref 6–8.5)
RBC # BLD AUTO: 5.51 10*6/MM3 (ref 4.14–5.8)
RBC MORPH BLD: NORMAL
SODIUM SERPL-SCNC: 139 MMOL/L (ref 136–145)
WBC MORPH BLD: NORMAL
WBC NRBC COR # BLD: 5.42 10*3/MM3 (ref 3.4–10.8)

## 2022-11-30 PROCEDURE — 83521 IG LIGHT CHAINS FREE EACH: CPT | Performed by: INTERNAL MEDICINE

## 2022-11-30 PROCEDURE — 82784 ASSAY IGA/IGD/IGG/IGM EACH: CPT | Performed by: INTERNAL MEDICINE

## 2022-11-30 PROCEDURE — 82232 ASSAY OF BETA-2 PROTEIN: CPT | Performed by: INTERNAL MEDICINE

## 2022-11-30 PROCEDURE — 86334 IMMUNOFIX E-PHORESIS SERUM: CPT | Performed by: INTERNAL MEDICINE

## 2022-11-30 PROCEDURE — 99214 OFFICE O/P EST MOD 30 MIN: CPT | Performed by: NURSE PRACTITIONER

## 2022-11-30 PROCEDURE — 80053 COMPREHEN METABOLIC PANEL: CPT | Performed by: INTERNAL MEDICINE

## 2022-11-30 PROCEDURE — 85025 COMPLETE CBC W/AUTO DIFF WBC: CPT | Performed by: INTERNAL MEDICINE

## 2022-11-30 PROCEDURE — 84165 PROTEIN E-PHORESIS SERUM: CPT | Performed by: INTERNAL MEDICINE

## 2022-11-30 PROCEDURE — 36415 COLL VENOUS BLD VENIPUNCTURE: CPT

## 2022-11-30 PROCEDURE — 85007 BL SMEAR W/DIFF WBC COUNT: CPT | Performed by: INTERNAL MEDICINE

## 2022-11-30 NOTE — PROGRESS NOTES
"Subjective     CHIEF COMPLAINT:      Chief Complaint   Patient presents with   • Follow-up     No concerns     HISTORY OF PRESENT ILLNESS:     Duy Owens is a 61 y.o. male with multiple myeloma here today for lab review.  He continues on maintenance Revlimid 10 mg daily for 21 out of 28 days. He denies further issues with itching or skin rash.  He reports normal bowel function. He continues on ASA 81 mg daily and denies swelling issues.  Mr. Owens will be due to start his next cycle of Revlimid on Saturday 12/3/2022.    ROS:  Pertinent ROS is in the HPI.     Past medical, surgical, social and family history were reviewed.     MEDICATIONS:    Current Outpatient Medications:   •  aspirin 81 MG EC tablet, Take 1 tablet by mouth Daily., Disp: , Rfl:   •  lenalidomide (REVLIMID) 10 MG capsule, Take one capsule by mouth daily for 21 days of 28 day cycle. Auth#[id number] Patient Category :Adult Male, Disp: , Rfl:   •  losartan (Cozaar) 100 MG tablet, Take 1 tablet by mouth Daily., Disp: 30 tablet, Rfl: 11  •  metFORMIN (GLUCOPHAGE) 500 MG tablet, Take 500 mg by mouth 2 (Two) Times a Day., Disp: , Rfl:   •  ondansetron (ZOFRAN) 8 MG tablet, Take 1 tablet by mouth 3 (Three) Times a Day As Needed for Nausea or Vomiting., Disp: 30 tablet, Rfl: 5  •  valACYclovir (VALTREX) 500 MG tablet, Take 1 tablet by mouth 2 (Two) Times a Day., Disp: 60 tablet, Rfl: 5  •  sulfamethoxazole-trimethoprim (BACTRIM DS,SEPTRA DS) 800-160 MG per tablet, TAKE ONE TABLET BY MOUTH THREE TIMES WEEKLY, MONDAY, WEDNESDAY, AND FRIDAY, Disp: 12 tablet, Rfl: 5  Objective     VITAL SIGNS:     Vitals:    11/30/22 0919   BP: 116/75   Pulse: 67   Resp: 18   Temp: 98.7 °F (37.1 °C)   TempSrc: Temporal   SpO2: 100%   Weight: 89.7 kg (197 lb 12.8 oz)   Height: 177.8 cm (70\")   PainSc: 0-No pain     Body mass index is 28.38 kg/m².     Wt Readings from Last 5 Encounters:   11/30/22 89.7 kg (197 lb 12.8 oz)   11/09/22 89.2 kg (196 lb 11.2 oz)   09/23/22 87.8 kg " (193 lb 9.6 oz)   08/24/22 88.2 kg (194 lb 8 oz)   06/08/22 93.9 kg (207 lb 1.6 oz)     Physical Exam  Vitals and nursing note reviewed.   Constitutional:       Appearance: Normal appearance. He is well-developed.   HENT:      Head: Normocephalic and atraumatic.      Nose: Nose normal.   Eyes:      Pupils: Pupils are equal, round, and reactive to light.   Cardiovascular:      Rate and Rhythm: Normal rate and regular rhythm.      Heart sounds: Normal heart sounds.   Pulmonary:      Effort: Pulmonary effort is normal. No respiratory distress.      Breath sounds: Normal breath sounds. No wheezing, rhonchi or rales.   Abdominal:      General: Bowel sounds are normal. There is no distension.      Palpations: Abdomen is soft.      Tenderness: There is no abdominal tenderness.   Musculoskeletal:         General: Normal range of motion.      Cervical back: Normal range of motion and neck supple.   Skin:     General: Skin is warm and dry.   Neurological:      Mental Status: He is alert and oriented to person, place, and time.   Psychiatric:         Behavior: Behavior normal.         DIAGNOSTIC DATA:     Results from last 7 days   Lab Units 11/30/22  0904   WBC 10*3/mm3 5.42   NEUTROS ABS 10*3/mm3 2.66   HEMOGLOBIN g/dL 12.3*   HEMATOCRIT % 40.4   PLATELETS 10*3/mm3 270           Component      Latest Ref Rng & Units 9/23/2022   IgG      603 - 1613 mg/dL 826   IgA      90 - 386 mg/dL 19 (L)   IgM      20 - 172 mg/dL 26   Total Protein      6.0 - 8.5 g/dL 6.8   Albumin      2.9 - 4.4 g/dL 4.1   Alpha-1-Globulin      0.0 - 0.4 g/dL 0.2   Alpha-2-Globulin      0.4 - 1.0 g/dL 0.6   Beta Globulin      0.7 - 1.3 g/dL 1.0   Gamma Globulin      0.4 - 1.8 g/dL 0.9   M-Werner      Not Observed g/dL Not Observed   Globulin      2.2 - 3.9 g/dL 2.7   A/G Ratio      0.7 - 1.7 1.6   Immunofixation Reflex, Serum       Comment:   Please note       Comment   Kappa FLC      3.3 - 19.4 mg/L 14.6   Free Lambda Light Chains      5.7 - 26.3 mg/L  24.7   Kappa/Lambda Ratio      0.26 - 1.65 0.59     Pathology exam from 9/29/2022:  BONE MARROW - PERIPHERAL BLOOD SMEAR, ASPIRATE SMEAR, PARTICLE PREPARATION AND BIOPSY: LIMITED SAMPLE; CELLULAR MARROW WITH TRILINEAGE HEMATOPOIESIS; MINUTE POPULATION OF MONOTYPIC PLASMA CELLS BY FLOW CYTOMETRY.     IMPRESSION:   Evaluation of this specimen is limited by a small fragmented core biopsy. The marrow is cellular with trilineage hematopoiesis. There is no increase in plasma cells by morphology or immunohistochemistry. However, flow cytometry detects a minute population of monotypic and immunophenotypically aberrant plasma cells, consistent with minimal persistence of the patient's previously diagnosed plasma cell neoplasm.    CT scan on 9/29/2022:  Compared to 10/5/2021, interval decrease in bulky retroperitoneal and pelvic adenopathy with multiple now partially calcified lymph nodes. Index lymph nodes as follows (measured on series 7):   - Shivani conglomerate aortocaval region measuring 3.3 x 3.7 cm per previously 5.6 x 4.7 cm (image 124).   - Anterior aortocaval measuring 1.6 cm short axis, previously 2.9 cm (image 134).   -Left pelvic sidewall 0.7 cm, previously 1.8 cm (image 173).     Assessment & Plan    *Lambda light chain multiple myeloma with lymphadenopathy and development of AL amyloidosis.  · Patient was found to have lymph node involvement and amyloid deposition in the involved lymph nodes.  · Patient started having dry cough around July 2021.    · CT scans 9/23/2021-9/24/2021 revealed inferior mediastinal adenopathy and retroperitoneal adenopathy extending to the right iliac chain.  · The Largest lymph node was in the retroperitoneal area measuring 4.8 x 3.5 cm.  The common iliac lymph node measured 3.5 x 2.7 cm.  The left external iliac chain lymph node measured 2.9 x 2 cm.    · PET scan on 10/5/2021 revealed the retroperitoneal and left pelvic lymphadenopathy to be hypermetabolic.  The left periaortic lymph  nodes had SUV of 6.9 and the left pelvic sidewall lymph nodes had an SUV of 5.4.  No bone involvement.  · CT-guided biopsy on 10/6/2021- pathologist at [facility] reported involvement with monotypic lambda restricted plasma cells concerning for involvement with plasma cell dyscrasia.  · Bone marrow biopsy on 10/29/2021 revealed a normocellular marrow (50%) with involvement with plasma cell dyscrasia (plasma cells representing 20-30% of total cells by  stain).   · FISH was negative for gain of 1q, monosomy/deletions of chromosomes 13 and 17, IGH rearrangement, gain of chromosomes 9 and 11, IGH-CCND1 (11;14) fusion.   · Treatment with the VRD started on 12/22/2021.  · Free lambda light chain started to improve after the patient started treatment.  · CT scan on 3/11/2022 revealed decrease in the size of lymph nodes in the periaortic area.  There is a slight increase in a lymph node in the left axillary area that increased from 7 to 10 mm.    · PET scan on 4/28/2022 revealed significant reduction in the size and hypermetabolism of the involved lymph nodes.  · SPEP REGINO FLC on 5/25/2022 revealed no M protein.  Free lambda light chain was 41.8.  Kappa to lambda ratio was normal at 0.26.  B2 M was 1.6.  · Bone marrow biopsy on 5/24/2022 revealed normocellular marrow with 30-40% cellularity with involvement by plasma cell neoplasm representing 5-10% by IHC.  Negative for amyloid.  · He was considered to be OH-1.  · S/p high-dose melphalan with autologous stem cell transplant.  Day 0 was 7/7/2022.  · Patient did well with the transplant but had neutropenic fever necessitating hospitalization between 7/16/2022 and 7/18/2022.  Source of fever cannot be determined.  · Bone marrow biopsy on 9/29/2022 revealed monotypic plasma cells identified on on flow cytometry but not on IHC.  · CT on 9/29/2022 showed decrease in the size of the enlarged lymph nodes.  · Maintenance Revlimid 10 mg daily for 21 out of 28-day cycle was  recommended.  · Patient started Revlimid on 11/1/2022.    · He is tolerating it well.   · We will obtain a new baseline SPEP REGINO FLC and B2M today.  · Continues on maintenance Revlimid tolerating well.      *Anemia secondary to multiple myeloma and secondary to treatment.  · Hemoglobin was 13.1 on 9/29/2021.    · Iron, folate and vitamin B12 were normal in September/October 2021.    · Hemoglobin was 11.4 on 8/24/2022.  · Hemoglobin improved to 12.8 11/9/2022  · 11/30/2022 Hgb 12.3    *Prophylaxis.  · He was previously on acyclovir 400 mg twice daily.    · He is now on Valtrex with plan for 1 year posttreatment.  · He received Evusheld on 8/20/2022.  · Patient received vaccinations as recommended by [facility].  · He received COVID-19 Moderna booster on 10/25/2022.  · I recommended receiving the next booster after 6 months-late April 2023.    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  · There was mild wall thickening.  Ejection fraction was normal.  · Echocardiogram at [facility] on 4/21/2022 did not reveal wall thickening.    PLAN:    1. Continue maintenance Revlimid 10 mg daily for 21 out of 28 days.  He states he is due to start his next cycle on 12/3/2022.  2. Continue ASA 81 mg daily.  3. Continue Valtrex 500 mg twice daily.   4. Follow up with Dr. Mendez in 4 weeks with repeat  CBC CMP SPEP REGINO FLC B2 M.6.    5. He will have follow-up CT scan at [facility] 4 months from the last studies -late January 2023.  6. Call/ return sooner should he develop any new concerns or problems.      Patient is on a high risk medication requiring close monitoring for toxicity.    Tita Murphy, APRN  11/30/22

## 2022-12-01 ENCOUNTER — TELEPHONE (OUTPATIENT)
Dept: ONCOLOGY | Facility: CLINIC | Age: 62
End: 2022-12-01

## 2022-12-01 ENCOUNTER — SPECIALTY PHARMACY (OUTPATIENT)
Dept: PHARMACY | Facility: HOSPITAL | Age: 62
End: 2022-12-01

## 2022-12-01 RX ORDER — LENALIDOMIDE 10 MG/1
10 CAPSULE ORAL DAILY
Qty: 21 CAPSULE | Refills: 0 | Status: SHIPPED | OUTPATIENT
Start: 2022-12-01 | End: 2022-12-02 | Stop reason: SDUPTHER

## 2022-12-01 RX ORDER — LENALIDOMIDE 10 MG/1
10 CAPSULE ORAL DAILY
Qty: 21 CAPSULE | Refills: 0 | Status: SHIPPED | OUTPATIENT
Start: 2022-12-01 | End: 2022-12-01 | Stop reason: SDUPTHER

## 2022-12-01 NOTE — PROGRESS NOTES
Specialty Note ( Revlimid)      Labs reviewed      11/30/2022   WBC 3.40 - 10.80 10*3/mm3 5.42   Neutrophils Absolute 1.70 - 7.00 10*3/mm3 2.66   Hemoglobin 13.0 - 17.7 g/dL 12.3 (A)   Hematocrit 37.5 - 51.0 % 40.4   Platelets 140 - 450 10*3/mm3 270   Creatinine 0.76 - 1.27 mg/dL 0.87   BUN 8 - 23 mg/dL 14   Sodium 136 - 145 mmol/L 139   Potassium 3.5 - 5.2 mmol/L 3.9   Glucose 65 - 99 mg/dL 96   Calcium 8.6 - 10.5 mg/dL 9.6   Albumin 3.50 - 5.20 g/dL 4.40   Total Protein 6.0 - 8.5 g/dL 7.4   AST (SGOT) 1 - 40 U/L 152 (A)   ALT (SGPT) 1 - 41 U/L 185 (A)   Alkaline Phosphatase 39 - 117 U/L 124 (A)   Total Bilirubin 0.0 - 1.2 mg/dL 0.3     APRN dictation is noted    1. Continue maintenance Revlimid 10 mg daily for 21 out of 28 days.  He states he is due to start his next cycle on 12/3/2022.  2. Continue ASA 81 mg daily.  3. Continue Valtrex 500 mg twice daily.     [facility] is no longer assuming responsibility for Revlimid.  THOMAS sent an rx to [facility] this am, but Duy called the office and needs it sent to [facility].  Spoke with Duy this evening and explained that we have escribed rx to [facility] and will call then tomorrow asking for expedited delivery.

## 2022-12-01 NOTE — TELEPHONE ENCOUNTER
Caller: Duy Owens    Relationship: Self    Best call back number: [phone]      Who are you requesting to speak with (clinical staff, provider,  specific staff member): CLINICAL      What was the call regarding: PT CALLED STATES HE IS OUT OF THE REVLIMID AND NEEDS TO START 12-2-22. ADVISED PT THAT [facility] BUT HE STATED THAT ITS [facility]PPOSED TO BE CALLED IN TO [facility] # [phone].     Do you require a callback: YES PLEASE CALL PATIENT TO ADVISE

## 2022-12-01 NOTE — TELEPHONE ENCOUNTER
Caller: JOEY    Relationship to patient: SELF    Best call back number: [phone]    Patient is needing: TO CHECK ON STATUS OF REFILL FOR REVLIMID. PT IS OUT OF REVLIMID AND SHOULD START TAKING MEDICATION TOMORROW, Friday, 12-2-22.     HE CALLED [facility] AND THEY SAID TO GET IN CONTACT WITH DR. MESSER. PLEASE CALL PATIENT WITH UPDATE ASAP.

## 2022-12-02 ENCOUNTER — DOCUMENTATION (OUTPATIENT)
Dept: PHARMACY | Facility: HOSPITAL | Age: 62
End: 2022-12-02

## 2022-12-02 LAB
ALBUMIN SERPL ELPH-MCNC: 3.9 G/DL (ref 2.9–4.4)
ALBUMIN/GLOB SERPL: 1.4 {RATIO} (ref 0.7–1.7)
ALPHA1 GLOB SERPL ELPH-MCNC: 0.3 G/DL (ref 0–0.4)
ALPHA2 GLOB SERPL ELPH-MCNC: 0.6 G/DL (ref 0.4–1)
B-GLOBULIN SERPL ELPH-MCNC: 1 G/DL (ref 0.7–1.3)
GAMMA GLOB SERPL ELPH-MCNC: 0.9 G/DL (ref 0.4–1.8)
GLOBULIN SER-MCNC: 2.8 G/DL (ref 2.2–3.9)
IGA SERPL-MCNC: 24 MG/DL (ref 90–386)
IGG SERPL-MCNC: 1034 MG/DL (ref 603–1613)
IGM SERPL-MCNC: 38 MG/DL (ref 20–172)
INTERPRETATION SERPL IEP-IMP: ABNORMAL
KAPPA LC FREE SER-MCNC: 18.4 MG/L (ref 3.3–19.4)
KAPPA LC FREE/LAMBDA FREE SER: 0.7 {RATIO} (ref 0.26–1.65)
LABORATORY COMMENT REPORT: ABNORMAL
LAMBDA LC FREE SERPL-MCNC: 26.3 MG/L (ref 5.7–26.3)
M PROTEIN SERPL ELPH-MCNC: ABNORMAL G/DL
PROT SERPL-MCNC: 6.7 G/DL (ref 6–8.5)

## 2022-12-02 RX ORDER — LENALIDOMIDE 10 MG/1
10 CAPSULE ORAL DAILY
Qty: 21 CAPSULE | Refills: 0 | Status: SHIPPED | OUTPATIENT
Start: 2022-12-02 | End: 2022-12-23

## 2022-12-02 NOTE — PROGRESS NOTES
The following Patient Call  Message was forwarded to my attention:      Caller: Duy Owens     Relationship: Self     Best call back number: [phone]        Who are you requesting to speak with (clinical staff, provider,  specific staff member): CLINICAL        What was the call regarding: PT CALLED STATES HE IS OUT OF THE REVLIMID AND NEEDS TO START 12-2-22. ADVISED PT THAT [facility] BUT HE STATED THAT ITS SUPPOSED TO BE CALLED IN TO [facility] # [phone].      Do you require a callback: YES PLEASE CALL PATIENT TO ADVISE    Per Taina @ [facility] @ [phone], Mr. Owens shipment has been scheduled to arrive on 12/3/22.    Diane Higginbotham - Care Coordinator   12/2/2022  17:09 EST

## 2022-12-06 ENCOUNTER — SPECIALTY PHARMACY (OUTPATIENT)
Dept: PHARMACY | Facility: HOSPITAL | Age: 62
End: 2022-12-06

## 2022-12-23 ENCOUNTER — SPECIALTY PHARMACY (OUTPATIENT)
Dept: PHARMACY | Facility: HOSPITAL | Age: 62
End: 2022-12-23

## 2022-12-23 ENCOUNTER — DOCUMENTATION (OUTPATIENT)
Dept: PHARMACY | Facility: HOSPITAL | Age: 62
End: 2022-12-23

## 2022-12-23 RX ORDER — LENALIDOMIDE 10 MG/1
10 CAPSULE ORAL DAILY
Qty: 21 CAPSULE | Refills: 0 | Status: SHIPPED | OUTPATIENT
Start: 2022-12-23 | End: 2023-01-20 | Stop reason: SDUPTHER

## 2022-12-23 NOTE — PROGRESS NOTES
I have submitted a PA renewal request for revlimid through the CoverMyMeds web-site and I'm waiting on a determination.     Diane Higginbotham - Care Coordinator   12/23/2022  14:33 EST

## 2022-12-23 NOTE — PROGRESS NOTES
Refill due per [facility]. Per last chart note, patient to continue Revlimid 10 mg daily for 21 of 28 days. Will route to MD for cosignature.    Figueroa Pleitez, PharmD, [facility]  12/23/2022 09:01 EST

## 2022-12-26 ENCOUNTER — DOCUMENTATION (OUTPATIENT)
Dept: PHARMACY | Facility: HOSPITAL | Age: 62
End: 2022-12-26

## 2022-12-26 NOTE — PROGRESS NOTES
The Prior Authorization for Lenalidomide is approved from 12/23/22 to 12/23/23.     Diane Higginbotham - Care Coordinator   12/26/2022  13:57 EST

## 2022-12-28 ENCOUNTER — OFFICE VISIT (OUTPATIENT)
Dept: ONCOLOGY | Facility: CLINIC | Age: 62
End: 2022-12-28

## 2022-12-28 ENCOUNTER — LAB (OUTPATIENT)
Dept: OTHER | Facility: HOSPITAL | Age: 62
End: 2022-12-28
Payer: COMMERCIAL

## 2022-12-28 VITALS
HEIGHT: 70 IN | OXYGEN SATURATION: 100 % | HEART RATE: 66 BPM | WEIGHT: 196.6 LBS | TEMPERATURE: 97.5 F | SYSTOLIC BLOOD PRESSURE: 142 MMHG | DIASTOLIC BLOOD PRESSURE: 78 MMHG | RESPIRATION RATE: 18 BRPM | BODY MASS INDEX: 28.15 KG/M2

## 2022-12-28 DIAGNOSIS — D63.0 ANEMIA IN NEOPLASTIC DISEASE: ICD-10-CM

## 2022-12-28 DIAGNOSIS — Z79.899 ENCOUNTER FOR LONG-TERM (CURRENT) USE OF HIGH-RISK MEDICATION: ICD-10-CM

## 2022-12-28 DIAGNOSIS — E85.81 AL AMYLOIDOSIS: ICD-10-CM

## 2022-12-28 DIAGNOSIS — D64.81 ANEMIA DUE TO CHEMOTHERAPY: ICD-10-CM

## 2022-12-28 DIAGNOSIS — R74.8 ELEVATED LIVER ENZYMES: ICD-10-CM

## 2022-12-28 DIAGNOSIS — D84.9 IMMUNOCOMPROMISED PATIENT: ICD-10-CM

## 2022-12-28 DIAGNOSIS — T45.1X5A ANEMIA DUE TO CHEMOTHERAPY: ICD-10-CM

## 2022-12-28 DIAGNOSIS — C90.00 MULTIPLE MYELOMA NOT HAVING ACHIEVED REMISSION: Primary | ICD-10-CM

## 2022-12-28 DIAGNOSIS — C90.00 MULTIPLE MYELOMA NOT HAVING ACHIEVED REMISSION: ICD-10-CM

## 2022-12-28 LAB
ALBUMIN SERPL-MCNC: 4.2 G/DL (ref 3.5–5.2)
ALBUMIN/GLOB SERPL: 1.4 G/DL
ALP SERPL-CCNC: 99 U/L (ref 39–117)
ALT SERPL W P-5'-P-CCNC: 20 U/L (ref 1–41)
ANION GAP SERPL CALCULATED.3IONS-SCNC: 8.6 MMOL/L (ref 5–15)
AST SERPL-CCNC: 18 U/L (ref 1–40)
B2 MICROGLOB SERPL-MCNC: 1.5 MG/L (ref 0.8–2.2)
BASOPHILS # BLD AUTO: 0.04 10*3/MM3 (ref 0–0.2)
BASOPHILS NFR BLD AUTO: 0.7 % (ref 0–1.5)
BILIRUB SERPL-MCNC: 0.3 MG/DL (ref 0–1.2)
BUN SERPL-MCNC: 12 MG/DL (ref 8–23)
BUN/CREAT SERPL: 14.1 (ref 7–25)
CALCIUM SPEC-SCNC: 9.5 MG/DL (ref 8.6–10.5)
CHLORIDE SERPL-SCNC: 105 MMOL/L (ref 98–107)
CO2 SERPL-SCNC: 25.4 MMOL/L (ref 22–29)
CREAT SERPL-MCNC: 0.85 MG/DL (ref 0.76–1.27)
DEPRECATED RDW RBC AUTO: 43.7 FL (ref 37–54)
EGFRCR SERPLBLD CKD-EPI 2021: 98.9 ML/MIN/1.73
EOSINOPHIL # BLD AUTO: 0.56 10*3/MM3 (ref 0–0.4)
EOSINOPHIL NFR BLD AUTO: 10.5 % (ref 0.3–6.2)
ERYTHROCYTE [DISTWIDTH] IN BLOOD BY AUTOMATED COUNT: 16.4 % (ref 12.3–15.4)
GLOBULIN UR ELPH-MCNC: 3 GM/DL
GLUCOSE SERPL-MCNC: 135 MG/DL (ref 65–99)
HCT VFR BLD AUTO: 40.8 % (ref 37.5–51)
HGB BLD-MCNC: 12.2 G/DL (ref 13–17.7)
IMM GRANULOCYTES # BLD AUTO: 0.04 10*3/MM3 (ref 0–0.05)
IMM GRANULOCYTES NFR BLD AUTO: 0.7 % (ref 0–0.5)
LYMPHOCYTES # BLD AUTO: 1.54 10*3/MM3 (ref 0.7–3.1)
LYMPHOCYTES NFR BLD AUTO: 28.8 % (ref 19.6–45.3)
MCH RBC QN AUTO: 22.5 PG (ref 26.6–33)
MCHC RBC AUTO-ENTMCNC: 29.9 G/DL (ref 31.5–35.7)
MCV RBC AUTO: 75.1 FL (ref 79–97)
MONOCYTES # BLD AUTO: 0.72 10*3/MM3 (ref 0.1–0.9)
MONOCYTES NFR BLD AUTO: 13.5 % (ref 5–12)
NEUTROPHILS NFR BLD AUTO: 2.45 10*3/MM3 (ref 1.7–7)
NEUTROPHILS NFR BLD AUTO: 45.8 % (ref 42.7–76)
NRBC BLD AUTO-RTO: 0 /100 WBC (ref 0–0.2)
PLATELET # BLD AUTO: 258 10*3/MM3 (ref 140–450)
PMV BLD AUTO: 9 FL (ref 6–12)
POTASSIUM SERPL-SCNC: 3.7 MMOL/L (ref 3.5–5.2)
PROT SERPL-MCNC: 7.2 G/DL (ref 6–8.5)
RBC # BLD AUTO: 5.43 10*6/MM3 (ref 4.14–5.8)
SODIUM SERPL-SCNC: 139 MMOL/L (ref 136–145)
WBC NRBC COR # BLD: 5.35 10*3/MM3 (ref 3.4–10.8)

## 2022-12-28 PROCEDURE — 83521 IG LIGHT CHAINS FREE EACH: CPT | Performed by: INTERNAL MEDICINE

## 2022-12-28 PROCEDURE — 36415 COLL VENOUS BLD VENIPUNCTURE: CPT

## 2022-12-28 PROCEDURE — 82784 ASSAY IGA/IGD/IGG/IGM EACH: CPT | Performed by: INTERNAL MEDICINE

## 2022-12-28 PROCEDURE — 84165 PROTEIN E-PHORESIS SERUM: CPT | Performed by: INTERNAL MEDICINE

## 2022-12-28 PROCEDURE — 99214 OFFICE O/P EST MOD 30 MIN: CPT | Performed by: INTERNAL MEDICINE

## 2022-12-28 PROCEDURE — 82232 ASSAY OF BETA-2 PROTEIN: CPT | Performed by: INTERNAL MEDICINE

## 2022-12-28 PROCEDURE — 86334 IMMUNOFIX E-PHORESIS SERUM: CPT | Performed by: INTERNAL MEDICINE

## 2022-12-28 PROCEDURE — 85025 COMPLETE CBC W/AUTO DIFF WBC: CPT | Performed by: INTERNAL MEDICINE

## 2022-12-28 PROCEDURE — 80053 COMPREHEN METABOLIC PANEL: CPT | Performed by: INTERNAL MEDICINE

## 2022-12-28 NOTE — PROGRESS NOTES
"Subjective     CHIEF COMPLAINT:      Chief Complaint   Patient presents with   • Follow-up     HISTORY OF PRESENT ILLNESS:     Duy Owens is a 62 y.o. male patient who returns today for follow up on his multiple myeloma.  He returns today for follow-up reporting no new symptoms.  He reports that he occasionally feels cold.  His energy level is good.  He is tolerating his medicines.  No problem with nausea vomiting.  He recently had an episode of diarrhea but suspects that it was due to food he ate.  No persistent diarrhea.    Patient did not have any recent infections.    ROS:  Pertinent ROS is in the HPI.     Past medical, surgical, social and family history were reviewed.     MEDICATIONS:    Current Outpatient Medications:   •  aspirin 81 MG EC tablet, Take 1 tablet by mouth Daily., Disp: , Rfl:   •  lenalidomide (REVLIMID) 10 MG capsule, Take 1 capsule by mouth Daily. Take for 21 days on, then 7 days off, then repeat., Disp: 21 capsule, Rfl: 0  •  losartan (Cozaar) 100 MG tablet, Take 1 tablet by mouth Daily., Disp: 30 tablet, Rfl: 11  •  metFORMIN (GLUCOPHAGE) 500 MG tablet, Take 500 mg by mouth 2 (Two) Times a Day., Disp: , Rfl:   •  ondansetron (ZOFRAN) 8 MG tablet, Take 1 tablet by mouth 3 (Three) Times a Day As Needed for Nausea or Vomiting., Disp: 30 tablet, Rfl: 5  •  sulfamethoxazole-trimethoprim (BACTRIM DS,SEPTRA DS) 800-160 MG per tablet, TAKE ONE TABLET BY MOUTH THREE TIMES WEEKLY, MONDAY, WEDNESDAY, AND FRIDAY, Disp: 12 tablet, Rfl: 5  •  valACYclovir (VALTREX) 500 MG tablet, Take 1 tablet by mouth 2 (Two) Times a Day., Disp: 60 tablet, Rfl: 5  Objective     VITAL SIGNS:     Vitals:    12/28/22 0900   BP: 142/78   Pulse: 66   Resp: 18   Temp: 97.5 °F (36.4 °C)   TempSrc: Temporal   SpO2: 100%   Weight: 89.2 kg (196 lb 9.6 oz)   Height: 177.8 cm (70\")   PainSc: 0-No pain     Body mass index is 28.21 kg/m².     Wt Readings from Last 5 Encounters:   12/28/22 89.2 kg (196 lb 9.6 oz)   11/30/22 89.7 " kg (197 lb 12.8 oz)   11/09/22 89.2 kg (196 lb 11.2 oz)   09/23/22 87.8 kg (193 lb 9.6 oz)   08/24/22 88.2 kg (194 lb 8 oz)     PHYSICAL EXAMINATION:   GENERAL: The patient appears in good general condition, not in acute distress.   SKIN: No ecchymosis.  EYES: No jaundice. No pallor.  LYMPHATICS: No cervical lymphadenopathy.  CHEST: Normal respiratory effort.  Lungs clear bilaterally.  No added sounds.  CVS: Normal S1-S2.  No murmurs.  ABDOMEN: Soft. No tenderness. No Hepatomegaly. No Splenomegaly. No masses.  EXTREMITIES: No edema    DIAGNOSTIC DATA:     Results from last 7 days   Lab Units 12/28/22  0854   WBC 10*3/mm3 5.35   NEUTROS ABS 10*3/mm3 2.45   HEMOGLOBIN g/dL 12.2*   HEMATOCRIT % 40.8   PLATELETS 10*3/mm3 258     Results from last 7 days   Lab Units 12/28/22  0854   SODIUM mmol/L 139   POTASSIUM mmol/L 3.7   CHLORIDE mmol/L 105   CO2 mmol/L 25.4   BUN mg/dL 12   CREATININE mg/dL 0.85   CALCIUM mg/dL 9.5   ALBUMIN g/dL 4.2   BILIRUBIN mg/dL 0.3   ALK PHOS U/L 99   ALT (SGPT) U/L 20   AST (SGOT) U/L 18   GLUCOSE mg/dL 135*     Component      Latest Ref Rng & Units 11/9/2022 11/30/2022   IgG      603 - 1613 mg/dL 1026 1034   IgA      90 - 386 mg/dL 26 (L) 24 (L)   IgM      20 - 172 mg/dL 32 38   Total Protein      6.0 - 8.5 g/dL 7.1 6.7   Albumin      2.9 - 4.4 g/dL 4.3 3.9   Alpha-1-Globulin      0.0 - 0.4 g/dL 0.2 0.3   Alpha-2-Globulin      0.4 - 1.0 g/dL 0.6 0.6   Beta Globulin      0.7 - 1.3 g/dL 1.0 1.0   Gamma Globulin      0.4 - 1.8 g/dL 0.9 0.9   M-Werner      Not Observed g/dL Not Observed Not Observed   Globulin      2.2 - 3.9 g/dL 2.8 2.8   A/G Ratio      0.7 - 1.7 1.6 1.4   Immunofixation Reflex, Serum       Comment: Comment   Please note       Comment Comment   Kappa FLC      3.3 - 19.4 mg/L 21.7 (H) 18.4   Free Lambda Light Chains      5.7 - 26.3 mg/L 23.9 26.3   Kappa/Lambda Ratio      0.26 - 1.65 0.91 0.70     Component      Latest Ref Rng & Units 11/9/2022 11/30/2022 12/28/2022   Beta-2  Microglobulin      0.8 - 2.2 mg/L 1.4 1.7 1.5     Assessment & Plan    *Lambda light chain multiple myeloma with lymphadenopathy and development of AL amyloidosis.  · Patient was found to have lymph node involvement and amyloid deposition in the involved lymph nodes.  · Patient started having dry cough around July 2021.    · CT scans 9/23/2021-9/24/2021 revealed inferior mediastinal adenopathy and retroperitoneal adenopathy extending to the right iliac chain.  · The Largest lymph node was in the retroperitoneal area measuring 4.8 x 3.5 cm.  The common iliac lymph node measured 3.5 x 2.7 cm.  The left external iliac chain lymph node measured 2.9 x 2 cm.    · PET scan on 10/5/2021 revealed the retroperitoneal and left pelvic lymphadenopathy to be hypermetabolic.  The left periaortic lymph nodes had SUV of 6.9 and the left pelvic sidewall lymph nodes had an SUV of 5.4.  No bone involvement.  · CT-guided biopsy on 10/6/2021- pathologist at [facility] reported involvement with monotypic lambda restricted plasma cells concerning for involvement with plasma cell dyscrasia.  · Bone marrow biopsy on 10/29/2021 revealed a normocellular marrow (50%) with involvement with plasma cell dyscrasia (plasma cells representing 20-30% of total cells by  stain).   · FISH was negative for gain of 1q, monosomy/deletions of chromosomes 13 and 17, IGH rearrangement, gain of chromosomes 9 and 11, IGH-CCND1 (11;14) fusion.   · Treatment with the VRD started on 12/22/2021.  · Free lambda light chain started to improve after the patient started treatment.  · CT scan on 3/11/2022 revealed decrease in the size of lymph nodes in the periaortic area.  There is a slight increase in a lymph node in the left axillary area that increased from 7 to 10 mm.    · PET scan on 4/28/2022 revealed significant reduction in the size and hypermetabolism of the involved lymph nodes.  · SPEP REGINO FLC on 5/25/2022 revealed no M protein.  Free lambda light  chain was 41.8.  Kappa to lambda ratio was normal at 0.26.  B2 M was 1.6.  · Bone marrow biopsy on 5/24/2022 revealed normocellular marrow with 30-40% cellularity with involvement by plasma cell neoplasm representing 5-10% by IHC.  Negative for amyloid.  · He was considered to be FL-1.  · S/p high-dose melphalan with autologous stem cell transplant.  Day 0 was 7/7/2022.  · Patient did well with the transplant but had neutropenic fever of unclear source necessitating hospitalization between 7/16/2022 and 7/18/2022.    · Bone marrow biopsy on 9/29/2022 revealed monotypic plasma cells identified on on flow cytometry but not on IHC.  · CT on 9/29/2022 showed decrease in the size of the enlarged lymph nodes.  · Patient started Revlimid 10 mg daily for 21 out of 28-day cycle on 11/1/2022.    · Please tolerating Revlimid except for mild anemia.  · No evidence of monoclonal protein on 11/30/2022.  B2 M was normal.    *Anemia secondary to multiple myeloma and secondary to treatment.  · Hemoglobin was 13.1 on 9/29/2021.    · Iron, folate and vitamin B12 were normal in September/October 2021.    · Hemoglobin was 11.4 on 8/24/2022.  · Hemoglobin is currently in the 12 g range.  · Hemoglobin is 12.2 today.    *Elevated liver enzymes.  · AST was 152 and ALT was 185 on 11/30/2022.  · Liver enzymes are normal today.  · The elevation of the liver enzymes was likely secondary to alcohol consumption.  Recommended reducing intake of alcohol.    *Prophylaxis.  · He was previously on acyclovir 400 mg twice daily.    · He is now on Valtrex with plan for 1 year posttreatment.  · He received Evusheld on 8/20/2022.  · Patient received vaccinations as recommended by [facility].  · He received COVID-19 Moderna booster on 10/25/2022.  · I recommended receiving the next booster after 6 months-late April 2023.    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  · There was mild wall thickening.  Ejection fraction was  normal.  · Echocardiogram at [facility] on 4/21/2022 did not reveal wall thickening.    PLAN:    1.  Continue maintenance Revlimid 10 mg daily for 21 days of a 28-day cycle.  He will start the new cycle on 12/30/2022.  2.  Continue Valtrex 500 mg twice daily.  3.  Continue aspirin 81 mg daily.  4.  He has follow-up with the transplant team at [facility] in 3 weeks.  He is going to have follow-up CT scans done.  5.  I will see him in follow-up after his visit at [facility].  We will obtain CBC and CMP.      Tima Mendez MD  12/28/22

## 2022-12-29 LAB
ALBUMIN SERPL ELPH-MCNC: 3.9 G/DL (ref 2.9–4.4)
ALBUMIN/GLOB SERPL: 1.4 {RATIO} (ref 0.7–1.7)
ALPHA1 GLOB SERPL ELPH-MCNC: 0.3 G/DL (ref 0–0.4)
ALPHA2 GLOB SERPL ELPH-MCNC: 0.6 G/DL (ref 0.4–1)
B-GLOBULIN SERPL ELPH-MCNC: 1 G/DL (ref 0.7–1.3)
GAMMA GLOB SERPL ELPH-MCNC: 1 G/DL (ref 0.4–1.8)
GLOBULIN SER-MCNC: 2.8 G/DL (ref 2.2–3.9)
IGA SERPL-MCNC: 44 MG/DL (ref 61–437)
IGG SERPL-MCNC: 1086 MG/DL (ref 603–1613)
IGM SERPL-MCNC: 38 MG/DL (ref 20–172)
INTERPRETATION SERPL IEP-IMP: ABNORMAL
KAPPA LC FREE SER-MCNC: 23.3 MG/L (ref 3.3–19.4)
KAPPA LC FREE/LAMBDA FREE SER: 0.76 {RATIO} (ref 0.26–1.65)
LABORATORY COMMENT REPORT: ABNORMAL
LAMBDA LC FREE SERPL-MCNC: 30.8 MG/L (ref 5.7–26.3)
M PROTEIN SERPL ELPH-MCNC: ABNORMAL G/DL
PROT SERPL-MCNC: 6.7 G/DL (ref 6–8.5)

## 2022-12-30 DIAGNOSIS — I10 HYPERTENSION, ESSENTIAL: ICD-10-CM

## 2022-12-30 RX ORDER — LOSARTAN POTASSIUM 100 MG/1
TABLET ORAL
Qty: 30 TABLET | Refills: 11 | Status: SHIPPED | OUTPATIENT
Start: 2022-12-30

## 2023-01-20 RX ORDER — LENALIDOMIDE 10 MG/1
10 CAPSULE ORAL DAILY
Qty: 21 CAPSULE | Refills: 0 | Status: SHIPPED | OUTPATIENT
Start: 2023-01-20 | End: 2023-01-24 | Stop reason: SDUPTHER

## 2023-01-24 ENCOUNTER — SPECIALTY PHARMACY (OUTPATIENT)
Dept: PHARMACY | Facility: HOSPITAL | Age: 63
End: 2023-01-24
Payer: COMMERCIAL

## 2023-01-24 RX ORDER — LENALIDOMIDE 10 MG/1
10 CAPSULE ORAL DAILY
Qty: 21 CAPSULE | Refills: 0 | Status: SHIPPED | OUTPATIENT
Start: 2023-01-24 | End: 2023-02-16 | Stop reason: SDUPTHER

## 2023-01-24 NOTE — PROGRESS NOTES
Specialty Note ( Revlimid)    Duy called the [facility] office and stated that [facility] told him they needed a new rx sent and could not schedule his delivery.  An rx was escribed on 1/20/23 and shows receipt confirmed from [facility].  [facility] called [facility] and they verified that they do indeed have the rx, and they did a 3 way call to schedule Revlimid delivery with Duy.      Duy then called back to the [facility] office stating [facility]- this was changed as requested.

## 2023-02-01 ENCOUNTER — OFFICE VISIT (OUTPATIENT)
Dept: ONCOLOGY | Facility: CLINIC | Age: 63
End: 2023-02-01
Payer: COMMERCIAL

## 2023-02-01 ENCOUNTER — LAB (OUTPATIENT)
Dept: OTHER | Facility: HOSPITAL | Age: 63
End: 2023-02-01
Payer: COMMERCIAL

## 2023-02-01 VITALS
TEMPERATURE: 97.8 F | RESPIRATION RATE: 16 BRPM | HEIGHT: 70 IN | OXYGEN SATURATION: 99 % | DIASTOLIC BLOOD PRESSURE: 75 MMHG | HEART RATE: 70 BPM | SYSTOLIC BLOOD PRESSURE: 132 MMHG | BODY MASS INDEX: 28.13 KG/M2 | WEIGHT: 196.5 LBS

## 2023-02-01 DIAGNOSIS — R74.8 ELEVATED LIVER ENZYMES: ICD-10-CM

## 2023-02-01 DIAGNOSIS — D84.9 IMMUNOCOMPROMISED PATIENT: ICD-10-CM

## 2023-02-01 DIAGNOSIS — T45.1X5A ANEMIA DUE TO CHEMOTHERAPY: ICD-10-CM

## 2023-02-01 DIAGNOSIS — Z79.899 ENCOUNTER FOR LONG-TERM (CURRENT) USE OF HIGH-RISK MEDICATION: ICD-10-CM

## 2023-02-01 DIAGNOSIS — D63.0 ANEMIA IN NEOPLASTIC DISEASE: ICD-10-CM

## 2023-02-01 DIAGNOSIS — C90.00 MULTIPLE MYELOMA NOT HAVING ACHIEVED REMISSION: Primary | ICD-10-CM

## 2023-02-01 DIAGNOSIS — D64.81 ANEMIA DUE TO CHEMOTHERAPY: ICD-10-CM

## 2023-02-01 DIAGNOSIS — E85.81 AL AMYLOIDOSIS: ICD-10-CM

## 2023-02-01 DIAGNOSIS — C90.00 MULTIPLE MYELOMA NOT HAVING ACHIEVED REMISSION: ICD-10-CM

## 2023-02-01 LAB
ALBUMIN SERPL-MCNC: 4.5 G/DL (ref 3.5–5.2)
ALBUMIN/GLOB SERPL: 1.6 G/DL
ALP SERPL-CCNC: 100 U/L (ref 39–117)
ALT SERPL W P-5'-P-CCNC: 34 U/L (ref 1–41)
ANION GAP SERPL CALCULATED.3IONS-SCNC: 9.2 MMOL/L (ref 5–15)
AST SERPL-CCNC: 23 U/L (ref 1–40)
BASOPHILS # BLD AUTO: 0.08 10*3/MM3 (ref 0–0.2)
BASOPHILS NFR BLD AUTO: 1.9 % (ref 0–1.5)
BILIRUB SERPL-MCNC: 0.3 MG/DL (ref 0–1.2)
BUN SERPL-MCNC: 10 MG/DL (ref 8–23)
BUN/CREAT SERPL: 11.5 (ref 7–25)
CALCIUM SPEC-SCNC: 9.5 MG/DL (ref 8.6–10.5)
CHLORIDE SERPL-SCNC: 102 MMOL/L (ref 98–107)
CO2 SERPL-SCNC: 26.8 MMOL/L (ref 22–29)
CREAT SERPL-MCNC: 0.87 MG/DL (ref 0.76–1.27)
DEPRECATED RDW RBC AUTO: 46.8 FL (ref 37–54)
EGFRCR SERPLBLD CKD-EPI 2021: 98.2 ML/MIN/1.73
EOSINOPHIL # BLD AUTO: 0.24 10*3/MM3 (ref 0–0.4)
EOSINOPHIL NFR BLD AUTO: 5.8 % (ref 0.3–6.2)
ERYTHROCYTE [DISTWIDTH] IN BLOOD BY AUTOMATED COUNT: 17.4 % (ref 12.3–15.4)
GLOBULIN UR ELPH-MCNC: 2.9 GM/DL
GLUCOSE SERPL-MCNC: 84 MG/DL (ref 65–99)
HCT VFR BLD AUTO: 40 % (ref 37.5–51)
HGB BLD-MCNC: 11.9 G/DL (ref 13–17.7)
IMM GRANULOCYTES # BLD AUTO: 0.08 10*3/MM3 (ref 0–0.05)
IMM GRANULOCYTES NFR BLD AUTO: 1.9 % (ref 0–0.5)
LYMPHOCYTES # BLD AUTO: 1.11 10*3/MM3 (ref 0.7–3.1)
LYMPHOCYTES NFR BLD AUTO: 26.8 % (ref 19.6–45.3)
MCH RBC QN AUTO: 22.3 PG (ref 26.6–33)
MCHC RBC AUTO-ENTMCNC: 29.8 G/DL (ref 31.5–35.7)
MCV RBC AUTO: 75 FL (ref 79–97)
MONOCYTES # BLD AUTO: 0.37 10*3/MM3 (ref 0.1–0.9)
MONOCYTES NFR BLD AUTO: 8.9 % (ref 5–12)
NEUTROPHILS NFR BLD AUTO: 2.26 10*3/MM3 (ref 1.7–7)
NEUTROPHILS NFR BLD AUTO: 54.7 % (ref 42.7–76)
NRBC BLD AUTO-RTO: 0 /100 WBC (ref 0–0.2)
PLATELET # BLD AUTO: 261 10*3/MM3 (ref 140–450)
PMV BLD AUTO: 10 FL (ref 6–12)
POTASSIUM SERPL-SCNC: 3.7 MMOL/L (ref 3.5–5.2)
PROT SERPL-MCNC: 7.4 G/DL (ref 6–8.5)
RBC # BLD AUTO: 5.33 10*6/MM3 (ref 4.14–5.8)
SODIUM SERPL-SCNC: 138 MMOL/L (ref 136–145)
WBC NRBC COR # BLD: 4.14 10*3/MM3 (ref 3.4–10.8)

## 2023-02-01 PROCEDURE — 36415 COLL VENOUS BLD VENIPUNCTURE: CPT

## 2023-02-01 PROCEDURE — 99214 OFFICE O/P EST MOD 30 MIN: CPT | Performed by: INTERNAL MEDICINE

## 2023-02-01 PROCEDURE — 80053 COMPREHEN METABOLIC PANEL: CPT | Performed by: INTERNAL MEDICINE

## 2023-02-01 PROCEDURE — 85025 COMPLETE CBC W/AUTO DIFF WBC: CPT | Performed by: INTERNAL MEDICINE

## 2023-02-01 NOTE — PROGRESS NOTES
"Subjective     CHIEF COMPLAINT:      Chief Complaint   Patient presents with   • Follow-up     NO CONCERNS       HISTORY OF PRESENT ILLNESS:     Duy Owens is a 62 y.o. male patient who returns today for follow up on his multiple myeloma.  He returns today for follow-up reporting no new symptoms.  He is tolerating Revlimid well.  He started a new cycle of Revlimid on Friday, 1/27/2023.  He is taking aspirin daily and Valtrex twice daily as instructed.  No recent infections.  Energy level is good.  No shortness of breath.  No chest pain.    ROS:  Pertinent ROS is in the HPI.     Past medical, surgical, social and family history were reviewed.     MEDICATIONS:    Current Outpatient Medications:   •  aspirin 81 MG EC tablet, Take 1 tablet by mouth Daily., Disp: , Rfl:   •  lenalidomide (REVLIMID) 10 MG capsule, Take 1 capsule by mouth Daily. Take for 21 days on, then 7 days off, then repeat., Disp: 21 capsule, Rfl: 0  •  losartan (COZAAR) 100 MG tablet, TAKE ONE TABLET BY MOUTH DAILY, Disp: 30 tablet, Rfl: 11  •  metFORMIN (GLUCOPHAGE) 500 MG tablet, Take 500 mg by mouth 2 (Two) Times a Day., Disp: , Rfl:   •  ondansetron (ZOFRAN) 8 MG tablet, Take 1 tablet by mouth 3 (Three) Times a Day As Needed for Nausea or Vomiting., Disp: 30 tablet, Rfl: 5  •  valACYclovir (VALTREX) 500 MG tablet, Take 1 tablet by mouth 2 (Two) Times a Day., Disp: 60 tablet, Rfl: 5  •  sulfamethoxazole-trimethoprim (BACTRIM DS,SEPTRA DS) 800-160 MG per tablet, TAKE ONE TABLET BY MOUTH THREE TIMES WEEKLY, MONDAY, WEDNESDAY, AND FRIDAY, Disp: 12 tablet, Rfl: 5  Objective     VITAL SIGNS:     Vitals:    02/01/23 1536   BP: 132/75   Pulse: 70   Resp: 16   Temp: 97.8 °F (36.6 °C)   TempSrc: Temporal   SpO2: 99%   Weight: 89.1 kg (196 lb 8 oz)   Height: 177.8 cm (70\")   PainSc: 0-No pain     Body mass index is 28.19 kg/m².     Wt Readings from Last 5 Encounters:   02/01/23 89.1 kg (196 lb 8 oz)   12/28/22 89.2 kg (196 lb 9.6 oz)   11/30/22 89.7 kg " (197 lb 12.8 oz)   11/09/22 89.2 kg (196 lb 11.2 oz)   09/23/22 87.8 kg (193 lb 9.6 oz)     PHYSICAL EXAMINATION:   GENERAL: The patient appears in good general condition, not in acute distress.   SKIN: No ecchymosis.  EYES: No jaundice. No pallor.  LYMPHATICS: No cervical lymphadenopathy.  CHEST: Normal respiratory effort.  Lungs clear bilaterally.  No added sounds.  CVS: Normal S1-S2.  No murmurs.  ABDOMEN: Soft. No tenderness. No Hepatomegaly. No Splenomegaly. No masses.  EXTREMITIES: No edema.  No calf tenderness.    DIAGNOSTIC DATA:     Results from last 7 days   Lab Units 02/01/23  1521   WBC 10*3/mm3 4.14   NEUTROS ABS 10*3/mm3 2.26   HEMOGLOBIN g/dL 11.9*   HEMATOCRIT % 40.0   PLATELETS 10*3/mm3 261     Results from last 7 days   Lab Units 02/01/23  1521   SODIUM mmol/L 138   POTASSIUM mmol/L 3.7   CHLORIDE mmol/L 102   CO2 mmol/L 26.8   BUN mg/dL 10   CREATININE mg/dL 0.87   CALCIUM mg/dL 9.5   ALBUMIN g/dL 4.5   BILIRUBIN mg/dL 0.3   ALK PHOS U/L 100   ALT (SGPT) U/L 34   AST (SGOT) U/L 23   GLUCOSE mg/dL 84     Component      Latest Ref Rng & Units 11/9/2022 11/30/2022 12/28/2022   IgG      603 - 1613 mg/dL 1026 1034 1086   IgA      61 - 437 mg/dL 26 (L) 24 (L) 44 (L)   IgM      20 - 172 mg/dL 32 38 38   Total Protein      6.0 - 8.5 g/dL 7.1 6.7 6.7   Albumin      2.9 - 4.4 g/dL 4.3 3.9 3.9   Alpha-1-Globulin      0.0 - 0.4 g/dL 0.2 0.3 0.3   Alpha-2-Globulin      0.4 - 1.0 g/dL 0.6 0.6 0.6   Beta Globulin      0.7 - 1.3 g/dL 1.0 1.0 1.0   Gamma Globulin      0.4 - 1.8 g/dL 0.9 0.9 1.0   M-Werner      Not Observed g/dL Not Observed Not Observed Not Observed   Globulin      2.2 - 3.9 g/dL 2.8 2.8 2.8   A/G Ratio      0.7 - 1.7 1.6 1.4 1.4   Immunofixation Reflex, Serum       Comment: Comment Comment:   Please note       Comment Comment Comment   Kappa FLC      3.3 - 19.4 mg/L 21.7 (H) 18.4 23.3 (H)   Free Lambda Light Chains      5.7 - 26.3 mg/L 23.9 26.3 30.8 (H)   Kappa/Lambda Ratio      0.26 - 1.65 0.91  0.70 0.76     Component      Latest Ref Rng & Units 11/9/2022 11/30/2022 12/28/2022   Beta-2 Microglobulin      0.8 - 2.2 mg/L 1.4 1.7 1.5       Assessment & Plan    *Lambda light chain multiple myeloma with lymphadenopathy and development of AL amyloidosis.  · Patient was found to have lymph node involvement and amyloid deposition in the involved lymph nodes.  · Patient started having dry cough around July 2021.    · CT scans 9/23/2021-9/24/2021 revealed inferior mediastinal adenopathy and retroperitoneal adenopathy extending to the right iliac chain.  · The Largest lymph node was in the retroperitoneal area measuring 4.8 x 3.5 cm.  The common iliac lymph node measured 3.5 x 2.7 cm.  The left external iliac chain lymph node measured 2.9 x 2 cm.    · PET scan on 10/5/2021 revealed the retroperitoneal and left pelvic lymphadenopathy to be hypermetabolic.  The left periaortic lymph nodes had SUV of 6.9 and the left pelvic sidewall lymph nodes had an SUV of 5.4.  No bone involvement.  · CT-guided biopsy on 10/6/2021- pathologist at [facility] reported involvement with monotypic lambda restricted plasma cells concerning for involvement with plasma cell dyscrasia.  · Bone marrow biopsy on 10/29/2021 revealed a normocellular marrow (50%) with involvement with plasma cell dyscrasia (plasma cells representing 20-30% of total cells by  stain).   · FISH was negative for gain of 1q, monosomy/deletions of chromosomes 13 and 17, IGH rearrangement, gain of chromosomes 9 and 11, IGH-CCND1 (11;14) fusion.   · Treatment with the VRD started on 12/22/2021.  · Free lambda light chain started to improve after the patient started treatment.  · CT scan on 3/11/2022 revealed decrease in the size of lymph nodes in the periaortic area.  There is a slight increase in a lymph node in the left axillary area that increased from 7 to 10 mm.    · PET scan on 4/28/2022 revealed significant reduction in the size and hypermetabolism of the  involved lymph nodes.  · SPEP REGINO FLC on 5/25/2022 revealed no M protein.  Free lambda light chain was 41.8.  Kappa to lambda ratio was normal at 0.26.  B2 M was 1.6.  · Bone marrow biopsy on 5/24/2022 revealed normocellular marrow with 30-40% cellularity with involvement by plasma cell neoplasm representing 5-10% by IHC.  Negative for amyloid.  · He was considered to be ID-1.  · S/p high-dose melphalan with autologous stem cell transplant.  Day 0 was 7/7/2022.  · Patient did well with the transplant but had neutropenic fever of unclear source necessitating hospitalization between 7/16/2022 and 7/18/2022.    · Bone marrow biopsy on 9/29/2022 revealed monotypic plasma cells identified on on flow cytometry but not on IHC.  · CT on 9/29/2022 showed decrease in the size of the enlarged lymph nodes.  · Patient started Revlimid 10 mg daily for 21 out of 28-day cycle on 11/1/2022.    · He is tolerating Revlimid well.  · SPEP REGINO FLC on 12/28/2022 revealed no monoclonal protein.  Kappa to lambda ratio was normal at 0.76.    *Anemia secondary to multiple myeloma and secondary to treatment.  · Hemoglobin was 13.1 on 9/29/2021.    · Iron, folate and vitamin B12 were normal in September/October 2021.    · Hemoglobin was 11.4 on 8/24/2022.  · Hemoglobin improved to the 11-12 g range.  · Hemoglobin is 11.9 today.    *Elevated liver enzymes.  · AST was 152 and ALT was 185 on 11/30/2022.  · Patient reduced alcohol consumption  · Liver enzymes improved afterwards.  · AST is 23 and ALT is 34 today.    *Prophylaxis.  · He was previously on acyclovir 400 mg twice daily.    · He is now on Valtrex with plan for 1 year posttreatment.  · He received Evusheld on 8/20/2022.  · Patient received vaccinations as recommended by [facility].  · He received COVID-19 Moderna booster on 10/25/2022.  · I recommended receiving the next booster after 6 months-late April 2023.    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  · There was mild  wall thickening.  Ejection fraction was normal.  · Echocardiogram at [facility] on 4/21/2022 did not reveal wall thickening.    PLAN:    1.  Continue maintenance Revlimid 10 mg daily for 21 days out of a 28-day cycle.    2.  Continue aspirin 81 mg daily.  3.  Continue Valtrex 500 mg twice daily.    4.  Await evaluation at the bone marrow transplant center at [facility] on 2/23/2023.  5.  I will see him in follow-up in 5 weeks.  We will review the results of the upcoming studies and scans.  CBC CMP will be obtained.      Tima Mendez MD  02/01/23

## 2023-02-02 ENCOUNTER — SPECIALTY PHARMACY (OUTPATIENT)
Dept: PHARMACY | Facility: HOSPITAL | Age: 63
End: 2023-02-02
Payer: COMMERCIAL

## 2023-02-02 NOTE — PROGRESS NOTES
Specialty Note ( Revlimid)    Dr Mendez's dictation is noted    Continue maintenance Revlimid 10 mg daily for 21 days out of a 28-day cycle.    2.  Continue aspirin 81 mg daily.  3.  Continue Valtrex 500 mg twice daily.      Labs reviewed        2/1/2023   WBC 3.40 - 10.80 10*3/mm3 4.14   Neutrophils Absolute 1.70 - 7.00 10*3/mm3 2.26   Hemoglobin 13.0 - 17.7 g/dL 11.9 (A)   Hematocrit 37.5 - 51.0 % 40.0   Platelets 140 - 450 10*3/mm3 261   Creatinine 0.76 - 1.27 mg/dL 0.87   BUN 8 - 23 mg/dL 10   Sodium 136 - 145 mmol/L 138   Potassium 3.5 - 5.2 mmol/L 3.7   Glucose 65 - 99 mg/dL 84   Calcium 8.6 - 10.5 mg/dL 9.5   Albumin 3.5 - 5.2 g/dL 4.5   Total Protein 6.0 - 8.5 g/dL 7.4   AST (SGOT) 1 - 40 U/L 23   ALT (SGPT) 1 - 41 U/L 34   Alkaline Phosphatase 39 - 117 U/L 100   Total Bilirubin 0.0 - 1.2 mg/dL 0.3

## 2023-02-08 NOTE — PROGRESS NOTES
02/08/23                            Makayla Faustin  [id number] [place] [address]    To Whom It May Concern:    This is to certify Makayla Faustin was evaluated with Kaitlin Carreno MD on 02/08/23 and can return to school on 2/8/23.     RESTRICTIONS: none             Electronically signed by:  Kaitlin Carreno MD  [facility]  [id number] [facility] [id number]  Dept Phone: [phone]      Tima Mendez MD Noel, Shelbie, TONY  Since the patient cannot have the MRI of the heart, I recommend referral to the cardiologist, Dr. Stephenson to evaluate for cardiac involvement with amyloidosis.     Thank you

## 2023-02-14 ENCOUNTER — SPECIALTY PHARMACY (OUTPATIENT)
Dept: PHARMACY | Facility: HOSPITAL | Age: 63
End: 2023-02-14
Payer: COMMERCIAL

## 2023-02-14 NOTE — PROGRESS NOTES
MTM Encounter-Re: Adherence and side effects (Revlimid)    Today's encounter was conducted via telephone.    Medication:  Revlimid 10 mg po 21/28 days  - Reason for outreach: Routine medication check-in .  - Administration: Patient is taking every day at the same time .  - Missed doses: Patient reports missing 1 doses in the last 30 days.  - Self-administration: Patient demonstrates ability to self-administer medication. No barriers to adherence identified.   - Diagnosis/Indication: Multiple Myeloma. Progress toward achieving therapeutic goals reviewed.   - Patient denies side effects.    - Medication availability/affordability: Patient has had no issues obtaining medication from pharmacy.   - Questions/concerns about medications: none       Completed medication reconciliation today to assess for drug interactions.   Reviewed allergies, medical history, labs, quality of life( post HSCT and reports no issues of concern) , and medication history with patient.   Patient denies starting or stopping any medications.  Assessed medication list for interactions, no significant drug interactions noted.   Advised pt to call the clinic if any new medications are started so we can assess for drug-drug interactions.     All questions addressed. Patient had no additional concerns for MTM office.     Sepideh Avila RPH  2/14/2023  09:57 EST

## 2023-02-16 RX ORDER — LENALIDOMIDE 10 MG/1
10 CAPSULE ORAL DAILY
Qty: 21 CAPSULE | Refills: 0 | Status: SHIPPED | OUTPATIENT
Start: 2023-02-16 | End: 2023-03-16 | Stop reason: SDUPTHER

## 2023-02-21 ENCOUNTER — SPECIALTY PHARMACY (OUTPATIENT)
Dept: PHARMACY | Facility: HOSPITAL | Age: 63
End: 2023-02-21
Payer: COMMERCIAL

## 2023-02-21 NOTE — PROGRESS NOTES
Specialty Note ( Revlimid)    Duy called the [facility] office this am to report that [facility] has not called him to set up delivery of his Revlimid.  [facility] called [facility] to verify that they have Revlimid rx on file, and they stated they plan to call him to schedule delivery.  He can call [phone] if he wishes to expedite this process.  A VM was left for [facility] with this information and phone number for [facility].

## 2023-03-06 ENCOUNTER — TELEPHONE (OUTPATIENT)
Dept: ONCOLOGY | Facility: CLINIC | Age: 63
End: 2023-03-06

## 2023-03-06 NOTE — TELEPHONE ENCOUNTER
Caller: Dyu Owens    Relationship to patient: Self    Best call back number: [phone]    Chief complaint: PATIENT TO RESCHEDULE 3/8/23    Type of visit: LAB AND FU    Requested date: NEXT AVAILABLE WEDNESDAY

## 2023-03-08 ENCOUNTER — OFFICE VISIT (OUTPATIENT)
Dept: CARDIOLOGY | Facility: CLINIC | Age: 63
End: 2023-03-08
Payer: COMMERCIAL

## 2023-03-08 VITALS
HEART RATE: 92 BPM | BODY MASS INDEX: 28.63 KG/M2 | OXYGEN SATURATION: 95 % | SYSTOLIC BLOOD PRESSURE: 130 MMHG | WEIGHT: 200 LBS | HEIGHT: 70 IN | DIASTOLIC BLOOD PRESSURE: 80 MMHG

## 2023-03-08 DIAGNOSIS — E85.81 AL AMYLOIDOSIS: Primary | ICD-10-CM

## 2023-03-08 PROCEDURE — 99213 OFFICE O/P EST LOW 20 MIN: CPT | Performed by: NURSE PRACTITIONER

## 2023-03-08 PROCEDURE — 93000 ELECTROCARDIOGRAM COMPLETE: CPT | Performed by: NURSE PRACTITIONER

## 2023-03-08 NOTE — PROGRESS NOTES
"    CARDIOLOGY        Patient Name: Duy Owens  :1960  Age: 62 y.o.  Primary Cardiologist: Ajay Valdovinos MD  Encounter Provider:  AUDRA Brannon    Date of Service: 23            CHIEF COMPLAINT / REASON FOR OFFICE VISIT     Follow-up      HISTORY OF PRESENT ILLNESS       HPI  Duy Owens is a 62 y.o. male who presents today for annual follow up.     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  Currently follows [facility] as well as [facility].  He is on maintenance Revlimid.      He presents today for routine follow-up.  Reports that he is doing well.  He states that he exercises in the form of walking 3 to 4 miles per day without any limiting symptoms.  He is asymptomatic and denies chest pain, dyspnea at rest or with exertion, palpitations, lightheadedness, edema, fatigue.      The following portions of the patient's history were reviewed and updated as appropriate: allergies, current medications, past family history, past medical history, past social history, past surgical history and problem list.      VITAL SIGNS     Visit Vitals  /80 (BP Location: Left arm, Patient Position: Sitting, Cuff Size: Adult)   Pulse 92   Ht 177.8 cm (70\")   Wt 90.7 kg (200 lb)   SpO2 95%   BMI 28.70 kg/m²         Wt Readings from Last 3 Encounters:   23 90.7 kg (200 lb)   23 89.1 kg (196 lb 8 oz)   22 89.2 kg (196 lb 9.6 oz)     Body mass index is 28.7 kg/m².      REVIEW OF SYSTEMS   Review of Systems   Constitutional: Negative for chills, fever, weight gain and weight loss.   Cardiovascular: Negative for leg swelling.   Respiratory: Negative for cough, snoring and wheezing.    Hematologic/Lymphatic: Negative for bleeding problem. Does not " bruise/bleed easily.   Skin: Negative for color change.   Musculoskeletal: Negative for falls, joint pain and myalgias.   Gastrointestinal: Negative for melena.   Genitourinary: Negative for hematuria.   Neurological: Negative for excessive daytime sleepiness.   Psychiatric/Behavioral: Negative for depression. The patient is not nervous/anxious.            PHYSICAL EXAMINATION     Constitutional:       Appearance: Normal appearance. Well-developed.   Eyes:      Conjunctiva/sclera: Conjunctivae normal.   Neck:      Vascular: No carotid bruit.   Pulmonary:      Effort: Pulmonary effort is normal.      Breath sounds: Normal breath sounds.   Cardiovascular:      Normal rate. Regular rhythm. Normal S1. Normal S2.      Murmurs: There is no murmur.      No gallop. No click. No rub.   Edema:     Peripheral edema absent.   Musculoskeletal: Normal range of motion. Skin:     General: Skin is warm and dry.   Neurological:      Mental Status: Alert and oriented to person, place, and time.      GCS: GCS eye subscore is 4. GCS verbal subscore is 5. GCS motor subscore is 6.   Psychiatric:         Speech: Speech normal.         Behavior: Behavior normal.         Thought Content: Thought content normal.         Judgment: Judgment normal.           REVIEWED DATA       ECG 12 Lead    Date/Time: 3/8/2023 10:49 AM  Performed by: Tamiko Burnham APRN  Authorized by: Tamiko Burnham APRN   Comparison: compared with previous ECG from 1/19/2022  Rhythm: sinus rhythm  Rate: normal  ST Segments: ST segments normal  T Waves: T waves normal  QRS axis: normal    Clinical impression: normal ECG            Cardiac Procedures:  1. Echocardiogram 4/21/20[facility] at [facility].  Mild LVH with normal biventricular function.  Sclerotic aortic valve with trivial insufficiency.  Normal filling pressures.    Lipid Panel    Lipid Panel 5/24/22 5/24/22    1033 1033   Total Cholesterol 267 (A)    Triglycerides  101   (A) Abnormal value             BUN   Date Value Ref Range Status   02/01/2023 10 8 - 23 mg/dL Final   07/23/2022 6 (L) 8 - 26 mg/dL Final     Creatinine   Date Value Ref Range Status   02/01/2023 0.87 0.76 - 1.27 mg/dL Final   07/23/2022 0.73 0.72 - 1.25 mg/dL Final     Potassium   Date Value Ref Range Status   02/01/2023 3.7 3.5 - 5.2 mmol/L Final   07/23/2022 3.7 3.3 - 4.8 mmol/L Final     Comment:     Plasma reference ranges are shown, please note that serum reference ranges are higher than plasma.     ALT (SGPT)   Date Value Ref Range Status   02/01/2023 34 1 - 41 U/L Final     AST (SGOT)   Date Value Ref Range Status   02/01/2023 23 1 - 40 U/L Final           ASSESSMENT & PLAN     Diagnoses and all orders for this visit:    1. AL amyloidosis (HCC) (Primary)  · Follow CBC as well as [facility]  · Continue current regimen  · Stable echocardiogram performed at [facility] in 4/2022  · ECG unchanged  · Follow-up with cardio oncology in 8 months  -     Ambulatory Referral to Cardiology CardioOncology            Future Appointments       Provider Department Center    3/22/2023 7:40 AM LAB CHAIR 1 CBC LAB [facility] ONC CBC LAB [facility]    3/22/2023 8:00 AM Tima Mendez MD [facility] HEMATOLOGY & ONCOLOGY [facility]    11/3/2023 9:15 AM Allan Buenrostro MD [facility] CARDIOLOGY [facility]                MEDICATIONS         Discharge Medications          Accurate as of March 8, 2023 11:09 AM. If you have any questions, ask your nurse or doctor.            Continue These Medications      Instructions Start Date   aspirin 81 MG EC tablet   81 mg, Oral, Daily      lenalidomide 10 MG capsule  Commonly known as: REVLIMID   10 mg, Oral, Daily, Take for 21 days on, then 7 days off, then repeat.      losartan 100 MG tablet  Commonly known as: COZAAR   TAKE ONE TABLET BY MOUTH DAILY      metFORMIN 500 MG tablet  Commonly known as: GLUCOPHAGE   500 mg, Oral, 2  Times Daily      ondansetron 8 MG tablet  Commonly known as: ZOFRAN   8 mg, Oral, 3 Times Daily PRN      sulfamethoxazole-trimethoprim 800-160 MG per tablet  Commonly known as: BACTRIM DS,SEPTRA DS   TAKE ONE TABLET BY MOUTH THREE TIMES WEEKLY, MONDAY, WEDNESDAY, AND FRIDAY      valACYclovir 500 MG tablet  Commonly known as: VALTREX   500 mg, Oral, 2 Times Daily                 **Dragon Disclaimer:   Much of this encounter note is an electronic transcription/translation of spoken language to printed text. The electronic translation of spoken language may permit erroneous, or at times, nonsensical words or phrases to be inadvertently transcribed. Although I have reviewed the note for such errors, some may still exist.

## 2023-03-09 ENCOUNTER — SPECIALTY PHARMACY (OUTPATIENT)
Dept: PHARMACY | Facility: HOSPITAL | Age: 63
End: 2023-03-09
Payer: COMMERCIAL

## 2023-03-16 ENCOUNTER — SPECIALTY PHARMACY (OUTPATIENT)
Dept: PHARMACY | Facility: HOSPITAL | Age: 63
End: 2023-03-16
Payer: COMMERCIAL

## 2023-03-16 RX ORDER — LENALIDOMIDE 10 MG/1
10 CAPSULE ORAL DAILY
Qty: 21 CAPSULE | Refills: 0 | Status: SHIPPED | OUTPATIENT
Start: 2023-03-16

## 2023-03-16 NOTE — PROGRESS NOTES
Re: Refills of Oral Specialty Medication - Revlimid (lenalidomide)    • Drug-Drug Interactions: The current medication list was reviewed and there are no relevant drug-drug interactions.  • Medication Allergies: The patient has no relevant allergies as it relates to their oral specialty medication  • Review of Labs/Dose Adjustments: NO DOSE CHANGE - I reviewed the most recent note and labs and the patient will continue without any dose changes.  I sent refills as described below.    Drug: Revlimid (lenalidomide)  Strength: 10 mg  Directions: Take 1 capsule by mouth daily for 21 days on, then 7 days off, then repeat.  Quantity: 21  Refills: 0  Pharm[facility] prescription [facility] to: [facility]    Name/Credentials Sepideh Avila RPh, GILLIAN    3/16/2023  09:04 EDT      Completed independent double check on medication order/RX.    Thanks,   Gina Naylor, PharmD, BCPS

## 2023-03-21 DIAGNOSIS — E85.81 AL AMYLOIDOSIS: ICD-10-CM

## 2023-03-21 DIAGNOSIS — C90.00 MULTIPLE MYELOMA NOT HAVING ACHIEVED REMISSION: Primary | ICD-10-CM

## 2023-03-22 ENCOUNTER — OFFICE VISIT (OUTPATIENT)
Dept: ONCOLOGY | Facility: CLINIC | Age: 63
End: 2023-03-22
Payer: COMMERCIAL

## 2023-03-22 ENCOUNTER — LAB (OUTPATIENT)
Dept: OTHER | Facility: HOSPITAL | Age: 63
End: 2023-03-22
Payer: COMMERCIAL

## 2023-03-22 VITALS
WEIGHT: 194.4 LBS | RESPIRATION RATE: 16 BRPM | TEMPERATURE: 97.7 F | HEART RATE: 70 BPM | BODY MASS INDEX: 27.83 KG/M2 | HEIGHT: 70 IN | OXYGEN SATURATION: 100 % | DIASTOLIC BLOOD PRESSURE: 74 MMHG | SYSTOLIC BLOOD PRESSURE: 121 MMHG

## 2023-03-22 DIAGNOSIS — D84.9 IMMUNOCOMPROMISED PATIENT: ICD-10-CM

## 2023-03-22 DIAGNOSIS — Z79.899 ENCOUNTER FOR LONG-TERM (CURRENT) USE OF HIGH-RISK MEDICATION: ICD-10-CM

## 2023-03-22 DIAGNOSIS — D64.81 ANEMIA DUE TO CHEMOTHERAPY: ICD-10-CM

## 2023-03-22 DIAGNOSIS — C90.00 MULTIPLE MYELOMA NOT HAVING ACHIEVED REMISSION: Primary | ICD-10-CM

## 2023-03-22 DIAGNOSIS — C90.00 MULTIPLE MYELOMA NOT HAVING ACHIEVED REMISSION: ICD-10-CM

## 2023-03-22 DIAGNOSIS — E85.81 AL AMYLOIDOSIS: ICD-10-CM

## 2023-03-22 DIAGNOSIS — T45.1X5A ANEMIA DUE TO CHEMOTHERAPY: ICD-10-CM

## 2023-03-22 DIAGNOSIS — D63.0 ANEMIA IN NEOPLASTIC DISEASE: ICD-10-CM

## 2023-03-22 LAB
ALBUMIN SERPL-MCNC: 4.3 G/DL (ref 3.5–5.2)
ALBUMIN/GLOB SERPL: 1.4 G/DL
ALP SERPL-CCNC: 93 U/L (ref 39–117)
ALT SERPL W P-5'-P-CCNC: 19 U/L (ref 1–41)
ANION GAP SERPL CALCULATED.3IONS-SCNC: 9.6 MMOL/L (ref 5–15)
AST SERPL-CCNC: 19 U/L (ref 1–40)
BASOPHILS # BLD AUTO: 0.09 10*3/MM3 (ref 0–0.2)
BASOPHILS NFR BLD AUTO: 1.9 % (ref 0–1.5)
BILIRUB SERPL-MCNC: 0.4 MG/DL (ref 0–1.2)
BUN SERPL-MCNC: 18 MG/DL (ref 8–23)
BUN/CREAT SERPL: 17.8 (ref 7–25)
CALCIUM SPEC-SCNC: 9.2 MG/DL (ref 8.6–10.5)
CHLORIDE SERPL-SCNC: 107 MMOL/L (ref 98–107)
CO2 SERPL-SCNC: 23.4 MMOL/L (ref 22–29)
CREAT SERPL-MCNC: 1.01 MG/DL (ref 0.76–1.27)
DEPRECATED RDW RBC AUTO: 43.7 FL (ref 37–54)
EGFRCR SERPLBLD CKD-EPI 2021: 84.6 ML/MIN/1.73
EOSINOPHIL # BLD AUTO: 0.32 10*3/MM3 (ref 0–0.4)
EOSINOPHIL NFR BLD AUTO: 6.6 % (ref 0.3–6.2)
ERYTHROCYTE [DISTWIDTH] IN BLOOD BY AUTOMATED COUNT: 16.1 % (ref 12.3–15.4)
GLOBULIN UR ELPH-MCNC: 3.1 GM/DL
GLUCOSE SERPL-MCNC: 125 MG/DL (ref 65–99)
HCT VFR BLD AUTO: 40.9 % (ref 37.5–51)
HGB BLD-MCNC: 12.4 G/DL (ref 13–17.7)
IMM GRANULOCYTES # BLD AUTO: 0.04 10*3/MM3 (ref 0–0.05)
IMM GRANULOCYTES NFR BLD AUTO: 0.8 % (ref 0–0.5)
LYMPHOCYTES # BLD AUTO: 1.62 10*3/MM3 (ref 0.7–3.1)
LYMPHOCYTES NFR BLD AUTO: 33.3 % (ref 19.6–45.3)
MCH RBC QN AUTO: 23 PG (ref 26.6–33)
MCHC RBC AUTO-ENTMCNC: 30.3 G/DL (ref 31.5–35.7)
MCV RBC AUTO: 76 FL (ref 79–97)
MONOCYTES # BLD AUTO: 0.62 10*3/MM3 (ref 0.1–0.9)
MONOCYTES NFR BLD AUTO: 12.8 % (ref 5–12)
NEUTROPHILS NFR BLD AUTO: 2.17 10*3/MM3 (ref 1.7–7)
NEUTROPHILS NFR BLD AUTO: 44.6 % (ref 42.7–76)
NRBC BLD AUTO-RTO: 0 /100 WBC (ref 0–0.2)
PLATELET # BLD AUTO: 283 10*3/MM3 (ref 140–450)
PMV BLD AUTO: 8.2 FL (ref 6–12)
POTASSIUM SERPL-SCNC: 3.9 MMOL/L (ref 3.5–5.2)
PROT SERPL-MCNC: 7.4 G/DL (ref 6–8.5)
RBC # BLD AUTO: 5.38 10*6/MM3 (ref 4.14–5.8)
SODIUM SERPL-SCNC: 140 MMOL/L (ref 136–145)
WBC NRBC COR # BLD: 4.86 10*3/MM3 (ref 3.4–10.8)

## 2023-03-22 PROCEDURE — 99214 OFFICE O/P EST MOD 30 MIN: CPT | Performed by: INTERNAL MEDICINE

## 2023-03-22 PROCEDURE — 80053 COMPREHEN METABOLIC PANEL: CPT | Performed by: INTERNAL MEDICINE

## 2023-03-22 PROCEDURE — 36415 COLL VENOUS BLD VENIPUNCTURE: CPT

## 2023-03-22 PROCEDURE — 85025 COMPLETE CBC W/AUTO DIFF WBC: CPT | Performed by: INTERNAL MEDICINE

## 2023-03-22 NOTE — PROGRESS NOTES
"Subjective     CHIEF COMPLAINT:      Chief Complaint   Patient presents with   • Follow-up     HISTORY OF PRESENT ILLNESS:     Duy Owens is a 62 y.o. male patient who returns today for follow up on his multiple myeloma AL amyloid.  He returns today for follow-up and reports that he was seen at [facility] in February 2023.  He had lab and CT scans done.  His next follow-up is going to be in 6 months.      Patient is not having chest pain or abdominal pain at present.  He reports having good level of energy.    ROS:  Pertinent ROS is in the HPI.     Past medical, surgical, social and family history were reviewed.     MEDICATIONS:    Current Outpatient Medications:   •  aspirin 81 MG EC tablet, Take 1 tablet by mouth Daily., Disp: , Rfl:   •  lenalidomide (REVLIMID) 10 MG capsule, Take 1 capsule by mouth Daily. Take for 21 days on, then 7 days off, then repeat., Disp: 21 capsule, Rfl: 0  •  losartan (COZAAR) 100 MG tablet, TAKE ONE TABLET BY MOUTH DAILY, Disp: 30 tablet, Rfl: 11  •  metFORMIN (GLUCOPHAGE) 500 MG tablet, Take 1 tablet by mouth 2 (Two) Times a Day., Disp: , Rfl:   •  ondansetron (ZOFRAN) 8 MG tablet, Take 1 tablet by mouth 3 (Three) Times a Day As Needed for Nausea or Vomiting., Disp: 30 tablet, Rfl: 5  •  valACYclovir (VALTREX) 500 MG tablet, Take 1 tablet by mouth 2 (Two) Times a Day., Disp: 60 tablet, Rfl: 5  Objective     VITAL SIGNS:     Vitals:    03/22/23 0812   BP: 121/74   Pulse: 70   Resp: 16   Temp: 97.7 °F (36.5 °C)   TempSrc: Temporal   SpO2: 100%   Weight: 88.2 kg (194 lb 6.4 oz)   Height: 177 cm (69.69\")   PainSc: 0-No pain     Body mass index is 28.15 kg/m².     Wt Readings from Last 5 Encounters:   03/22/23 88.2 kg (194 lb 6.4 oz)   03/08/23 90.7 kg (200 lb)   02/01/23 89.1 kg (196 lb 8 oz)   12/28/22 89.2 kg (196 lb 9.6 oz)   11/30/22 89.7 kg (197 lb 12.8 oz)     PHYSICAL EXAMINATION:   GENERAL: The patient appears in good general condition, not in acute distress. "   SKIN: No ecchymosis.  EYES: No jaundice. No pallor.  LYMPHATICS: No cervical lymphadenopathy.  CHEST: Normal respiratory effort.  Lungs clear bilaterally.  No added sounds.  CVS: Normal S1-S2.  No murmurs.  ABDOMEN: Soft. No tenderness. No Hepatomegaly. No Splenomegaly. No masses.  EXTREMITIES: No edema.    DIAGNOSTIC DATA:     Results from last 7 days   Lab Units 03/22/23  0807   WBC 10*3/mm3 4.86   NEUTROS ABS 10*3/mm3 2.17   HEMOGLOBIN g/dL 12.4*   HEMATOCRIT % 40.9   PLATELETS 10*3/mm3 283     Results from last 7 days   Lab Units 03/22/23  0807   SODIUM mmol/L 140   POTASSIUM mmol/L 3.9   CHLORIDE mmol/L 107   CO2 mmol/L 23.4   BUN mg/dL 18   CREATININE mg/dL 1.01   CALCIUM mg/dL 9.2   ALBUMIN g/dL 4.3   BILIRUBIN mg/dL 0.4   ALK PHOS U/L 93   ALT (SGPT) U/L 19   AST (SGOT) U/L 19   GLUCOSE mg/dL 125*     CT chest abdomen pelvis on 2/23/2023:  CT CHEST:   Lungs: Background of emphysema and multiple similar appearing pulmonary cysts. Scattered calcified granulomas no suspicious pulmonary nodule identified   Pleura: Within normal limits   Airways: Within normal limits   Cardiovascular: Coronary vascular calcifications.   Mediastinum: Mediastinal and hilar partially calcified lymph nodes. Similar to slightly decreased size of multiple posterior mediastinal lymph nodes as described below on series 3:     *  0.8 cm, image 48, previously measuring 0.8 cm.   *  0.9 cm, image 75, previously measuring 1.1 cm.   Lower Neck/Chest Wall: Bilateral gynecomastia     CT ABDOMEN AND PELVIS:   Liver: Within normal limits   Gallbladder/Biliary tree: Within normal limits   Spleen: Within normal limits   Pancreas: Within normal limits   Adrenals: Within normal limits   Kidneys/Ureters: Within normal limits   Gastrointestinal: Scattered colonic diverticula. No evidence of bowel obstruction. Normal appendix.   Peritoneum: Within normal limits   Vasculature: Atherosclerosis of the abdominal aorta without evidence of aneurysm.   Lymph  Nodes: Interval decrease in size of multiple abdomen/pelvis lymph nodes with index lesions as described below on series 3:     *  Right retroperitoneal conglomerate measuring 3.3 x 2.8 cm, image 122, previously measuring 3.8 x 3.3 cm.   *  Anterior aortocaval lymph node measuring 1.0 cm, image 133, previously measuring 1.6 cm.   *  Left iliac chain lymph node measuring 0.5 cm, image 168, previously measuring 0.8 cm.     Urinary Bladder: Bladder is decompressed and poorly evaluated.   Reproductive organs: Within normal limits   Abdominal Wall: Within normal limits   MUSCULOSKELETAL: Multilevel degenerative changes, most prominent at L4-5.     IMPRESSION:   1.  When compared to 9/29/2022, similar to slightly decreased size of multiple posterior mediastinal lymph nodes as described.   2.  When compared to 9/29/2022, mild interval decrease in size of multiple abdomen/pelvis lymph nodes as described.     Assessment & Plan    *Lambda light chain multiple myeloma with lymphadenopathy and development of AL amyloidosis.  · Patient was found to have lymph node involvement and amyloid deposition in the involved lymph nodes.  · Patient started having dry cough around July 2021.    · CT scans 9/23/2021-9/24/2021 revealed inferior mediastinal adenopathy and retroperitoneal adenopathy extending to the right iliac chain.  · The Largest lymph node was in the retroperitoneal area measuring 4.8 x 3.5 cm.  The common iliac lymph node measured 3.5 x 2.7 cm.  The left external iliac chain lymph node measured 2.9 x 2 cm.    · PET scan on 10/5/2021 revealed the retroperitoneal and left pelvic lymphadenopathy to be hypermetabolic.  The left periaortic lymph nodes had SUV of 6.9 and the left pelvic sidewall lymph nodes had an SUV of 5.4.  No bone involvement.  · CT-guided biopsy on 10/6/2021- pathologist at [facility] reported involvement with monotypic lambda restricted plasma cells concerning for involvement with plasma cell  dyscrasia.  · Bone marrow biopsy on 10/29/2021 revealed a normocellular marrow (50%) with involvement with plasma cell dyscrasia (plasma cells representing 20-30% of total cells by  stain).   · FISH was negative for gain of 1q, monosomy/deletions of chromosomes 13 and 17, IGH rearrangement, gain of chromosomes 9 and 11, IGH-CCND1 (11;14) fusion.   · Treatment with the VRD started on 12/22/2021.  · Free lambda light chain started to improve after the patient started treatment.  · CT scan on 3/11/2022 revealed decrease in the size of lymph nodes in the periaortic area.  There is a slight increase in a lymph node in the left axillary area that increased from 7 to 10 mm.    · PET scan on 4/28/2022 revealed significant reduction in the size and hypermetabolism of the involved lymph nodes.  · SPEP REGINO FLC on 5/25/2022 revealed no M protein.  Free lambda light chain was 41.8.  Kappa to lambda ratio was normal at 0.26.  B2 M was 1.6.  · Bone marrow biopsy on 5/24/2022 revealed normocellular marrow with 30-40% cellularity with involvement by plasma cell neoplasm representing 5-10% by IHC.  Negative for amyloid.  · He was considered to be NJ-1.  · S/p high-dose melphalan with autologous stem cell transplant.  Day 0 was 7/7/2022.  · Patient did well with the transplant but had neutropenic fever of unclear source necessitating hospitalization between 7/16/2022 and 7/18/2022.    · Bone marrow biopsy on 9/29/2022 revealed monotypic plasma cells identified on on flow cytometry but not on IHC.  · CT on 9/29/2022 showed decrease in the size of the enlarged lymph nodes.  · Patient started Revlimid 10 mg daily for 21 out of 28-day cycle on 11/1/2022.    · CT on 2/23/2023 showed decrease in the size of the mediastinal and retroperitoneal lymph nodes indicating response to treatment.  · On 2/23/2023-Free kappa light chain 1.94, free lambda light chain 2.19, kappa/lambda ratio normal at 0.89.  · He is tolerating  Revlimid.    *Anemia secondary to multiple myeloma and secondary to treatment.  · Hemoglobin was 13.1 on 9/29/2021.    · Iron, folate and vitamin B12 were normal in September/October 2021.    · Hemoglobin was 11.4 on 8/24/2022.  · Hemoglobin improved to the 11-12 g range.  · Hemoglobin is today at 12.4.    *Prophylaxis.  · He was previously on acyclovir 400 mg twice daily.    · He is now on Valtrex with plan for 1 year posttreatment.  · He received Evusheld on 8/20/2022.  · Patient received vaccinations as recommended by [facility].  · He received COVID-19 Moderna booster on 10/25/2022.  · I recommended receiving the next booster after 6 months-late April 2023.  · Recommendation from [facility] was to continue Valtrex till July 2023.    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  · There was mild wall thickening.  Ejection fraction was normal.  · Echocardiogram at [facility] on 4/21/2022 did not reveal wall thickening.    PLAN:    1.  Start new cycle of Revlimid tomorrow.  Revlimid will be at 10 mg daily for 21/28 days.   2.  Continue aspirin 81 mg daily.   3.  He will continue Valtrex 500 mg twice daily through July 2023.   4.  He will return monthly for CBC CMP SPEP REGINO FLC.   5.  We will schedule him to receive his 8 months vaccines in 1 month-inactivated poliovirus, hepatitis B vaccine, DTaP and HIB.    6.  I will see him in follow-up in 3 months.  He will have CBC CMP SPEP REGINO FLC and he will be scheduled for CBC and vaccines-(10-month vaccines).        Tima eMndez MD  03/22/23

## 2023-03-28 ENCOUNTER — TELEPHONE (OUTPATIENT)
Dept: ONCOLOGY | Facility: CLINIC | Age: 63
End: 2023-03-28
Payer: COMMERCIAL

## 2023-03-28 NOTE — TELEPHONE ENCOUNTER
Informed the patient that Dr. Mendez recommends Paxlovid treatment d/t his post transplant status. I reviewed Paxlovid and its EUA status with the patient, he verbally consented to having the medication sent to his preferred pharmacy. The patient began to have covid like symptoms on the evening of 3/25/2023 and tested positive thereafter. Patient is within the 5 day window to receive Paxlovid. We reviewed that [facility] pharmacy did not find any interactions with his listed medications. He v/u to all information.

## 2023-03-28 NOTE — TELEPHONE ENCOUNTER
----- Message from Sepideh Avila RPH sent at 3/28/2023  1:38 PM EDT -----  There are no drug interactions of concern.    Sunita Alonso  ----- Message -----  From: Milka Anthony RN  Sent: 3/28/2023   8:49 AM EDT  To: Sepideh Avila RPH, #    Hello! This patient began having covid symptoms on Saturday night and then tested positive. If Dr. Mendez wants to give him Paxlovid would any of his current medications interact?    Thank you!

## 2023-03-29 ENCOUNTER — TELEPHONE (OUTPATIENT)
Dept: ONCOLOGY | Facility: CLINIC | Age: 63
End: 2023-03-29
Payer: COMMERCIAL

## 2023-03-29 NOTE — TELEPHONE ENCOUNTER
Patient requested a prescription be sent to the [facility] on [address]. I spoke with Kristin at [facility] on [address] and she confirmed they do have Paxlovid in stock and enough to fill his prescription. I electronically sent the prescription to them and notified the pt. He v/u.

## 2023-03-30 ENCOUNTER — SPECIALTY PHARMACY (OUTPATIENT)
Dept: PHARMACY | Facility: HOSPITAL | Age: 63
End: 2023-03-30
Payer: COMMERCIAL

## 2023-03-30 DIAGNOSIS — C90.00 MULTIPLE MYELOMA NOT HAVING ACHIEVED REMISSION: ICD-10-CM

## 2023-03-30 RX ORDER — ACYCLOVIR 400 MG/1
TABLET ORAL
Qty: 60 TABLET | Refills: 11 | OUTPATIENT
Start: 2023-03-30

## 2023-04-06 ENCOUNTER — SPECIALTY PHARMACY (OUTPATIENT)
Dept: PHARMACY | Facility: HOSPITAL | Age: 63
End: 2023-04-06
Payer: COMMERCIAL

## 2023-04-06 NOTE — PROGRESS NOTES
Specialty Note ( revlimid)    Call placed to check in post COVID treatment with Paxlovid.  No answer.   direct line [phone].

## 2023-04-10 ENCOUNTER — SPECIALTY PHARMACY (OUTPATIENT)
Dept: PHARMACY | Facility: HOSPITAL | Age: 63
End: 2023-04-10
Payer: COMMERCIAL

## 2023-04-10 NOTE — PROGRESS NOTES
Specialty Note ( revlimid)     Call placed to check in post COVID treatment with Paxlovid.  No answer.   direct line [phone]    Duy called the [facility] office back and reports he is feeling well, no concerns for us after recently recovering from COVID.

## 2023-04-12 ENCOUNTER — SPECIALTY PHARMACY (OUTPATIENT)
Dept: PHARMACY | Facility: HOSPITAL | Age: 63
End: 2023-04-12
Payer: COMMERCIAL

## 2023-04-12 RX ORDER — LENALIDOMIDE 10 MG/1
10 CAPSULE ORAL DAILY
Qty: 21 CAPSULE | Refills: 0 | Status: SHIPPED | OUTPATIENT
Start: 2023-04-12

## 2023-04-12 NOTE — PROGRESS NOTES
Re: Refills of Oral Specialty Medication - Revlimid (lenalidomide)    • Drug-Drug Interactions: The current medication list was reviewed and there are no relevant drug-drug interactions.  • Medication Allergies: The patient has no relevant allergies as it relates to their oral specialty medication  • Review of Labs/Dose Adjustments: NO DOSE CHANGE - I reviewed the most recent note and labs and the patient will continue without any dose changes.  I sent refills as described below.    Drug: Revlimid (lenalidomide)  Strength: 10 mg  Directions: Take 1 capsule by mouth daily for 21 days on, then 7 days off, then repeat  Quantity: 21  Refills: 0  Pharmacy prescription sent to: [facility]    Name/Credentials Sepideh Avila RPh, GILLIAN    4/12/2023  09:35 EDT     Completed independent double check on medication order/RX.    Thanks,   Gina Naylor, PharmD, BCPS

## 2023-04-20 ENCOUNTER — INFUSION (OUTPATIENT)
Dept: ONCOLOGY | Facility: HOSPITAL | Age: 63
End: 2023-04-20
Payer: COMMERCIAL

## 2023-04-20 ENCOUNTER — LAB (OUTPATIENT)
Dept: OTHER | Facility: HOSPITAL | Age: 63
End: 2023-04-20
Payer: COMMERCIAL

## 2023-04-20 DIAGNOSIS — D63.0 ANEMIA IN NEOPLASTIC DISEASE: ICD-10-CM

## 2023-04-20 DIAGNOSIS — T45.1X5A ANEMIA DUE TO CHEMOTHERAPY: ICD-10-CM

## 2023-04-20 DIAGNOSIS — D84.9 IMMUNOCOMPROMISED PATIENT: ICD-10-CM

## 2023-04-20 DIAGNOSIS — E85.81 AL AMYLOIDOSIS: ICD-10-CM

## 2023-04-20 DIAGNOSIS — C90.00 MULTIPLE MYELOMA NOT HAVING ACHIEVED REMISSION: Primary | ICD-10-CM

## 2023-04-20 DIAGNOSIS — D64.81 ANEMIA DUE TO CHEMOTHERAPY: ICD-10-CM

## 2023-04-20 DIAGNOSIS — Z79.899 ENCOUNTER FOR LONG-TERM (CURRENT) USE OF HIGH-RISK MEDICATION: ICD-10-CM

## 2023-04-20 DIAGNOSIS — C90.00 MULTIPLE MYELOMA NOT HAVING ACHIEVED REMISSION: ICD-10-CM

## 2023-04-20 LAB
ALBUMIN SERPL-MCNC: 4.1 G/DL (ref 3.5–5.2)
ALBUMIN/GLOB SERPL: 1.5 G/DL
ALP SERPL-CCNC: 84 U/L (ref 39–117)
ALT SERPL W P-5'-P-CCNC: 19 U/L (ref 1–41)
ANION GAP SERPL CALCULATED.3IONS-SCNC: 10.8 MMOL/L (ref 5–15)
AST SERPL-CCNC: 18 U/L (ref 1–40)
BASOPHILS # BLD AUTO: 0.05 10*3/MM3 (ref 0–0.2)
BASOPHILS NFR BLD AUTO: 1.1 % (ref 0–1.5)
BILIRUB SERPL-MCNC: 0.2 MG/DL (ref 0–1.2)
BUN SERPL-MCNC: 12 MG/DL (ref 8–23)
BUN/CREAT SERPL: 12.1 (ref 7–25)
CALCIUM SPEC-SCNC: 9.3 MG/DL (ref 8.6–10.5)
CHLORIDE SERPL-SCNC: 106 MMOL/L (ref 98–107)
CO2 SERPL-SCNC: 23.2 MMOL/L (ref 22–29)
CREAT SERPL-MCNC: 0.99 MG/DL (ref 0.76–1.27)
DEPRECATED RDW RBC AUTO: 44.5 FL (ref 37–54)
EGFRCR SERPLBLD CKD-EPI 2021: 86.7 ML/MIN/1.73
EOSINOPHIL # BLD AUTO: 0.34 10*3/MM3 (ref 0–0.4)
EOSINOPHIL NFR BLD AUTO: 7.7 % (ref 0.3–6.2)
ERYTHROCYTE [DISTWIDTH] IN BLOOD BY AUTOMATED COUNT: 16.5 % (ref 12.3–15.4)
GLOBULIN UR ELPH-MCNC: 2.8 GM/DL
GLUCOSE SERPL-MCNC: 97 MG/DL (ref 65–99)
HCT VFR BLD AUTO: 38.8 % (ref 37.5–51)
HGB BLD-MCNC: 12.1 G/DL (ref 13–17.7)
IMM GRANULOCYTES # BLD AUTO: 0.04 10*3/MM3 (ref 0–0.05)
IMM GRANULOCYTES NFR BLD AUTO: 0.9 % (ref 0–0.5)
LYMPHOCYTES # BLD AUTO: 1.44 10*3/MM3 (ref 0.7–3.1)
LYMPHOCYTES NFR BLD AUTO: 32.5 % (ref 19.6–45.3)
MCH RBC QN AUTO: 23.6 PG (ref 26.6–33)
MCHC RBC AUTO-ENTMCNC: 31.2 G/DL (ref 31.5–35.7)
MCV RBC AUTO: 75.6 FL (ref 79–97)
MONOCYTES # BLD AUTO: 0.73 10*3/MM3 (ref 0.1–0.9)
MONOCYTES NFR BLD AUTO: 16.5 % (ref 5–12)
NEUTROPHILS NFR BLD AUTO: 1.83 10*3/MM3 (ref 1.7–7)
NEUTROPHILS NFR BLD AUTO: 41.3 % (ref 42.7–76)
NRBC BLD AUTO-RTO: 0 /100 WBC (ref 0–0.2)
PLATELET # BLD AUTO: 218 10*3/MM3 (ref 140–450)
PMV BLD AUTO: 9.5 FL (ref 6–12)
POTASSIUM SERPL-SCNC: 4 MMOL/L (ref 3.5–5.2)
PROT SERPL-MCNC: 6.9 G/DL (ref 6–8.5)
RBC # BLD AUTO: 5.13 10*6/MM3 (ref 4.14–5.8)
SODIUM SERPL-SCNC: 140 MMOL/L (ref 136–145)
WBC NRBC COR # BLD: 4.43 10*3/MM3 (ref 3.4–10.8)

## 2023-04-20 PROCEDURE — 36415 COLL VENOUS BLD VENIPUNCTURE: CPT

## 2023-04-20 PROCEDURE — 96372 THER/PROPH/DIAG INJ SC/IM: CPT

## 2023-04-20 PROCEDURE — G0010 ADMIN HEPATITIS B VACCINE: HCPCS | Performed by: INTERNAL MEDICINE

## 2023-04-20 PROCEDURE — 84165 PROTEIN E-PHORESIS SERUM: CPT | Performed by: INTERNAL MEDICINE

## 2023-04-20 PROCEDURE — 80053 COMPREHEN METABOLIC PANEL: CPT | Performed by: INTERNAL MEDICINE

## 2023-04-20 PROCEDURE — 83521 IG LIGHT CHAINS FREE EACH: CPT | Performed by: INTERNAL MEDICINE

## 2023-04-20 PROCEDURE — 90472 IMMUNIZATION ADMIN EACH ADD: CPT

## 2023-04-20 PROCEDURE — 82784 ASSAY IGA/IGD/IGG/IGM EACH: CPT | Performed by: INTERNAL MEDICINE

## 2023-04-20 PROCEDURE — 86334 IMMUNOFIX E-PHORESIS SERUM: CPT | Performed by: INTERNAL MEDICINE

## 2023-04-20 PROCEDURE — 25010000002 HEPATITIS B VACCINE (RECOMBINANT) 20 MCG/ML SUSPENSION: Performed by: INTERNAL MEDICINE

## 2023-04-20 PROCEDURE — 90740 HEPB VACC 3 DOSE IMMUNSUP IM: CPT | Performed by: INTERNAL MEDICINE

## 2023-04-20 PROCEDURE — 85025 COMPLETE CBC W/AUTO DIFF WBC: CPT | Performed by: INTERNAL MEDICINE

## 2023-04-20 PROCEDURE — 90471 IMMUNIZATION ADMIN: CPT

## 2023-04-20 RX ADMIN — HEPATITIS B VACCINE (RECOMBINANT) 20 MCG: 20 INJECTION, SUSPENSION INTRAMUSCULAR at 08:52

## 2023-04-20 RX ADMIN — POLIOVIRUS TYPE 1 ANTIGEN (FORMALDEHYDE INACTIVATED), POLIOVIRUS TYPE 2 ANTIGEN (FORMALDEHYDE INACTIVATED), AND POLIOVIRUS TYPE 3 ANTIGEN (FORMALDEHYDE INACTIVATED) 0.5 ML: 40; 8; 32 INJECTION, SUSPENSION INTRAMUSCULAR at 08:55

## 2023-04-20 RX ADMIN — HAEMOPHILUS B POLYSACCHARIDE CONJUGATE VACCINE FOR INJ 0.5 ML: RECON SOLN at 08:48

## 2023-04-20 RX ADMIN — DIPHTHERIA AND TETANUS TOXOIDS AND ACELLULAR PERTUSSIS VACCINE ADSORBED 0.5 ML: 10; 25; 25; 25; 8 SUSPENSION INTRAMUSCULAR at 08:50

## 2023-04-20 NOTE — NURSING NOTE
Post stem cell vaccines given per MD order.  Pt denies complaints.  Next appt reviewed and pt instructed to call sooner with concerns and v/u.

## 2023-04-21 LAB
ALBUMIN SERPL ELPH-MCNC: 3.8 G/DL (ref 2.9–4.4)
ALBUMIN/GLOB SERPL: 1.5 {RATIO} (ref 0.7–1.7)
ALPHA1 GLOB SERPL ELPH-MCNC: 0.2 G/DL (ref 0–0.4)
ALPHA2 GLOB SERPL ELPH-MCNC: 0.5 G/DL (ref 0.4–1)
B-GLOBULIN SERPL ELPH-MCNC: 0.8 G/DL (ref 0.7–1.3)
GAMMA GLOB SERPL ELPH-MCNC: 1.1 G/DL (ref 0.4–1.8)
GLOBULIN SER-MCNC: 2.6 G/DL (ref 2.2–3.9)
IGA SERPL-MCNC: 67 MG/DL (ref 61–437)
IGG SERPL-MCNC: 1187 MG/DL (ref 603–1613)
IGM SERPL-MCNC: 72 MG/DL (ref 20–172)
INTERPRETATION SERPL IEP-IMP: ABNORMAL
KAPPA LC FREE SER-MCNC: 29.1 MG/L (ref 3.3–19.4)
KAPPA LC FREE/LAMBDA FREE SER: 1.23 {RATIO} (ref 0.26–1.65)
LABORATORY COMMENT REPORT: ABNORMAL
LAMBDA LC FREE SERPL-MCNC: 23.6 MG/L (ref 5.7–26.3)
M PROTEIN SERPL ELPH-MCNC: ABNORMAL G/DL
PROT SERPL-MCNC: 6.4 G/DL (ref 6–8.5)

## 2023-05-01 RX ORDER — VALACYCLOVIR HYDROCHLORIDE 500 MG/1
TABLET, FILM COATED ORAL
Qty: 60 TABLET | Refills: 2 | Status: SHIPPED | OUTPATIENT
Start: 2023-05-01

## 2023-05-15 ENCOUNTER — SPECIALTY PHARMACY (OUTPATIENT)
Dept: PHARMACY | Facility: HOSPITAL | Age: 63
End: 2023-05-15
Payer: COMMERCIAL

## 2023-05-15 RX ORDER — LENALIDOMIDE 10 MG/1
10 CAPSULE ORAL DAILY
Qty: 21 CAPSULE | Refills: 0 | Status: SHIPPED | OUTPATIENT
Start: 2023-05-15

## 2023-05-15 NOTE — PROGRESS NOTES
Re: Refills of Oral Specialty Medication - Revlimid (lenalidomide)    • Drug-Drug Interactions: The current medication list was reviewed and there are no relevant drug-drug interactions.  • Medication Allergies: The patient has no relevant allergies as it relates to their oral specialty medication  • Review of Labs/Dose Adjustments: NO DOSE CHANGE - I reviewed the most recent note and labs and the patient will continue without any dose changes.  I sent refills as described below.    Drug: Revlimid (lenalidomide)  Strength: 10 mg  Directions: Take 1 capsule by mouth daily for 21 days on, then 7 days off  Quantity: 21  Refills: 0  Pharmacy prescription sent to: [facility]    Name/Credentials Sepideh Avila RPh, GILLIAN    5/15/2023  10:47 EDT         Completed independent double check on medication order/RX.    Figueroa Pleitez, PharmD, BCOP

## 2023-05-17 ENCOUNTER — CLINICAL SUPPORT (OUTPATIENT)
Dept: ONCOLOGY | Facility: HOSPITAL | Age: 63
End: 2023-05-17
Payer: COMMERCIAL

## 2023-05-17 ENCOUNTER — LAB (OUTPATIENT)
Dept: OTHER | Facility: HOSPITAL | Age: 63
End: 2023-05-17
Payer: COMMERCIAL

## 2023-05-17 VITALS — RESPIRATION RATE: 15 BRPM | TEMPERATURE: 97.6 F | BODY MASS INDEX: 28.5 KG/M2 | WEIGHT: 192.4 LBS | HEIGHT: 69 IN

## 2023-05-17 DIAGNOSIS — T45.1X5A ANEMIA DUE TO CHEMOTHERAPY: ICD-10-CM

## 2023-05-17 DIAGNOSIS — E85.81 AL AMYLOIDOSIS: ICD-10-CM

## 2023-05-17 DIAGNOSIS — D63.0 ANEMIA IN NEOPLASTIC DISEASE: ICD-10-CM

## 2023-05-17 DIAGNOSIS — C90.00 MULTIPLE MYELOMA NOT HAVING ACHIEVED REMISSION: ICD-10-CM

## 2023-05-17 DIAGNOSIS — D84.9 IMMUNOCOMPROMISED PATIENT: ICD-10-CM

## 2023-05-17 DIAGNOSIS — D64.81 ANEMIA DUE TO CHEMOTHERAPY: ICD-10-CM

## 2023-05-17 DIAGNOSIS — C90.00 MULTIPLE MYELOMA NOT HAVING ACHIEVED REMISSION: Primary | ICD-10-CM

## 2023-05-17 DIAGNOSIS — Z79.899 ENCOUNTER FOR LONG-TERM (CURRENT) USE OF HIGH-RISK MEDICATION: ICD-10-CM

## 2023-05-17 LAB
ALBUMIN SERPL-MCNC: 4.4 G/DL (ref 3.5–5.2)
ALBUMIN/GLOB SERPL: 1.5 G/DL
ALP SERPL-CCNC: 95 U/L (ref 39–117)
ALT SERPL W P-5'-P-CCNC: 42 U/L (ref 1–41)
ANION GAP SERPL CALCULATED.3IONS-SCNC: 9.8 MMOL/L (ref 5–15)
AST SERPL-CCNC: 29 U/L (ref 1–40)
BASOPHILS # BLD AUTO: 0.05 10*3/MM3 (ref 0–0.2)
BASOPHILS NFR BLD AUTO: 1.1 % (ref 0–1.5)
BILIRUB SERPL-MCNC: 0.4 MG/DL (ref 0–1.2)
BUN SERPL-MCNC: 11 MG/DL (ref 8–23)
BUN/CREAT SERPL: 11.2 (ref 7–25)
CALCIUM SPEC-SCNC: 9.3 MG/DL (ref 8.6–10.5)
CHLORIDE SERPL-SCNC: 106 MMOL/L (ref 98–107)
CO2 SERPL-SCNC: 24.2 MMOL/L (ref 22–29)
CREAT SERPL-MCNC: 0.98 MG/DL (ref 0.76–1.27)
DEPRECATED RDW RBC AUTO: 46.3 FL (ref 37–54)
EGFRCR SERPLBLD CKD-EPI 2021: 87.7 ML/MIN/1.73
EOSINOPHIL # BLD AUTO: 0.57 10*3/MM3 (ref 0–0.4)
EOSINOPHIL NFR BLD AUTO: 12.6 % (ref 0.3–6.2)
ERYTHROCYTE [DISTWIDTH] IN BLOOD BY AUTOMATED COUNT: 17.5 % (ref 12.3–15.4)
GLOBULIN UR ELPH-MCNC: 3 GM/DL
GLUCOSE SERPL-MCNC: 98 MG/DL (ref 65–99)
HCT VFR BLD AUTO: 40.7 % (ref 37.5–51)
HGB BLD-MCNC: 12.6 G/DL (ref 13–17.7)
IMM GRANULOCYTES # BLD AUTO: 0.04 10*3/MM3 (ref 0–0.05)
IMM GRANULOCYTES NFR BLD AUTO: 0.9 % (ref 0–0.5)
LYMPHOCYTES # BLD AUTO: 1.33 10*3/MM3 (ref 0.7–3.1)
LYMPHOCYTES NFR BLD AUTO: 29.3 % (ref 19.6–45.3)
MCH RBC QN AUTO: 23.4 PG (ref 26.6–33)
MCHC RBC AUTO-ENTMCNC: 31 G/DL (ref 31.5–35.7)
MCV RBC AUTO: 75.5 FL (ref 79–97)
MONOCYTES # BLD AUTO: 0.65 10*3/MM3 (ref 0.1–0.9)
MONOCYTES NFR BLD AUTO: 14.3 % (ref 5–12)
NEUTROPHILS NFR BLD AUTO: 1.9 10*3/MM3 (ref 1.7–7)
NEUTROPHILS NFR BLD AUTO: 41.8 % (ref 42.7–76)
NRBC BLD AUTO-RTO: 0 /100 WBC (ref 0–0.2)
PLATELET # BLD AUTO: 214 10*3/MM3 (ref 140–450)
PMV BLD AUTO: 9.2 FL (ref 6–12)
POTASSIUM SERPL-SCNC: 3.9 MMOL/L (ref 3.5–5.2)
PROT SERPL-MCNC: 7.4 G/DL (ref 6–8.5)
RBC # BLD AUTO: 5.39 10*6/MM3 (ref 4.14–5.8)
SODIUM SERPL-SCNC: 140 MMOL/L (ref 136–145)
WBC NRBC COR # BLD: 4.54 10*3/MM3 (ref 3.4–10.8)

## 2023-05-17 PROCEDURE — 85025 COMPLETE CBC W/AUTO DIFF WBC: CPT | Performed by: INTERNAL MEDICINE

## 2023-05-17 PROCEDURE — 84165 PROTEIN E-PHORESIS SERUM: CPT | Performed by: INTERNAL MEDICINE

## 2023-05-17 PROCEDURE — 36415 COLL VENOUS BLD VENIPUNCTURE: CPT

## 2023-05-17 PROCEDURE — 82784 ASSAY IGA/IGD/IGG/IGM EACH: CPT | Performed by: INTERNAL MEDICINE

## 2023-05-17 PROCEDURE — 86334 IMMUNOFIX E-PHORESIS SERUM: CPT | Performed by: INTERNAL MEDICINE

## 2023-05-17 PROCEDURE — 83521 IG LIGHT CHAINS FREE EACH: CPT | Performed by: INTERNAL MEDICINE

## 2023-05-17 PROCEDURE — 80053 COMPREHEN METABOLIC PANEL: CPT | Performed by: INTERNAL MEDICINE

## 2023-05-17 PROCEDURE — G0463 HOSPITAL OUTPT CLINIC VISIT: HCPCS

## 2023-05-17 NOTE — NURSING NOTE
Patient is here for lab review with RN. Reviewed CBC and CMP, counts are stable for this patient at this time.  Patient states since having covid his stamina has decreased, but he is working to build his strength back up. He denies any other complaints. ALT is increased to 42; denies taking tylenol, zofran or drinking alcohol. Patient is taking Revlimid as prescribed. Spoke with AUDRA Fitzgerald; Revlimid can cause increased ALT. Patient will return in 2 weeks for CBC, CMP and RN review. Notified patient to continue avoiding tylenol, zofran and alcohol, he verbalized understanding. Copy of labs given to patient and appointment scheduled for 5/31/23. Instructed patent to call the office prior to then for any concerns or new symptoms. Discharged in stable condition.    Lab Results   Component Value Date    WBC 4.54 05/17/2023    HGB 12.6 (L) 05/17/2023    HCT 40.7 05/17/2023    MCV 75.5 (L) 05/17/2023     05/17/2023     Lab Results   Component Value Date    GLUCOSE 98 05/17/2023    BUN 11 05/17/2023    CREATININE 0.98 05/17/2023    EGFR 87.7 05/17/2023    BCR 11.2 05/17/2023    K 3.9 05/17/2023    CO2 24.2 05/17/2023    CALCIUM 9.3 05/17/2023    PROTENTOTREF 6.4 04/20/2023    ALBUMIN 4.4 05/17/2023    BILITOT 0.4 05/17/2023    AST 29 05/17/2023    ALT 42 (H) 05/17/2023

## 2023-05-18 LAB
ALBUMIN SERPL ELPH-MCNC: 3.9 G/DL (ref 2.9–4.4)
ALBUMIN/GLOB SERPL: 1.3 {RATIO} (ref 0.7–1.7)
ALPHA1 GLOB SERPL ELPH-MCNC: 0.3 G/DL (ref 0–0.4)
ALPHA2 GLOB SERPL ELPH-MCNC: 0.5 G/DL (ref 0.4–1)
B-GLOBULIN SERPL ELPH-MCNC: 1 G/DL (ref 0.7–1.3)
GAMMA GLOB SERPL ELPH-MCNC: 1.3 G/DL (ref 0.4–1.8)
GLOBULIN SER-MCNC: 3.1 G/DL (ref 2.2–3.9)
IGA SERPL-MCNC: 70 MG/DL (ref 61–437)
IGG SERPL-MCNC: 1229 MG/DL (ref 603–1613)
IGM SERPL-MCNC: 60 MG/DL (ref 20–172)
INTERPRETATION SERPL IEP-IMP: ABNORMAL
KAPPA LC FREE SER-MCNC: 31.5 MG/L (ref 3.3–19.4)
KAPPA LC FREE/LAMBDA FREE SER: 1.29 {RATIO} (ref 0.26–1.65)
LABORATORY COMMENT REPORT: ABNORMAL
LAMBDA LC FREE SERPL-MCNC: 24.5 MG/L (ref 5.7–26.3)
M PROTEIN SERPL ELPH-MCNC: ABNORMAL G/DL
PROT SERPL-MCNC: 7 G/DL (ref 6–8.5)

## 2023-05-26 RX ORDER — VALACYCLOVIR HYDROCHLORIDE 500 MG/1
TABLET, FILM COATED ORAL
Qty: 60 TABLET | Refills: 2 | Status: SHIPPED | OUTPATIENT
Start: 2023-05-26

## 2023-05-31 ENCOUNTER — CLINICAL SUPPORT (OUTPATIENT)
Dept: ONCOLOGY | Facility: HOSPITAL | Age: 63
End: 2023-05-31

## 2023-05-31 ENCOUNTER — LAB (OUTPATIENT)
Dept: OTHER | Facility: HOSPITAL | Age: 63
End: 2023-05-31

## 2023-05-31 VITALS — BODY MASS INDEX: 28.06 KG/M2 | HEIGHT: 70 IN | TEMPERATURE: 97.7 F | WEIGHT: 196 LBS | RESPIRATION RATE: 15 BRPM

## 2023-05-31 DIAGNOSIS — C90.00 MULTIPLE MYELOMA NOT HAVING ACHIEVED REMISSION: ICD-10-CM

## 2023-05-31 LAB
ALBUMIN SERPL-MCNC: 4.2 G/DL (ref 3.5–5.2)
ALBUMIN/GLOB SERPL: 1.3 G/DL
ALP SERPL-CCNC: 109 U/L (ref 39–117)
ALT SERPL W P-5'-P-CCNC: 84 U/L (ref 1–41)
ANION GAP SERPL CALCULATED.3IONS-SCNC: 10.4 MMOL/L (ref 5–15)
AST SERPL-CCNC: 56 U/L (ref 1–40)
BASOPHILS # BLD AUTO: 0.05 10*3/MM3 (ref 0–0.2)
BASOPHILS NFR BLD AUTO: 0.9 % (ref 0–1.5)
BILIRUB SERPL-MCNC: 0.4 MG/DL (ref 0–1.2)
BUN SERPL-MCNC: 10 MG/DL (ref 8–23)
BUN/CREAT SERPL: 10.5 (ref 7–25)
CALCIUM SPEC-SCNC: 9.4 MG/DL (ref 8.6–10.5)
CHLORIDE SERPL-SCNC: 105 MMOL/L (ref 98–107)
CO2 SERPL-SCNC: 24.6 MMOL/L (ref 22–29)
CREAT SERPL-MCNC: 0.95 MG/DL (ref 0.76–1.27)
DEPRECATED RDW RBC AUTO: 48.6 FL (ref 37–54)
EGFRCR SERPLBLD CKD-EPI 2021: 91.1 ML/MIN/1.73
EOSINOPHIL # BLD AUTO: 0.3 10*3/MM3 (ref 0–0.4)
EOSINOPHIL NFR BLD AUTO: 5.5 % (ref 0.3–6.2)
ERYTHROCYTE [DISTWIDTH] IN BLOOD BY AUTOMATED COUNT: 18.5 % (ref 12.3–15.4)
GLOBULIN UR ELPH-MCNC: 3.2 GM/DL
GLUCOSE SERPL-MCNC: 95 MG/DL (ref 65–99)
HCT VFR BLD AUTO: 40.7 % (ref 37.5–51)
HGB BLD-MCNC: 12.7 G/DL (ref 13–17.7)
IMM GRANULOCYTES # BLD AUTO: 0.03 10*3/MM3 (ref 0–0.05)
IMM GRANULOCYTES NFR BLD AUTO: 0.6 % (ref 0–0.5)
LYMPHOCYTES # BLD AUTO: 1.25 10*3/MM3 (ref 0.7–3.1)
LYMPHOCYTES NFR BLD AUTO: 23.1 % (ref 19.6–45.3)
MCH RBC QN AUTO: 23.6 PG (ref 26.6–33)
MCHC RBC AUTO-ENTMCNC: 31.2 G/DL (ref 31.5–35.7)
MCV RBC AUTO: 75.7 FL (ref 79–97)
MONOCYTES # BLD AUTO: 0.3 10*3/MM3 (ref 0.1–0.9)
MONOCYTES NFR BLD AUTO: 5.5 % (ref 5–12)
NEUTROPHILS NFR BLD AUTO: 3.48 10*3/MM3 (ref 1.7–7)
NEUTROPHILS NFR BLD AUTO: 64.4 % (ref 42.7–76)
NRBC BLD AUTO-RTO: 0 /100 WBC (ref 0–0.2)
PLATELET # BLD AUTO: 218 10*3/MM3 (ref 140–450)
PMV BLD AUTO: 10.4 FL (ref 6–12)
POTASSIUM SERPL-SCNC: 4 MMOL/L (ref 3.5–5.2)
PROT SERPL-MCNC: 7.4 G/DL (ref 6–8.5)
RBC # BLD AUTO: 5.38 10*6/MM3 (ref 4.14–5.8)
SODIUM SERPL-SCNC: 140 MMOL/L (ref 136–145)
WBC NRBC COR # BLD: 5.41 10*3/MM3 (ref 3.4–10.8)

## 2023-05-31 PROCEDURE — 80053 COMPREHEN METABOLIC PANEL: CPT | Performed by: NURSE PRACTITIONER

## 2023-05-31 PROCEDURE — G0463 HOSPITAL OUTPT CLINIC VISIT: HCPCS

## 2023-05-31 PROCEDURE — 36415 COLL VENOUS BLD VENIPUNCTURE: CPT

## 2023-05-31 PROCEDURE — 85025 COMPLETE CBC W/AUTO DIFF WBC: CPT | Performed by: NURSE PRACTITIONER

## 2023-05-31 NOTE — NURSING NOTE
"Pt to infusion room for CBC, CMP and Rn review  Lab Results   Component Value Date    WBC 5.41 05/31/2023    HGB 12.7 (L) 05/31/2023    HCT 40.7 05/31/2023    MCV 75.7 (L) 05/31/2023     05/31/2023       AST 56, ALT 84  Pt reports he is not taking any tylenol , zofran or alcohol  Pt c/o's of mid back \"muscle pain spasms that have gotten worse the past week\".   Pt also reports he fell 2 weeks ago , states \"his right leg just gave out on him\" .  Pt reports no residual pain or discomfort from fall.  Pt denies N/V and reports he is eating and drinking without issues.   Discussed above issues with Dr Mendez , Pt is to continue to take revlimid , push fluids , use heating pad and creams as needed for back discomfort, and not to take NSAID's , tylenol , zofran or alcohol.   Pt is to return in two weeks for CBC, CMP and RN review  Reviewed all above instructions with patient and he V/U  Pt instructed to call for further issues or concerns.  Pt has F/U with Dr Mendez on 06/14/23     "

## 2023-06-13 ENCOUNTER — SPECIALTY PHARMACY (OUTPATIENT)
Dept: PHARMACY | Facility: HOSPITAL | Age: 63
End: 2023-06-13
Payer: COMMERCIAL

## 2023-06-13 RX ORDER — LENALIDOMIDE 10 MG/1
10 CAPSULE ORAL DAILY
Qty: 21 CAPSULE | Refills: 0 | Status: SHIPPED | OUTPATIENT
Start: 2023-06-13

## 2023-06-13 NOTE — PROGRESS NOTES
Re: Refills of Oral Specialty Medication - Revlimid (lenalidomide)    Drug-Drug Interactions: The current medication list was reviewed and there are no relevant drug-drug interactions.  Medication Allergies: The patient has no relevant allergies as it relates to their oral specialty medication  Review of Labs/Dose Adjustments: NO DOSE CHANGE - I reviewed the most recent note and labs and the patient will continue without any dose changes.  I sent refills as described below.    Drug: Revlimid (lenalidomide)  Strength: 10 mg  Directions: Take 1 capsule by mouth  daily for 21 days on, then 7 days off  Quantity: 21  Refills: 0  Pharmacy prescription sent to: [facility]    Name/Credentials Sepideh Avila RPh, GILLIAN    6/13/2023  09:33 EDT         Completed independent double check on medication order/RX.    Figueroa Pleitez, [place]ie, BCOP

## 2023-06-14 ENCOUNTER — LAB (OUTPATIENT)
Dept: OTHER | Facility: HOSPITAL | Age: 63
End: 2023-06-14
Payer: COMMERCIAL

## 2023-06-14 ENCOUNTER — OFFICE VISIT (OUTPATIENT)
Dept: ONCOLOGY | Facility: CLINIC | Age: 63
End: 2023-06-14
Payer: COMMERCIAL

## 2023-06-14 VITALS
RESPIRATION RATE: 18 BRPM | HEIGHT: 70 IN | BODY MASS INDEX: 27.5 KG/M2 | HEART RATE: 59 BPM | WEIGHT: 192.1 LBS | DIASTOLIC BLOOD PRESSURE: 72 MMHG | TEMPERATURE: 98.4 F | OXYGEN SATURATION: 99 % | SYSTOLIC BLOOD PRESSURE: 130 MMHG

## 2023-06-14 DIAGNOSIS — E85.81 AL AMYLOIDOSIS: ICD-10-CM

## 2023-06-14 DIAGNOSIS — D64.81 ANEMIA DUE TO CHEMOTHERAPY: ICD-10-CM

## 2023-06-14 DIAGNOSIS — Z79.899 ENCOUNTER FOR LONG-TERM (CURRENT) USE OF HIGH-RISK MEDICATION: ICD-10-CM

## 2023-06-14 DIAGNOSIS — T45.1X5A ANEMIA DUE TO CHEMOTHERAPY: ICD-10-CM

## 2023-06-14 DIAGNOSIS — D63.0 ANEMIA IN NEOPLASTIC DISEASE: ICD-10-CM

## 2023-06-14 DIAGNOSIS — D84.9 IMMUNOCOMPROMISED PATIENT: ICD-10-CM

## 2023-06-14 DIAGNOSIS — C90.00 MULTIPLE MYELOMA NOT HAVING ACHIEVED REMISSION: ICD-10-CM

## 2023-06-14 DIAGNOSIS — M62.838 MUSCLE SPASM: ICD-10-CM

## 2023-06-14 DIAGNOSIS — R55 SYNCOPE, UNSPECIFIED SYNCOPE TYPE: ICD-10-CM

## 2023-06-14 DIAGNOSIS — C90.00 MULTIPLE MYELOMA NOT HAVING ACHIEVED REMISSION: Primary | ICD-10-CM

## 2023-06-14 LAB
ALBUMIN SERPL-MCNC: 4 G/DL (ref 3.5–5.2)
ALBUMIN/GLOB SERPL: 1.3 G/DL
ALP SERPL-CCNC: 89 U/L (ref 39–117)
ALT SERPL W P-5'-P-CCNC: 23 U/L (ref 1–41)
ANION GAP SERPL CALCULATED.3IONS-SCNC: 11 MMOL/L (ref 5–15)
AST SERPL-CCNC: 22 U/L (ref 1–40)
BASOPHILS # BLD AUTO: 0.05 10*3/MM3 (ref 0–0.2)
BASOPHILS NFR BLD AUTO: 1.1 % (ref 0–1.5)
BILIRUB SERPL-MCNC: 0.3 MG/DL (ref 0–1.2)
BUN SERPL-MCNC: 17 MG/DL (ref 8–23)
BUN/CREAT SERPL: 17.2 (ref 7–25)
CALCIUM SPEC-SCNC: 9 MG/DL (ref 8.6–10.5)
CHLORIDE SERPL-SCNC: 106 MMOL/L (ref 98–107)
CO2 SERPL-SCNC: 23 MMOL/L (ref 22–29)
CREAT SERPL-MCNC: 0.99 MG/DL (ref 0.76–1.27)
DEPRECATED RDW RBC AUTO: 47.7 FL (ref 37–54)
EGFRCR SERPLBLD CKD-EPI 2021: 86.7 ML/MIN/1.73
EOSINOPHIL # BLD AUTO: 0.44 10*3/MM3 (ref 0–0.4)
EOSINOPHIL NFR BLD AUTO: 9.6 % (ref 0.3–6.2)
ERYTHROCYTE [DISTWIDTH] IN BLOOD BY AUTOMATED COUNT: 17.6 % (ref 12.3–15.4)
GLOBULIN UR ELPH-MCNC: 3.1 GM/DL
GLUCOSE SERPL-MCNC: 90 MG/DL (ref 65–99)
HCT VFR BLD AUTO: 38.2 % (ref 37.5–51)
HGB BLD-MCNC: 12 G/DL (ref 13–17.7)
IMM GRANULOCYTES # BLD AUTO: 0.03 10*3/MM3 (ref 0–0.05)
IMM GRANULOCYTES NFR BLD AUTO: 0.7 % (ref 0–0.5)
LYMPHOCYTES # BLD AUTO: 1.49 10*3/MM3 (ref 0.7–3.1)
LYMPHOCYTES NFR BLD AUTO: 32.5 % (ref 19.6–45.3)
MAGNESIUM SERPL-MCNC: 1.9 MG/DL (ref 1.6–2.4)
MCH RBC QN AUTO: 23.9 PG (ref 26.6–33)
MCHC RBC AUTO-ENTMCNC: 31.4 G/DL (ref 31.5–35.7)
MCV RBC AUTO: 76.1 FL (ref 79–97)
MONOCYTES # BLD AUTO: 0.72 10*3/MM3 (ref 0.1–0.9)
MONOCYTES NFR BLD AUTO: 15.7 % (ref 5–12)
NEUTROPHILS NFR BLD AUTO: 1.85 10*3/MM3 (ref 1.7–7)
NEUTROPHILS NFR BLD AUTO: 40.4 % (ref 42.7–76)
NRBC BLD AUTO-RTO: 0 /100 WBC (ref 0–0.2)
PHOSPHATE SERPL-MCNC: 3.1 MG/DL (ref 2.5–4.5)
PLATELET # BLD AUTO: 202 10*3/MM3 (ref 140–450)
PMV BLD AUTO: 9.1 FL (ref 6–12)
POTASSIUM SERPL-SCNC: 4 MMOL/L (ref 3.5–5.2)
PROT SERPL-MCNC: 7.1 G/DL (ref 6–8.5)
RBC # BLD AUTO: 5.02 10*6/MM3 (ref 4.14–5.8)
SODIUM SERPL-SCNC: 140 MMOL/L (ref 136–145)
WBC NRBC COR # BLD: 4.58 10*3/MM3 (ref 3.4–10.8)

## 2023-06-14 PROCEDURE — 83735 ASSAY OF MAGNESIUM: CPT | Performed by: INTERNAL MEDICINE

## 2023-06-14 PROCEDURE — 80053 COMPREHEN METABOLIC PANEL: CPT | Performed by: INTERNAL MEDICINE

## 2023-06-14 PROCEDURE — 84165 PROTEIN E-PHORESIS SERUM: CPT | Performed by: INTERNAL MEDICINE

## 2023-06-14 PROCEDURE — 83521 IG LIGHT CHAINS FREE EACH: CPT | Performed by: INTERNAL MEDICINE

## 2023-06-14 PROCEDURE — 86334 IMMUNOFIX E-PHORESIS SERUM: CPT | Performed by: INTERNAL MEDICINE

## 2023-06-14 PROCEDURE — 36415 COLL VENOUS BLD VENIPUNCTURE: CPT

## 2023-06-14 PROCEDURE — 84100 ASSAY OF PHOSPHORUS: CPT | Performed by: INTERNAL MEDICINE

## 2023-06-14 PROCEDURE — 82784 ASSAY IGA/IGD/IGG/IGM EACH: CPT | Performed by: INTERNAL MEDICINE

## 2023-06-14 PROCEDURE — 85025 COMPLETE CBC W/AUTO DIFF WBC: CPT | Performed by: INTERNAL MEDICINE

## 2023-06-14 NOTE — PROGRESS NOTES
Subjective     CHIEF COMPLAINT:      Chief Complaint   Patient presents with   • Follow-up     DISCUSS IMMUNIZATIONS/RT LEG GIVES OUT/BACK AND NECK SPASMS/REVLIMID AND BEING OUT IN THE SUN/HEADACHES       HISTORY OF PRESENT ILLNESS:     Duy Owens is a 62 y.o. male patient who returns today for follow up on his MM and AL amyloidosis. He returns today for follow up and reports that he took the last Revlomid of the cycle yesterday. He is not having nausea or vomiting.    Patient reports having an episode of passing out recently. He was outdoors watching a game. He was well hydrated and did not feel dizzy. He suddenly lost consciousness. EMS was called. He was advised to go to ER but he declined. He was not having stroke like symptoms at that time.     Patient reports having recurrent episodes of muscles spasms. He had an episode of severe spasm of the upper spine that made him unable to bend back his neck or turn it to the side. He reports having episodes of muscle spasms of the arch of the foot. He reports that he had an episode of falling down when he tried to use a ladder. He reports weakness of the right leg that made him fall.     ROS:  Pertinent ROS is in the HPI.     Past medical, surgical, social and family history were reviewed.     MEDICATIONS:    Current Outpatient Medications:   •  aspirin 81 MG EC tablet, Take 1 tablet by mouth Daily., Disp: , Rfl:   •  lenalidomide (REVLIMID) 10 MG capsule, Take 1 capsule by mouth Daily. Take for 21 days on, then 7 days off, then repeat., Disp: 21 capsule, Rfl: 0  •  losartan (COZAAR) 100 MG tablet, TAKE ONE TABLET BY MOUTH DAILY, Disp: 30 tablet, Rfl: 11  •  metFORMIN (GLUCOPHAGE) 500 MG tablet, Take 1 tablet by mouth 2 (Two) Times a Day., Disp: , Rfl:   •  ondansetron (ZOFRAN) 8 MG tablet, Take 1 tablet by mouth 3 (Three) Times a Day As Needed for Nausea or Vomiting., Disp: 30 tablet, Rfl: 5  •  valACYclovir (VALTREX) 500 MG tablet, TAKE ONE TABLET BY MOUTH TWICE A  "DAY, Disp: 60 tablet, Rfl: 2  Objective     VITAL SIGNS:     Vitals:    06/14/23 0749   BP: 130/72   Pulse: 59   Resp: 18   Temp: 98.4 °F (36.9 °C)   TempSrc: Temporal   SpO2: 99%   Weight: 87.1 kg (192 lb 1.6 oz)   Height: 177 cm (69.69\")   PainSc: 0-No pain     Body mass index is 27.81 kg/m².     Wt Readings from Last 5 Encounters:   06/14/23 87.1 kg (192 lb 1.6 oz)   05/31/23 88.9 kg (196 lb)   05/17/23 87.3 kg (192 lb 6.4 oz)   03/22/23 88.2 kg (194 lb 6.4 oz)   03/08/23 90.7 kg (200 lb)     PHYSICAL EXAMINATION:   GENERAL: The patient appears in good general condition, not in acute distress.   SKIN: No ecchymosis.  EYES: No jaundice. No pallor.  LYMPHATICS: No cervical lymphadenopathy.  CHEST: Normal respiratory effort. Lungs clear. No added sounds.  CVS: Regular rhythm. Normal S1 and S2. No murmurs.  ABDOMEN: Soft. No tenderness. No Hepatomegaly. No Splenomegaly. No masses.  EXTREMITIES: No edema. No calf tenderness.    DIAGNOSTIC DATA:     Results from last 7 days   Lab Units 06/14/23  0736   WBC 10*3/mm3 4.58   NEUTROS ABS 10*3/mm3 1.85   HEMOGLOBIN g/dL 12.0*   HEMATOCRIT % 38.2   PLATELETS 10*3/mm3 202     Results from last 7 days   Lab Units 06/14/23  0736   SODIUM mmol/L 140   POTASSIUM mmol/L 4.0   CHLORIDE mmol/L 106   CO2 mmol/L 23.0   BUN mg/dL 17   CREATININE mg/dL 0.99   CALCIUM mg/dL 9.0   ALBUMIN g/dL 4.0   BILIRUBIN mg/dL 0.3   ALK PHOS U/L 89   ALT (SGPT) U/L 23   AST (SGOT) U/L 22   GLUCOSE mg/dL 90   MAGNESIUM mg/dL 1.9     Component      Latest Ref Rng 6/14/2023   Phosphorus      2.5 - 4.5 mg/dL 3.1      Component      Latest Ref Rng 4/20/2023 5/17/2023   IgG      603 - 1613 mg/dL 1187  1229    IgA      61 - 437 mg/dL 67  70    IgM      20 - 172 mg/dL 72  60    Total Protein      6.0 - 8.5 g/dL 6.4  7.0    Albumin      2.9 - 4.4 g/dL 3.8  3.9    Alpha-1-Globulin      0.0 - 0.4 g/dL 0.2  0.3    Alpha-2-Globulin      0.4 - 1.0 g/dL 0.5  0.5    Beta Globulin      0.7 - 1.3 g/dL 0.8  1.0  "   Gamma Globulin      0.4 - 1.8 g/dL 1.1  1.3    M-Werner      Not Observed g/dL Not Observed  Not Observed    Globulin      2.2 - 3.9 g/dL 2.6  3.1    A/G Ratio      0.7 - 1.7  1.5  1.3    Immunofixation Reflex, Serum Comment:  Comment    Please note Comment  Comment    Kappa FLC      3.3 - 19.4 mg/L 29.1 (H)  31.5 (H)    Free Lambda Light Chains      5.7 - 26.3 mg/L 23.6  24.5    Kappa/Lambda Ratio      0.26 - 1.65  1.23  1.29       Assessment & Plan    *Lambda light chain multiple myeloma with lymphadenopathy and development of AL amyloidosis.  · Patient was found to have lymph node involvement and amyloid deposition in the involved lymph nodes.  · Patient started having dry cough around July 2021.    · CT scans 9/23/2021-9/24/2021 revealed inferior mediastinal adenopathy and retroperitoneal adenopathy extending to the right iliac chain.  · The Largest lymph node was in the retroperitoneal area measuring 4.8 x 3.5 cm.  The common iliac lymph node measured 3.5 x 2.7 cm.  The left external iliac chain lymph node measured 2.9 x 2 cm.    · PET scan on 10/5/2021 revealed the retroperitoneal and left pelvic lymphadenopathy to be hypermetabolic.  The left periaortic lymph nodes had SUV of 6.9 and the left pelvic sidewall lymph nodes had an SUV of 5.4.  No bone involvement.  · CT-guided biopsy on 10/6/2021- pathologist at [facility] reported involvement with monotypic lambda restricted plasma cells concerning for involvement with plasma cell dyscrasia.  · Bone marrow biopsy on 10/29/2021 revealed a normocellular marrow (50%) with involvement with plasma cell dyscrasia (plasma cells representing 20-30% of total cells by  stain).   · FISH was negative for gain of 1q, monosomy/deletions of chromosomes 13 and 17, IGH rearrangement, gain of chromosomes 9 and 11, IGH-CCND1 (11;14) fusion.   · Treatment with the VRD started on 12/22/2021.  · Free lambda light chain started to improve after the patient started  treatment.  · CT scan on 3/11/2022 revealed decrease in the size of lymph nodes in the periaortic area.  There is a slight increase in a lymph node in the left axillary area that increased from 7 to 10 mm.    · PET scan on 4/28/2022 revealed significant reduction in the size and hypermetabolism of the involved lymph nodes.  · SPEP REGINO FLC on 5/25/2022 revealed no M protein.  Free lambda light chain was 41.8.  Kappa to lambda ratio was normal at 0.26.  B2 M was 1.6.  · Bone marrow biopsy on 5/24/2022 revealed normocellular marrow with 30-40% cellularity with involvement by plasma cell neoplasm representing 5-10% by IHC.  Negative for amyloid.  · He was considered to be MN-1.  · S/p high-dose melphalan with autologous stem cell transplant.  Day 0 was 7/7/2022.  · Patient did well with the transplant but had neutropenic fever of unclear source necessitating hospitalization between 7/16/2022 and 7/18/2022.    · Bone marrow biopsy on 9/29/2022 revealed monotypic plasma cells identified on on flow cytometry but not on IHC.  · CT on 9/29/2022 showed decrease in the size of the enlarged lymph nodes.  · Patient started Revlimid 10 mg daily for 21 out of 28-day cycle on 11/1/2022.    · CT on 2/23/2023 showed decrease in the size of the mediastinal and retroperitoneal lymph nodes indicating response to treatment.  · On 2/23/2023-Free kappa light chain 1.94, free lambda light chain 2.19, kappa/lambda ratio normal at 0.89.  · On 5/17/2023: Free kappa light chain was 31.5.  Free lambda light chain was 24.5.  Kappa 3 lambda ratio normal at 1.29.  · He is tolerating the treatment well.  · Paraprotein studies will be repeated today.    *Anemia secondary to multiple myeloma and secondary to treatment.  · Hemoglobin was 13.1 on 9/29/2021.    · Iron, folate and vitamin B12 were normal in September/October 2021.    · Hemoglobin was 11.4 on 8/24/2022.  · Hemoglobin improved to the 11-12 g range.  · Hemoglobin is 12.0  today.    *Prophylaxis.  · He was previously on acyclovir 400 mg twice daily.    · He is now on Valtrex.  · He received Evusheld on 8/20/2022.  · Patient received vaccinations as recommended by [place].  · He received COVID-19 Moderna booster on 10/25/2022.  · He received vaccines on 4/20/2023.    *Syncope.  · Patient reports having an episode of passing out while he was outdoors watching a game.  · He did not feel dizzy prior to the episode. He was well hydrated.  · He regained consciousness quickly.   · EMS was called. He declined going to ER at that time.   · He is concerned that the episode might have been caused by Revlimid.  · I explained that this is not a common side effect but I recommended evaluation by Cardiology.     *Severe muscle spasm of the cervical-thoracic spine.  · He reports having weakness of the right lower extremity.  · He fell off a ladder recently due to weakness of the right LE.  · I recommended evaluation with an MRI of the spine.   · He complained of muscle spasm of other muscles.  · Labs today revealed normal Na, K, Ca, Mg and Phos levels.     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  · There was mild wall thickening.  Ejection fraction was normal.  · Echocardiogram at [facility] on 4/21/2022 did not reveal wall thickening.    PLAN:    1.  Refer to Cardiology/Cardio-Oncology.  2.  Obtain MRI of the cervical, thoracic and lumbar spine.  3.  Start a new cycle of Revlimid in 1 week.  4.  Schedule him to receive his post transplant vaccines on 6/19/2023.  5.  Continue ASA 81 mg daily.  6.  Continue Valtrex through July 2023.  7.  He has follow up at [place] in July. I will see him in follow up in 8 weeks. CBC CMP SPEP REGINO FLC will be obtained.     I spent 50 minutes caring for Duy on this date of service. This time includes time spent by me in the following activities: preparing for the visit, reviewing tests, obtaining and/or reviewing a separately obtained  history, performing a medically appropriate examination and/or evaluation, counseling and educating the patient/family/caregiver, ordering medications, tests, or procedures, referring and communicating with other health care professionals, documenting information in the medical record, independently interpreting results and communicating that information with the patient/family/caregiver and care coordination     Tima Mendez MD  06/14/23

## 2023-06-15 ENCOUNTER — SPECIALTY PHARMACY (OUTPATIENT)
Dept: PHARMACY | Facility: HOSPITAL | Age: 63
End: 2023-06-15
Payer: COMMERCIAL

## 2023-06-15 LAB
ALBUMIN SERPL ELPH-MCNC: 3.7 G/DL (ref 2.9–4.4)
ALBUMIN/GLOB SERPL: 1.3 {RATIO} (ref 0.7–1.7)
ALPHA1 GLOB SERPL ELPH-MCNC: 0.2 G/DL (ref 0–0.4)
ALPHA2 GLOB SERPL ELPH-MCNC: 0.5 G/DL (ref 0.4–1)
B-GLOBULIN SERPL ELPH-MCNC: 1 G/DL (ref 0.7–1.3)
GAMMA GLOB SERPL ELPH-MCNC: 1.3 G/DL (ref 0.4–1.8)
GLOBULIN SER-MCNC: 3 G/DL (ref 2.2–3.9)
IGA SERPL-MCNC: 78 MG/DL (ref 61–437)
IGG SERPL-MCNC: 1378 MG/DL (ref 603–1613)
IGM SERPL-MCNC: 43 MG/DL (ref 20–172)
INTERPRETATION SERPL IEP-IMP: ABNORMAL
KAPPA LC FREE SER-MCNC: 31 MG/L (ref 3.3–19.4)
KAPPA LC FREE/LAMBDA FREE SER: 1.31 {RATIO} (ref 0.26–1.65)
LABORATORY COMMENT REPORT: ABNORMAL
LAMBDA LC FREE SERPL-MCNC: 23.7 MG/L (ref 5.7–26.3)
M PROTEIN SERPL ELPH-MCNC: ABNORMAL G/DL
PROT SERPL-MCNC: 6.7 G/DL (ref 6–8.5)

## 2023-06-19 ENCOUNTER — INFUSION (OUTPATIENT)
Dept: ONCOLOGY | Facility: HOSPITAL | Age: 63
End: 2023-06-19
Payer: COMMERCIAL

## 2023-06-19 VITALS — TEMPERATURE: 98.2 F

## 2023-06-19 DIAGNOSIS — C90.00 MULTIPLE MYELOMA NOT HAVING ACHIEVED REMISSION: Primary | ICD-10-CM

## 2023-06-19 PROCEDURE — 90740 HEPB VACC 3 DOSE IMMUNSUP IM: CPT | Performed by: INTERNAL MEDICINE

## 2023-06-19 PROCEDURE — 25010000002 HEPATITIS B VACCINE (RECOMBINANT) 20 MCG/ML SUSPENSION: Performed by: INTERNAL MEDICINE

## 2023-06-19 PROCEDURE — 96372 THER/PROPH/DIAG INJ SC/IM: CPT

## 2023-06-19 PROCEDURE — 90472 IMMUNIZATION ADMIN EACH ADD: CPT

## 2023-06-19 PROCEDURE — 90471 IMMUNIZATION ADMIN: CPT

## 2023-06-19 PROCEDURE — G0010 ADMIN HEPATITIS B VACCINE: HCPCS | Performed by: INTERNAL MEDICINE

## 2023-06-19 RX ADMIN — POLIOVIRUS TYPE 1 ANTIGEN (FORMALDEHYDE INACTIVATED), POLIOVIRUS TYPE 2 ANTIGEN (FORMALDEHYDE INACTIVATED), AND POLIOVIRUS TYPE 3 ANTIGEN (FORMALDEHYDE INACTIVATED) 0.5 ML: 40; 8; 32 INJECTION, SUSPENSION INTRAMUSCULAR at 09:23

## 2023-06-19 RX ADMIN — DIPHTHERIA AND TETANUS TOXOIDS AND ACELLULAR PERTUSSIS VACCINE ADSORBED 0.5 ML: 10; 25; 25; 25; 8 SUSPENSION INTRAMUSCULAR at 09:25

## 2023-06-19 RX ADMIN — HEPATITIS B VACCINE (RECOMBINANT) 20 MCG: 20 INJECTION, SUSPENSION INTRAMUSCULAR at 09:29

## 2023-06-19 RX ADMIN — HAEMOPHILUS B POLYSACCHARIDE CONJUGATE VACCINE FOR INJ 0.5 ML: RECON SOLN at 09:27

## 2023-07-14 ENCOUNTER — TELEPHONE (OUTPATIENT)
Dept: NEUROSURGERY | Facility: CLINIC | Age: 63
End: 2023-07-14

## 2023-07-14 NOTE — TELEPHONE ENCOUNTER
LVM: Dr. Carpio is wanting to schedule appt with patient. We have an opening on Monday and can possibly schedule him then. Asked to give us a call back. [facility] can read.

## 2023-07-20 PROBLEM — I10 HYPERTENSION, ESSENTIAL: Status: RESOLVED | Noted: 2021-05-03 | Resolved: 2023-07-20

## 2023-07-20 PROBLEM — R42 DIZZINESS: Status: RESOLVED | Noted: 2021-05-03 | Resolved: 2023-07-20

## 2023-08-04 ENCOUNTER — SPECIALTY PHARMACY (OUTPATIENT)
Dept: PHARMACY | Facility: HOSPITAL | Age: 63
End: 2023-08-04
Payer: COMMERCIAL

## 2023-08-05 ENCOUNTER — OFFICE VISIT (OUTPATIENT)
Dept: NEUROSURGERY | Facility: CLINIC | Age: 63
End: 2023-08-05
Payer: COMMERCIAL

## 2023-08-05 VITALS
SYSTOLIC BLOOD PRESSURE: 120 MMHG | BODY MASS INDEX: 27.77 KG/M2 | WEIGHT: 194 LBS | TEMPERATURE: 97.8 F | HEART RATE: 74 BPM | OXYGEN SATURATION: 100 % | RESPIRATION RATE: 16 BRPM | DIASTOLIC BLOOD PRESSURE: 72 MMHG | HEIGHT: 70 IN

## 2023-08-05 DIAGNOSIS — M48.061 SPINAL STENOSIS OF LUMBAR REGION WITHOUT NEUROGENIC CLAUDICATION: ICD-10-CM

## 2023-08-05 DIAGNOSIS — M48.02 CERVICAL SPINAL STENOSIS: ICD-10-CM

## 2023-08-05 DIAGNOSIS — M51.36 DDD (DEGENERATIVE DISC DISEASE), LUMBAR: Primary | ICD-10-CM

## 2023-08-07 ENCOUNTER — SPECIALTY PHARMACY (OUTPATIENT)
Dept: PHARMACY | Facility: HOSPITAL | Age: 63
End: 2023-08-07
Payer: COMMERCIAL

## 2023-08-07 RX ORDER — LENALIDOMIDE 10 MG/1
10 CAPSULE ORAL DAILY
Qty: 21 CAPSULE | Refills: 0 | Status: SHIPPED | OUTPATIENT
Start: 2023-08-07

## 2023-08-07 NOTE — PROGRESS NOTES
Re: Refills of Oral Specialty Medication - Revlimid (lenalidomide)    Drug-Drug Interactions: The current medication list was reviewed and there are no relevant drug-drug interactions.  Medication Allergies: The patient has no relevant allergies as it relates to their oral specialty medication  Review of Labs/Dose Adjustments: NO DOSE CHANGE - I reviewed the most recent note and labs and the patient will continue without any dose changes.  I sent refills as described below.    Drug: Revlimid (lenalidomide)  Strength: 10 mg  Directions: Take 1 capsule by mouth  daily for 21 days on, then 7 days off  Quantity: 21  Refills: 0  Pharmacy prescription sent to: [facility]    Name/Credentials Sepideh Avila RPh, BCOP    8/7/2023  08:26 EDT

## 2023-08-07 NOTE — PROGRESS NOTES
Drug: Revlimid (lenalidomide)  Strength: 10 mg  Directions: Take 1 capsule by mouth  daily for 21 days on, then 7 days off  Quantity: 21  Refills: 0  Pharmacy prescription sent to: [facility]    Completed independent double check on medication order/RX.  Figueroa Pleitez, PharmD, BCOP

## 2023-08-11 ENCOUNTER — SPECIALTY PHARMACY (OUTPATIENT)
Dept: PHARMACY | Facility: HOSPITAL | Age: 63
End: 2023-08-11
Payer: COMMERCIAL

## 2023-08-11 NOTE — PROGRESS NOTES
Specialty Pharmacy Patient Management Program  Oncology 6-Month Clinical Assessment       Duy Owens is a 62 y.o. male with lambda light chain multiple myeloma seen today to assess adherence and side effects.    Regimen: Revlimid 10 mg po daily for 21 days on, then 7 days off    Reason for Outreach: Routine medication check-in .       Problem list reviewed by Sepideh Avila RPH on 8/11/2023 at  3:50 PM  Medicines reviewed by Sepideh Avila RPH on 8/11/2023 at  3:50 PM  Allergies reviewed by Sepideh Avila RPH on 8/11/2023 at  3:50 PM     Goals        Specialty Pharmacy General Goal      SPEP REGINO FLC  will show no monoclonal proteins.             Medication Assessment:  Medication Assessment  Follow Up Clinical Assessment  Medication(s) assessed: Revlimid  Therapeutic appropriateness: Appropriate  Medication tolerability: Tolerating with no to minimal ADRs  Medication plan: Continue therapy with normal follow-up  Quality of Life Improvement Scale: Somewhat better  Comments on Quality of Life: Duy's mom passed away recently  Administration: as prescribed .   Patient can self administer medications: yes  Medication Follow-Up Plan: Next clinical assessment  Lab Review: The labs listed below have been reviewed. No dose adjustments are needed for the oral specialty medication(s) based on the labs.    Lab Results   Component Value Date    GLUCOSE 90 06/14/2023    CALCIUM 9.0 06/14/2023     06/14/2023    K 4.0 06/14/2023    CO2 23.0 06/14/2023     06/14/2023    BUN 17 06/14/2023    CREATININE 0.99 06/14/2023    EGFRIFAFRI 88 02/16/2022    EGFRIFNONA 84 05/03/2021    BCR 17.2 06/14/2023    ANIONGAP 11.0 06/14/2023     Lab Results   Component Value Date    WBC 4.58 06/14/2023    RBC 5.02 06/14/2023    HGB 12.0 (L) 06/14/2023    HCT 38.2 06/14/2023    MCV 76.1 (L) 06/14/2023    MCH 23.9 (L) 06/14/2023    MCHC 31.4 (L) 06/14/2023    RDW 17.6 (H) 06/14/2023    RDWSD 47.7 06/14/2023    MPV 9.1 06/14/2023      06/14/2023    NEUTRORELPCT 40.4 (L) 06/14/2023    LYMPHORELPCT 32.5 06/14/2023    MONORELPCT 15.7 (H) 06/14/2023    EOSRELPCT 9.6 (H) 06/14/2023    BASORELPCT 1.1 06/14/2023    AUTOIGPER 0.7 (H) 06/14/2023    NEUTROABS 1.85 06/14/2023    LYMPHSABS 1.49 06/14/2023    MONOSABS 0.72 06/14/2023    EOSABS 0.44 (H) 06/14/2023    BASOSABS 0.05 06/14/2023    AUTOIGNUM 0.03 06/14/2023    NRBC 0.0 06/14/2023     Drug-drug interactions  Completed medication reconciliation today to assess for drug interactions. Patient denies starting or stopping any medications.    Assessed medication list for interactions, no significant drug interactions noted.   Advised patient to call the clinic if any new medications are started so we can assess for drug-drug interactions.  Drug-food interactions discussed:  none of concern  Vaccines are coordinated by the patient's oncologist and primary care provider.    Allergies  Known allergies and reactions were discussed with the patient. The patient's chart has been reviewed for allergy information and updated as necessary.   Allergies   Allergen Reactions    Lisinopril Cough        Hospitalizations and Urgent Care Visits Since Last Assessment:  Unplanned hospitalizations or inpatient admissions: no  ED Visits: no  Urgent Office Visits: no    Adherence Assessment:  Adherence Questions  Medication(s) assessed: Revlimid  On average, how many doses/injections does the patient miss per month?: 0  What are the identified reasons for non-adherence or missed doses? : no problems identfied  What is the estimated medication adherence level?: %  Based on the patient/caregiver response and refill history, does this patient require an MTP to track adherence improvements?: no    Quality of Life Assessment:  Quality of Life Assessment  Quality of Life Improvement Scale: Somewhat better  Comments on Quality of Life: Duy's mom passed away recently  -- Quality of Life: 7/10    Financial  Assessment:  Medication availability/affordability: Patient has had no issues obtaining medication from pharmacy.      All questions addressed and patient had no additional concerns. Patient has pharmacy contact information.    Name/Credentials Sepideh Avila RPh, GILLIAN    8/11/2023  15:52 EDT

## 2023-08-11 NOTE — PROGRESS NOTES
Specialty Note- Revlimid     Call placed to assess adherence and side effects.  No answer.   direct line [phone]

## 2023-08-16 ENCOUNTER — LAB (OUTPATIENT)
Dept: OTHER | Facility: HOSPITAL | Age: 63
End: 2023-08-16
Payer: COMMERCIAL

## 2023-08-16 ENCOUNTER — OFFICE VISIT (OUTPATIENT)
Dept: ONCOLOGY | Facility: CLINIC | Age: 63
End: 2023-08-16
Payer: COMMERCIAL

## 2023-08-16 VITALS
HEART RATE: 56 BPM | SYSTOLIC BLOOD PRESSURE: 126 MMHG | WEIGHT: 198.7 LBS | TEMPERATURE: 98.4 F | RESPIRATION RATE: 16 BRPM | HEIGHT: 70 IN | BODY MASS INDEX: 28.45 KG/M2 | DIASTOLIC BLOOD PRESSURE: 77 MMHG | OXYGEN SATURATION: 100 %

## 2023-08-16 DIAGNOSIS — E85.81 AL AMYLOIDOSIS: ICD-10-CM

## 2023-08-16 DIAGNOSIS — Z79.899 ENCOUNTER FOR LONG-TERM (CURRENT) USE OF HIGH-RISK MEDICATION: ICD-10-CM

## 2023-08-16 DIAGNOSIS — D63.0 ANEMIA IN NEOPLASTIC DISEASE: ICD-10-CM

## 2023-08-16 DIAGNOSIS — C90.00 MULTIPLE MYELOMA NOT HAVING ACHIEVED REMISSION: Primary | ICD-10-CM

## 2023-08-16 DIAGNOSIS — D84.9 IMMUNOCOMPROMISED PATIENT: ICD-10-CM

## 2023-08-16 DIAGNOSIS — M62.838 MUSCLE SPASM: ICD-10-CM

## 2023-08-16 DIAGNOSIS — C90.00 MULTIPLE MYELOMA NOT HAVING ACHIEVED REMISSION: ICD-10-CM

## 2023-08-16 DIAGNOSIS — R55 SYNCOPE, UNSPECIFIED SYNCOPE TYPE: ICD-10-CM

## 2023-08-16 LAB
ALBUMIN SERPL-MCNC: 4.7 G/DL (ref 3.5–5.2)
ALBUMIN/GLOB SERPL: 1.6 G/DL
ALP SERPL-CCNC: 100 U/L (ref 39–117)
ALT SERPL W P-5'-P-CCNC: 26 U/L (ref 1–41)
ANION GAP SERPL CALCULATED.3IONS-SCNC: 10.9 MMOL/L (ref 5–15)
AST SERPL-CCNC: 23 U/L (ref 1–40)
BASOPHILS # BLD AUTO: 0.1 10*3/MM3 (ref 0–0.2)
BASOPHILS NFR BLD AUTO: 2 % (ref 0–1.5)
BILIRUB SERPL-MCNC: 0.3 MG/DL (ref 0–1.2)
BUN SERPL-MCNC: 15 MG/DL (ref 8–23)
BUN/CREAT SERPL: 15.8 (ref 7–25)
CALCIUM SPEC-SCNC: 9.6 MG/DL (ref 8.6–10.5)
CHLORIDE SERPL-SCNC: 104 MMOL/L (ref 98–107)
CO2 SERPL-SCNC: 25.1 MMOL/L (ref 22–29)
CREAT SERPL-MCNC: 0.95 MG/DL (ref 0.76–1.27)
DEPRECATED RDW RBC AUTO: 45.6 FL (ref 37–54)
EGFRCR SERPLBLD CKD-EPI 2021: 91.1 ML/MIN/1.73
EOSINOPHIL # BLD AUTO: 0.28 10*3/MM3 (ref 0–0.4)
EOSINOPHIL NFR BLD AUTO: 5.6 % (ref 0.3–6.2)
ERYTHROCYTE [DISTWIDTH] IN BLOOD BY AUTOMATED COUNT: 16.3 % (ref 12.3–15.4)
GLOBULIN UR ELPH-MCNC: 2.9 GM/DL
GLUCOSE SERPL-MCNC: 83 MG/DL (ref 65–99)
HCT VFR BLD AUTO: 41.4 % (ref 37.5–51)
HGB BLD-MCNC: 12.8 G/DL (ref 13–17.7)
IMM GRANULOCYTES # BLD AUTO: 0.02 10*3/MM3 (ref 0–0.05)
IMM GRANULOCYTES NFR BLD AUTO: 0.4 % (ref 0–0.5)
LYMPHOCYTES # BLD AUTO: 1.91 10*3/MM3 (ref 0.7–3.1)
LYMPHOCYTES NFR BLD AUTO: 38 % (ref 19.6–45.3)
MCH RBC QN AUTO: 24.1 PG (ref 26.6–33)
MCHC RBC AUTO-ENTMCNC: 30.9 G/DL (ref 31.5–35.7)
MCV RBC AUTO: 77.8 FL (ref 79–97)
MONOCYTES # BLD AUTO: 0.75 10*3/MM3 (ref 0.1–0.9)
MONOCYTES NFR BLD AUTO: 14.9 % (ref 5–12)
NEUTROPHILS NFR BLD AUTO: 1.96 10*3/MM3 (ref 1.7–7)
NEUTROPHILS NFR BLD AUTO: 39.1 % (ref 42.7–76)
NRBC BLD AUTO-RTO: 0 /100 WBC (ref 0–0.2)
PLATELET # BLD AUTO: 274 10*3/MM3 (ref 140–450)
PMV BLD AUTO: 9.3 FL (ref 6–12)
POTASSIUM SERPL-SCNC: 4.2 MMOL/L (ref 3.5–5.2)
PROT SERPL-MCNC: 7.6 G/DL (ref 6–8.5)
RBC # BLD AUTO: 5.32 10*6/MM3 (ref 4.14–5.8)
SODIUM SERPL-SCNC: 140 MMOL/L (ref 136–145)
WBC NRBC COR # BLD: 5.02 10*3/MM3 (ref 3.4–10.8)

## 2023-08-16 PROCEDURE — 84165 PROTEIN E-PHORESIS SERUM: CPT | Performed by: INTERNAL MEDICINE

## 2023-08-16 PROCEDURE — 83521 IG LIGHT CHAINS FREE EACH: CPT | Performed by: INTERNAL MEDICINE

## 2023-08-16 PROCEDURE — 36415 COLL VENOUS BLD VENIPUNCTURE: CPT

## 2023-08-16 PROCEDURE — 85025 COMPLETE CBC W/AUTO DIFF WBC: CPT | Performed by: INTERNAL MEDICINE

## 2023-08-16 PROCEDURE — 86334 IMMUNOFIX E-PHORESIS SERUM: CPT | Performed by: INTERNAL MEDICINE

## 2023-08-16 PROCEDURE — 99214 OFFICE O/P EST MOD 30 MIN: CPT | Performed by: INTERNAL MEDICINE

## 2023-08-16 PROCEDURE — 82784 ASSAY IGA/IGD/IGG/IGM EACH: CPT | Performed by: INTERNAL MEDICINE

## 2023-08-16 PROCEDURE — 80053 COMPREHEN METABOLIC PANEL: CPT | Performed by: INTERNAL MEDICINE

## 2023-08-16 NOTE — PROGRESS NOTES
"Subjective     CHIEF COMPLAINT:      Chief Complaint   Patient presents with    Follow-up     Discuss immunizations      HISTORY OF PRESENT ILLNESS:     Duy Owens is a 62 y.o. male patient who returns today for follow up on his multiple myeloma and secondary AL amyloidosis.  He returns today for follow-up reporting improvement in the pain in the neck.  He was seen by neurosurgery.  Surgery was not recommended and he will see him in follow-up in the future.    Patient did not have recurrence of the syncopal episode since the episode that he developed more than 2 months ago.  He was seen by cardiology.  He is going to have echocardiogram done.    ROS:  Pertinent ROS is in the HPI.     Past medical, surgical, social and family history were reviewed.     MEDICATIONS:    Current Outpatient Medications:     aspirin 81 MG EC tablet, Take 1 tablet by mouth Daily., Disp: , Rfl:     lenalidomide (REVLIMID) 10 MG capsule, Take 1 capsule by mouth Daily. Take for 21 days on, then 7 days off, then repeat., Disp: 21 capsule, Rfl: 0    losartan (COZAAR) 100 MG tablet, TAKE ONE TABLET BY MOUTH DAILY, Disp: 30 tablet, Rfl: 11    metFORMIN (GLUCOPHAGE) 500 MG tablet, Take 1 tablet by mouth 2 (Two) Times a Day., Disp: , Rfl:     ondansetron (ZOFRAN) 8 MG tablet, Take 1 tablet by mouth 3 (Three) Times a Day As Needed for Nausea or Vomiting., Disp: 30 tablet, Rfl: 5    valACYclovir (VALTREX) 500 MG tablet, TAKE ONE TABLET BY MOUTH TWICE A DAY, Disp: 60 tablet, Rfl: 2  Objective     VITAL SIGNS:     Vitals:    08/16/23 0957   BP: 126/77   Pulse: 56   Resp: 16   Temp: 98.4 øF (36.9 øC)   TempSrc: Temporal   SpO2: 100%   Weight: 90.1 kg (198 lb 11.2 oz)   Height: 177 cm (69.69\")   PainSc: 0-No pain     Body mass index is 28.77 kg/mý.     Wt Readings from Last 5 Encounters:   08/16/23 90.1 kg (198 lb 11.2 oz)   08/05/23 88 kg (194 lb)   07/20/23 89.2 kg (196 lb 9.6 oz)   06/14/23 87.1 kg (192 lb 1.6 oz)   05/31/23 88.9 kg (196 lb) "     PHYSICAL EXAMINATION:   GENERAL: The patient appears in good general condition, not in acute distress.   SKIN: No ecchymosis.  EYES: No jaundice. No pallor.  LYMPHATICS: No cervical lymphadenopathy.  CHEST: Normal respiratory effort.  Lungs clear bilaterally.  No added sounds.  CVS: Normal S1-S2.  No murmurs.  ABDOMEN: Soft. No tenderness. No Hepatomegaly. No Splenomegaly. No masses.  EXTREMITIES: No edema or calf tenderness..     DIAGNOSTIC DATA:     Results from last 7 days   Lab Units 08/16/23  0949   WBC 10*3/mm3 5.02   NEUTROS ABS 10*3/mm3 1.96   HEMOGLOBIN g/dL 12.8*   HEMATOCRIT % 41.4   PLATELETS 10*3/mm3 274     Results from last 7 days   Lab Units 08/16/23  0949   SODIUM mmol/L 140   POTASSIUM mmol/L 4.2   CHLORIDE mmol/L 104   CO2 mmol/L 25.1   BUN mg/dL 15   CREATININE mg/dL 0.95   CALCIUM mg/dL 9.6   ALBUMIN g/dL 4.7   BILIRUBIN mg/dL 0.3   ALK PHOS U/L 100   ALT (SGPT) U/L 26   AST (SGOT) U/L 23   GLUCOSE mg/dL 83     Component      Latest Ref Rng 5/17/2023 6/14/2023   IgG      603 - 1613 mg/dL 1229  1378    IgA      61 - 437 mg/dL 70  78    IgM      20 - 172 mg/dL 60  43    Total Protein      6.0 - 8.5 g/dL 7.0  6.7    Albumin      2.9 - 4.4 g/dL 3.9  3.7    Alpha-1-Globulin      0.0 - 0.4 g/dL 0.3  0.2    Alpha-2-Globulin      0.4 - 1.0 g/dL 0.5  0.5    Beta Globulin      0.7 - 1.3 g/dL 1.0  1.0    Gamma Globulin      0.4 - 1.8 g/dL 1.3  1.3    M-Werner      Not Observed g/dL Not Observed  Not Observed    Globulin      2.2 - 3.9 g/dL 3.1  3.0    A/G Ratio      0.7 - 1.7  1.3  1.3    Immunofixation Reflex, Serum Comment  Comment    Please note Comment  Comment    Kappa FLC      3.3 - 19.4 mg/L 31.5 (H)  31.0 (H)    Free Lambda Light Chains      5.7 - 26.3 mg/L 24.5  23.7    Kappa/Lambda Ratio      0.26 - 1.65  1.29  1.31       MRI cervical spine on 7/3/2023:  There is no evidence to suggest myelomatous involvement of the cervical  spine.     Advanced degenerative disc changes are seen at C3-4 and  C6-7.  Degenerative endplate marrow edema is identified at the C3-4 level.     Disc osteophyte complexes are noted within the cervical spine resulting  in up to a mild-to-moderate degree of canal stenosis at the C3-4 level.  Mild degrees of canal narrowing are identified at C4-5, C5-6, and C6-7.     Multilevel foraminal narrowing is noted within the cervical spine and  this includes mild left C2-3, moderate to severe right C3-4,  mild-to-moderate left C3-4, moderate right C4-5, mild right C5-6,  moderate left C5-6, moderate-to-severe left C6-7, and moderate right  C6-7 foraminal stenosis  MRI thoracic spine on 7/3/2023:  There is mild flattening of the dorsal surface of the thoracic spinal  cord at the T2-3 level and the findings are suggestive of either a  dorsal thoracic arachnoid web or an arachnoid cyst resulting in mild  mass effect upon the thoracic spinal cord. There is no associated cord  signal abnormality at this level or any other level of the thoracic  spine. Multilevel relatively mild canal narrowing is seen within the  remaining thoracic spine as discussed above.     At the T5 and T6 levels, are prominent areas of T1 hypointense and T2  hyperintense signal in addition to contrast enhancement which I suspect  is simply representative of a prominent basivertebral plexus at each of  these levels. However, I could not entirely exclude the possibility of a  myelomatous lesion at either of these sites. I recommend a follow-up MRI  examination be performed in an approximate 3-6 month interval to ensure  stability.    MRI lumbar spine on 7/3/2023:  There is no evidence to suggest myelomatous lesion within the lumbar  spine. There are advanced degenerative disc changes seen at the L4-5  level with prominent adjacent degenerative endplate marrow edema.     Relatively mild degrees of multilevel canal narrowing are noted within  the lumbar spine. There are up to mild-to-moderate degrees of foraminal  narrowing  appreciated at the L4-5 and L5-S1 levels as discussed above.     There is retroperitoneal para-aortic lymphadenopathy although these  lymph nodes appear smaller in size when compared to the prior PET/CT  dated 04/28/2022. This lymphadenopathy could be more comprehensively  assessed on dedicated abdominal CT imaging, if clinically indicated.    Assessment & Plan    *Lambda light chain multiple myeloma with lymphadenopathy and development of AL amyloidosis.  Patient was found to have lymph node involvement and amyloid deposition in the involved lymph nodes.  Patient started having dry cough around July 2021.    CT scans 9/23/2021-9/24/2021 revealed inferior mediastinal adenopathy and retroperitoneal adenopathy extending to the right iliac chain.  The Largest lymph node was in the retroperitoneal area measuring 4.8 x 3.5 cm.  The common iliac lymph node measured 3.5 x 2.7 cm.  The left external iliac chain lymph node measured 2.9 x 2 cm.    PET scan on 10/5/2021 revealed the retroperitoneal and left pelvic lymphadenopathy to be hypermetabolic.  The left periaortic lymph nodes had SUV of 6.9 and the left pelvic sidewall lymph nodes had an SUV of 5.4.  No bone involvement.  CT-guided biopsy on 10/6/2021- pathologist at [facility] reported involvement with monotypic lambda restricted plasma cells concerning for involvement with plasma cell dyscrasia.  Bone marrow biopsy on 10/29/2021 revealed a normocellular marrow (50%) with involvement with plasma cell dyscrasia (plasma cells representing 20-30% of total cells by  stain).   FISH was negative for gain of 1q, monosomy/deletions of chromosomes 13 and 17, IGH rearrangement, gain of chromosomes 9 and 11, IGH-CCND1 (11;14) fusion.   Treatment with the VRD started on 12/22/2021.  Free lambda light chain started to improve after the patient started treatment.  CT scan on 3/11/2022 revealed decrease in the size of lymph nodes in the periaortic area.  There is a slight  increase in a lymph node in the left axillary area that increased from 7 to 10 mm.    PET scan on 4/28/2022 revealed significant reduction in the size and hypermetabolism of the involved lymph nodes.  SPEP REGINO FLC on 5/25/2022 revealed no M protein.  Free lambda light chain was 41.8.  Kappa to lambda ratio was normal at 0.26.  B2 M was 1.6.  Bone marrow biopsy on 5/24/2022 revealed normocellular marrow with 30-40% cellularity with involvement by plasma cell neoplasm representing 5-10% by IHC.  Negative for amyloid.  He was considered to be RI-1.  S/p high-dose melphalan with autologous stem cell transplant.  Day 0 was 7/7/2022.  Patient did well with the transplant but had neutropenic fever of unclear source necessitating hospitalization between 7/16/2022 and 7/18/2022.    Bone marrow biopsy on 9/29/2022 revealed monotypic plasma cells identified on on flow cytometry but not on IHC.  CT on 9/29/2022 showed decrease in the size of the enlarged lymph nodes.  Patient started Revlimid 10 mg daily for 21 out of 28-day cycle on 11/1/2022.    CT on 2/23/2023 showed decrease in the size of the mediastinal and retroperitoneal lymph nodes indicating response to treatment.  On 2/23/2023-Free kappa light chain 1.94, free lambda light chain 2.19, kappa/lambda ratio normal at 0.89.  On 5/17/2023: Free kappa light chain was 31.5.  Free lambda light chain was 24.5.  Kappa 3 lambda ratio normal at 1.29.  On 6/14/2023: Free kappa light chain was 31.0.  Free lambda light chain was 23.7.  Kappa to lambda ratio was 1.31.   He is tolerating Revlimid well.    *Anemia secondary to multiple myeloma and secondary to treatment.  Hemoglobin was 13.1 on 9/29/2021.    Iron, folate and vitamin B12 were normal in September/October 2021.    Hemoglobin was 11.4 on 8/24/2022.  Hemoglobin improved to the 11-12 g range.  Hemoglobin 12.8 today.      *Prophylaxis.  He was previously on acyclovir 400 mg twice daily.    He is now on Valtrex.  He received  Annabella on 8/20/2022.  Patient received vaccinations as recommended by [place].  He received COVID-19 Moderna booster on 10/25/2022.  He received vaccines in April and June..  He is due to receive Shingrix and was advised to receive it at one of the pharmacies (not available at our office).    *Syncope.  Patient reports having an episode of passing out while he was outdoors watching a game.  He did not feel dizzy prior to the episode. He was well hydrated.  He regained consciousness quickly.   EMS was called. He declined going to ER at that time.   He was referred to cardiology.  He is going to have echocardiogram done.    *Severe muscle spasm of the cervical-thoracic spine.  He reports having weakness of the right lower extremity.  He fell off a ladder recently due to weakness of the right LE.  I recommended evaluation with an MRI of the spine.   He complained of muscle spasm of other muscles.  MRI showed no lesions from multiple myeloma.  He was referred to neurosurgery.  Conservative management was recommended for DJD.    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  There was mild wall thickening.  Ejection fraction was normal.  Echocardiogram at [facility] on 4/21/2022 did not reveal wall thickening.    PLAN:    1.  Continue Revlimid maintenance.    2.  Discontinue Valtrex after he completes his current supply.  3.  Continue aspirin 81 mg daily.  4.  He is due to receive the Shingrix vaccine now and the second dose in 2 months.  5.  Return in 4 weeks for CBC CMP SPEP REGINO FLC.  6.  He has follow-up at [place] in 1 week.  7.  I will see him in follow-up in 8 weeks.  CBC CMP SPEP REGINO FLC will be obtained.          Tima Mendez MD  08/16/23

## 2023-08-18 LAB
ALBUMIN SERPL ELPH-MCNC: 3.9 G/DL (ref 2.9–4.4)
ALBUMIN/GLOB SERPL: 1.2 {RATIO} (ref 0.7–1.7)
ALPHA1 GLOB SERPL ELPH-MCNC: 0.3 G/DL (ref 0–0.4)
ALPHA2 GLOB SERPL ELPH-MCNC: 0.7 G/DL (ref 0.4–1)
B-GLOBULIN SERPL ELPH-MCNC: 1.1 G/DL (ref 0.7–1.3)
GAMMA GLOB SERPL ELPH-MCNC: 1.4 G/DL (ref 0.4–1.8)
GLOBULIN SER-MCNC: 3.4 G/DL (ref 2.2–3.9)
IGA SERPL-MCNC: 92 MG/DL (ref 61–437)
IGG SERPL-MCNC: 1353 MG/DL (ref 603–1613)
IGM SERPL-MCNC: 50 MG/DL (ref 20–172)
INTERPRETATION SERPL IEP-IMP: ABNORMAL
KAPPA LC FREE SER-MCNC: 22.7 MG/L (ref 3.3–19.4)
KAPPA LC FREE/LAMBDA FREE SER: 1.01 {RATIO} (ref 0.26–1.65)
LABORATORY COMMENT REPORT: ABNORMAL
LAMBDA LC FREE SERPL-MCNC: 22.4 MG/L (ref 5.7–26.3)
M PROTEIN SERPL ELPH-MCNC: ABNORMAL G/DL
PROT SERPL-MCNC: 7.3 G/DL (ref 6–8.5)

## 2023-08-29 ENCOUNTER — HOSPITAL ENCOUNTER (OUTPATIENT)
Dept: CARDIOLOGY | Facility: HOSPITAL | Age: 63
Discharge: HOME | End: 2023-08-29
Admitting: NURSE PRACTITIONER
Payer: COMMERCIAL

## 2023-08-29 VITALS
HEIGHT: 70 IN | WEIGHT: 198.63 LBS | DIASTOLIC BLOOD PRESSURE: 58 MMHG | BODY MASS INDEX: 28.44 KG/M2 | HEART RATE: 70 BPM | SYSTOLIC BLOOD PRESSURE: 118 MMHG

## 2023-08-29 DIAGNOSIS — E85.81 AL AMYLOIDOSIS: ICD-10-CM

## 2023-08-29 DIAGNOSIS — R55 SYNCOPE, UNSPECIFIED SYNCOPE TYPE: ICD-10-CM

## 2023-08-29 LAB
AORTIC ARCH: 2.5 CM
ASCENDING AORTA: 2.8 CM
BH CV ECHO LEFT VENTRICLE GLOBAL LONGITUDINAL STRAIN: -22.2 %
BH CV ECHO MEAS - ACS: 1.96 CM
BH CV ECHO MEAS - AO MAX PG: 11.7 MMHG
BH CV ECHO MEAS - AO MEAN PG: 6.3 MMHG
BH CV ECHO MEAS - AO ROOT DIAM: 3.4 CM
BH CV ECHO MEAS - AO V2 MAX: 171.3 CM/SEC
BH CV ECHO MEAS - AO V2 VTI: 33.4 CM
BH CV ECHO MEAS - AVA(I,D): 2.7 CM2
BH CV ECHO MEAS - EDV(CUBED): 107.6 ML
BH CV ECHO MEAS - EDV(MOD-SP2): 126 ML
BH CV ECHO MEAS - EDV(MOD-SP4): 134 ML
BH CV ECHO MEAS - EF(MOD-BP): 64.6 %
BH CV ECHO MEAS - EF(MOD-SP2): 64.3 %
BH CV ECHO MEAS - EF(MOD-SP4): 64.9 %
BH CV ECHO MEAS - ESV(CUBED): 26.3 ML
BH CV ECHO MEAS - ESV(MOD-SP2): 45 ML
BH CV ECHO MEAS - ESV(MOD-SP4): 47 ML
BH CV ECHO MEAS - FS: 37.5 %
BH CV ECHO MEAS - IVS/LVPW: 1.08 CM
BH CV ECHO MEAS - IVSD: 1.12 CM
BH CV ECHO MEAS - LAT PEAK E' VEL: 8.1 CM/SEC
BH CV ECHO MEAS - LV DIASTOLIC VOL/BSA (35-75): 64.3 CM2
BH CV ECHO MEAS - LV MASS(C)D: 186.4 GRAMS
BH CV ECHO MEAS - LV MAX PG: 7.5 MMHG
BH CV ECHO MEAS - LV MEAN PG: 3.9 MMHG
BH CV ECHO MEAS - LV SYSTOLIC VOL/BSA (12-30): 22.6 CM2
BH CV ECHO MEAS - LV V1 MAX: 136.9 CM/SEC
BH CV ECHO MEAS - LV V1 VTI: 25.9 CM
BH CV ECHO MEAS - LVIDD: 4.8 CM
BH CV ECHO MEAS - LVIDS: 3 CM
BH CV ECHO MEAS - LVOT AREA: 3.5 CM2
BH CV ECHO MEAS - LVOT DIAM: 2.1 CM
BH CV ECHO MEAS - LVPWD: 1.04 CM
BH CV ECHO MEAS - MED PEAK E' VEL: 7.9 CM/SEC
BH CV ECHO MEAS - MV A DUR: 0.18 SEC
BH CV ECHO MEAS - MV A MAX VEL: 48 CM/SEC
BH CV ECHO MEAS - MV DEC SLOPE: 338 CM/SEC2
BH CV ECHO MEAS - MV DEC TIME: 0.18 MSEC
BH CV ECHO MEAS - MV E MAX VEL: 52.6 CM/SEC
BH CV ECHO MEAS - MV E/A: 1.1
BH CV ECHO MEAS - MV MAX PG: 2.8 MMHG
BH CV ECHO MEAS - MV MEAN PG: 1 MMHG
BH CV ECHO MEAS - MV P1/2T: 78.4 MSEC
BH CV ECHO MEAS - MV V2 VTI: 32 CM
BH CV ECHO MEAS - MVA(P1/2T): 2.8 CM2
BH CV ECHO MEAS - MVA(VTI): 2.8 CM2
BH CV ECHO MEAS - PA ACC TIME: 0.1 SEC
BH CV ECHO MEAS - PA V2 MAX: 107.7 CM/SEC
BH CV ECHO MEAS - PULM A REVS DUR: 0.14 SEC
BH CV ECHO MEAS - PULM A REVS VEL: 37.4 CM/SEC
BH CV ECHO MEAS - PULM DIAS VEL: 51.9 CM/SEC
BH CV ECHO MEAS - PULM S/D: 1.25
BH CV ECHO MEAS - PULM SYS VEL: 65 CM/SEC
BH CV ECHO MEAS - QP/QS: 0.83
BH CV ECHO MEAS - RAP SYSTOLE: 3 MMHG
BH CV ECHO MEAS - RV MAX PG: 2.43 MMHG
BH CV ECHO MEAS - RV V1 MAX: 78 CM/SEC
BH CV ECHO MEAS - RV V1 VTI: 19 CM
BH CV ECHO MEAS - RVOT DIAM: 2.23 CM
BH CV ECHO MEAS - SI(MOD-SP2): 38.9 ML/M2
BH CV ECHO MEAS - SI(MOD-SP4): 41.8 ML/M2
BH CV ECHO MEAS - SUP REN AO DIAM: 2.5 CM
BH CV ECHO MEAS - SV(LVOT): 89.7 ML
BH CV ECHO MEAS - SV(MOD-SP2): 81 ML
BH CV ECHO MEAS - SV(MOD-SP4): 87 ML
BH CV ECHO MEAS - SV(RVOT): 74.1 ML
BH CV ECHO MEAS - TAPSE (>1.6): 2.12 CM
BH CV ECHO MEASUREMENTS AVERAGE E/E' RATIO: 6.58
BH CV XLRA - RV BASE: 3.2 CM
BH CV XLRA - RV LENGTH: 7 CM
BH CV XLRA - RV MID: 3.6 CM
BH CV XLRA - TDI S': 11.6 CM/SEC
LEFT ATRIUM VOLUME INDEX: 27.9 ML/M2
SINUS: 3.4 CM
STJ: 2.5 CM

## 2023-08-29 PROCEDURE — 93356 MYOCRD STRAIN IMG SPCKL TRCK: CPT

## 2023-08-29 PROCEDURE — 93306 TTE W/DOPPLER COMPLETE: CPT

## 2023-08-30 NOTE — PROGRESS NOTES
Mr. Owens was seen in May 2023 after a syncopal episode.  Echocardiogram completely normal.  I will reassess his symptoms.  If stable, he will keep his scheduled appointment with Dr. Buenrostro in November.    I left a message and told him I will try calling him back this week.

## 2023-09-01 ENCOUNTER — SPECIALTY PHARMACY (OUTPATIENT)
Dept: PHARMACY | Facility: HOSPITAL | Age: 63
End: 2023-09-01
Payer: COMMERCIAL

## 2023-09-01 RX ORDER — LENALIDOMIDE 10 MG/1
10 CAPSULE ORAL DAILY
Qty: 21 CAPSULE | Refills: 0 | Status: SHIPPED | OUTPATIENT
Start: 2023-09-01

## 2023-09-01 NOTE — PROGRESS NOTES
Re: Refills of Oral Specialty Medication - Revlimid (lenalidomide)    Drug-Drug Interactions: The current medication list was reviewed and there are no relevant drug-drug interactions.  Medication Allergies: The patient has no relevant allergies as it relates to their oral specialty medication  Review of Labs/Dose Adjustments: NO DOSE CHANGE - I reviewed the most recent note and labs and the patient will continue without any dose changes.  I sent refills as described below.    Drug: Revlimid (lenalidomide)  Strength: 10 mg  Directions: Take 1 capsule by mouth  daily for 21 days on, then 7 days off  Quantity: 21  Refills: 0  Pharm[facility] prescription sent to: [facility]    Name/Credentials Sepideh Avila RPh, BCOP    9/1/2023  09:42 EDT

## 2023-09-05 ENCOUNTER — TELEPHONE (OUTPATIENT)
Dept: CARDIOLOGY | Facility: CLINIC | Age: 63
End: 2023-09-05
Payer: COMMERCIAL

## 2023-09-05 DIAGNOSIS — E85.81 AL AMYLOIDOSIS: ICD-10-CM

## 2023-09-05 DIAGNOSIS — C90.00 MULTIPLE MYELOMA NOT HAVING ACHIEVED REMISSION: Primary | ICD-10-CM

## 2023-09-05 NOTE — TELEPHONE ENCOUNTER
Caller: Duy Owens    Relationship: Self    Best call back number: [phone]     What is the best time to reach you: WHEN DONE WITH PATIENTS    Who are you requesting to speak with (clinical staff, provider,  specific staff member): YESENIA    Do you know the name of the person who called: YESENIA    What was the call regarding: PT STATES HE AND YESENIA HAVE BEEN CALLING BACK AND FORTH AND HE HASN'T GOTTEN IN TOUCH WITH HER - HE STATES THAT IF SHE CAN CALL TODAY HE WILL WATCH AND MAKE SURE TO ANSWER     Is it okay if the provider responds through MyChart: CALL BACK PLEASE

## 2023-09-06 ENCOUNTER — LAB (OUTPATIENT)
Dept: OTHER | Facility: HOSPITAL | Age: 63
End: 2023-09-06
Payer: COMMERCIAL

## 2023-09-06 DIAGNOSIS — C90.00 MULTIPLE MYELOMA NOT HAVING ACHIEVED REMISSION: ICD-10-CM

## 2023-09-06 DIAGNOSIS — E85.81 AL AMYLOIDOSIS: ICD-10-CM

## 2023-09-06 LAB
ALBUMIN SERPL-MCNC: 4.4 G/DL (ref 3.5–5.2)
ALBUMIN/GLOB SERPL: 1.5 G/DL
ALP SERPL-CCNC: 114 U/L (ref 39–117)
ALT SERPL W P-5'-P-CCNC: 47 U/L (ref 1–41)
ANION GAP SERPL CALCULATED.3IONS-SCNC: 10 MMOL/L (ref 5–15)
AST SERPL-CCNC: 31 U/L (ref 1–40)
BASOPHILS # BLD AUTO: 0.06 10*3/MM3 (ref 0–0.2)
BASOPHILS NFR BLD AUTO: 1.4 % (ref 0–1.5)
BILIRUB SERPL-MCNC: 0.3 MG/DL (ref 0–1.2)
BUN SERPL-MCNC: 15 MG/DL (ref 8–23)
BUN/CREAT SERPL: 16.1 (ref 7–25)
CALCIUM SPEC-SCNC: 9.1 MG/DL (ref 8.6–10.5)
CHLORIDE SERPL-SCNC: 104 MMOL/L (ref 98–107)
CO2 SERPL-SCNC: 24 MMOL/L (ref 22–29)
CREAT SERPL-MCNC: 0.93 MG/DL (ref 0.76–1.27)
DEPRECATED RDW RBC AUTO: 45.7 FL (ref 37–54)
EGFRCR SERPLBLD CKD-EPI 2021: 93.4 ML/MIN/1.73
EOSINOPHIL # BLD AUTO: 0.45 10*3/MM3 (ref 0–0.4)
EOSINOPHIL NFR BLD AUTO: 10.3 % (ref 0.3–6.2)
ERYTHROCYTE [DISTWIDTH] IN BLOOD BY AUTOMATED COUNT: 16 % (ref 12.3–15.4)
GLOBULIN UR ELPH-MCNC: 2.9 GM/DL
GLUCOSE SERPL-MCNC: 107 MG/DL (ref 65–99)
HCT VFR BLD AUTO: 40.1 % (ref 37.5–51)
HGB BLD-MCNC: 12.1 G/DL (ref 13–17.7)
IMM GRANULOCYTES # BLD AUTO: 0.02 10*3/MM3 (ref 0–0.05)
IMM GRANULOCYTES NFR BLD AUTO: 0.5 % (ref 0–0.5)
LYMPHOCYTES # BLD AUTO: 1.42 10*3/MM3 (ref 0.7–3.1)
LYMPHOCYTES NFR BLD AUTO: 32.6 % (ref 19.6–45.3)
MCH RBC QN AUTO: 23.7 PG (ref 26.6–33)
MCHC RBC AUTO-ENTMCNC: 30.2 G/DL (ref 31.5–35.7)
MCV RBC AUTO: 78.5 FL (ref 79–97)
MONOCYTES # BLD AUTO: 0.62 10*3/MM3 (ref 0.1–0.9)
MONOCYTES NFR BLD AUTO: 14.2 % (ref 5–12)
NEUTROPHILS NFR BLD AUTO: 1.79 10*3/MM3 (ref 1.7–7)
NEUTROPHILS NFR BLD AUTO: 41 % (ref 42.7–76)
NRBC BLD AUTO-RTO: 0 /100 WBC (ref 0–0.2)
PLATELET # BLD AUTO: 221 10*3/MM3 (ref 140–450)
PMV BLD AUTO: 10.1 FL (ref 6–12)
POTASSIUM SERPL-SCNC: 3.7 MMOL/L (ref 3.5–5.2)
PROT SERPL-MCNC: 7.3 G/DL (ref 6–8.5)
RBC # BLD AUTO: 5.11 10*6/MM3 (ref 4.14–5.8)
SODIUM SERPL-SCNC: 138 MMOL/L (ref 136–145)
WBC NRBC COR # BLD: 4.36 10*3/MM3 (ref 3.4–10.8)

## 2023-09-06 PROCEDURE — 86334 IMMUNOFIX E-PHORESIS SERUM: CPT | Performed by: INTERNAL MEDICINE

## 2023-09-06 PROCEDURE — 84165 PROTEIN E-PHORESIS SERUM: CPT | Performed by: INTERNAL MEDICINE

## 2023-09-06 PROCEDURE — 83521 IG LIGHT CHAINS FREE EACH: CPT | Performed by: INTERNAL MEDICINE

## 2023-09-06 PROCEDURE — 82784 ASSAY IGA/IGD/IGG/IGM EACH: CPT | Performed by: INTERNAL MEDICINE

## 2023-09-06 PROCEDURE — 85025 COMPLETE CBC W/AUTO DIFF WBC: CPT | Performed by: INTERNAL MEDICINE

## 2023-09-06 PROCEDURE — 80053 COMPREHEN METABOLIC PANEL: CPT | Performed by: INTERNAL MEDICINE

## 2023-09-06 PROCEDURE — 36415 COLL VENOUS BLD VENIPUNCTURE: CPT

## 2023-09-06 NOTE — PROGRESS NOTES
Pt was seen in May for syncope. Please call patient - Holter normal. Ask if he had anymore syncope. Keep appt with Dr. Buenrostro in November. Thanks.

## 2023-09-06 NOTE — PROGRESS NOTES
Reviewed echo and holter results and recommendations with Duy Owens.  Patient's questions were answered and he verbalized understanding of results and recommendations.    Patient denies any more syncopal episodes and stated he will keep appointment with Dr. Buenrostro in November as requested.    Thank you,  Gail URIBE RN  Triage Nurse [facility]   13:27 EDT

## 2023-09-07 LAB
ALBUMIN SERPL ELPH-MCNC: 3.9 G/DL (ref 2.9–4.4)
ALBUMIN/GLOB SERPL: 1.4 {RATIO} (ref 0.7–1.7)
ALPHA1 GLOB SERPL ELPH-MCNC: 0.2 G/DL (ref 0–0.4)
ALPHA2 GLOB SERPL ELPH-MCNC: 0.6 G/DL (ref 0.4–1)
B-GLOBULIN SERPL ELPH-MCNC: 0.9 G/DL (ref 0.7–1.3)
GAMMA GLOB SERPL ELPH-MCNC: 1.2 G/DL (ref 0.4–1.8)
GLOBULIN SER-MCNC: 2.9 G/DL (ref 2.2–3.9)
IGA SERPL-MCNC: 96 MG/DL (ref 61–437)
IGG SERPL-MCNC: 1284 MG/DL (ref 603–1613)
IGM SERPL-MCNC: 56 MG/DL (ref 20–172)
INTERPRETATION SERPL IEP-IMP: ABNORMAL
KAPPA LC FREE SER-MCNC: 30 MG/L (ref 3.3–19.4)
KAPPA LC FREE/LAMBDA FREE SER: 1.16 {RATIO} (ref 0.26–1.65)
LABORATORY COMMENT REPORT: ABNORMAL
LAMBDA LC FREE SERPL-MCNC: 25.8 MG/L (ref 5.7–26.3)
M PROTEIN SERPL ELPH-MCNC: ABNORMAL G/DL
PROT SERPL-MCNC: 6.8 G/DL (ref 6–8.5)

## 2023-09-11 RX ORDER — VALACYCLOVIR HYDROCHLORIDE 500 MG/1
TABLET, FILM COATED ORAL
Qty: 60 TABLET | OUTPATIENT
Start: 2023-09-11

## 2023-09-18 ENCOUNTER — DOCUMENTATION (OUTPATIENT)
Dept: PHARMACY | Facility: HOSPITAL | Age: 63
End: 2023-09-18
Payer: COMMERCIAL

## 2023-09-18 NOTE — PROGRESS NOTES
I received a fax from the office of Dr. Alia Felix. The fax is the Provider section of the [facility] Patient Assistance Foundation application. Dr. Felix's office faxed the form to our office because they received the fax in error.     I called Mr. Owens to inform him that I would complete the form and fax it back to [facility] today, but I had leave a message.     Diane Higginbotham - Care Coordinator   9/18/2023  14:39 EDT

## 2023-09-19 RX ORDER — VALACYCLOVIR HYDROCHLORIDE 500 MG/1
TABLET, FILM COATED ORAL
Qty: 60 TABLET | OUTPATIENT
Start: 2023-09-19

## 2023-09-20 ENCOUNTER — DOCUMENTATION (OUTPATIENT)
Dept: PHARMACY | Facility: HOSPITAL | Age: 63
End: 2023-09-20
Payer: COMMERCIAL

## 2023-09-20 NOTE — PROGRESS NOTES
Praneeth with [facility] [employer] confirmed that they received the provider portion of Mr. Owens's re-enrollmentr application on 9/18/23.      Praneeth also stated that his re-enrollment application would not be reviewed until closer to the renewal date of 10/25/23.    Diane Higginbotham - Care Coordinator   9/20/2023  10:50 EDT

## 2023-09-27 ENCOUNTER — DOCUMENTATION (OUTPATIENT)
Dept: PHARMACY | Facility: HOSPITAL | Age: 63
End: 2023-09-27
Payer: COMMERCIAL

## 2023-09-27 NOTE — PROGRESS NOTES
I have received a fax from [employer] stating that Mr. Owens has been approved to receive free Revilimd.    Approval dates are 10/2623 - 10/25/24.    I called Mr. Owens to inform him of all of the above but had to leave a vm.    Diane Higginbotham - Care Coordinator   9/27/2023  09:27 EDT

## 2023-10-02 ENCOUNTER — SPECIALTY PHARMACY (OUTPATIENT)
Dept: PHARMACY | Facility: HOSPITAL | Age: 63
End: 2023-10-02
Payer: COMMERCIAL

## 2023-10-02 RX ORDER — LENALIDOMIDE 10 MG/1
10 CAPSULE ORAL DAILY
Qty: 21 CAPSULE | Refills: 0 | Status: SHIPPED | OUTPATIENT
Start: 2023-10-02

## 2023-10-02 NOTE — PROGRESS NOTES
Re: Refills of Oral Specialty Medication - Revlimid (lenalidomide)    Drug-Drug Interactions: The current medication list was reviewed and there are no relevant drug-drug interactions with the specialty medication.  Medication Allergies: The patient has no relevant allergies as it relates to their oral specialty medication  Review of Labs/Dose Adjustments: NO DOSE CHANGE - I reviewed the most recent note and labs and the patient will continue without any dose changes.  I sent refills as described below.    Drug: Revlimid (lenalidomide)  Strength: 10 mg  Directions: Take 1 capsule by mouth  daily for 21 days on, then 7 days off  Quantity: 21  Refills: 0  Pharmacy prescription sent to: [facility]    Name/Credentials Sepideh Avila RPH, BCOP    10/2/2023  09:02 EDT

## 2023-10-02 NOTE — PROGRESS NOTES
Drug: Revlimid (lenalidomide)  Strength: 10 mg  Directions: Take 1 capsule by mouth  daily for 21 days on, then 7 days off  Quantity: 21  Refills: 0  Pharmacy prescription sent to: [facility]    Completed independent double check on medication order/RX.  Gina Naylor, ReinaD, BCPS

## 2023-10-04 ENCOUNTER — OFFICE VISIT (OUTPATIENT)
Dept: ONCOLOGY | Facility: CLINIC | Age: 63
End: 2023-10-04
Payer: COMMERCIAL

## 2023-10-04 ENCOUNTER — LAB (OUTPATIENT)
Dept: OTHER | Facility: HOSPITAL | Age: 63
End: 2023-10-04
Payer: COMMERCIAL

## 2023-10-04 VITALS
SYSTOLIC BLOOD PRESSURE: 129 MMHG | RESPIRATION RATE: 18 BRPM | HEART RATE: 49 BPM | TEMPERATURE: 98 F | DIASTOLIC BLOOD PRESSURE: 76 MMHG | WEIGHT: 198.4 LBS | BODY MASS INDEX: 28.4 KG/M2 | HEIGHT: 70 IN | OXYGEN SATURATION: 98 %

## 2023-10-04 DIAGNOSIS — D63.0 ANEMIA IN NEOPLASTIC DISEASE: ICD-10-CM

## 2023-10-04 DIAGNOSIS — R55 SYNCOPE, UNSPECIFIED SYNCOPE TYPE: ICD-10-CM

## 2023-10-04 DIAGNOSIS — C90.00 MULTIPLE MYELOMA NOT HAVING ACHIEVED REMISSION: Primary | ICD-10-CM

## 2023-10-04 DIAGNOSIS — M62.838 MUSCLE SPASM: ICD-10-CM

## 2023-10-04 DIAGNOSIS — Z79.899 ENCOUNTER FOR LONG-TERM (CURRENT) USE OF HIGH-RISK MEDICATION: ICD-10-CM

## 2023-10-04 DIAGNOSIS — E85.81 AL AMYLOIDOSIS: ICD-10-CM

## 2023-10-04 DIAGNOSIS — C90.00 MULTIPLE MYELOMA NOT HAVING ACHIEVED REMISSION: ICD-10-CM

## 2023-10-04 DIAGNOSIS — D84.9 IMMUNOCOMPROMISED PATIENT: ICD-10-CM

## 2023-10-04 LAB
ALBUMIN SERPL-MCNC: 4.4 G/DL (ref 3.5–5.2)
ALBUMIN/GLOB SERPL: 1.6 G/DL
ALP SERPL-CCNC: 92 U/L (ref 39–117)
ALT SERPL W P-5'-P-CCNC: 33 U/L (ref 1–41)
ANION GAP SERPL CALCULATED.3IONS-SCNC: 8.6 MMOL/L (ref 5–15)
AST SERPL-CCNC: 24 U/L (ref 1–40)
BASOPHILS # BLD AUTO: 0.06 10*3/MM3 (ref 0–0.2)
BASOPHILS NFR BLD AUTO: 1.4 % (ref 0–1.5)
BILIRUB SERPL-MCNC: 0.6 MG/DL (ref 0–1.2)
BUN SERPL-MCNC: 11 MG/DL (ref 8–23)
BUN/CREAT SERPL: 11.2 (ref 7–25)
CALCIUM SPEC-SCNC: 9.3 MG/DL (ref 8.6–10.5)
CHLORIDE SERPL-SCNC: 104 MMOL/L (ref 98–107)
CO2 SERPL-SCNC: 26.4 MMOL/L (ref 22–29)
CREAT SERPL-MCNC: 0.98 MG/DL (ref 0.76–1.27)
DEPRECATED RDW RBC AUTO: 44.6 FL (ref 37–54)
EGFRCR SERPLBLD CKD-EPI 2021: 87.7 ML/MIN/1.73
EOSINOPHIL # BLD AUTO: 0.42 10*3/MM3 (ref 0–0.4)
EOSINOPHIL NFR BLD AUTO: 9.6 % (ref 0.3–6.2)
ERYTHROCYTE [DISTWIDTH] IN BLOOD BY AUTOMATED COUNT: 15.8 % (ref 12.3–15.4)
GLOBULIN UR ELPH-MCNC: 2.8 GM/DL
GLUCOSE SERPL-MCNC: 96 MG/DL (ref 65–99)
HCT VFR BLD AUTO: 40.9 % (ref 37.5–51)
HGB BLD-MCNC: 12.5 G/DL (ref 13–17.7)
IMM GRANULOCYTES # BLD AUTO: 0.01 10*3/MM3 (ref 0–0.05)
IMM GRANULOCYTES NFR BLD AUTO: 0.2 % (ref 0–0.5)
LYMPHOCYTES # BLD AUTO: 1.63 10*3/MM3 (ref 0.7–3.1)
LYMPHOCYTES NFR BLD AUTO: 37.3 % (ref 19.6–45.3)
MCH RBC QN AUTO: 23.7 PG (ref 26.6–33)
MCHC RBC AUTO-ENTMCNC: 30.6 G/DL (ref 31.5–35.7)
MCV RBC AUTO: 77.6 FL (ref 79–97)
MONOCYTES # BLD AUTO: 0.64 10*3/MM3 (ref 0.1–0.9)
MONOCYTES NFR BLD AUTO: 14.6 % (ref 5–12)
NEUTROPHILS NFR BLD AUTO: 1.61 10*3/MM3 (ref 1.7–7)
NEUTROPHILS NFR BLD AUTO: 36.9 % (ref 42.7–76)
NRBC BLD AUTO-RTO: 0 /100 WBC (ref 0–0.2)
PLAT MORPH BLD: NORMAL
PLATELET # BLD AUTO: 190 10*3/MM3 (ref 140–450)
PMV BLD AUTO: 8.9 FL (ref 6–12)
POTASSIUM SERPL-SCNC: 3.9 MMOL/L (ref 3.5–5.2)
PROT SERPL-MCNC: 7.2 G/DL (ref 6–8.5)
RBC # BLD AUTO: 5.27 10*6/MM3 (ref 4.14–5.8)
RBC MORPH BLD: NORMAL
SODIUM SERPL-SCNC: 139 MMOL/L (ref 136–145)
WBC MORPH BLD: NORMAL
WBC NRBC COR # BLD: 4.37 10*3/MM3 (ref 3.4–10.8)

## 2023-10-04 PROCEDURE — 86334 IMMUNOFIX E-PHORESIS SERUM: CPT | Performed by: INTERNAL MEDICINE

## 2023-10-04 PROCEDURE — 83521 IG LIGHT CHAINS FREE EACH: CPT | Performed by: INTERNAL MEDICINE

## 2023-10-04 PROCEDURE — 85025 COMPLETE CBC W/AUTO DIFF WBC: CPT | Performed by: INTERNAL MEDICINE

## 2023-10-04 PROCEDURE — 36415 COLL VENOUS BLD VENIPUNCTURE: CPT

## 2023-10-04 PROCEDURE — 85007 BL SMEAR W/DIFF WBC COUNT: CPT | Performed by: INTERNAL MEDICINE

## 2023-10-04 PROCEDURE — 99214 OFFICE O/P EST MOD 30 MIN: CPT | Performed by: INTERNAL MEDICINE

## 2023-10-04 PROCEDURE — 80053 COMPREHEN METABOLIC PANEL: CPT | Performed by: INTERNAL MEDICINE

## 2023-10-04 PROCEDURE — 82784 ASSAY IGA/IGD/IGG/IGM EACH: CPT | Performed by: INTERNAL MEDICINE

## 2023-10-04 PROCEDURE — 84165 PROTEIN E-PHORESIS SERUM: CPT | Performed by: INTERNAL MEDICINE

## 2023-10-04 NOTE — PROGRESS NOTES
"Subjective     CHIEF COMPLAINT:      Chief Complaint   Patient presents with    Follow-up     NO CONCERNS     HISTORY OF PRESENT ILLNESS:     Duy Owens is a 62 y.o. male patient who returns today for follow up on his multiple myeloma and secondary amyloidosis.  He returns today for follow-up reporting fatigue.  No recurrence of the syncopal episode he had a few months ago.  No chest pain or shortness of breath.  No abdominal pain.  No recurrence of the severe spasm he developed in the back few months ago.    ROS:  Pertinent ROS is in the HPI.     Past medical, surgical, social and family history were reviewed.     MEDICATIONS:    Current Outpatient Medications:     aspirin 81 MG EC tablet, Take 1 tablet by mouth Daily., Disp: , Rfl:     lenalidomide (REVLIMID) 10 MG capsule, Take 1 capsule by mouth Daily. Take for 21 days on, then 7 days off, then repeat., Disp: 21 capsule, Rfl: 0    losartan (COZAAR) 100 MG tablet, TAKE ONE TABLET BY MOUTH DAILY, Disp: 30 tablet, Rfl: 11    metFORMIN (GLUCOPHAGE) 500 MG tablet, Take 1 tablet by mouth 2 (Two) Times a Day., Disp: , Rfl:     ondansetron (ZOFRAN) 8 MG tablet, Take 1 tablet by mouth 3 (Three) Times a Day As Needed for Nausea or Vomiting., Disp: 30 tablet, Rfl: 5  Objective     VITAL SIGNS:     Vitals:    10/04/23 0758   BP: 129/76   Pulse: (!) 49   Resp: 18   Temp: 98 °F (36.7 °C)   TempSrc: Temporal   SpO2: 98%   Weight: 90 kg (198 lb 6.4 oz)   Height: 177 cm (69.69\")   PainSc: 0-No pain     Body mass index is 28.73 kg/m².     Wt Readings from Last 5 Encounters:   10/04/23 90 kg (198 lb 6.4 oz)   08/29/23 90.1 kg (198 lb 10.2 oz)   08/16/23 90.1 kg (198 lb 11.2 oz)   08/05/23 88 kg (194 lb)   07/20/23 89.2 kg (196 lb 9.6 oz)     PHYSICAL EXAMINATION:   GENERAL: The patient appears in good general condition, not in acute distress.   SKIN: No ecchymosis.  EYES: No jaundice. No Pallor.  CHEST: Normal respiratory effort.  Lungs clear bilaterally.  No added " sounds.  CVS: Normal S1-S2.  No murmurs..  ABDOMEN: Soft. No tenderness. No Hepatomegaly. No Splenomegaly. No masses.  EXTREMITIES: No edema.  No calf tenderness..     DIAGNOSTIC DATA:     Results from last 7 days   Lab Units 10/04/23  0755   WBC 10*3/mm3 4.37   NEUTROS ABS 10*3/mm3 1.61*   HEMOGLOBIN g/dL 12.5*   HEMATOCRIT % 40.9   PLATELETS 10*3/mm3 190     Results from last 7 days   Lab Units 10/04/23  0755   SODIUM mmol/L 139   POTASSIUM mmol/L 3.9   CHLORIDE mmol/L 104   CO2 mmol/L 26.4   BUN mg/dL 11   CREATININE mg/dL 0.98   CALCIUM mg/dL 9.3   ALBUMIN g/dL 4.4   BILIRUBIN mg/dL 0.6   ALK PHOS U/L 92   ALT (SGPT) U/L 33   AST (SGOT) U/L 24   GLUCOSE mg/dL 96     Component      Latest Ref Rng 9/6/2023   IgG      603 - 1613 mg/dL 1284    IgA      61 - 437 mg/dL 96    IgM      20 - 172 mg/dL 56    Total Protein      6.0 - 8.5 g/dL 6.8    Albumin      2.9 - 4.4 g/dL 3.9    Alpha-1-Globulin      0.0 - 0.4 g/dL 0.2    Alpha-2-Globulin      0.4 - 1.0 g/dL 0.6    Beta Globulin      0.7 - 1.3 g/dL 0.9    Gamma Globulin      0.4 - 1.8 g/dL 1.2    M-Werner      Not Observed g/dL Not Observed    Globulin      2.2 - 3.9 g/dL 2.9    A/G Ratio      0.7 - 1.7  1.4    Immunofixation Reflex, Serum Comment    Please note Comment    Kappa FLC      3.3 - 19.4 mg/L 30.0 (H)    Free Lambda Light Chains      5.7 - 26.3 mg/L 25.8    Kappa/Lambda Ratio      0.26 - 1.65  1.16       CT chest abdomen pelvis on 8/22/2023:  CT CHEST:   Lungs: Background emphysematous changes. No suspicious pulmonary nodules. No focal consolidation.     Pleura: No pleural effusion or pneumothorax.   Airways: Patent central airways.   Cardiovascular: Heart is normal in size. No pericardial effusion.   Mediastinum: Partially calcified mediastinal and hilar lymph nodes. For example:   -8 mm posterior mediastinal node (series 3 image 50), previously 8 mm   -9 mm posterior mediastinal node (series 3 image 76), previously 9 mm     Lower Neck/Chest Wall: No  axillary adenopathy.     CT ABDOMEN AND PELVIS:   Liver: No focal hepatic lesion.   Gallbladder/Biliary tree: Gallbladder is unremarkable. No biliary dilatation.   Spleen: Calcified granulomas.   Pancreas: Unremarkable.   Adrenals: Unremarkable.   Kidneys/Ureters: No nephrolithiasis or hydronephrosis.   Gastrointestinal: Small hiatal hernia. Colonic diverticulosis. Normal appendix. No bowel obstruction.   Peritoneum: No free fluid or free gas.   Vasculature: Patent portal venous system. Normal caliber aorta. Atherosclerotic calcifications.     Lymph Nodes: Multiple partially calcified abdominopelvic lymph nodes. Index nodes as follows:   -2.8 x 2.5 cm right retroperitoneal reinier conglomerate (series 3 image 124), previously 3.2 x 3.6 cm   -1.4 cm left periaortic node (series 3 image 127), previously 1.5 cm   -1.3 cm retrocaval node (series 3 image 132), previously 1.7 cm   -0.9 cm anterior aortocaval node (series 3 image 134), previously 1.0 cm   -0.5 cm left external iliac lymph node (series 3 image 169), previously 0.5 cm     Urinary Bladder: Unremarkable.   Reproductive organs: Prostatomegaly.   Abdominal Wall: Bilateral fat-containing inguinal hernias.   MUSCULOSKELETAL: Degenerative changes of the spine. No acute bony abnormality.     IMPRESSION:     1. Compared to 2/23/2023, decreased abdominopelvic adenopathy with similar thoracic adenopathy.     2.  Background emphysematous changes.     Assessment & Plan    *Lambda light chain multiple myeloma with lymphadenopathy and development of AL amyloidosis.  Patient was found to have lymph node involvement and amyloid deposition in the involved lymph nodes.  Patient started having dry cough around July 2021.    CT scans 9/23/2021-9/24/2021 revealed inferior mediastinal adenopathy and retroperitoneal adenopathy extending to the right iliac chain.  The Largest lymph node was in the retroperitoneal area measuring 4.8 x 3.5 cm.  The common iliac lymph node measured 3.5 x  2.7 cm.  The left external iliac chain lymph node measured 2.9 x 2 cm.    PET scan on 10/5/2021 revealed the retroperitoneal and left pelvic lymphadenopathy to be hypermetabolic.  The left periaortic lymph nodes had SUV of 6.9 and the left pelvic sidewall lymph nodes had an SUV of 5.4.  No bone involvement.  CT-guided biopsy on 10/6/2021- pathologist at [facility] reported involvement with monotypic lambda restricted plasma cells concerning for involvement with plasma cell dyscrasia.  Bone marrow biopsy on 10/29/2021 revealed a normocellular marrow (50%) with involvement with plasma cell dyscrasia (plasma cells representing 20-30% of total cells by  stain).   FISH was negative for gain of 1q, monosomy/deletions of chromosomes 13 and 17, IGH rearrangement, gain of chromosomes 9 and 11, IGH-CCND1 (11;14) fusion.   Treatment with the VRD started on 12/22/2021.  Free lambda light chain started to improve after the patient started treatment.  CT scan on 3/11/2022 revealed decrease in the size of lymph nodes in the periaortic area.  There is a slight increase in a lymph node in the left axillary area that increased from 7 to 10 mm.    PET scan on 4/28/2022 revealed significant reduction in the size and hypermetabolism of the involved lymph nodes.  SPEP REGINO FLC on 5/25/2022 revealed no M protein.  Free lambda light chain was 41.8.  Kappa to lambda ratio was normal at 0.26.  B2 M was 1.6.  Bone marrow biopsy on 5/24/2022 revealed normocellular marrow with 30-40% cellularity with involvement by plasma cell neoplasm representing 5-10% by IHC.  Negative for amyloid.  He was considered to be HI-1.  S/p high-dose melphalan with autologous stem cell transplant.  Day 0 was 7/7/2022.  Patient did well with the transplant but had neutropenic fever of unclear source necessitating hospitalization between 7/16/2022 and 7/18/2022.    Bone marrow biopsy on 9/29/2022 revealed monotypic plasma cells identified on on flow cytometry  but not on IHC.  CT on 9/29/2022 showed decrease in the size of the enlarged lymph nodes.  Patient started Revlimid 10 mg daily for 21 out of 28-day cycle on 11/1/2022.    CT on 2/23/2023 showed decrease in the size of the mediastinal and retroperitoneal lymph nodes indicating response to treatment.  2/23/2023-Free kappa light chain 1.94, free lambda light chain 2.19, kappa/lambda ratio normal at 0.89.  5/17/2023: Free kappa light chain was 31.5.  Free lambda light chain was 24.5.  Kappa 3 lambda ratio normal at 1.29.  6/14/2023: Free kappa light chain was 31.0.  Free lambda light chain was 23.7.  Kappa to lambda ratio was 1.31.   Patient had follow-up at [facility] on 8/22/2023.    CT scan showed decrease in the size of the enlarged lymph nodes.  No new abnormalities were seen.  9/6/2023: Free kappa light chain 30.0.  Free lambda light chain 25.8.  Kappa to lambda ratio 1.16.  No M protein.  He is tolerating Revlimid well.    *Anemia secondary to multiple myeloma and secondary to treatment.  Hemoglobin was 13.1 on 9/29/2021.    Iron, folate and vitamin B12 were normal in September/October 2021.    Hemoglobin was 11.4 on 8/24/2022.  Hemoglobin improved to the 11-12 g range.  10/4/2023: Hemoglobin 12.5.  Was having some fatigue.    *Prophylaxis.  He was previously on acyclovir 400 mg twice daily.    He is now on Valtrex.  He received Evusheld on 8/20/2022.  Patient received vaccinations as recommended by [facility].  He received COVID-19 Moderna booster on 10/25/2022.  He received vaccines in April and June.  He received the first Shingrix vaccine in August 2023 and is due for the second dose later this month.  No recent infections.    *Syncope.  Patient reports having an episode of passing out while he was outdoors watching a game.  He did not feel dizzy prior to the episode. He was well hydrated.  He regained consciousness quickly.   EMS was called. He declined going to ER at that time.   He was referred to  cardiology.  He is going to have echocardiogram done.  No recurrence of the episodes.  He has mild bradycardia today but he is asymptomatic.    *Severe muscle spasm of the cervical-thoracic spine.  He reports having weakness of the right lower extremity.  He fell off a ladder recently due to weakness of the right LE.  I recommended evaluation with an MRI of the spine.   He complained of muscle spasm of other muscles.  MRI showed no lesions from multiple myeloma.  He was referred to neurosurgery.  Conservative management was recommended for DJD.  He did not have recurrence of the severe muscle spasm.    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  There was mild wall thickening.  Ejection fraction was normal.  Echocardiogram at [facility] on 4/21/2022 did not reveal wall thickening.    PLAN:    1.  Continue maintenance Revlimid at 10 mg daily for 21 days out of a 28-day cycle.    2.  Continue aspirin 81 mg daily.    3.  He will receive the second dose of Shingrix vaccine this month.    4.  Return in 1 and 2 months with CBC CMP SPEP REGINO FLC.    5.  I will see him in follow-up in 3 months.  CBC CMP SPEP REGINO FLC will be obtained.  We will also obtain ferritin iron panel vitamin B12 and folate levels.        Tima Mendez MD  10/04/23

## 2023-10-05 ENCOUNTER — SPECIALTY PHARMACY (OUTPATIENT)
Dept: PHARMACY | Facility: HOSPITAL | Age: 63
End: 2023-10-05
Payer: COMMERCIAL

## 2023-10-05 LAB
ALBUMIN SERPL ELPH-MCNC: 3.8 G/DL (ref 2.9–4.4)
ALBUMIN/GLOB SERPL: 1.3 {RATIO} (ref 0.7–1.7)
ALPHA1 GLOB SERPL ELPH-MCNC: 0.2 G/DL (ref 0–0.4)
ALPHA2 GLOB SERPL ELPH-MCNC: 0.6 G/DL (ref 0.4–1)
B-GLOBULIN SERPL ELPH-MCNC: 1 G/DL (ref 0.7–1.3)
GAMMA GLOB SERPL ELPH-MCNC: 1.3 G/DL (ref 0.4–1.8)
GLOBULIN SER-MCNC: 3.1 G/DL (ref 2.2–3.9)
IGA SERPL-MCNC: 107 MG/DL (ref 61–437)
IGG SERPL-MCNC: 1352 MG/DL (ref 603–1613)
IGM SERPL-MCNC: 55 MG/DL (ref 20–172)
INTERPRETATION SERPL IEP-IMP: ABNORMAL
KAPPA LC FREE SER-MCNC: 29.7 MG/L (ref 3.3–19.4)
KAPPA LC FREE/LAMBDA FREE SER: 1.32 {RATIO} (ref 0.26–1.65)
LABORATORY COMMENT REPORT: ABNORMAL
LAMBDA LC FREE SERPL-MCNC: 22.5 MG/L (ref 5.7–26.3)
M PROTEIN SERPL ELPH-MCNC: ABNORMAL G/DL
PROT SERPL-MCNC: 6.9 G/DL (ref 6–8.5)

## 2023-10-13 NOTE — PROGRESS NOTES
RM:________     PCP: Nara Dobson MD    : 1960  AGE: 62 y.o.  EST PATIENT     REASON FOR VISIT/  CC:        BP Readings from Last 3 Encounters:   10/04/23 129/76   23 118/58   23 126/77      Wt Readings from Last 3 Encounters:   10/04/23 90 kg (198 lb 6.4 oz)   23 90.1 kg (198 lb 10.2 oz)   23 90.1 kg (198 lb 11.2 oz)        WT: ____________ BP: __________L __________R HR______    CHEST PAIN: _____________    SOA: _____________PALPS: _______________     LIGHTHEADED: ___________FATIGUE: ________________ EDEMA __________    ALLERGIES:Lisinopril SMOKING HISTORY:  Social History     Tobacco Use    Smoking status: Former     Packs/day: 0.50     Years: 10.50     Additional pack years: 0.00     Total pack years: 5.25     Types: Cigarettes     Quit date:      Years since quittin.7    Smokeless tobacco: Never   Vaping Use    Vaping Use: Never used   Substance Use Topics    Alcohol use: Not Currently     Alcohol/week: 6.0 standard drinks of alcohol     Types: 6 Cans of beer per week     Comment: weekly    Drug use: Never     CAFFEINE USE_________________  ALCOHOL ______________________         NEW DIAGNOSIS/ SURGERY/ HOSP OR ED VISITS: ______________________    __________________________________________________________________      RECENT LABS OR DIAGNOSTIC TESTING:  _____________________________    __________________________________________________________________      ASSESSMENT/ PLAN: _______________________________________________    __________________________________________________________________

## 2023-10-18 ENCOUNTER — SPECIALTY PHARMACY (OUTPATIENT)
Dept: PHARMACY | Facility: HOSPITAL | Age: 63
End: 2023-10-18
Payer: COMMERCIAL

## 2023-10-25 ENCOUNTER — SPECIALTY PHARMACY (OUTPATIENT)
Dept: PHARMACY | Facility: HOSPITAL | Age: 63
End: 2023-10-25
Payer: COMMERCIAL

## 2023-10-25 RX ORDER — LENALIDOMIDE 10 MG/1
10 CAPSULE ORAL DAILY
Qty: 21 CAPSULE | Refills: 0 | Status: SHIPPED | OUTPATIENT
Start: 2023-10-25

## 2023-10-25 NOTE — PROGRESS NOTES
Re: Refills of Oral Specialty Medication - Revlimid (lenalidomide)    Drug-Drug Interactions: The current medication list was reviewed and there are no relevant drug-drug interactions with the specialty medication.  Medication Allergies: The patient has no relevant allergies as it relates to their oral specialty medication  Review of Labs/Dose Adjustments: NO DOSE CHANGE - I reviewed the most recent note and labs and the patient will continue without any dose changes.  I sent refills as described below.    Drug: Revlimid (lenalidomide)  Strength: 10 mg  Directions: Take 1 capsule by mouth  daily for 21 days on, then 7 days off  Quantity: 21  Refills: 0  Pharmacy prescription sent to: [facility]    Name/Credentials Sepideh Avila RPH, BCOP    10/25/2023  08:45 EDT

## 2023-11-01 ENCOUNTER — CLINICAL SUPPORT (OUTPATIENT)
Dept: ONCOLOGY | Facility: HOSPITAL | Age: 63
End: 2023-11-01
Payer: COMMERCIAL

## 2023-11-01 ENCOUNTER — LAB (OUTPATIENT)
Dept: OTHER | Facility: HOSPITAL | Age: 63
End: 2023-11-01
Payer: COMMERCIAL

## 2023-11-01 DIAGNOSIS — D84.9 IMMUNOCOMPROMISED PATIENT: ICD-10-CM

## 2023-11-01 DIAGNOSIS — C90.00 MULTIPLE MYELOMA NOT HAVING ACHIEVED REMISSION: Primary | ICD-10-CM

## 2023-11-01 DIAGNOSIS — D63.0 ANEMIA IN NEOPLASTIC DISEASE: ICD-10-CM

## 2023-11-01 DIAGNOSIS — M62.838 MUSCLE SPASM: ICD-10-CM

## 2023-11-01 DIAGNOSIS — R55 SYNCOPE, UNSPECIFIED SYNCOPE TYPE: ICD-10-CM

## 2023-11-01 DIAGNOSIS — E85.81 AL AMYLOIDOSIS: ICD-10-CM

## 2023-11-01 DIAGNOSIS — Z79.899 ENCOUNTER FOR LONG-TERM (CURRENT) USE OF HIGH-RISK MEDICATION: ICD-10-CM

## 2023-11-01 DIAGNOSIS — C90.00 MULTIPLE MYELOMA NOT HAVING ACHIEVED REMISSION: ICD-10-CM

## 2023-11-01 LAB
ALBUMIN SERPL-MCNC: 4.6 G/DL (ref 3.5–5.2)
ALBUMIN/GLOB SERPL: 1.6 G/DL
ALP SERPL-CCNC: 110 U/L (ref 39–117)
ALT SERPL W P-5'-P-CCNC: 71 U/L (ref 1–41)
ANION GAP SERPL CALCULATED.3IONS-SCNC: 9.3 MMOL/L (ref 5–15)
AST SERPL-CCNC: 39 U/L (ref 1–40)
BASOPHILS # BLD AUTO: 0.05 10*3/MM3 (ref 0–0.2)
BASOPHILS NFR BLD AUTO: 1.1 % (ref 0–1.5)
BILIRUB SERPL-MCNC: 0.5 MG/DL (ref 0–1.2)
BUN SERPL-MCNC: 15 MG/DL (ref 8–23)
BUN/CREAT SERPL: 14.7 (ref 7–25)
CALCIUM SPEC-SCNC: 9.4 MG/DL (ref 8.6–10.5)
CHLORIDE SERPL-SCNC: 105 MMOL/L (ref 98–107)
CHOLEST SERPL-MCNC: 189 MG/DL (ref 0–200)
CO2 SERPL-SCNC: 25.7 MMOL/L (ref 22–29)
CREAT SERPL-MCNC: 1.02 MG/DL (ref 0.76–1.27)
DEPRECATED RDW RBC AUTO: 42.8 FL (ref 37–54)
EGFRCR SERPLBLD CKD-EPI 2021: 83.6 ML/MIN/1.73
EOSINOPHIL # BLD AUTO: 0.54 10*3/MM3 (ref 0–0.4)
EOSINOPHIL NFR BLD AUTO: 12 % (ref 0.3–6.2)
ERYTHROCYTE [DISTWIDTH] IN BLOOD BY AUTOMATED COUNT: 15.5 % (ref 12.3–15.4)
GLOBULIN UR ELPH-MCNC: 2.9 GM/DL
GLUCOSE SERPL-MCNC: 95 MG/DL (ref 65–99)
HCT VFR BLD AUTO: 41.4 % (ref 37.5–51)
HDLC SERPL-MCNC: 48 MG/DL (ref 40–60)
HGB BLD-MCNC: 12.8 G/DL (ref 13–17.7)
IMM GRANULOCYTES # BLD AUTO: 0.02 10*3/MM3 (ref 0–0.05)
IMM GRANULOCYTES NFR BLD AUTO: 0.4 % (ref 0–0.5)
LDLC SERPL CALC-MCNC: 126 MG/DL (ref 0–100)
LDLC/HDLC SERPL: 2.6 {RATIO}
LYMPHOCYTES # BLD AUTO: 1.51 10*3/MM3 (ref 0.7–3.1)
LYMPHOCYTES NFR BLD AUTO: 33.6 % (ref 19.6–45.3)
MCH RBC QN AUTO: 23.8 PG (ref 26.6–33)
MCHC RBC AUTO-ENTMCNC: 30.9 G/DL (ref 31.5–35.7)
MCV RBC AUTO: 77 FL (ref 79–97)
MONOCYTES # BLD AUTO: 0.64 10*3/MM3 (ref 0.1–0.9)
MONOCYTES NFR BLD AUTO: 14.2 % (ref 5–12)
NEUTROPHILS NFR BLD AUTO: 1.74 10*3/MM3 (ref 1.7–7)
NEUTROPHILS NFR BLD AUTO: 38.7 % (ref 42.7–76)
NRBC BLD AUTO-RTO: 0 /100 WBC (ref 0–0.2)
PLATELET # BLD AUTO: 204 10*3/MM3 (ref 140–450)
PMV BLD AUTO: 9.3 FL (ref 6–12)
POTASSIUM SERPL-SCNC: 4.1 MMOL/L (ref 3.5–5.2)
PROT SERPL-MCNC: 7.5 G/DL (ref 6–8.5)
RBC # BLD AUTO: 5.38 10*6/MM3 (ref 4.14–5.8)
SODIUM SERPL-SCNC: 140 MMOL/L (ref 136–145)
TRIGL SERPL-MCNC: 80 MG/DL (ref 0–150)
VLDLC SERPL-MCNC: 15 MG/DL (ref 5–40)
WBC NRBC COR # BLD: 4.5 10*3/MM3 (ref 3.4–10.8)

## 2023-11-01 PROCEDURE — 82784 ASSAY IGA/IGD/IGG/IGM EACH: CPT | Performed by: INTERNAL MEDICINE

## 2023-11-01 PROCEDURE — 85025 COMPLETE CBC W/AUTO DIFF WBC: CPT | Performed by: INTERNAL MEDICINE

## 2023-11-01 PROCEDURE — 80061 LIPID PANEL: CPT | Performed by: INTERNAL MEDICINE

## 2023-11-01 PROCEDURE — 80053 COMPREHEN METABOLIC PANEL: CPT | Performed by: INTERNAL MEDICINE

## 2023-11-01 PROCEDURE — 84165 PROTEIN E-PHORESIS SERUM: CPT | Performed by: INTERNAL MEDICINE

## 2023-11-01 PROCEDURE — G0463 HOSPITAL OUTPT CLINIC VISIT: HCPCS

## 2023-11-01 PROCEDURE — 83521 IG LIGHT CHAINS FREE EACH: CPT | Performed by: INTERNAL MEDICINE

## 2023-11-01 PROCEDURE — 36415 COLL VENOUS BLD VENIPUNCTURE: CPT

## 2023-11-01 PROCEDURE — 86334 IMMUNOFIX E-PHORESIS SERUM: CPT | Performed by: INTERNAL MEDICINE

## 2023-11-01 NOTE — NURSING NOTE
Pt is here for labs with RN review. Pt denies any new complaints today. He states his hands feel stiff when he first wakes up, but this resolves quickly. He denies any alcohol intake, tylenol or zofran use. Pt confirmed he is taking Revlimid 10mg for 21 days of a 28 day cycle, he completed 21 day cycle yesterday and is currently on his 7 days off. Reviewed labs with AUDRA Fitzgerald. Per Tita pt should push fluids, avoid tylenol, zofran, NSAIDS, and alcohol. Orders received to recheck CBC and CMP in 2 weeks. Pt v/u and agrees with this plan.     Pt asked if we could check his cholesterol while in the office today, he confirmed he is fasting. Received order from Tita with instructions for pt to follow up with PCP regarding results. Pt notified and v/u.     Pt scheduled to return on 11/15/23. Discharged in stable condition.      Lab Results   Component Value Date    WBC 4.50 11/01/2023    HGB 12.8 (L) 11/01/2023    HCT 41.4 11/01/2023    MCV 77.0 (L) 11/01/2023     11/01/2023     Lab Results   Component Value Date    GLUCOSE 95 11/01/2023    BUN 15 11/01/2023    CREATININE 1.02 11/01/2023    EGFR 83.6 11/01/2023    BCR 14.7 11/01/2023    K 4.1 11/01/2023    CO2 25.7 11/01/2023    CALCIUM 9.4 11/01/2023    PROTENTOTREF 6.9 10/04/2023    ALBUMIN 4.6 11/01/2023    BILITOT 0.5 11/01/2023    AST 39 11/01/2023    ALT 71 (H) 11/01/2023

## 2023-11-02 LAB
ALBUMIN SERPL ELPH-MCNC: 4.1 G/DL (ref 2.9–4.4)
ALBUMIN/GLOB SERPL: 1.3 {RATIO} (ref 0.7–1.7)
ALPHA1 GLOB SERPL ELPH-MCNC: 0.2 G/DL (ref 0–0.4)
ALPHA2 GLOB SERPL ELPH-MCNC: 0.5 G/DL (ref 0.4–1)
B-GLOBULIN SERPL ELPH-MCNC: 1.1 G/DL (ref 0.7–1.3)
GAMMA GLOB SERPL ELPH-MCNC: 1.4 G/DL (ref 0.4–1.8)
GLOBULIN SER-MCNC: 3.2 G/DL (ref 2.2–3.9)
IGA SERPL-MCNC: 112 MG/DL (ref 61–437)
IGG SERPL-MCNC: 1459 MG/DL (ref 603–1613)
IGM SERPL-MCNC: 52 MG/DL (ref 20–172)
INTERPRETATION SERPL IEP-IMP: ABNORMAL
KAPPA LC FREE SER-MCNC: 32.9 MG/L (ref 3.3–19.4)
KAPPA LC FREE/LAMBDA FREE SER: 1.28 {RATIO} (ref 0.26–1.65)
LABORATORY COMMENT REPORT: ABNORMAL
LAMBDA LC FREE SERPL-MCNC: 25.8 MG/L (ref 5.7–26.3)
M PROTEIN SERPL ELPH-MCNC: ABNORMAL G/DL
PROT SERPL-MCNC: 7.3 G/DL (ref 6–8.5)

## 2023-11-03 ENCOUNTER — OFFICE VISIT (OUTPATIENT)
Dept: CARDIOLOGY | Facility: CLINIC | Age: 63
End: 2023-11-03
Payer: COMMERCIAL

## 2023-11-03 VITALS
DIASTOLIC BLOOD PRESSURE: 78 MMHG | HEIGHT: 70 IN | SYSTOLIC BLOOD PRESSURE: 136 MMHG | WEIGHT: 199 LBS | BODY MASS INDEX: 28.49 KG/M2 | HEART RATE: 62 BPM

## 2023-11-03 DIAGNOSIS — E85.81 AL AMYLOIDOSIS: ICD-10-CM

## 2023-11-03 DIAGNOSIS — R55 SYNCOPE AND COLLAPSE: ICD-10-CM

## 2023-11-03 DIAGNOSIS — I10 PRIMARY HYPERTENSION: ICD-10-CM

## 2023-11-03 DIAGNOSIS — C90.00 MULTIPLE MYELOMA NOT HAVING ACHIEVED REMISSION: Primary | ICD-10-CM

## 2023-11-03 PROCEDURE — 93000 ELECTROCARDIOGRAM COMPLETE: CPT | Performed by: INTERNAL MEDICINE

## 2023-11-03 PROCEDURE — 99214 OFFICE O/P EST MOD 30 MIN: CPT | Performed by: INTERNAL MEDICINE

## 2023-11-03 NOTE — PROGRESS NOTES
Date of Office Visit: 23  Encounter Provider: Allan Buenrostro MD  Place of Service: [facility] CARDIOLOGY  Patient Name: Duy Owens  :1960    Chief Complaint   Patient presents with    Amyloidosis,   :     HPI:     Mr. Owens is 62 y.o. and presents today to establish care. I have reviewed prior notes and there are no changes except for any new updates described below. I have also reviewed any information entered into the medical record by the patient or by ancillary staff.     He has multiple myeloma (lambda light chain) and associated AL amyloidosis. He is s/p stem cell transplant and he is maintained on lenalidomide.    Serial echocardiograms have been normal; his last was in 2023. It showed normal LVSF and normal strain.      Earlier this year, he had an episode of syncope. He had been outside in the heat and his legs got weak and he briefly went down. He says he had not been drinking enough water that day. He has had no further events or symptoms. He denies chest pain, dyspnea, orthopnea, edema, palpitations, or lightheadedness.     Past Medical History:   Diagnosis Date    AL amyloidosis     GERD (gastroesophageal reflux disease)     History of colon polyps     History of snoring     Hypertension     Multiple myeloma        Past Surgical History:   Procedure Laterality Date    COLONOSCOPY      COLONOSCOPY N/A 2022    Procedure: COLONOSCOPY to cecum and TI;  Surgeon: Clive Mars MD;  Location: [facility] ENDOSCOPY;  Service: Gastroenterology;  Laterality: N/A;  PRE - hx polyps  POST - diverticulosis, hemorrhoids    COLONOSCOPY W/ POLYPECTOMY      CYSTOSCOPY TRANSURETHRAL RESECTION OF PROSTATE      LYMPH NODE BIOPSY Right 10/06/2021    right retroperitoneal lymph node conglomerate       Social History     Socioeconomic History    Marital status: Single   Tobacco Use    Smoking status: Former     Packs/day: 0.50     Years: 10.50     Additional pack years:  "0.00     Total pack years: 5.25     Types: Cigarettes     Quit date:      Years since quittin.8    Smokeless tobacco: Never   Vaping Use    Vaping Use: Never used   Substance and Sexual Activity    Alcohol use: Not Currently    Drug use: Never    Sexual activity: Defer       Family History   Problem Relation Age of Onset    Heart disease Mother         CHF    Prostate cancer Brother     Malig Hyperthermia Neg Hx        Review of Systems   Musculoskeletal:  Positive for arthritis.   All other systems reviewed and are negative.      Allergies   Allergen Reactions    Lisinopril Cough         Current Outpatient Medications:     aspirin 81 MG EC tablet, Take 1 tablet by mouth Daily., Disp: , Rfl:     lenalidomide (REVLIMID) 10 MG capsule, Take 1 capsule by mouth Daily. Take for 21 days on, then 7 days off, then repeat., Disp: 21 capsule, Rfl: 0    losartan (COZAAR) 100 MG tablet, TAKE ONE TABLET BY MOUTH DAILY, Disp: 30 tablet, Rfl: 11    metFORMIN (GLUCOPHAGE) 500 MG tablet, Take 1 tablet by mouth 2 (Two) Times a Day., Disp: , Rfl:     ondansetron (ZOFRAN) 8 MG tablet, Take 1 tablet by mouth 3 (Three) Times a Day As Needed for Nausea or Vomiting. (Patient not taking: Reported on 11/3/2023), Disp: 30 tablet, Rfl: 5      Objective:     Vitals:    23 0921   BP: 136/78   BP Location: Right arm   Pulse: 62   Weight: 90.3 kg (199 lb)   Height: 177 cm (69.69\")     Body mass index is 28.81 kg/m².    Vitals reviewed.   Constitutional:       Appearance: Well-developed and not in distress.   Eyes:      Conjunctiva/sclera: Conjunctivae normal.   HENT:      Head: Normocephalic.      Nose: Nose normal.   Neck:      Thyroid: Thyroid normal.      Vascular: No JVD. JVD normal.      Lymphadenopathy: No cervical adenopathy.   Pulmonary:      Effort: Pulmonary effort is normal.      Breath sounds: Normal breath sounds.   Cardiovascular:      Normal rate. Regular rhythm.      Murmurs: There is no murmur.   Pulses:     Intact " distal pulses.   Edema:     Peripheral edema absent.   Abdominal:      Palpations: Abdomen is soft.      Tenderness: There is no abdominal tenderness.   Musculoskeletal: Normal range of motion.      Cervical back: Normal range of motion. Skin:     General: Skin is warm and dry.      Findings: No rash.   Neurological:      General: No focal deficit present.      Mental Status: Alert and oriented to person, place, and time.      Cranial Nerves: No cranial nerve deficit.   Psychiatric:         Behavior: Behavior normal.         Thought Content: Thought content normal.         Judgment: Judgment normal.           ECG 12 Lead    Date/Time: 11/3/2023 10:00 AM  Performed by: Allan Buenrostro MD    Authorized by: Allan Buenrostro MD  Comparison: compared with previous ECG   Similar to previous ECG  Rhythm: sinus rhythm  Conduction: conduction normal  ST Segments: ST segments normal  T Waves: T waves normal  QRS axis: normal  Other: no other findings    Clinical impression: normal ECG            Assessment:       Diagnosis Plan   1. Multiple myeloma not having achieved remission        2. AL amyloidosis        3. Primary hypertension        4. Syncope and collapse               Plan:       Mr Owens has AL amyloidosis and multiple myeloma. He is s/p SCT and remains on lenalidomide.  Serial echocardiograms have shown no evidence of cardiac amyloidosis.  As noted previously, PYP testing is not indicated for this diagnosis, as it detects ATTR amyloidosis.  He had an echocardiogram in August 23 that was completely normal, with normal global longitudinal strain/strain pattern.    He had an isolated episode of syncope in May 2023.  He says he was not well-hydrated and that it was hot outside.  It was very brief and he has not had any recurrence or any other symptoms to suggest that he has an arrhythmia or advanced conduction disease.  His surface EKG is normal, and his recent echo was normal.    At this point, I do not feel that he  needs serial cardiac evaluations unless something changes with his condition.  We will see him back in one year, and if he is doing well, I think he can then follow-up on an as-needed basis.    Sincerely,       Allan Buenrostro MD

## 2023-11-15 ENCOUNTER — CLINICAL SUPPORT (OUTPATIENT)
Dept: ONCOLOGY | Facility: HOSPITAL | Age: 63
End: 2023-11-15
Payer: COMMERCIAL

## 2023-11-15 ENCOUNTER — TELEPHONE (OUTPATIENT)
Dept: ONCOLOGY | Facility: CLINIC | Age: 63
End: 2023-11-15
Payer: COMMERCIAL

## 2023-11-15 ENCOUNTER — LAB (OUTPATIENT)
Dept: OTHER | Facility: HOSPITAL | Age: 63
End: 2023-11-15
Payer: COMMERCIAL

## 2023-11-15 VITALS — RESPIRATION RATE: 15 BRPM | BODY MASS INDEX: 28.2 KG/M2 | WEIGHT: 197 LBS | HEIGHT: 70 IN | TEMPERATURE: 98.5 F

## 2023-11-15 DIAGNOSIS — C90.00 MULTIPLE MYELOMA NOT HAVING ACHIEVED REMISSION: ICD-10-CM

## 2023-11-15 LAB
ALBUMIN SERPL-MCNC: 4.4 G/DL (ref 3.5–5.2)
ALBUMIN/GLOB SERPL: 1.6 G/DL
ALP SERPL-CCNC: 92 U/L (ref 39–117)
ALT SERPL W P-5'-P-CCNC: 22 U/L (ref 1–41)
ANION GAP SERPL CALCULATED.3IONS-SCNC: 8.4 MMOL/L (ref 5–15)
AST SERPL-CCNC: 20 U/L (ref 1–40)
BASOPHILS # BLD AUTO: 0.07 10*3/MM3 (ref 0–0.2)
BASOPHILS NFR BLD AUTO: 1.8 % (ref 0–1.5)
BILIRUB SERPL-MCNC: 0.4 MG/DL (ref 0–1.2)
BUN SERPL-MCNC: 13 MG/DL (ref 8–23)
BUN/CREAT SERPL: 11.8 (ref 7–25)
CALCIUM SPEC-SCNC: 9.2 MG/DL (ref 8.6–10.5)
CHLORIDE SERPL-SCNC: 103 MMOL/L (ref 98–107)
CO2 SERPL-SCNC: 25.6 MMOL/L (ref 22–29)
CREAT SERPL-MCNC: 1.1 MG/DL (ref 0.76–1.27)
DEPRECATED RDW RBC AUTO: 44.1 FL (ref 37–54)
EGFRCR SERPLBLD CKD-EPI 2021: 76.4 ML/MIN/1.73
EOSINOPHIL # BLD AUTO: 0.32 10*3/MM3 (ref 0–0.4)
EOSINOPHIL NFR BLD AUTO: 8.2 % (ref 0.3–6.2)
ERYTHROCYTE [DISTWIDTH] IN BLOOD BY AUTOMATED COUNT: 15.7 % (ref 12.3–15.4)
GLOBULIN UR ELPH-MCNC: 2.8 GM/DL
GLUCOSE SERPL-MCNC: 106 MG/DL (ref 65–99)
HCT VFR BLD AUTO: 41.3 % (ref 37.5–51)
HGB BLD-MCNC: 12.3 G/DL (ref 13–17.7)
IMM GRANULOCYTES # BLD AUTO: 0.03 10*3/MM3 (ref 0–0.05)
IMM GRANULOCYTES NFR BLD AUTO: 0.8 % (ref 0–0.5)
LYMPHOCYTES # BLD AUTO: 1.33 10*3/MM3 (ref 0.7–3.1)
LYMPHOCYTES NFR BLD AUTO: 34.1 % (ref 19.6–45.3)
MCH RBC QN AUTO: 23.2 PG (ref 26.6–33)
MCHC RBC AUTO-ENTMCNC: 29.8 G/DL (ref 31.5–35.7)
MCV RBC AUTO: 77.9 FL (ref 79–97)
MONOCYTES # BLD AUTO: 0.29 10*3/MM3 (ref 0.1–0.9)
MONOCYTES NFR BLD AUTO: 7.4 % (ref 5–12)
NEUTROPHILS NFR BLD AUTO: 1.86 10*3/MM3 (ref 1.7–7)
NEUTROPHILS NFR BLD AUTO: 47.7 % (ref 42.7–76)
NRBC BLD AUTO-RTO: 0 /100 WBC (ref 0–0.2)
PLATELET # BLD AUTO: 209 10*3/MM3 (ref 140–450)
PMV BLD AUTO: 9.8 FL (ref 6–12)
POTASSIUM SERPL-SCNC: 3.9 MMOL/L (ref 3.5–5.2)
PROT SERPL-MCNC: 7.2 G/DL (ref 6–8.5)
RBC # BLD AUTO: 5.3 10*6/MM3 (ref 4.14–5.8)
SODIUM SERPL-SCNC: 137 MMOL/L (ref 136–145)
WBC NRBC COR # BLD: 3.9 10*3/MM3 (ref 3.4–10.8)

## 2023-11-15 PROCEDURE — 36415 COLL VENOUS BLD VENIPUNCTURE: CPT

## 2023-11-15 PROCEDURE — G0463 HOSPITAL OUTPT CLINIC VISIT: HCPCS

## 2023-11-15 PROCEDURE — 85025 COMPLETE CBC W/AUTO DIFF WBC: CPT | Performed by: NURSE PRACTITIONER

## 2023-11-15 PROCEDURE — 80053 COMPREHEN METABOLIC PANEL: CPT | Performed by: NURSE PRACTITIONER

## 2023-11-15 NOTE — NURSING NOTE
Pt arrived for labs with RN review. He denies any new complaints and confirms he is taking Revlimid 10mg as prescribed. Reviewed CBC with pt, but he did not wish to wait for his CMP to result and asked that I call him with those results. CBC is stable for this pt at this time. Pt states he did not follow up about his lipid panel, I printed this off for him and advised him to call his PCP. Pt verbalized understanding.    Called pt at 0934 with CMP results, his LFTs have returned to normal. Pt denies any questions. Reviewed next appointment and encouraged pt to call sooner for any questions or concerns. Pt verbalized understanding.    Lab Results   Component Value Date    WBC 3.90 11/15/2023    HGB 12.3 (L) 11/15/2023    HCT 41.3 11/15/2023    MCV 77.9 (L) 11/15/2023     11/15/2023     Lab Results   Component Value Date    GLUCOSE 106 (H) 11/15/2023    BUN 13 11/15/2023    CREATININE 1.10 11/15/2023    EGFR 76.4 11/15/2023    BCR 11.8 11/15/2023    K 3.9 11/15/2023    CO2 25.6 11/15/2023    CALCIUM 9.2 11/15/2023    PROTENTOTREF 7.3 11/01/2023    ALBUMIN 4.4 11/15/2023    BILITOT 0.4 11/15/2023    AST 20 11/15/2023    ALT 22 11/15/2023

## 2023-11-15 NOTE — TELEPHONE ENCOUNTER
Called patient about his appt that is not needed that we will see him January 3 2024, patient understood

## 2023-11-15 NOTE — TELEPHONE ENCOUNTER
----- Message from Sparkle Corea RN sent at 11/15/2023  1:26 PM EST -----  Scheduling,     Can you please cancel and let the patient know?    Thanks    ----- Message -----  From: Tima Mendez MD  Sent: 11/15/2023  11:26 AM EST  To: Milka Anthony, RN; Sparkle Corea RN    Since labs improved, no need to do labs on 11/29/2023.    Thank you    ----- Message -----  From: Sparkle Corea, TONY  Sent: 11/15/2023   9:52 AM EST  To: Tima Mendez MD; Milka Anthony, TONY    Hi Dr. Mendez,     Mr. Owens was in today for RN review. He had labs drawn on 11/1/23 but his ALT was elevated and AUDRA Fitzgerald wanted him to return today to recheck. His LFTs were back to normal range today. He is scheduled to return on 11/29/23 for another RN review and 1/3/24 for follow up with you. Just wanted to check if he needed to come back on 11/29/23?     Thanks,   Sparkle

## 2023-11-27 ENCOUNTER — SPECIALTY PHARMACY (OUTPATIENT)
Dept: PHARMACY | Facility: HOSPITAL | Age: 63
End: 2023-11-27
Payer: COMMERCIAL

## 2023-11-27 RX ORDER — LENALIDOMIDE 10 MG/1
10 CAPSULE ORAL DAILY
Qty: 21 CAPSULE | Refills: 0 | Status: SHIPPED | OUTPATIENT
Start: 2023-11-27

## 2023-11-27 NOTE — PROGRESS NOTES
Re: Refills of Oral Specialty Medication - Revlimid (lenalidomide)    Drug-Drug Interactions: The current medication list was reviewed and there are no relevant drug-drug interactions with the specialty medication.  Medication Allergies: The patient has no relevant allergies as it relates to their oral specialty medication  Review of Labs/Dose Adjustments: NO DOSE CHANGE - I reviewed the most recent note and labs and the patient will continue without any dose changes.  I sent refills as described below.    Drug: Revlimid (lenalidomide)  Strength: 10 mg  Directions: Take 1 capsule by mouth daily for 21 days on, then 7 days off  Quantity: 21  Refills: 0  Pharmacy prescription sent to: [facility]    Name/Credentials Sepideh Avila Rph, BCOP    11/27/2023  08:48 EST

## 2023-11-27 NOTE — PROGRESS NOTES
Drug: Revlimid (lenalidomide)  Strength: 10 mg  Directions: Take 1 capsule by mouth daily for 21 days on, then 7 days off  Quantity: 21  Refills: 0  Pharmacy prescription sent to: [facility]    Completed independent double check on medication order/RX.  Figueroa Pleitez, PharmD, BCOP  Clinical Oncology Pharmacist

## 2023-12-07 RX ORDER — VALACYCLOVIR HYDROCHLORIDE 500 MG/1
500 TABLET, FILM COATED ORAL 2 TIMES DAILY
Qty: 60 TABLET | Refills: 2 | OUTPATIENT
Start: 2023-12-07

## 2023-12-11 ENCOUNTER — OFFICE VISIT (OUTPATIENT)
Dept: FAMILY MEDICINE CLINIC | Facility: CLINIC | Age: 63
End: 2023-12-11
Payer: COMMERCIAL

## 2023-12-11 VITALS
TEMPERATURE: 97.8 F | SYSTOLIC BLOOD PRESSURE: 128 MMHG | HEART RATE: 78 BPM | WEIGHT: 197 LBS | RESPIRATION RATE: 18 BRPM | DIASTOLIC BLOOD PRESSURE: 82 MMHG | BODY MASS INDEX: 28.2 KG/M2 | HEIGHT: 70 IN | OXYGEN SATURATION: 97 %

## 2023-12-11 DIAGNOSIS — I10 PRIMARY HYPERTENSION: ICD-10-CM

## 2023-12-11 DIAGNOSIS — E11.9 TYPE 2 DIABETES MELLITUS WITHOUT COMPLICATION, WITHOUT LONG-TERM CURRENT USE OF INSULIN: Primary | ICD-10-CM

## 2023-12-11 DIAGNOSIS — I10 HYPERTENSION, ESSENTIAL: ICD-10-CM

## 2023-12-11 DIAGNOSIS — E78.2 HYPERLIPEMIA, MIXED: ICD-10-CM

## 2023-12-11 DIAGNOSIS — R74.8 ELEVATED LIVER ENZYMES: ICD-10-CM

## 2023-12-11 RX ORDER — LOSARTAN POTASSIUM 100 MG/1
100 TABLET ORAL DAILY
Qty: 90 TABLET | Refills: 1 | Status: SHIPPED | OUTPATIENT
Start: 2023-12-11

## 2023-12-11 NOTE — PROGRESS NOTES
"Chief Complaint  Med Refill and Hypertension    Subjective        Duy Owens presents to [facility]  Hypertension      Pt presents today for refills, labs and  HTN- no CP or HA  DM- sugars are in good range per pt.    Elevated LFT and wants to get hepatitis panel.  Pt states it is most likely related to the revlimid but wants to make sure.    HLD - last LDL was 126  Objective   Vital Signs:  /82 (BP Location: Left arm, Patient Position: Sitting, Cuff Size: Large Adult)   Pulse 78   Temp 97.8 °F (36.6 °C) (Tympanic)   Resp 18   Ht 177 cm (69.69\")   Wt 89.4 kg (197 lb)   SpO2 97%   BMI 28.52 kg/m²   Estimated body mass index is 28.52 kg/m² as calculated from the following:    Height as of this encounter: 177 cm (69.69\").    Weight as of this encounter: 89.4 kg (197 lb).               Physical Exam  Vitals and nursing note reviewed.   Constitutional:       Appearance: Normal appearance. He is well-developed.   Cardiovascular:      Rate and Rhythm: Normal rate and regular rhythm.      Heart sounds: Normal heart sounds. No murmur heard.  Pulmonary:      Effort: Pulmonary effort is normal. No respiratory distress.      Breath sounds: Normal breath sounds. No stridor. No wheezing or rhonchi.   Neurological:      General: No focal deficit present.      Mental Status: He is alert and oriented to person, place, and time. He is not disoriented.   Psychiatric:         Mood and Affect: Mood normal.         Behavior: Behavior normal.        Result Review :                   Assessment and Plan   Diagnoses and all orders for this visit:    1. Type 2 diabetes mellitus without complication, without long-term current use of insulin (Primary)  -     metFORMIN (GLUCOPHAGE) 500 MG tablet; Take 1 tablet by mouth 2 (Two) Times a Day.  Dispense: 180 tablet; Refill: 1  -     Hemoglobin A1c  -     Microalbumin / Creatinine Urine Ratio - Urine, Clean Catch    2. Hypertension, essential  -     " losartan (COZAAR) 100 MG tablet; Take 1 tablet by mouth Daily.  Dispense: 90 tablet; Refill: 1    3. Hyperlipemia, mixed    4. Primary hypertension    5. Elevated liver enzymes  -     Hepatitis Panel, Acute             Follow Up   Return in about 6 months (around 6/11/2024) for diabetes, hypertension, hyperlipidema.  Patient was given instructions and counseling regarding his condition or for health maintenance advice. Please see specific information pulled into the AVS if appropriate.     I have reviewed cmp and Lipids.        Labs and refills today.

## 2023-12-12 LAB
ALBUMIN/CREAT UR: 27 MG/G CREAT (ref 0–29)
CREAT UR-MCNC: 99.3 MG/DL
HAV IGM SERPL QL IA: NEGATIVE
HBA1C MFR BLD: 6 % (ref 4.8–5.6)
HBV CORE IGM SERPL QL IA: NEGATIVE
HBV SURFACE AG SERPL QL IA: NEGATIVE
HCV AB SERPL QL IA: NORMAL
HCV IGG SERPL QL IA: NON REACTIVE
MICROALBUMIN UR-MCNC: 26.9 UG/ML

## 2023-12-22 ENCOUNTER — SPECIALTY PHARMACY (OUTPATIENT)
Dept: PHARMACY | Facility: HOSPITAL | Age: 63
End: 2023-12-22
Payer: COMMERCIAL

## 2023-12-22 RX ORDER — LENALIDOMIDE 10 MG/1
10 CAPSULE ORAL DAILY
Qty: 21 CAPSULE | Refills: 0 | Status: SHIPPED | OUTPATIENT
Start: 2023-12-22

## 2023-12-22 NOTE — PROGRESS NOTES
Re: Refills of Oral Specialty Medication - Revlimid (lenalidomide)    Drug-Drug Interactions: The current medication list was reviewed and there are no relevant drug-drug interactions with the specialty medication.  Medication Allergies: The patient has no relevant allergies as it relates to their oral specialty medication  Review of Labs/Dose Adjustments: NO DOSE CHANGE - I reviewed the most recent note and labs and the patient will continue without any dose changes.  I sent refills as described below.    Drug: Revlimid (lenalidomide)  Strength: 10 mg  Directions: Take 1 capsule by mouth daily for 21 days on, then 7 days off  Quantity: 21  Refills: 0  Pharmacy prescription sent to: [facility]    Name/Credentials Sepideh Avila Rph, BCOP    12/22/2023  09:45 EST

## 2023-12-26 ENCOUNTER — SPECIALTY PHARMACY (OUTPATIENT)
Dept: PHARMACY | Facility: HOSPITAL | Age: 63
End: 2023-12-26
Payer: COMMERCIAL

## 2023-12-26 NOTE — PROGRESS NOTES
The Prior Authorization for lenalidomide is approved from 12/26/2023 to 12/25/24.     Diane Higginbotham - Care Coordinator   12/26/2023  13:12 EST

## 2024-01-03 ENCOUNTER — OFFICE VISIT (OUTPATIENT)
Dept: ONCOLOGY | Facility: CLINIC | Age: 64
End: 2024-01-03
Payer: COMMERCIAL

## 2024-01-03 ENCOUNTER — LAB (OUTPATIENT)
Dept: OTHER | Facility: HOSPITAL | Age: 64
End: 2024-01-03
Payer: COMMERCIAL

## 2024-01-03 VITALS
DIASTOLIC BLOOD PRESSURE: 83 MMHG | BODY MASS INDEX: 28.93 KG/M2 | OXYGEN SATURATION: 98 % | WEIGHT: 202.1 LBS | HEART RATE: 63 BPM | HEIGHT: 70 IN | SYSTOLIC BLOOD PRESSURE: 152 MMHG | TEMPERATURE: 98.7 F | RESPIRATION RATE: 16 BRPM

## 2024-01-03 DIAGNOSIS — Z79.899 ENCOUNTER FOR LONG-TERM (CURRENT) USE OF HIGH-RISK MEDICATION: ICD-10-CM

## 2024-01-03 DIAGNOSIS — C90.00 MULTIPLE MYELOMA NOT HAVING ACHIEVED REMISSION: Primary | ICD-10-CM

## 2024-01-03 DIAGNOSIS — D63.0 ANEMIA IN NEOPLASTIC DISEASE: ICD-10-CM

## 2024-01-03 DIAGNOSIS — C90.00 MULTIPLE MYELOMA NOT HAVING ACHIEVED REMISSION: ICD-10-CM

## 2024-01-03 DIAGNOSIS — D84.9 IMMUNOCOMPROMISED PATIENT: ICD-10-CM

## 2024-01-03 DIAGNOSIS — R55 SYNCOPE, UNSPECIFIED SYNCOPE TYPE: ICD-10-CM

## 2024-01-03 DIAGNOSIS — D52.0 DIETARY FOLATE DEFICIENCY ANEMIA: ICD-10-CM

## 2024-01-03 DIAGNOSIS — M62.838 MUSCLE SPASM: ICD-10-CM

## 2024-01-03 DIAGNOSIS — E85.81 AL AMYLOIDOSIS: ICD-10-CM

## 2024-01-03 LAB
ALBUMIN SERPL-MCNC: 4.4 G/DL (ref 3.5–5.2)
ALBUMIN/GLOB SERPL: 1.5 G/DL
ALP SERPL-CCNC: 99 U/L (ref 39–117)
ALT SERPL W P-5'-P-CCNC: 31 U/L (ref 1–41)
ANION GAP SERPL CALCULATED.3IONS-SCNC: 9.4 MMOL/L (ref 5–15)
AST SERPL-CCNC: 20 U/L (ref 1–40)
BASOPHILS # BLD AUTO: 0.09 10*3/MM3 (ref 0–0.2)
BASOPHILS NFR BLD AUTO: 1.7 % (ref 0–1.5)
BILIRUB SERPL-MCNC: 0.3 MG/DL (ref 0–1.2)
BUN SERPL-MCNC: 15 MG/DL (ref 8–23)
BUN/CREAT SERPL: 15.2 (ref 7–25)
CALCIUM SPEC-SCNC: 9.1 MG/DL (ref 8.6–10.5)
CHLORIDE SERPL-SCNC: 105 MMOL/L (ref 98–107)
CO2 SERPL-SCNC: 23.6 MMOL/L (ref 22–29)
CREAT SERPL-MCNC: 0.99 MG/DL (ref 0.76–1.27)
DEPRECATED RDW RBC AUTO: 44 FL (ref 37–54)
EGFRCR SERPLBLD CKD-EPI 2021: 86.1 ML/MIN/1.73
EOSINOPHIL # BLD AUTO: 0.2 10*3/MM3 (ref 0–0.4)
EOSINOPHIL NFR BLD AUTO: 3.8 % (ref 0.3–6.2)
ERYTHROCYTE [DISTWIDTH] IN BLOOD BY AUTOMATED COUNT: 15.9 % (ref 12.3–15.4)
FERRITIN SERPL-MCNC: 305.1 NG/ML (ref 30–400)
FOLATE SERPL-MCNC: 5.5 NG/ML (ref 4.78–24.2)
GLOBULIN UR ELPH-MCNC: 3 GM/DL
GLUCOSE SERPL-MCNC: 110 MG/DL (ref 65–99)
HCT VFR BLD AUTO: 41.1 % (ref 37.5–51)
HGB BLD-MCNC: 12.5 G/DL (ref 13–17.7)
IMM GRANULOCYTES # BLD AUTO: 0.02 10*3/MM3 (ref 0–0.05)
IMM GRANULOCYTES NFR BLD AUTO: 0.4 % (ref 0–0.5)
IRON 24H UR-MRATE: 114 MCG/DL (ref 59–158)
IRON SATN MFR SERPL: 31 % (ref 20–50)
LYMPHOCYTES # BLD AUTO: 2.25 10*3/MM3 (ref 0.7–3.1)
LYMPHOCYTES NFR BLD AUTO: 43.2 % (ref 19.6–45.3)
MCH RBC QN AUTO: 23.4 PG (ref 26.6–33)
MCHC RBC AUTO-ENTMCNC: 30.4 G/DL (ref 31.5–35.7)
MCV RBC AUTO: 76.8 FL (ref 79–97)
MONOCYTES # BLD AUTO: 0.7 10*3/MM3 (ref 0.1–0.9)
MONOCYTES NFR BLD AUTO: 13.4 % (ref 5–12)
NEUTROPHILS NFR BLD AUTO: 1.95 10*3/MM3 (ref 1.7–7)
NEUTROPHILS NFR BLD AUTO: 37.5 % (ref 42.7–76)
NRBC BLD AUTO-RTO: 0 /100 WBC (ref 0–0.2)
PLATELET # BLD AUTO: 216 10*3/MM3 (ref 140–450)
PMV BLD AUTO: 9 FL (ref 6–12)
POTASSIUM SERPL-SCNC: 3.9 MMOL/L (ref 3.5–5.2)
PROT SERPL-MCNC: 7.4 G/DL (ref 6–8.5)
RBC # BLD AUTO: 5.35 10*6/MM3 (ref 4.14–5.8)
SODIUM SERPL-SCNC: 138 MMOL/L (ref 136–145)
TIBC SERPL-MCNC: 370 MCG/DL (ref 298–536)
TRANSFERRIN SERPL-MCNC: 248 MG/DL (ref 200–360)
VIT B12 BLD-MCNC: 546 PG/ML (ref 211–946)
WBC NRBC COR # BLD AUTO: 5.21 10*3/MM3 (ref 3.4–10.8)

## 2024-01-03 PROCEDURE — 84466 ASSAY OF TRANSFERRIN: CPT | Performed by: INTERNAL MEDICINE

## 2024-01-03 PROCEDURE — 84165 PROTEIN E-PHORESIS SERUM: CPT | Performed by: INTERNAL MEDICINE

## 2024-01-03 PROCEDURE — 83521 IG LIGHT CHAINS FREE EACH: CPT | Performed by: INTERNAL MEDICINE

## 2024-01-03 PROCEDURE — 86334 IMMUNOFIX E-PHORESIS SERUM: CPT | Performed by: INTERNAL MEDICINE

## 2024-01-03 PROCEDURE — 82728 ASSAY OF FERRITIN: CPT | Performed by: INTERNAL MEDICINE

## 2024-01-03 PROCEDURE — 80053 COMPREHEN METABOLIC PANEL: CPT | Performed by: INTERNAL MEDICINE

## 2024-01-03 PROCEDURE — 82784 ASSAY IGA/IGD/IGG/IGM EACH: CPT | Performed by: INTERNAL MEDICINE

## 2024-01-03 PROCEDURE — 99214 OFFICE O/P EST MOD 30 MIN: CPT | Performed by: INTERNAL MEDICINE

## 2024-01-03 PROCEDURE — 82746 ASSAY OF FOLIC ACID SERUM: CPT | Performed by: INTERNAL MEDICINE

## 2024-01-03 PROCEDURE — 82607 VITAMIN B-12: CPT | Performed by: INTERNAL MEDICINE

## 2024-01-03 PROCEDURE — 83540 ASSAY OF IRON: CPT | Performed by: INTERNAL MEDICINE

## 2024-01-03 PROCEDURE — 85025 COMPLETE CBC W/AUTO DIFF WBC: CPT | Performed by: INTERNAL MEDICINE

## 2024-01-03 PROCEDURE — 36415 COLL VENOUS BLD VENIPUNCTURE: CPT

## 2024-01-03 NOTE — PROGRESS NOTES
"Subjective     CHIEF COMPLAINT:      Chief Complaint   Patient presents with    Follow-up     No concerns      HISTORY OF PRESENT ILLNESS:     Duy Owens is a 63 y.o. male patient who returns today for follow up on his multiple myeloma and secondary amyloidosis.  He returns today for follow-up reporting no new symptoms.  He is tolerating Revlimid.  He is taking aspirin daily regularly.  He is waiting for the new shipment of Revlimid and will be starting Revlimid later today.    Patient is longer having pain in his neck.  No recurrence of the syncopal episode he had a few months ago.    ROS:  Pertinent ROS is in the HPI.     Past medical, surgical, social and family history were reviewed.     MEDICATIONS:    Current Outpatient Medications:     aspirin 81 MG EC tablet, Take 1 tablet by mouth Daily., Disp: , Rfl:     lenalidomide (REVLIMID) 10 MG capsule, Take 1 capsule by mouth Daily. Take for 21 days on, then 7 days off, then repeat., Disp: 21 capsule, Rfl: 0    losartan (COZAAR) 100 MG tablet, Take 1 tablet by mouth Daily., Disp: 90 tablet, Rfl: 1    metFORMIN (GLUCOPHAGE) 500 MG tablet, Take 1 tablet by mouth 2 (Two) Times a Day., Disp: 180 tablet, Rfl: 1    ondansetron (ZOFRAN) 8 MG tablet, Take 1 tablet by mouth 3 (Three) Times a Day As Needed for Nausea or Vomiting. (Patient not taking: Reported on 11/3/2023), Disp: 30 tablet, Rfl: 5    Objective     VITAL SIGNS:     Vitals:    01/03/24 0823   BP: 152/83   Pulse: 63   Resp: 16   Temp: 98.7 °F (37.1 °C)   TempSrc: Temporal   SpO2: 98%   Weight: 91.7 kg (202 lb 1.6 oz)   Height: 177 cm (69.69\")   PainSc: 0-No pain     Body mass index is 29.26 kg/m².     Wt Readings from Last 5 Encounters:   01/03/24 91.7 kg (202 lb 1.6 oz)   12/11/23 89.4 kg (197 lb)   11/15/23 89.4 kg (197 lb)   11/03/23 90.3 kg (199 lb)   10/04/23 90 kg (198 lb 6.4 oz)     PHYSICAL EXAMINATION:   GENERAL: The patient appears in good general condition, not in acute distress.   SKIN: No " ecchymosis.  EYES: No jaundice. No Pallor.  CHEST: Normal respiratory effort.  Lungs clear bilaterally.  No added sounds.  CVS: Normal S1-S2.  No murmurs.  ABDOMEN: Soft. No tenderness. No Hepatomegaly. No Splenomegaly. No masses.  EXTREMITIES: No edema.    DIAGNOSTIC DATA:     Results from last 7 days   Lab Units 01/03/24  0810   WBC 10*3/mm3 5.21   NEUTROS ABS 10*3/mm3 1.95   HEMOGLOBIN g/dL 12.5*   HEMATOCRIT % 41.1   PLATELETS 10*3/mm3 216     Results from last 7 days   Lab Units 01/03/24  0810   SODIUM mmol/L 138   POTASSIUM mmol/L 3.9   CHLORIDE mmol/L 105   CO2 mmol/L 23.6   BUN mg/dL 15   CREATININE mg/dL 0.99   CALCIUM mg/dL 9.1   ALBUMIN g/dL 4.4   BILIRUBIN mg/dL 0.3   ALK PHOS U/L 99   ALT (SGPT) U/L 31   AST (SGOT) U/L 20   GLUCOSE mg/dL 110*         Lab 01/03/24  0810   IRON 114   IRON SATURATION (TSAT) 31   TIBC 370   TRANSFERRIN 248   FERRITIN 305.10   FOLATE 5.50   VITAMIN B 12 546      Component      Latest Ref Rng 9/6/2023 10/4/2023 11/1/2023   IgG      603 - 1613 mg/dL 1284  1352  1459    IgA      61 - 437 mg/dL 96  107  112    IgM      20 - 172 mg/dL 56  55  52    Total Protein      6.0 - 8.5 g/dL 6.8  6.9  7.3    Albumin      2.9 - 4.4 g/dL 3.9  3.8  4.1    Alpha-1-Globulin      0.0 - 0.4 g/dL 0.2  0.2  0.2    Alpha-2-Globulin      0.4 - 1.0 g/dL 0.6  0.6  0.5    Beta Globulin      0.7 - 1.3 g/dL 0.9  1.0  1.1    Gamma Globulin      0.4 - 1.8 g/dL 1.2  1.3  1.4    M-Werner      Not Observed g/dL Not Observed  Not Observed  Not Observed    Globulin      2.2 - 3.9 g/dL 2.9  3.1  3.2    A/G Ratio      0.7 - 1.7  1.4  1.3  1.3    Immunofixation Reflex, Serum Comment  Comment  Comment    Please note Comment  Comment  Comment    Kappa FLC      3.3 - 19.4 mg/L 30.0 (H)  29.7 (H)  32.9 (H)    Free Lambda Light Chains      5.7 - 26.3 mg/L 25.8  22.5  25.8    Kappa/Lambda Ratio      0.26 - 1.65  1.16  1.32  1.28         Assessment & Plan    *Lambda light chain multiple myeloma with lymphadenopathy and  development of AL amyloidosis.  Patient was found to have lymph node involvement and amyloid deposition in the involved lymph nodes.  Patient started having dry cough around July 2021.    CT scans 9/23/2021-9/24/2021 revealed inferior mediastinal adenopathy and retroperitoneal adenopathy extending to the right iliac chain.  The Largest lymph node was in the retroperitoneal area measuring 4.8 x 3.5 cm.  The common iliac lymph node measured 3.5 x 2.7 cm.  The left external iliac chain lymph node measured 2.9 x 2 cm.    PET scan on 10/5/2021 revealed the retroperitoneal and left pelvic lymphadenopathy to be hypermetabolic.  The left periaortic lymph nodes had SUV of 6.9 and the left pelvic sidewall lymph nodes had an SUV of 5.4.  No bone involvement.  CT-guided biopsy on 10/6/2021- pathologist at [facility] reported involvement with monotypic lambda restricted plasma cells concerning for involvement with plasma cell dyscrasia.  Bone marrow biopsy on 10/29/2021 revealed a normocellular marrow (50%) with involvement with plasma cell dyscrasia (plasma cells representing 20-30% of total cells by  stain).   FISH was negative for gain of 1q, monosomy/deletions of chromosomes 13 and 17, IGH rearrangement, gain of chromosomes 9 and 11, IGH-CCND1 (11;14) fusion.   Treatment with the VRD started on 12/22/2021.  Free lambda light chain started to improve after the patient started treatment.  CT scan on 3/11/2022 revealed decrease in the size of lymph nodes in the periaortic area.  There is a slight increase in a lymph node in the left axillary area that increased from 7 to 10 mm.    PET scan on 4/28/2022 revealed significant reduction in the size and hypermetabolism of the involved lymph nodes.  SPEP REGINO FLC on 5/25/2022 revealed no M protein.  Free lambda light chain was 41.8.  Kappa to lambda ratio was normal at 0.26.  B2 M was 1.6.  Bone marrow biopsy on 5/24/2022 revealed normocellular marrow with 30-40% cellularity  with involvement by plasma cell neoplasm representing 5-10% by IHC.  Negative for amyloid.  He was considered to be ME-1.  S/p high-dose melphalan with autologous stem cell transplant.  Day 0 was 7/7/2022.  Patient did well with the transplant but had neutropenic fever of unclear source necessitating hospitalization between 7/16/2022 and 7/18/2022.    Bone marrow biopsy on 9/29/2022 revealed monotypic plasma cells identified on on flow cytometry but not on IHC.  CT on 9/29/2022 showed decrease in the size of the enlarged lymph nodes.  Patient started Revlimid 10 mg daily for 21 out of 28-day cycle on 11/1/2022.    CT on 2/23/2023 showed decrease in the size of the mediastinal and retroperitoneal lymph nodes indicating response to treatment.  2/23/2023-Free kappa light chain 1.94, free lambda light chain 2.19, kappa/lambda ratio normal at 0.89.  5/17/2023: Free kappa light chain was 31.5.  Free lambda light chain was 24.5.  Kappa 3 lambda ratio normal at 1.29.  6/14/2023: Free kappa light chain was 31.0.  Free lambda light chain was 23.7.  Kappa to lambda ratio was 1.31.   Patient had follow-up at [place] on 8/22/2023.    CT scan showed decrease in the size of the enlarged lymph nodes.  No new abnormalities were seen.  9/6/2023: Kappa FLC 30.0.  Lambda FLC 25.8.  Kappa to lambda ratio 1.16.  No M protein.  11/1/2023: Kappa FLC 32.9. Lambda FLC 25.8. K:L ratio 1.28.  He is tolerating Revlimid well.  SPEP REGINO FLC will be repeated today.    *Anemia secondary to multiple myeloma and secondary to treatment.  Hemoglobin was 13.1 on 9/29/2021.    Iron, folate and vitamin B12 were normal in September/October 2021.    Hemoglobin was 11.4 on 8/24/2022.  Hemoglobin improved to the 11-12 g range.  10/4/2023: Hemoglobin 12.5.  1/3/2024: Hemoglobin 12.5.  Ferritin 305.  Transferrin saturation 31%.  Folate low normal at 5.50.    *Prophylaxis.  He was previously on acyclovir 400 mg twice daily.    He is now on Valtrex.  He  received Evusheld on 8/20/2022.  Patient received vaccinations as recommended by [place].  He received COVID-19 Moderna booster on 10/25/2022.  He received vaccines in April and June.  He received the first Shingrix vaccine in August 2023 and is due for the second dose later this month.  No recent infections.    *Syncope.  Patient reports having an episode of passing out while he was outdoors watching a game.  He did not feel dizzy prior to the episode. He was well hydrated.  He regained consciousness quickly.   EMS was called. He declined going to ER at that time.   He was referred to cardiology.  He is going to have echocardiogram done.  No recurrence of the episodes.  He has mild bradycardia today but he is asymptomatic.    *Severe muscle spasm of the cervical-thoracic spine.  He reports having weakness of the right lower extremity.  He fell off a ladder recently due to weakness of the right LE.  I recommended evaluation with an MRI of the spine.   He complained of muscle spasm of other muscles.  MRI showed no lesions from multiple myeloma.  He was referred to neurosurgery.  Conservative management was recommended for DJD.  No recurrence of the symptoms.    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  There was mild wall thickening.  Ejection fraction was normal.  Echocardiogram at [facility] on 4/21/2022 did not reveal wall thickening.    PLAN:    1.  Continue maintenance Revlimid 10 mg daily for 21 days followed by 7 days off.  He will start a new cycle today.  2.  Continue aspirin 81 mg daily.  3.  Start folic acid 1 mg daily.  4.  We will schedule him for CBC CMP SPEP REGINO FLC in 1 and 2 months.  5.  I asked him to check on his follow-up at [facility], which is likely in 1 month.  6.  Follow-up in 3 months.  We will obtain CBC CMP SPEP REGINO FLC.        Tima Mendez MD  01/03/24     Number Of Syringes (Required For Inventory): 1

## 2024-01-04 ENCOUNTER — SPECIALTY PHARMACY (OUTPATIENT)
Dept: PHARMACY | Facility: HOSPITAL | Age: 64
End: 2024-01-04
Payer: COMMERCIAL

## 2024-01-04 ENCOUNTER — TELEPHONE (OUTPATIENT)
Dept: ONCOLOGY | Facility: CLINIC | Age: 64
End: 2024-01-04
Payer: COMMERCIAL

## 2024-01-04 LAB
ALBUMIN SERPL ELPH-MCNC: 4 G/DL (ref 2.9–4.4)
ALBUMIN/GLOB SERPL: 1.4 {RATIO} (ref 0.7–1.7)
ALPHA1 GLOB SERPL ELPH-MCNC: 0.2 G/DL (ref 0–0.4)
ALPHA2 GLOB SERPL ELPH-MCNC: 0.5 G/DL (ref 0.4–1)
B-GLOBULIN SERPL ELPH-MCNC: 1 G/DL (ref 0.7–1.3)
GAMMA GLOB SERPL ELPH-MCNC: 1.3 G/DL (ref 0.4–1.8)
GLOBULIN SER-MCNC: 3 G/DL (ref 2.2–3.9)
IGA SERPL-MCNC: 117 MG/DL (ref 61–437)
IGG SERPL-MCNC: 1407 MG/DL (ref 603–1613)
IGM SERPL-MCNC: 42 MG/DL (ref 20–172)
INTERPRETATION SERPL IEP-IMP: ABNORMAL
KAPPA LC FREE SER-MCNC: 25.5 MG/L (ref 3.3–19.4)
KAPPA LC FREE/LAMBDA FREE SER: 1.14 {RATIO} (ref 0.26–1.65)
LABORATORY COMMENT REPORT: ABNORMAL
LAMBDA LC FREE SERPL-MCNC: 22.4 MG/L (ref 5.7–26.3)
M PROTEIN SERPL ELPH-MCNC: ABNORMAL G/DL
PROT SERPL-MCNC: 7 G/DL (ref 6–8.5)

## 2024-01-04 RX ORDER — FOLIC ACID 1 MG/1
1 TABLET ORAL DAILY
COMMUNITY

## 2024-01-04 NOTE — TELEPHONE ENCOUNTER
----- Message from Tima Mendez MD sent at 1/3/2024  2:44 PM EST -----  Please inform the patient that labs today showed folate level to be low normal.    I recommend starting folic acid 1 mg daily.    Thank you

## 2024-01-05 ENCOUNTER — TELEPHONE (OUTPATIENT)
Dept: NEUROSURGERY | Facility: CLINIC | Age: 64
End: 2024-01-05
Payer: COMMERCIAL

## 2024-01-05 NOTE — TELEPHONE ENCOUNTER
Contacted patient to let him know that Dr. Carpio will be out of town on feb 5, so we are rescheduling the appointment for mar 4 @ 2:15.

## 2024-01-23 ENCOUNTER — SPECIALTY PHARMACY (OUTPATIENT)
Dept: PHARMACY | Facility: HOSPITAL | Age: 64
End: 2024-01-23
Payer: COMMERCIAL

## 2024-01-23 RX ORDER — LENALIDOMIDE 10 MG/1
10 CAPSULE ORAL DAILY
Qty: 21 CAPSULE | Refills: 0 | Status: SHIPPED | OUTPATIENT
Start: 2024-01-23

## 2024-01-23 NOTE — PROGRESS NOTES
Re: Refills of Oral Specialty Medication - Revlimid (lenalidomide)    Drug-Drug Interactions: The current medication list was reviewed and there are no relevant drug-drug interactions with the specialty medication.  Medication Allergies: The patient has no relevant allergies as it relates to their oral specialty medication  Review of Labs/Dose Adjustments: NO DOSE CHANGE - I reviewed the most recent note and labs and the patient will continue without any dose changes.  I sent refills as described below.    Drug: Revlimid (lenalidomide)  Strength: 10 mg  Directions: Take 1 capsule by mouth daily for 21 days on, then 7 days off  Quantity: 21  Refills: 0  Pharmacy prescription sent to: [facility]    Name/Credentials Sepideh Avila Rph, BCOP    1/23/2024  09:01 EST

## 2024-02-01 ENCOUNTER — SPECIALTY PHARMACY (OUTPATIENT)
Dept: PHARMACY | Facility: HOSPITAL | Age: 64
End: 2024-02-01
Payer: COMMERCIAL

## 2024-02-01 NOTE — PROGRESS NOTES
Specialty Note- Revlimid    Call placed for 6 month adherence and toxicity check.  No answer.   direct line [phone].

## 2024-02-02 ENCOUNTER — SPECIALTY PHARMACY (OUTPATIENT)
Dept: PHARMACY | Facility: HOSPITAL | Age: 64
End: 2024-02-02
Payer: COMMERCIAL

## 2024-02-02 NOTE — PROGRESS NOTES
Specialty Pharmacy Patient Management Program  Oncology 6-Month Clinical Assessment       Duy Owens is a 63 y.o. male with lambda light chain multiple myeloma called today to assess adherence and side effects.    Regimen: Revlimid 10 mg po daily for 21 days on, then 7 days off    Reason for Outreach: Routine medication check-in .       Problem list reviewed by Sepideh Avila RPH on 2/2/2024 at  5:25 PM  Medicines reviewed by Sepideh Avila RPH on 2/2/2024 at  5:24 PM  Medicines reviewed by Sepideh Avila RPH on 2/2/2024 at  5:24 PM  Allergies reviewed by Sepideh Avila RPH on 2/2/2024 at  5:24 PM     Goals Addressed Today        Specialty Pharmacy General Goal      SPEP REGINO FLC  will show no monoclonal proteins.             Medication Assessment:  Medication Assessment  Follow Up Clinical Assessment  Linked Medication(s) Assessed: Lenalidomide  Therapeutic appropriateness: Appropriate  Medication tolerability: Tolerating with no to minimal ADRs  Medication plan: Continue therapy with normal follow-up  Quality of Life Improvement Scale: 9-A good deal better  Comments on Quality of Life: No issues reported today  Administration: as prescribed .   Patient can self administer medications: yes  Medication Follow-Up Plan: Next clinical assessment  Lab Review: The labs listed below have been reviewed. No dose adjustments are needed for the oral specialty medication(s) based on the labs.    Lab Results   Component Value Date    GLUCOSE 110 (H) 01/03/2024    CALCIUM 9.1 01/03/2024     01/03/2024    K 3.9 01/03/2024    CO2 23.6 01/03/2024     01/03/2024    BUN 15 01/03/2024    CREATININE 0.99 01/03/2024    EGFRIFAFRI 88 02/16/2022    EGFRIFNONA 84 05/03/2021    BCR 15.2 01/03/2024    ANIONGAP 9.4 01/03/2024     Lab Results   Component Value Date    WBC 5.21 01/03/2024    RBC 5.35 01/03/2024    HGB 12.5 (L) 01/03/2024    HCT 41.1 01/03/2024    MCV 76.8 (L) 01/03/2024    MCH 23.4 (L) 01/03/2024    [facility] 01/03/2024    RDW 15.9 (H) 01/03/2024    RDWSD 44.0 01/03/2024    MPV 9.0 01/03/2024     01/03/2024    NEUTRORELPCT 37.5 (L) 01/03/2024    LYMPHORELPCT 43.2 01/03/2024    MONORELPCT 13.4 (H) 01/03/2024    EOSRELPCT 3.8 01/03/2024    BASORELPCT 1.7 (H) 01/03/2024    AUTOIGPER 0.4 01/03/2024    NEUTROABS 1.95 01/03/2024    LYMPHSABS 2.25 01/03/2024    MONOSABS 0.70 01/03/2024    EOSABS 0.20 01/03/2024    BASOSABS 0.09 01/03/2024    AUTOIGNUM 0.02 01/03/2024    NRBC 0.0 01/03/2024     Drug-drug interactions  Completed medication reconciliation today to assess for drug interactions. Patient denies starting or stopping any medications.    Assessed medication list for interactions, no significant drug interactions noted.   Advised patient to call the clinic if any new medications are started so we can assess for drug-drug interactions.  Drug-food interactions discussed:  none of concern  Vaccines are coordinated by the patient's oncologist and primary care provider.    Allergies  Known allergies and reactions were discussed with the patient. The patient's chart has been reviewed for allergy information and updated as necessary.   Allergies   Allergen Reactions    Lisinopril Cough        Hospitalizations and Urgent Care Visits Since Last Assessment:  Unplanned hospitalizations or inpatient admissions: no  ED Visits: no  Urgent Office Visits: no    Adherence Assessment:  Adherence Questions  Linked Medication(s) Assessed: Lenalidomide  On average, how many doses/injections does the patient miss per month?: 0  What are the identified reasons for non-adherence or missed doses? : no problems identified  What is the estimated medication adherence level?: %  Based on the patient/caregiver response and refill history, does this patient require an MTP to track adherence improvements?: no    Quality of Life Assessment:  Quality of Life Assessment  Quality of Life Improvement Scale: 9-A good deal  better  Comments on Quality of Life: No issues reported today  -- Quality of Life: 9/10    Financial Assessment:  Medication availability/affordability: Patient has had no issues obtaining medication from pharmacy.    Attestation     I attest the patient was actively involved in and has agreed to the above plan of care.  If the prescribed therapy is at any point deemed not appropriate based on the current or future assessments, a consultation will be initiated with the patient's specialty care provider to determine the best course of action. The revised plan of therapy will be documented along with any required assessments and/or additional patient education provided.       All questions addressed and patient had no additional concerns. Patient has pharmacy contact information.    Name/Credentials Sepideh Avila Rph, GILLIAN    Telephone encounter    2/2/2024  17:25 EST

## 2024-02-15 ENCOUNTER — SPECIALTY PHARMACY (OUTPATIENT)
Dept: PHARMACY | Facility: HOSPITAL | Age: 64
End: 2024-02-15
Payer: COMMERCIAL

## 2024-02-15 RX ORDER — LENALIDOMIDE 10 MG/1
10 CAPSULE ORAL DAILY
Qty: 21 CAPSULE | Refills: 0 | Status: SHIPPED | OUTPATIENT
Start: 2024-02-15

## 2024-02-28 ENCOUNTER — LAB (OUTPATIENT)
Dept: OTHER | Facility: HOSPITAL | Age: 64
End: 2024-02-28
Payer: COMMERCIAL

## 2024-02-28 ENCOUNTER — CLINICAL SUPPORT (OUTPATIENT)
Dept: ONCOLOGY | Facility: HOSPITAL | Age: 64
End: 2024-02-28
Payer: COMMERCIAL

## 2024-02-28 DIAGNOSIS — D84.9 IMMUNOCOMPROMISED PATIENT: ICD-10-CM

## 2024-02-28 DIAGNOSIS — C90.00 MULTIPLE MYELOMA NOT HAVING ACHIEVED REMISSION: ICD-10-CM

## 2024-02-28 DIAGNOSIS — D52.0 DIETARY FOLATE DEFICIENCY ANEMIA: ICD-10-CM

## 2024-02-28 DIAGNOSIS — Z79.899 ENCOUNTER FOR LONG-TERM (CURRENT) USE OF HIGH-RISK MEDICATION: ICD-10-CM

## 2024-02-28 DIAGNOSIS — D63.0 ANEMIA IN NEOPLASTIC DISEASE: ICD-10-CM

## 2024-02-28 DIAGNOSIS — E85.81 AL AMYLOIDOSIS: ICD-10-CM

## 2024-02-28 LAB
ALBUMIN SERPL-MCNC: 4.3 G/DL (ref 3.5–5.2)
ALBUMIN/GLOB SERPL: 1.4 G/DL
ALP SERPL-CCNC: 104 U/L (ref 39–117)
ALT SERPL W P-5'-P-CCNC: 31 U/L (ref 1–41)
ANION GAP SERPL CALCULATED.3IONS-SCNC: 8 MMOL/L (ref 5–15)
AST SERPL-CCNC: 20 U/L (ref 1–40)
BASOPHILS # BLD AUTO: 0.06 10*3/MM3 (ref 0–0.2)
BASOPHILS NFR BLD AUTO: 1.6 % (ref 0–1.5)
BILIRUB SERPL-MCNC: 0.3 MG/DL (ref 0–1.2)
BUN SERPL-MCNC: 16 MG/DL (ref 8–23)
BUN/CREAT SERPL: 14 (ref 7–25)
CALCIUM SPEC-SCNC: 9.2 MG/DL (ref 8.6–10.5)
CHLORIDE SERPL-SCNC: 108 MMOL/L (ref 98–107)
CO2 SERPL-SCNC: 25 MMOL/L (ref 22–29)
CREAT SERPL-MCNC: 1.14 MG/DL (ref 0.76–1.27)
DEPRECATED RDW RBC AUTO: 44.4 FL (ref 37–54)
EGFRCR SERPLBLD CKD-EPI 2021: 72.7 ML/MIN/1.73
EOSINOPHIL # BLD AUTO: 0.28 10*3/MM3 (ref 0–0.4)
EOSINOPHIL NFR BLD AUTO: 7.3 % (ref 0.3–6.2)
ERYTHROCYTE [DISTWIDTH] IN BLOOD BY AUTOMATED COUNT: 16 % (ref 12.3–15.4)
FOLATE SERPL-MCNC: >20 NG/ML (ref 4.78–24.2)
GLOBULIN UR ELPH-MCNC: 3.1 GM/DL
GLUCOSE SERPL-MCNC: 93 MG/DL (ref 65–99)
HCT VFR BLD AUTO: 40.9 % (ref 37.5–51)
HGB BLD-MCNC: 12.2 G/DL (ref 13–17.7)
IMM GRANULOCYTES # BLD AUTO: 0 10*3/MM3 (ref 0–0.05)
IMM GRANULOCYTES NFR BLD AUTO: 0 % (ref 0–0.5)
LYMPHOCYTES # BLD AUTO: 1.46 10*3/MM3 (ref 0.7–3.1)
LYMPHOCYTES NFR BLD AUTO: 38.2 % (ref 19.6–45.3)
MCH RBC QN AUTO: 23.1 PG (ref 26.6–33)
MCHC RBC AUTO-ENTMCNC: 29.8 G/DL (ref 31.5–35.7)
MCV RBC AUTO: 77.3 FL (ref 79–97)
MONOCYTES # BLD AUTO: 0.51 10*3/MM3 (ref 0.1–0.9)
MONOCYTES NFR BLD AUTO: 13.4 % (ref 5–12)
NEUTROPHILS NFR BLD AUTO: 1.51 10*3/MM3 (ref 1.7–7)
NEUTROPHILS NFR BLD AUTO: 39.5 % (ref 42.7–76)
NRBC BLD AUTO-RTO: 0 /100 WBC (ref 0–0.2)
PLATELET # BLD AUTO: 236 10*3/MM3 (ref 140–450)
PMV BLD AUTO: 9.1 FL (ref 6–12)
POTASSIUM SERPL-SCNC: 4.3 MMOL/L (ref 3.5–5.2)
PROT SERPL-MCNC: 7.4 G/DL (ref 6–8.5)
RBC # BLD AUTO: 5.29 10*6/MM3 (ref 4.14–5.8)
SODIUM SERPL-SCNC: 141 MMOL/L (ref 136–145)
WBC NRBC COR # BLD AUTO: 3.82 10*3/MM3 (ref 3.4–10.8)

## 2024-02-28 PROCEDURE — 82746 ASSAY OF FOLIC ACID SERUM: CPT | Performed by: INTERNAL MEDICINE

## 2024-02-28 PROCEDURE — 86334 IMMUNOFIX E-PHORESIS SERUM: CPT | Performed by: INTERNAL MEDICINE

## 2024-02-28 PROCEDURE — 83521 IG LIGHT CHAINS FREE EACH: CPT | Performed by: INTERNAL MEDICINE

## 2024-02-28 PROCEDURE — 80053 COMPREHEN METABOLIC PANEL: CPT | Performed by: INTERNAL MEDICINE

## 2024-02-28 PROCEDURE — 82784 ASSAY IGA/IGD/IGG/IGM EACH: CPT | Performed by: INTERNAL MEDICINE

## 2024-02-28 PROCEDURE — 85025 COMPLETE CBC W/AUTO DIFF WBC: CPT | Performed by: INTERNAL MEDICINE

## 2024-02-28 PROCEDURE — 36415 COLL VENOUS BLD VENIPUNCTURE: CPT

## 2024-02-28 PROCEDURE — 84165 PROTEIN E-PHORESIS SERUM: CPT | Performed by: INTERNAL MEDICINE

## 2024-02-28 NOTE — PROGRESS NOTES
"Subjective   Patient ID: Duy Owens is a 63 y.o. male is here today for follow-up.    This very nice gentleman has a history of multiple myeloma and is still on medications for that.  He has no evidence of myeloma in the spine but he does have some cervical stenosis, lumbar stenosis and a thoracic arachnoid cyst all of which cause some radiographic nerve impingement and cord impingement.  He is not myelopathic.  He has good use of his hands.  He has mild neck pain.  I showed him how to do the chin tuck.  But I think his arachnoid cyst in his cervical stenosis or not causing significant symptoms.  He has some pain in his back and his right leg but it is mild and it is manageable.  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  Will follow him and see him again in a year.  If there is some worsening of his gait or dexterity or balance, he will let us know.        History of Present Illness    The following portions of the patient's history were reviewed and updated as appropriate: allergies, current medications, past family history, past medical history, past social history, past surgical history, and problem list.    Review of Systems   Constitutional:  Negative for fever.   Musculoskeletal:  Negative for back pain.   Neurological:  Negative for weakness and numbness.   All other systems reviewed and are negative.          Objective     Vitals:    03/04/24 1546   BP: 110/64   BP Location: Left arm   Patient Position: Sitting   Cuff Size: Adult   Resp: 20   Weight: 91.6 kg (202 lb)   Height: 177 cm (69.69\")   PainSc: 0-No pain     Body mass index is 29.25 kg/m².    Tobacco Use: Medium Risk (3/4/2024)    Patient History     Smoking Tobacco Use: Former     Smokeless Tobacco Use: Never     Passive Exposure: Not on file          Physical Exam  Constitutional:       Appearance: He is well-developed.   HENT:      Head: Normocephalic and atraumatic.   Eyes:      Extraocular Movements: EOM normal.      " Conjunctiva/sclera: Conjunctivae normal.      Pupils: Pupils are equal, round, and reactive to light.   Neck:      Vascular: No carotid bruit.   Neurological:      Mental Status: He is oriented to person, place, and time.      Coordination: Finger-Nose-Finger Test and Heel to Shin Test normal.      Gait: Gait is intact.      Deep Tendon Reflexes:      Reflex Scores:       Tricep reflexes are 2+ on the right side and 2+ on the left side.       Bicep reflexes are 2+ on the right side and 2+ on the left side.       Brachioradialis reflexes are 2+ on the right side and 2+ on the left side.       Patellar reflexes are 2+ on the right side and 2+ on the left side.       Achilles reflexes are 2+ on the right side and 2+ on the left side.  Psychiatric:         Speech: Speech normal.       Neurologic Exam     Mental Status   Oriented to person, place, and time.   Registration of memory: Good recent and remote memory.   Attention: normal. Concentration: normal.   Speech: speech is normal   Level of consciousness: alert  Knowledge: consistent with education.     Cranial Nerves     CN II   Visual fields full to confrontation.   Visual acuity: normal    CN III, IV, VI   Pupils are equal, round, and reactive to light.  Extraocular motions are normal.     CN V   Facial sensation intact.   Right corneal reflex: normal  Left corneal reflex: normal    CN VII   Facial expression full, symmetric.   Right facial weakness: none  Left facial weakness: none    CN VIII   Hearing: intact    CN IX, X   Palate: symmetric    CN XI   Right sternocleidomastoid strength: normal  Left sternocleidomastoid strength: normal    CN XII   Tongue: not atrophic  Tongue deviation: none    Motor Exam   Muscle bulk: normal  Right arm tone: normal  Left arm tone: normal  Right leg tone: normal  Left leg tone: normal    Strength   Strength 5/5 except as noted.     Sensory Exam   Light touch normal.     Gait, Coordination, and Reflexes     Gait  Gait:  normal    Coordination   Finger to nose coordination: normal  Heel to shin coordination: normal    Reflexes   Right brachioradialis: 2+  Left brachioradialis: 2+  Right biceps: 2+  Left biceps: 2+  Right triceps: 2+  Left triceps: 2+  Right patellar: 2+  Left patellar: 2+  Right achilles: 2+  Left achilles: 2+  Right : 2+  Left : 2+          Assessment & Plan   Independent Review of Radiographic Studies:      I personally reviewed the images from the following studies.    The cervical, thoracic, and lumbar MRI were reviewed.  There is a small to moderate-sized dorsal thoracic arachnoid cyst with modest compression of the cord at T2-T3.  He has multilevel cervical stenosis moderate to early severe in nature in the neck and in the lumbar spine moderate to early severe stenosis at L3-L4 and L4-L5.  No sign of myeloma.  Any of these regions.  Agree with the report.       Medical Decision Making:      Clinically stable.  I will see him again in 1 year.  If there is some loss of dexterity in his hands or balance problems or increase in the pain in his back and his legs and he will let us know and we will see him in between.        Diagnoses and all orders for this visit:    1. DDD (degenerative disc disease), lumbar (Primary)    2. Cervical spinal stenosis    3. Spinal stenosis of lumbar region without neurogenic claudication      Return in about 1 year (around 3/4/2025) for face to face.

## 2024-02-28 NOTE — PROGRESS NOTES
Duy Owens is a 63 y.o. male with multiple myeloma seen by an Hematology/Oncology provider, Dr. Mendez, and is scheduled with RN Review/Clinical Pharmacy Review offered by [facility]. Patient's visit was held in clinic today.     Therapy plan  Revlimid 10 mg daily for 21 days of 28-day cycle    Allergies  The patient's chart has been reviewed for allergy information and updated as necessary.   Allergies   Allergen Reactions    Lisinopril Cough      Current Medication List  This medication list has been reviewed with the patient and evaluated for any interactions and updated to include all prescription medications, OTC medications, and supplements the patient reports taking.  This list contains medications in the patient's profile and those that have been discussed with the patient; it is the most up to date version of the patient's current medication therapy.    Prior to Admission medications    Medication Sig Start Date End Date Taking? Authorizing Provider   aspirin 81 MG EC tablet Take 1 tablet by mouth Daily. 11/9/22   Tima Mendez MD   folic acid (FOLVITE) 1 MG tablet Take 1 tablet by mouth Daily.    Tima Mendez MD   lenalidomide (REVLIMID) 10 MG capsule Take 1 capsule by mouth Daily. Take for 21 days on, then 7 days off, then repeat. 2/15/24   Tima Mnedez MD   losartan (COZAAR) 100 MG tablet Take 1 tablet by mouth Daily. 12/11/23   Nara Dobson MD   metFORMIN (GLUCOPHAGE) 500 MG tablet Take 1 tablet by mouth 2 (Two) Times a Day. 12/11/23   Nara Dobson MD     Relevant Laboratory Values  Lab Results   Component Value Date    GLUCOSE 93 02/28/2024    CALCIUM 9.2 02/28/2024     02/28/2024    K 4.3 02/28/2024    CO2 25.0 02/28/2024     (H) 02/28/2024    BUN 16 02/28/2024    CREATININE 1.14 02/28/2024    EGFRIFAFRI 88 02/16/2022    EGFRIFNONA 84 05/03/2021    BCR 14.0 02/28/2024    ANIONGAP 8.0 02/28/2024     Lab Results   Component Value  Date    WBC 3.82 02/28/2024    RBC 5.29 02/28/2024    HGB 12.2 (L) 02/28/2024    HCT 40.9 02/28/2024    MCV 77.3 (L) 02/28/2024    MCH 23.1 (L) 02/28/2024    MCHC 29.8 (L) 02/28/2024    RDW 16.0 (H) 02/28/2024    RDWSD 44.4 02/28/2024    MPV 9.1 02/28/2024     02/28/2024    NEUTRORELPCT 39.5 (L) 02/28/2024    LYMPHORELPCT 38.2 02/28/2024    MONORELPCT 13.4 (H) 02/28/2024    EOSRELPCT 7.3 (H) 02/28/2024    BASORELPCT 1.6 (H) 02/28/2024    AUTOIGPER 0.0 02/28/2024    NEUTROABS 1.51 (L) 02/28/2024    LYMPHSABS 1.46 02/28/2024    MONOSABS 0.51 02/28/2024    EOSABS 0.28 02/28/2024    BASOSABS 0.06 02/28/2024    AUTOIGNUM 0.00 02/28/2024    NRBC 0.0 02/28/2024     Clinical Review  Patient reports no new symptoms or adverse effects. Patient reports adherence to aspirin 81 mg daily and folic acid 1 mg daily. Patient also reports adherence to Revlimid and is due to start next cycle tomorrow.     ANC 1.51 from 1.95 (1/3/24).     The patient's current therapy plan and above labs have been reviewed with AUDRA Collins.     Plan  CBC, folate, and Comprehensive Metabolic Panel reviewed, results are stable for this patient - see above in Laboratory Results  Will instruct Duy Owens to continue maintenance Revlimid 10 mg daily for 21 days followed by 7 days off .  Follow up in 1 month. Next scheduled appointment(s) reviewed with patient.  Patient is instructed to call the office with any concerns or new symptoms prior to next visit.  Verbal and written information provided. Patient expresses understanding and has no further questions at this time.            Ladonna Lorenz, PharmD  Clinical Pharmacy Services   [facility]  2/28/2024  11:01 EST

## 2024-02-29 LAB
ALBUMIN SERPL ELPH-MCNC: 4 G/DL (ref 2.9–4.4)
ALBUMIN/GLOB SERPL: 1.4 {RATIO} (ref 0.7–1.7)
ALPHA1 GLOB SERPL ELPH-MCNC: 0.2 G/DL (ref 0–0.4)
ALPHA2 GLOB SERPL ELPH-MCNC: 0.5 G/DL (ref 0.4–1)
B-GLOBULIN SERPL ELPH-MCNC: 1 G/DL (ref 0.7–1.3)
GAMMA GLOB SERPL ELPH-MCNC: 1.3 G/DL (ref 0.4–1.8)
GLOBULIN SER-MCNC: 3 G/DL (ref 2.2–3.9)
IGA SERPL-MCNC: 135 MG/DL (ref 61–437)
IGG SERPL-MCNC: 1482 MG/DL (ref 603–1613)
IGM SERPL-MCNC: 39 MG/DL (ref 20–172)
INTERPRETATION SERPL IEP-IMP: ABNORMAL
KAPPA LC FREE SER-MCNC: 28.3 MG/L (ref 3.3–19.4)
KAPPA LC FREE/LAMBDA FREE SER: 1.15 {RATIO} (ref 0.26–1.65)
LABORATORY COMMENT REPORT: ABNORMAL
LAMBDA LC FREE SERPL-MCNC: 24.6 MG/L (ref 5.7–26.3)
M PROTEIN SERPL ELPH-MCNC: ABNORMAL G/DL
PROT SERPL-MCNC: 7 G/DL (ref 6–8.5)

## 2024-03-04 ENCOUNTER — OFFICE VISIT (OUTPATIENT)
Dept: NEUROSURGERY | Facility: CLINIC | Age: 64
End: 2024-03-04
Payer: COMMERCIAL

## 2024-03-04 VITALS
DIASTOLIC BLOOD PRESSURE: 64 MMHG | BODY MASS INDEX: 28.92 KG/M2 | WEIGHT: 202 LBS | RESPIRATION RATE: 20 BRPM | SYSTOLIC BLOOD PRESSURE: 110 MMHG | HEIGHT: 70 IN

## 2024-03-04 DIAGNOSIS — M48.02 CERVICAL SPINAL STENOSIS: ICD-10-CM

## 2024-03-04 DIAGNOSIS — M48.061 SPINAL STENOSIS OF LUMBAR REGION WITHOUT NEUROGENIC CLAUDICATION: ICD-10-CM

## 2024-03-04 DIAGNOSIS — M51.36 DDD (DEGENERATIVE DISC DISEASE), LUMBAR: Primary | ICD-10-CM

## 2024-03-04 PROBLEM — M51.369 DDD (DEGENERATIVE DISC DISEASE), LUMBAR: Status: ACTIVE | Noted: 2024-03-04

## 2024-03-04 PROCEDURE — 99213 OFFICE O/P EST LOW 20 MIN: CPT | Performed by: NEUROLOGICAL SURGERY

## 2024-03-13 ENCOUNTER — SPECIALTY PHARMACY (OUTPATIENT)
Dept: PHARMACY | Facility: HOSPITAL | Age: 64
End: 2024-03-13
Payer: COMMERCIAL

## 2024-03-13 RX ORDER — LENALIDOMIDE 10 MG/1
10 CAPSULE ORAL DAILY
Qty: 21 CAPSULE | Refills: 0 | Status: SHIPPED | OUTPATIENT
Start: 2024-03-13

## 2024-03-13 NOTE — PROGRESS NOTES
Re: Refills of Oral Specialty Medication - Revlimid (lenalidomide)    Drug-Drug Interactions: The current medication list was reviewed and there are no relevant drug-drug interactions with the specialty medication.  Medication Allergies: The patient has no relevant allergies as it relates to their oral specialty medication  Review of Labs/Dose Adjustments: NO DOSE CHANGE - I reviewed the most recent note and labs and the patient will continue without any dose changes.  I sent refills as described below.    Drug: Revlimid (lenalidomide)  Strength: 10 mg  Directions: Take 1 capsule by mouth daily on days 1-21 of each 28 day cycle  Quantity: 21  Refills: 0  Pharmacy prescription sent to:  [facility]    Name/Credentials: Figueroa Pleitez, PharmD, [facility]  Clinical Oncology Pharmacist    3/13/2024  15:39 EDT

## 2024-03-14 NOTE — PROGRESS NOTES
Drug: Revlimid (lenalidomide)  Strength: 10 mg  Directions: Take 1 capsule by mouth daily on days 1-21 of each 28 day cycle  Quantity: 21  Refills: 0  Pharmacy prescription sent to:  [facility]  Completed independent double check on medication order/RX.  Sepideh Avila, Yonathan, BCOP

## 2024-03-27 ENCOUNTER — CLINICAL SUPPORT (OUTPATIENT)
Dept: ONCOLOGY | Facility: HOSPITAL | Age: 64
End: 2024-03-27
Payer: COMMERCIAL

## 2024-03-27 ENCOUNTER — LAB (OUTPATIENT)
Dept: OTHER | Facility: HOSPITAL | Age: 64
End: 2024-03-27
Payer: COMMERCIAL

## 2024-03-27 DIAGNOSIS — E85.81 AL AMYLOIDOSIS: ICD-10-CM

## 2024-03-27 DIAGNOSIS — D52.0 DIETARY FOLATE DEFICIENCY ANEMIA: ICD-10-CM

## 2024-03-27 DIAGNOSIS — Z79.899 ENCOUNTER FOR LONG-TERM (CURRENT) USE OF HIGH-RISK MEDICATION: ICD-10-CM

## 2024-03-27 DIAGNOSIS — D84.9 IMMUNOCOMPROMISED PATIENT: ICD-10-CM

## 2024-03-27 DIAGNOSIS — C90.00 MULTIPLE MYELOMA NOT HAVING ACHIEVED REMISSION: ICD-10-CM

## 2024-03-27 DIAGNOSIS — D63.0 ANEMIA IN NEOPLASTIC DISEASE: ICD-10-CM

## 2024-03-27 LAB
ALBUMIN SERPL-MCNC: 4.3 G/DL (ref 3.5–5.2)
ALBUMIN/GLOB SERPL: 1.3 G/DL
ALP SERPL-CCNC: 146 U/L (ref 39–117)
ALT SERPL W P-5'-P-CCNC: 78 U/L (ref 1–41)
ANION GAP SERPL CALCULATED.3IONS-SCNC: 9 MMOL/L (ref 5–15)
AST SERPL-CCNC: 80 U/L (ref 1–40)
BASOPHILS # BLD AUTO: 0.07 10*3/MM3 (ref 0–0.2)
BASOPHILS NFR BLD AUTO: 1.8 % (ref 0–1.5)
BILIRUB SERPL-MCNC: 0.3 MG/DL (ref 0–1.2)
BUN SERPL-MCNC: 19 MG/DL (ref 8–23)
BUN/CREAT SERPL: 19.4 (ref 7–25)
CALCIUM SPEC-SCNC: 9.2 MG/DL (ref 8.6–10.5)
CHLORIDE SERPL-SCNC: 107 MMOL/L (ref 98–107)
CO2 SERPL-SCNC: 23 MMOL/L (ref 22–29)
CREAT SERPL-MCNC: 0.98 MG/DL (ref 0.76–1.27)
DEPRECATED RDW RBC AUTO: 45.4 FL (ref 37–54)
EGFRCR SERPLBLD CKD-EPI 2021: 87.2 ML/MIN/1.73
EOSINOPHIL # BLD AUTO: 0.15 10*3/MM3 (ref 0–0.4)
EOSINOPHIL NFR BLD AUTO: 3.8 % (ref 0.3–6.2)
ERYTHROCYTE [DISTWIDTH] IN BLOOD BY AUTOMATED COUNT: 16 % (ref 12.3–15.4)
GLOBULIN UR ELPH-MCNC: 3.4 GM/DL
GLUCOSE SERPL-MCNC: 136 MG/DL (ref 65–99)
HCT VFR BLD AUTO: 40.8 % (ref 37.5–51)
HGB BLD-MCNC: 12.1 G/DL (ref 13–17.7)
IMM GRANULOCYTES # BLD AUTO: 0.01 10*3/MM3 (ref 0–0.05)
IMM GRANULOCYTES NFR BLD AUTO: 0.3 % (ref 0–0.5)
LYMPHOCYTES # BLD AUTO: 1.47 10*3/MM3 (ref 0.7–3.1)
LYMPHOCYTES NFR BLD AUTO: 37.6 % (ref 19.6–45.3)
MCH RBC QN AUTO: 23.2 PG (ref 26.6–33)
MCHC RBC AUTO-ENTMCNC: 29.7 G/DL (ref 31.5–35.7)
MCV RBC AUTO: 78.2 FL (ref 79–97)
MONOCYTES # BLD AUTO: 0.55 10*3/MM3 (ref 0.1–0.9)
MONOCYTES NFR BLD AUTO: 14.1 % (ref 5–12)
NEUTROPHILS NFR BLD AUTO: 1.66 10*3/MM3 (ref 1.7–7)
NEUTROPHILS NFR BLD AUTO: 42.4 % (ref 42.7–76)
NRBC BLD AUTO-RTO: 0 /100 WBC (ref 0–0.2)
PLATELET # BLD AUTO: 235 10*3/MM3 (ref 140–450)
PMV BLD AUTO: 9 FL (ref 6–12)
POTASSIUM SERPL-SCNC: 4 MMOL/L (ref 3.5–5.2)
PROT SERPL-MCNC: 7.7 G/DL (ref 6–8.5)
RBC # BLD AUTO: 5.22 10*6/MM3 (ref 4.14–5.8)
SODIUM SERPL-SCNC: 139 MMOL/L (ref 136–145)
WBC NRBC COR # BLD AUTO: 3.91 10*3/MM3 (ref 3.4–10.8)

## 2024-03-27 PROCEDURE — 83521 IG LIGHT CHAINS FREE EACH: CPT | Performed by: INTERNAL MEDICINE

## 2024-03-27 PROCEDURE — 85025 COMPLETE CBC W/AUTO DIFF WBC: CPT | Performed by: INTERNAL MEDICINE

## 2024-03-27 PROCEDURE — 84165 PROTEIN E-PHORESIS SERUM: CPT | Performed by: INTERNAL MEDICINE

## 2024-03-27 PROCEDURE — 82784 ASSAY IGA/IGD/IGG/IGM EACH: CPT | Performed by: INTERNAL MEDICINE

## 2024-03-27 PROCEDURE — 86334 IMMUNOFIX E-PHORESIS SERUM: CPT | Performed by: INTERNAL MEDICINE

## 2024-03-27 PROCEDURE — 36415 COLL VENOUS BLD VENIPUNCTURE: CPT

## 2024-03-27 PROCEDURE — 80053 COMPREHEN METABOLIC PANEL: CPT | Performed by: INTERNAL MEDICINE

## 2024-03-27 NOTE — PROGRESS NOTES
Duy Owens is a 63 y.o. male with multiple myeloma seen by an Hematology/Oncology provider, Dr. Mendez, and is scheduled with RN Review/Clinical Pharmacy Review offered by [facility]. Patient's visit was held in clinic today.     Therapy plan  Revlimid 10 mg daily for 21 days of 28-day cycle    Current Medication List  Medication Sig Start Date End Date Taking? Authorizing Provider   aspirin 81 MG EC tablet Take 1 tablet by mouth Daily. 11/9/22   Tima Mendez MD   folic acid (FOLVITE) 1 MG tablet Take 1 tablet by mouth Daily.    Tima Mendez MD   lenalidomide (REVLIMID) 10 MG capsule Take 1 capsule by mouth Daily. Take for 21 days on, then 7 days off, then repeat. 2/15/24   Tima Mendez MD   losartan (COZAAR) 100 MG tablet Take 1 tablet by mouth Daily. 12/11/23   Nara Dobson MD   metFORMIN (GLUCOPHAGE) 500 MG tablet Take 1 tablet by mouth 2 (Two) Times a Day. 12/11/23   Nara Dobson MD     Relevant Laboratory Values  Lab Results   Component Value Date    GLUCOSE 136 (H) 03/27/2024    CALCIUM 9.2 03/27/2024     03/27/2024    K 4.0 03/27/2024    CO2 23.0 03/27/2024     03/27/2024    BUN 19 03/27/2024    CREATININE 0.98 03/27/2024    EGFRIFAFRI 88 02/16/2022    EGFRIFNONA 84 05/03/2021    BCR 19.4 03/27/2024    ANIONGAP 9.0 03/27/2024     Lab Results   Component Value Date    WBC 3.91 03/27/2024    RBC 5.22 03/27/2024    HGB 12.1 (L) 03/27/2024    HCT 40.8 03/27/2024    MCV 78.2 (L) 03/27/2024    MCH 23.2 (L) 03/27/2024    MCHC 29.7 (L) 03/27/2024    RDW 16.0 (H) 03/27/2024    RDWSD 45.4 03/27/2024    MPV 9.0 03/27/2024     03/27/2024    NEUTRORELPCT 42.4 (L) 03/27/2024    LYMPHORELPCT 37.6 03/27/2024    MONORELPCT 14.1 (H) 03/27/2024    EOSRELPCT 3.8 03/27/2024    BASORELPCT 1.8 (H) 03/27/2024    AUTOIGPER 0.3 03/27/2024    NEUTROABS 1.66 (L) 03/27/2024    LYMPHSABS 1.47 03/27/2024    MONOSABS 0.55 03/27/2024    EOSABS 0.15 03/27/2024     BASOSABS 0.07 03/27/2024    AUTOIGNUM 0.01 03/27/2024    NRBC 0.0 03/27/2024     Clinical Review  Patient reports no new symptoms or adverse effects. Patient reports adherence to aspirin 81 mg daily and folic acid 1 mg daily. Patient also reports adherence to Revlimid and is due to start next cycle tomorrow.     Creatinine 0.98 from 1.14 (2/28/24)   Alkaline phos 146 from 104 (2/28/24)   AST 80 from 20 (2 /28/24)   ALT 78 from 31 (2/28/24)     Hemoglobin 12.1 from 12.2 (2/28/24)   Platelets 235,000 from 236,000 (2/28/24)   WBC 3,910 from 3,820 (2/28/24)   ANC 1,660 from 1,510 (2/28/24)     Patient reports no alcohol consumption and no use of NSAIDs or APAP.    The patient's current therapy plan and above labs have been reviewed with Dr. Mendez.     Plan  CBC, CMP, SPEP, REGINO, FLC, reviewed - see above in Laboratory Results  Will instruct Duy Owens to continue maintenance Revlimid 10 mg daily for 21 days followed by 7 days off .  Follow up in 1 week for CMP per Dr. Mendez. Next scheduled appointment(s) reviewed with patient.  Patient is instructed to call the office with any concerns or new symptoms prior to next visit.  Verbal information provided. Patient expresses understanding and has no further questions at this time.            Ladonna Lorenz, Virginie  Clinical Pharmacy Services   [facility]  3/27/2024  08:47 EDT

## 2024-03-28 LAB
ALBUMIN SERPL ELPH-MCNC: 3.9 G/DL (ref 2.9–4.4)
ALBUMIN/GLOB SERPL: 1.3 {RATIO} (ref 0.7–1.7)
ALPHA1 GLOB SERPL ELPH-MCNC: 0.2 G/DL (ref 0–0.4)
ALPHA2 GLOB SERPL ELPH-MCNC: 0.6 G/DL (ref 0.4–1)
B-GLOBULIN SERPL ELPH-MCNC: 1 G/DL (ref 0.7–1.3)
GAMMA GLOB SERPL ELPH-MCNC: 1.3 G/DL (ref 0.4–1.8)
GLOBULIN SER-MCNC: 3.1 G/DL (ref 2.2–3.9)
IGA SERPL-MCNC: 146 MG/DL (ref 61–437)
IGG SERPL-MCNC: 1421 MG/DL (ref 603–1613)
IGM SERPL-MCNC: 43 MG/DL (ref 20–172)
INTERPRETATION SERPL IEP-IMP: ABNORMAL
KAPPA LC FREE SER-MCNC: 27 MG/L (ref 3.3–19.4)
KAPPA LC FREE/LAMBDA FREE SER: 1.14 {RATIO} (ref 0.26–1.65)
LABORATORY COMMENT REPORT: ABNORMAL
LAMBDA LC FREE SERPL-MCNC: 23.6 MG/L (ref 5.7–26.3)
M PROTEIN SERPL ELPH-MCNC: ABNORMAL G/DL
PROT SERPL-MCNC: 7 G/DL (ref 6–8.5)

## 2024-04-03 ENCOUNTER — CLINICAL SUPPORT (OUTPATIENT)
Dept: ONCOLOGY | Facility: HOSPITAL | Age: 64
End: 2024-04-03
Payer: COMMERCIAL

## 2024-04-03 ENCOUNTER — LAB (OUTPATIENT)
Dept: OTHER | Facility: HOSPITAL | Age: 64
End: 2024-04-03
Payer: MEDICAID

## 2024-04-03 ENCOUNTER — TELEPHONE (OUTPATIENT)
Dept: ONCOLOGY | Facility: CLINIC | Age: 64
End: 2024-04-03
Payer: COMMERCIAL

## 2024-04-03 DIAGNOSIS — C90.00 MULTIPLE MYELOMA NOT HAVING ACHIEVED REMISSION: ICD-10-CM

## 2024-04-03 DIAGNOSIS — D84.9 IMMUNOCOMPROMISED PATIENT: ICD-10-CM

## 2024-04-03 DIAGNOSIS — D52.0 DIETARY FOLATE DEFICIENCY ANEMIA: ICD-10-CM

## 2024-04-03 DIAGNOSIS — D63.0 ANEMIA IN NEOPLASTIC DISEASE: ICD-10-CM

## 2024-04-03 DIAGNOSIS — E85.81 AL AMYLOIDOSIS: ICD-10-CM

## 2024-04-03 DIAGNOSIS — Z79.899 ENCOUNTER FOR LONG-TERM (CURRENT) USE OF HIGH-RISK MEDICATION: ICD-10-CM

## 2024-04-03 LAB
ALBUMIN SERPL-MCNC: 4.3 G/DL (ref 3.5–5.2)
ALBUMIN/GLOB SERPL: 1.2 G/DL
ALP SERPL-CCNC: 110 U/L (ref 39–117)
ALT SERPL W P-5'-P-CCNC: 39 U/L (ref 1–41)
ANION GAP SERPL CALCULATED.3IONS-SCNC: 8.6 MMOL/L (ref 5–15)
AST SERPL-CCNC: 29 U/L (ref 1–40)
BILIRUB SERPL-MCNC: 0.5 MG/DL (ref 0–1.2)
BUN SERPL-MCNC: 11 MG/DL (ref 8–23)
BUN/CREAT SERPL: 10.6 (ref 7–25)
CALCIUM SPEC-SCNC: 9.2 MG/DL (ref 8.6–10.5)
CHLORIDE SERPL-SCNC: 104 MMOL/L (ref 98–107)
CO2 SERPL-SCNC: 26.4 MMOL/L (ref 22–29)
CREAT SERPL-MCNC: 1.04 MG/DL (ref 0.76–1.27)
EGFRCR SERPLBLD CKD-EPI 2021: 81.2 ML/MIN/1.73
GLOBULIN UR ELPH-MCNC: 3.5 GM/DL
GLUCOSE SERPL-MCNC: 112 MG/DL (ref 65–99)
POTASSIUM SERPL-SCNC: 4 MMOL/L (ref 3.5–5.2)
PROT SERPL-MCNC: 7.8 G/DL (ref 6–8.5)
SODIUM SERPL-SCNC: 139 MMOL/L (ref 136–145)

## 2024-04-03 PROCEDURE — 36415 COLL VENOUS BLD VENIPUNCTURE: CPT

## 2024-04-03 PROCEDURE — 80053 COMPREHEN METABOLIC PANEL: CPT | Performed by: INTERNAL MEDICINE

## 2024-04-03 NOTE — TELEPHONE ENCOUNTER
----- Message from Tima Mendez MD sent at 4/3/2024  8:20 AM EDT -----  Please inform the patient that the liver enzymes normalized.    Thank you

## 2024-04-03 NOTE — PROGRESS NOTES
Duy Owens is a 63 y.o. male with multiple myeloma seen by an Hematology/Oncology provider, Dr. Mendez, and is scheduled with RN Review/Clinical Pharmacy Review offered by [facility]. Patient's visit was held in clinic today.     Therapy plan  Revlimid 10 mg daily for 21 days of 28-day cycle    Current Medication List  Medication Sig Start Date End Date Taking? Authorizing Provider   aspirin 81 MG EC tablet Take 1 tablet by mouth Daily. 11/9/22   Tima Mendez MD   folic acid (FOLVITE) 1 MG tablet Take 1 tablet by mouth Daily.    Tima Mendez MD   lenalidomide (REVLIMID) 10 MG capsule Take 1 capsule by mouth Daily. Take for 21 days on, then 7 days off, then repeat. 2/15/24   Tima Mendez MD   losartan (COZAAR) 100 MG tablet Take 1 tablet by mouth Daily. 12/11/23   Nara Dobson MD   metFORMIN (GLUCOPHAGE) 500 MG tablet Take 1 tablet by mouth 2 (Two) Times a Day. 12/11/23   Nara Dobson MD     Relevant Laboratory Values  Lab Results   Component Value Date    GLUCOSE 112 (H) 04/03/2024    CALCIUM 9.2 04/03/2024     04/03/2024    K 4.0 04/03/2024    CO2 26.4 04/03/2024     04/03/2024    BUN 11 04/03/2024    CREATININE 1.04 04/03/2024    EGFRIFAFRI 88 02/16/2022    EGFRIFNONA 84 05/03/2021    BCR 10.6 04/03/2024    ANIONGAP 8.6 04/03/2024     Clinical Review  Patient reports no new symptoms or adverse effects. Patient reports adherence to aspirin 81 mg daily and folic acid 1 mg daily. Patient also reports adherence to Revlimid and is due to start next cycle tomorrow.     Creatinine 1.04 from 0.98 (3/27/24) from 1.14 (2/28/24)   Alkaline phos 110 from 146 (3/27/24) from 104 (2/28/24)   AST 29 from 80 (3/27/24) from 20 (2 /28/24)   ALT 39 from 78 (3/27/24) from 31 (2/28/24)     Liver function tests have returned to normal. Patient denies alcohol consumption and no use of NSAIDs or APAP. Patient reports no recent diet changes, denies consumption of high  sugar or fatty food.     The patient's current therapy plan and above labs have been reviewed with Dr. Mendez.     Plan  CMP reviewed - see above in Laboratory Results  Will instruct Duy Owens to continue maintenance Revlimid 10 mg daily for 21 days followed by 7 days off .  Follow up in one month when scheduled with Dr. Mendez. Next scheduled appointment(s) reviewed with patient.  Patient is instructed to call the office with any concerns or new symptoms prior to next visit.  Verbal and written information provided. Patient expresses understanding and has no further questions at this time.            Ladonna Lorenz, PharmD  Clinical Pharmacy Services   [facility]  4/3/2024  07:52 EDT

## 2024-04-08 ENCOUNTER — SPECIALTY PHARMACY (OUTPATIENT)
Dept: PHARMACY | Facility: HOSPITAL | Age: 64
End: 2024-04-08
Payer: MEDICAID

## 2024-04-08 RX ORDER — LENALIDOMIDE 10 MG/1
10 CAPSULE ORAL DAILY
Qty: 21 CAPSULE | Refills: 0 | Status: SHIPPED | OUTPATIENT
Start: 2024-04-08

## 2024-04-08 NOTE — PROGRESS NOTES
Re: Refills of Oral Specialty Medication - Revlimid (lenalidomide)    Drug-Drug Interactions: The current medication list was reviewed and there are no relevant drug-drug interactions with the specialty medication.  Medication Allergies: The patient has no relevant allergies as it relates to their oral specialty medication  Review of Labs/Dose Adjustments: NO DOSE CHANGE - I reviewed the most recent note and labs and the patient will continue without any dose changes.  I sent refills as described below.    Drug: Revlimid (lenalidomide)  Strength: 10 mg  Directions: Take 1 capsule by mouth daily on days 1-21 of each 28 day cycle  Quantity: 21  Refills: 0  Pharmacy prescription sent to: [facility]    Name/Credentials: Figueroa Pleitez, Virginie, BCOP  Clinical Oncology Pharmacist    4/8/2024  15:29 EDT

## 2024-04-08 NOTE — PROGRESS NOTES
Drug: Revlimid (lenalidomide)  Strength: 10 mg  Directions: Take 1 capsule by mouth daily on days 1-21 of each 28 day cycle  Quantity: 21  Refills: 0  Pharmacy prescription sent to: [facility]    Completed independent double check on medication order/RX.  Gina Naylor, ReinaD, BCPS

## 2024-05-01 ENCOUNTER — LAB (OUTPATIENT)
Dept: OTHER | Facility: HOSPITAL | Age: 64
End: 2024-05-01
Payer: MEDICAID

## 2024-05-01 ENCOUNTER — OFFICE VISIT (OUTPATIENT)
Dept: ONCOLOGY | Facility: CLINIC | Age: 64
End: 2024-05-01
Payer: MEDICAID

## 2024-05-01 VITALS
OXYGEN SATURATION: 99 % | BODY MASS INDEX: 27.82 KG/M2 | DIASTOLIC BLOOD PRESSURE: 79 MMHG | TEMPERATURE: 98.4 F | RESPIRATION RATE: 16 BRPM | WEIGHT: 194.3 LBS | HEART RATE: 61 BPM | SYSTOLIC BLOOD PRESSURE: 128 MMHG | HEIGHT: 70 IN

## 2024-05-01 DIAGNOSIS — D63.0 ANEMIA IN NEOPLASTIC DISEASE: ICD-10-CM

## 2024-05-01 DIAGNOSIS — D84.9 IMMUNOCOMPROMISED PATIENT: ICD-10-CM

## 2024-05-01 DIAGNOSIS — E85.81 AL AMYLOIDOSIS: ICD-10-CM

## 2024-05-01 DIAGNOSIS — D52.0 DIETARY FOLATE DEFICIENCY ANEMIA: ICD-10-CM

## 2024-05-01 DIAGNOSIS — Z79.899 ENCOUNTER FOR LONG-TERM (CURRENT) USE OF HIGH-RISK MEDICATION: ICD-10-CM

## 2024-05-01 DIAGNOSIS — C90.00 MULTIPLE MYELOMA NOT HAVING ACHIEVED REMISSION: ICD-10-CM

## 2024-05-01 DIAGNOSIS — R74.8 ELEVATED LIVER ENZYMES: ICD-10-CM

## 2024-05-01 DIAGNOSIS — E87.6 HYPOKALEMIA: ICD-10-CM

## 2024-05-01 DIAGNOSIS — C90.00 MULTIPLE MYELOMA NOT HAVING ACHIEVED REMISSION: Primary | ICD-10-CM

## 2024-05-01 LAB
ALBUMIN SERPL-MCNC: 4.1 G/DL (ref 3.5–5.2)
ALBUMIN/GLOB SERPL: 1.3 G/DL
ALP SERPL-CCNC: 118 U/L (ref 39–117)
ALT SERPL W P-5'-P-CCNC: 63 U/L (ref 1–41)
ANION GAP SERPL CALCULATED.3IONS-SCNC: 8.7 MMOL/L (ref 5–15)
AST SERPL-CCNC: 52 U/L (ref 1–40)
BASOPHILS # BLD AUTO: 0.05 10*3/MM3 (ref 0–0.2)
BASOPHILS NFR BLD AUTO: 1 % (ref 0–1.5)
BILIRUB SERPL-MCNC: 0.3 MG/DL (ref 0–1.2)
BUN SERPL-MCNC: 9 MG/DL (ref 8–23)
BUN/CREAT SERPL: 9.7 (ref 7–25)
CALCIUM SPEC-SCNC: 8.9 MG/DL (ref 8.6–10.5)
CHLORIDE SERPL-SCNC: 103 MMOL/L (ref 98–107)
CO2 SERPL-SCNC: 25.3 MMOL/L (ref 22–29)
CREAT SERPL-MCNC: 0.93 MG/DL (ref 0.76–1.27)
DEPRECATED RDW RBC AUTO: 42.8 FL (ref 37–54)
EGFRCR SERPLBLD CKD-EPI 2021: 92.8 ML/MIN/1.73
EOSINOPHIL # BLD AUTO: 0.22 10*3/MM3 (ref 0–0.4)
EOSINOPHIL NFR BLD AUTO: 4.3 % (ref 0.3–6.2)
ERYTHROCYTE [DISTWIDTH] IN BLOOD BY AUTOMATED COUNT: 15.8 % (ref 12.3–15.4)
GLOBULIN UR ELPH-MCNC: 3.2 GM/DL
GLUCOSE SERPL-MCNC: 97 MG/DL (ref 65–99)
HCT VFR BLD AUTO: 38.2 % (ref 37.5–51)
HGB BLD-MCNC: 11.8 G/DL (ref 13–17.7)
HYPOCHROMIA BLD QL: NORMAL
IMM GRANULOCYTES # BLD AUTO: 0.02 10*3/MM3 (ref 0–0.05)
IMM GRANULOCYTES NFR BLD AUTO: 0.4 % (ref 0–0.5)
LYMPHOCYTES # BLD AUTO: 1.52 10*3/MM3 (ref 0.7–3.1)
LYMPHOCYTES NFR BLD AUTO: 29.5 % (ref 19.6–45.3)
MCH RBC QN AUTO: 23.3 PG (ref 26.6–33)
MCHC RBC AUTO-ENTMCNC: 30.9 G/DL (ref 31.5–35.7)
MCV RBC AUTO: 75.5 FL (ref 79–97)
MONOCYTES # BLD AUTO: 0.37 10*3/MM3 (ref 0.1–0.9)
MONOCYTES NFR BLD AUTO: 7.2 % (ref 5–12)
NEUTROPHILS NFR BLD AUTO: 2.97 10*3/MM3 (ref 1.7–7)
NEUTROPHILS NFR BLD AUTO: 57.6 % (ref 42.7–76)
NRBC BLD AUTO-RTO: 0 /100 WBC (ref 0–0.2)
PLAT MORPH BLD: NORMAL
PLATELET # BLD AUTO: 219 10*3/MM3 (ref 140–450)
PMV BLD AUTO: 9.2 FL (ref 6–12)
POTASSIUM SERPL-SCNC: 3.4 MMOL/L (ref 3.5–5.2)
PROT SERPL-MCNC: 7.3 G/DL (ref 6–8.5)
RBC # BLD AUTO: 5.06 10*6/MM3 (ref 4.14–5.8)
SODIUM SERPL-SCNC: 137 MMOL/L (ref 136–145)
WBC MORPH BLD: NORMAL
WBC NRBC COR # BLD AUTO: 5.15 10*3/MM3 (ref 3.4–10.8)

## 2024-05-01 PROCEDURE — 83521 IG LIGHT CHAINS FREE EACH: CPT | Performed by: INTERNAL MEDICINE

## 2024-05-01 PROCEDURE — 1159F MED LIST DOCD IN RCRD: CPT | Performed by: INTERNAL MEDICINE

## 2024-05-01 PROCEDURE — 3078F DIAST BP <80 MM HG: CPT | Performed by: INTERNAL MEDICINE

## 2024-05-01 PROCEDURE — 85025 COMPLETE CBC W/AUTO DIFF WBC: CPT | Performed by: INTERNAL MEDICINE

## 2024-05-01 PROCEDURE — 99214 OFFICE O/P EST MOD 30 MIN: CPT | Performed by: INTERNAL MEDICINE

## 2024-05-01 PROCEDURE — 36415 COLL VENOUS BLD VENIPUNCTURE: CPT

## 2024-05-01 PROCEDURE — 80053 COMPREHEN METABOLIC PANEL: CPT | Performed by: INTERNAL MEDICINE

## 2024-05-01 PROCEDURE — 85007 BL SMEAR W/DIFF WBC COUNT: CPT | Performed by: INTERNAL MEDICINE

## 2024-05-01 PROCEDURE — 82784 ASSAY IGA/IGD/IGG/IGM EACH: CPT | Performed by: INTERNAL MEDICINE

## 2024-05-01 PROCEDURE — 84165 PROTEIN E-PHORESIS SERUM: CPT | Performed by: INTERNAL MEDICINE

## 2024-05-01 PROCEDURE — 86334 IMMUNOFIX E-PHORESIS SERUM: CPT | Performed by: INTERNAL MEDICINE

## 2024-05-01 PROCEDURE — 3074F SYST BP LT 130 MM HG: CPT | Performed by: INTERNAL MEDICINE

## 2024-05-01 PROCEDURE — 1160F RVW MEDS BY RX/DR IN RCRD: CPT | Performed by: INTERNAL MEDICINE

## 2024-05-01 PROCEDURE — 1126F AMNT PAIN NOTED NONE PRSNT: CPT | Performed by: INTERNAL MEDICINE

## 2024-05-01 NOTE — PROGRESS NOTES
"Subjective     CHIEF COMPLAINT:      Chief Complaint   Patient presents with    Follow-up     HISTORY OF PRESENT ILLNESS:     Duy Owens is a 63 y.o. male patient who returns today for follow up on his multiple myeloma.  He returns today for follow-up reporting some cold feeling in the finger especially when he is outside walking.  It does not affect fine motor movement.  No numbness in the feet.  He reports recent diarrhea.  No nausea or vomiting.  No recent infections.    ROS:  Pertinent ROS is in the HPI.     Past medical, surgical, social and family history were reviewed.     MEDICATIONS:    Current Outpatient Medications:     aspirin 81 MG EC tablet, Take 1 tablet by mouth Daily., Disp: , Rfl:     folic acid (FOLVITE) 1 MG tablet, Take 1 tablet by mouth Daily., Disp: , Rfl:     lenalidomide (REVLIMID) 10 MG capsule, Take 1 capsule by mouth Daily. Take for 21 days on, then 7 days off, then repeat., Disp: 21 capsule, Rfl: 0    losartan (COZAAR) 100 MG tablet, Take 1 tablet by mouth Daily., Disp: 90 tablet, Rfl: 1    metFORMIN (GLUCOPHAGE) 500 MG tablet, Take 1 tablet by mouth 2 (Two) Times a Day., Disp: 180 tablet, Rfl: 1  Objective     VITAL SIGNS:     Vitals:    05/01/24 1540   BP: 128/79   Pulse: 61   Resp: 16   Temp: 98.4 °F (36.9 °C)   TempSrc: Temporal   SpO2: 99%   Weight: 88.1 kg (194 lb 4.8 oz)   Height: 177 cm (69.69\")   PainSc: 0-No pain     Body mass index is 28.13 kg/m².     Wt Readings from Last 5 Encounters:   05/01/24 88.1 kg (194 lb 4.8 oz)   03/04/24 91.6 kg (202 lb)   01/03/24 91.7 kg (202 lb 1.6 oz)   12/11/23 89.4 kg (197 lb)   11/15/23 89.4 kg (197 lb)     PHYSICAL EXAMINATION:   GENERAL: The patient appears in good general condition, not in acute distress.   SKIN: No ecchymosis.  EYES: No jaundice. No Pallor.  CHEST: Normal respiratory effort.  Lungs clear bilaterally.  No added sounds.  CVS: Normal S1-S2.  No murmurs.  ABDOMEN: Soft. No RUQ tenderness. No Hepatomegaly. No Splenomegaly. " No masses.  EXTREMITIES: No edema or calf tenderness.     DIAGNOSTIC DATA:     Results from last 7 days   Lab Units 05/01/24  1532   WBC 10*3/mm3 5.15   NEUTROS ABS 10*3/mm3 2.97   HEMOGLOBIN g/dL 11.8*   HEMATOCRIT % 38.2   PLATELETS 10*3/mm3 219     Results from last 7 days   Lab Units 05/01/24  1532   SODIUM mmol/L 137   POTASSIUM mmol/L 3.4*   CHLORIDE mmol/L 103   CO2 mmol/L 25.3   BUN mg/dL 9   CREATININE mg/dL 0.93   CALCIUM mg/dL 8.9   ALBUMIN g/dL 4.1   BILIRUBIN mg/dL 0.3   ALK PHOS U/L 118*   ALT (SGPT) U/L 63*   AST (SGOT) U/L 52*   GLUCOSE mg/dL 97     Component      Latest Ref Rng 2/28/2024 3/27/2024   IgG      603 - 1613 mg/dL 1482  1421    IgA      61 - 437 mg/dL 135  146    IgM      20 - 172 mg/dL 39  43    Total Protein      6.0 - 8.5 g/dL 7.0  7.0    Albumin      2.9 - 4.4 g/dL 4.0  3.9    Alpha-1-Globulin      0.0 - 0.4 g/dL 0.2  0.2    Alpha-2-Globulin      0.4 - 1.0 g/dL 0.5  0.6    Beta Globulin      0.7 - 1.3 g/dL 1.0  1.0    Gamma Globulin      0.4 - 1.8 g/dL 1.3  1.3    M-Werner      Not Observed g/dL Not Observed  Not Observed    Globulin      2.2 - 3.9 g/dL 3.0  3.1    A/G Ratio      0.7 - 1.7  1.4  1.3    Immunofixation Reflex, Serum Comment  Comment    Please note Comment  Comment    Kappa FLC      3.3 - 19.4 mg/L 28.3 (H)  27.0 (H)    Free Lambda Light Chains      5.7 - 26.3 mg/L 24.6  23.6    Kappa/Lambda Ratio      0.26 - 1.65  1.15  1.14       CT chest abdomen pelvis on 2/20/2024:  CT CHEST:   Lungs: Background emphysematous changes. Calcified granulomas. No suspicious pulmonary nodules. No focal consolidation.     Pleura: No pleural effusion or pneumothorax.     Airways: Patent central airways.     Cardiovascular: Heart is normal in size. No pericardial effusion.     Mediastinum: Mediastinal and hilar calcified lymph nodes. For example:   -8 mm posterior mediastinal node (series 3 image 57), previously 8 mm   -9 mm posterior mediastinal node (series 3 image 85), previously 9 mm      Lower Neck/Chest Wall: No axillary adenopathy.     CT ABDOMEN AND PELVIS:   Liver: No focal hepatic lesion.     Gallbladder/Biliary tree: Gallbladder is unremarkable. No biliary dilatation.     Spleen: Unremarkable.     Pancreas: Unremarkable.     Adrenals: Unremarkable.     Kidneys/Ureters: No nephrolithiasis or hydronephrosis.     Gastrointestinal: Small hiatal hernia. Colonic diverticulosis. Normal appendix. No bowel obstruction.     Peritoneum: No free fluid or free gas.     Vasculature: Patent portal venous system. Atherosclerotic calcifications.     Lymph Nodes: Partially calcified abdominal pelvic lymph nodes with index nodes as follows:   -2.7 x 2.5 cm right retroperitoneal reinier conglomerate (series 3 image 131), previously 2.8 x 2.5 cm   -1.4 cm left periaortic node (series 3 image 134), previously 1.4 cm   -1.5 cm retrocaval node (series 3 image 137), previously 1.3 cm   -0.9 cm anterior aortocaval node (series 3 image 140), previously 0.9 cm   -0.6 cm left external iliac node (series 3 image 175), previously 0.5 cm     Urinary Bladder: Unremarkable.     Reproductive organs: Prostatomegaly.     Abdominal Wall: Bilateral fat-containing inguinal hernias.     MUSCULOSKELETAL: Degenerative changes of the spine.     IMPRESSION:   1. Compared to 8/22/2023, overall similar thoracic and abdominopelvic adenopathy.     2.  No evidence of new metastatic disease in the chest, abdomen, and pelvis.     Assessment & Plan    *Lambda light chain multiple myeloma with lymphadenopathy and development of AL amyloidosis.  Patient was found to have lymph node involvement and amyloid deposition in the involved lymph nodes.  Patient started having dry cough around July 2021.    CT scans 9/23/2021-9/24/2021 revealed inferior mediastinal adenopathy and retroperitoneal adenopathy extending to the right iliac chain.  The Largest lymph node was in the retroperitoneal area measuring 4.8 x 3.5 cm.  The common iliac lymph node  measured 3.5 x 2.7 cm.  The left external iliac chain lymph node measured 2.9 x 2 cm.    PET scan on 10/5/2021 revealed the retroperitoneal and left pelvic lymphadenopathy to be hypermetabolic.  The left periaortic lymph nodes had SUV of 6.9 and the left pelvic sidewall lymph nodes had an SUV of 5.4.  No bone involvement.  CT-guided biopsy on 10/6/2021- pathologist at [facility] reported involvement with monotypic lambda restricted plasma cells concerning for involvement with plasma cell dyscrasia.  Bone marrow biopsy on 10/29/2021 revealed a normocellular marrow (50%) with involvement with plasma cell dyscrasia (plasma cells representing 20-30% of total cells by  stain).   FISH was negative for gain of 1q, monosomy/deletions of chromosomes 13 and 17, IGH rearrangement, gain of chromosomes 9 and 11, IGH-CCND1 (11;14) fusion.   Treatment with the VRD started on 12/22/2021.  Free lambda light chain started to improve after the patient started treatment.  CT scan on 3/11/2022 revealed decrease in the size of lymph nodes in the periaortic area.  There is a slight increase in a lymph node in the left axillary area that increased from 7 to 10 mm.    PET scan on 4/28/2022 revealed significant reduction in the size and hypermetabolism of the involved lymph nodes.  SPEP REGINO FLC on 5/25/2022 revealed no M protein.  Free lambda light chain was 41.8.  Kappa to lambda ratio was normal at 0.26.  B2 M was 1.6.  Bone marrow biopsy on 5/24/2022 revealed normocellular marrow with 30-40% cellularity with involvement by plasma cell neoplasm representing 5-10% by IHC.  Negative for amyloid.  He was considered to be NJ-1.  S/p high-dose melphalan with autologous stem cell transplant.  Day 0 was 7/7/2022.  Patient did well with the transplant but had neutropenic fever of unclear source necessitating hospitalization between 7/16/2022 and 7/18/2022.    Bone marrow biopsy on 9/29/2022 revealed monotypic plasma cells identified on on  flow cytometry but not on IHC.  CT on 9/29/2022 showed decrease in the size of the enlarged lymph nodes.  Patient started Revlimid 10 mg daily for 21 out of 28-day cycle on 11/1/2022.    CT on 2/23/2023 showed decrease in the size of the mediastinal and retroperitoneal lymph nodes indicating response to treatment.  2/23/2023-Free kappa light chain 1.94, free lambda light chain 2.19, kappa/lambda ratio normal at 0.89.  5/17/2023: Free kappa light chain was 31.5.  Free lambda light chain was 24.5.  Kappa 3 lambda ratio normal at 1.29.  6/14/2023: Free kappa light chain was 31.0.  Free lambda light chain was 23.7.  Kappa to lambda ratio was 1.31.   Patient had follow-up at [facility] on 8/22/2023.    CT scan showed decrease in the size of the enlarged lymph nodes.  No new abnormalities were seen.  9/6/2023: Kappa FLC 30.0.  Lambda FLC 25.8.  Kappa to lambda ratio 1.16.  No M protein.  11/1/2023: Kappa FLC 32.9. Lambda FLC 25.8. K:L ratio 1.28.  CT on 2/20/2024 showed stable lymphadenopathy.  3/27/2024: Kappa FLC 27.0.  Lambda FLC 23.6.  K/L ratio 1.14.  No other associate relapse of his multiple myeloma.  He is tolerating Revlimid except for cold feeling in the fingers.  He is tolerating this and we will therefore continue Revlimid at the same dose.    *Anemia secondary to multiple myeloma and secondary to treatment.  Hemoglobin was 13.1 on 9/29/2021.    Iron, folate and vitamin B12 were normal in September/October 2021.    Hemoglobin was 11.4 on 8/24/2022.  Hemoglobin improved to the 11-12 g range.  10/4/2023: Hemoglobin 12.5.  1/3/2024: Hemoglobin 12.5.  Ferritin 305.  Transferrin saturation 31%.  Folate was low at 5.5.   He was started on folic acid 1 mg daily.  2/28/2024: Folate increased to >20.  5/1/2024: Hemoglobin 11.8.    *Prophylaxis.  He was previously on acyclovir 400 mg twice daily.    He is now on Valtrex.  He received Evusheld on 8/20/2022.  Patient received vaccinations as recommended by [facility].  He  received COVID-19 Moderna booster on 10/25/2022.  He received vaccines in April and June.  He received the first Shingrix vaccine in August 2023 and in February 2024.    *Elevated liver enzymes.  Liver enzymes have fluctuated over time.  CT on 2/20/2024 showed no liver or gallbladder abnormality.  Patient not to drink alcohol.  5/1/2024: ALT 63.  AST 52.  Bilirubin 0.3.  Alkaline phosphatase 118.    *Hypokalemia.  5/1/2024: Potassium decreased to 3.4.    *Syncope.  Patient reports having an episode of passing out while he was outdoors watching a game.  He did not feel dizzy prior to the episode. He was well hydrated.  He regained consciousness quickly.   EMS was called. He declined going to ER at that time.   He was referred to cardiology.  He is going to have echocardiogram done.  No recurrence of the episodes.  He has mild bradycardia today but he is asymptomatic.    *Severe muscle spasm of the cervical-thoracic spine.  He reports having weakness of the right lower extremity.  He fell off a ladder recently due to weakness of the right LE.  I recommended evaluation with an MRI of the spine.   He complained of muscle spasm of other muscles.  MRI showed no lesions from multiple myeloma.  He was referred to neurosurgery.  Conservative management was recommended for DJD.  No recurrence of the symptoms.    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  There was mild wall thickening.  Ejection fraction was normal.  Echocardiogram at [facility] on 4/21/2022 did not reveal wall thickening.    PLAN:    1.  Continue maintenance Revlimid at the current dose of 10 mg daily for 21 days of a 28-day cycle.  He is currently on day #8.  2.  Continue folic acid 1 mg daily.  3.  Continue aspirin 81 mg daily.  4.  Increase intake of potassium rich foods.  5.  Return in 1 month for CBC CMP SPEP REGINO FLC.  6.  I will see him in follow-up in early July.  We will obtain CBC CMP SPEP REGINO FLC ferritin iron panel vitamin  B12 and folate levels.    7.  He is due for follow-up at [facility] in late August 2024.         Tima Mendez MD  05/01/24

## 2024-05-02 ENCOUNTER — SPECIALTY PHARMACY (OUTPATIENT)
Dept: PHARMACY | Facility: HOSPITAL | Age: 64
End: 2024-05-02
Payer: MEDICAID

## 2024-05-03 ENCOUNTER — SPECIALTY PHARMACY (OUTPATIENT)
Dept: PHARMACY | Facility: HOSPITAL | Age: 64
End: 2024-05-03
Payer: MEDICAID

## 2024-05-03 LAB
ALBUMIN SERPL ELPH-MCNC: 3.7 G/DL (ref 2.9–4.4)
ALBUMIN/GLOB SERPL: 1.2 {RATIO} (ref 0.7–1.7)
ALPHA1 GLOB SERPL ELPH-MCNC: 0.3 G/DL (ref 0–0.4)
ALPHA2 GLOB SERPL ELPH-MCNC: 0.5 G/DL (ref 0.4–1)
B-GLOBULIN SERPL ELPH-MCNC: 1 G/DL (ref 0.7–1.3)
GAMMA GLOB SERPL ELPH-MCNC: 1.3 G/DL (ref 0.4–1.8)
GLOBULIN SER-MCNC: 3.2 G/DL (ref 2.2–3.9)
IGA SERPL-MCNC: 129 MG/DL (ref 61–437)
IGG SERPL-MCNC: 1384 MG/DL (ref 603–1613)
IGM SERPL-MCNC: 38 MG/DL (ref 20–172)
INTERPRETATION SERPL IEP-IMP: ABNORMAL
KAPPA LC FREE SER-MCNC: 28 MG/L (ref 3.3–19.4)
KAPPA LC FREE/LAMBDA FREE SER: 1.16 {RATIO} (ref 0.26–1.65)
LABORATORY COMMENT REPORT: ABNORMAL
LAMBDA LC FREE SERPL-MCNC: 24.1 MG/L (ref 5.7–26.3)
M PROTEIN SERPL ELPH-MCNC: ABNORMAL G/DL
PROT SERPL-MCNC: 6.9 G/DL (ref 6–8.5)

## 2024-05-03 RX ORDER — LENALIDOMIDE 10 MG/1
10 CAPSULE ORAL DAILY
Qty: 21 CAPSULE | Refills: 0 | Status: SHIPPED | OUTPATIENT
Start: 2024-05-03

## 2024-05-03 NOTE — PROGRESS NOTES
Re: Refills of Oral Specialty Medication - Revlimid (lenalidomide)    Drug-Drug Interactions: The current medication list was reviewed and there are no relevant drug-drug interactions with the specialty medication.  Medication Allergies: The patient has no relevant allergies as it relates to their oral specialty medication  Review of Labs/Dose Adjustments: NO DOSE CHANGE - I reviewed the most recent note and labs and the patient will continue without any dose changes.  I sent refills as described below.    Drug: Revlimid (lenalidomide)  Strength: 10 mg  Directions: Take 1 capsule by mouth daily on days 1-21 of each 28 day cycle  Quantity: 21  Refills: 0  Pharmacy prescription sent to: [facility]    Name/Credentials: Figueroa Pleitez, Virginie, BCOP  Clinical Oncology Pharmacist    5/3/2024  12:55 EDT

## 2024-05-29 ENCOUNTER — SPECIALTY PHARMACY (OUTPATIENT)
Dept: PHARMACY | Facility: HOSPITAL | Age: 64
End: 2024-05-29
Payer: COMMERCIAL

## 2024-05-29 RX ORDER — LENALIDOMIDE 10 MG/1
10 CAPSULE ORAL DAILY
Qty: 21 CAPSULE | Refills: 0 | Status: SHIPPED | OUTPATIENT
Start: 2024-05-29

## 2024-05-29 NOTE — PROGRESS NOTES
Re: Refills of Oral Specialty Medication - Revlimid (lenalidomide)    Drug-Drug Interactions: The current medication list was reviewed and there are no relevant drug-drug interactions with the specialty medication.  Medication Allergies: The patient has no relevant allergies as it relates to their oral specialty medication  Review of Labs/Dose Adjustments: NO DOSE CHANGE - I reviewed the most recent note and labs and the patient will continue without any dose changes.  I sent refills as described below.    Drug: Revlimid (lenalidomide)  Strength: 10 mg  Directions: Take 1 capsule by mouth daily on days 1-21 of each 28 day cycle  Quantity: 21  Refills: 0  Pharmacy prescription sent to: [facility]    Name/Credentials: Figueroa Pleitez, Virginie, BCOP  Clinical Oncology Pharmacist    5/29/2024  16:21 EDT

## 2024-05-30 DIAGNOSIS — I10 HYPERTENSION, ESSENTIAL: ICD-10-CM

## 2024-05-30 RX ORDER — LOSARTAN POTASSIUM 100 MG/1
100 TABLET ORAL DAILY
Qty: 90 TABLET | Refills: 1 | Status: SHIPPED | OUTPATIENT
Start: 2024-05-30

## 2024-05-31 ENCOUNTER — LAB (OUTPATIENT)
Dept: OTHER | Facility: HOSPITAL | Age: 64
End: 2024-05-31
Payer: COMMERCIAL

## 2024-05-31 DIAGNOSIS — E85.81 AL AMYLOIDOSIS: ICD-10-CM

## 2024-05-31 DIAGNOSIS — D63.0 ANEMIA IN NEOPLASTIC DISEASE: ICD-10-CM

## 2024-05-31 DIAGNOSIS — D52.0 DIETARY FOLATE DEFICIENCY ANEMIA: ICD-10-CM

## 2024-05-31 DIAGNOSIS — D84.9 IMMUNOCOMPROMISED PATIENT: ICD-10-CM

## 2024-05-31 DIAGNOSIS — E87.6 HYPOKALEMIA: ICD-10-CM

## 2024-05-31 DIAGNOSIS — Z79.899 ENCOUNTER FOR LONG-TERM (CURRENT) USE OF HIGH-RISK MEDICATION: ICD-10-CM

## 2024-05-31 DIAGNOSIS — C90.00 MULTIPLE MYELOMA NOT HAVING ACHIEVED REMISSION: ICD-10-CM

## 2024-05-31 DIAGNOSIS — R74.8 ELEVATED LIVER ENZYMES: ICD-10-CM

## 2024-05-31 LAB
ALBUMIN SERPL-MCNC: 4.3 G/DL (ref 3.5–5.2)
ALBUMIN/GLOB SERPL: 1.4 G/DL
ALP SERPL-CCNC: 105 U/L (ref 39–117)
ALT SERPL W P-5'-P-CCNC: 44 U/L (ref 1–41)
ANION GAP SERPL CALCULATED.3IONS-SCNC: 9.2 MMOL/L (ref 5–15)
AST SERPL-CCNC: 29 U/L (ref 1–40)
BASOPHILS # BLD AUTO: 0.05 10*3/MM3 (ref 0–0.2)
BASOPHILS NFR BLD AUTO: 1.2 % (ref 0–1.5)
BILIRUB SERPL-MCNC: 0.6 MG/DL (ref 0–1.2)
BUN SERPL-MCNC: 14 MG/DL (ref 8–23)
BUN/CREAT SERPL: 16.1 (ref 7–25)
CALCIUM SPEC-SCNC: 9.1 MG/DL (ref 8.6–10.5)
CHLORIDE SERPL-SCNC: 105 MMOL/L (ref 98–107)
CO2 SERPL-SCNC: 24.8 MMOL/L (ref 22–29)
CREAT SERPL-MCNC: 0.87 MG/DL (ref 0.76–1.27)
DEPRECATED RDW RBC AUTO: 44.2 FL (ref 37–54)
EGFRCR SERPLBLD CKD-EPI 2021: 97.6 ML/MIN/1.73
EOSINOPHIL # BLD AUTO: 0.2 10*3/MM3 (ref 0–0.4)
EOSINOPHIL NFR BLD AUTO: 4.9 % (ref 0.3–6.2)
ERYTHROCYTE [DISTWIDTH] IN BLOOD BY AUTOMATED COUNT: 16 % (ref 12.3–15.4)
FOLATE SERPL-MCNC: >20 NG/ML (ref 4.78–24.2)
GLOBULIN UR ELPH-MCNC: 3 GM/DL
GLUCOSE SERPL-MCNC: 85 MG/DL (ref 65–99)
HCT VFR BLD AUTO: 42.3 % (ref 37.5–51)
HGB BLD-MCNC: 13 G/DL (ref 13–17.7)
IMM GRANULOCYTES # BLD AUTO: 0.02 10*3/MM3 (ref 0–0.05)
IMM GRANULOCYTES NFR BLD AUTO: 0.5 % (ref 0–0.5)
LYMPHOCYTES # BLD AUTO: 1.23 10*3/MM3 (ref 0.7–3.1)
LYMPHOCYTES NFR BLD AUTO: 30 % (ref 19.6–45.3)
MCH RBC QN AUTO: 23.5 PG (ref 26.6–33)
MCHC RBC AUTO-ENTMCNC: 30.7 G/DL (ref 31.5–35.7)
MCV RBC AUTO: 76.5 FL (ref 79–97)
MONOCYTES # BLD AUTO: 0.34 10*3/MM3 (ref 0.1–0.9)
MONOCYTES NFR BLD AUTO: 8.3 % (ref 5–12)
NEUTROPHILS NFR BLD AUTO: 2.26 10*3/MM3 (ref 1.7–7)
NEUTROPHILS NFR BLD AUTO: 55.1 % (ref 42.7–76)
NRBC BLD AUTO-RTO: 0 /100 WBC (ref 0–0.2)
PLATELET # BLD AUTO: 224 10*3/MM3 (ref 140–450)
PMV BLD AUTO: 10.1 FL (ref 6–12)
POTASSIUM SERPL-SCNC: 4.2 MMOL/L (ref 3.5–5.2)
PROT SERPL-MCNC: 7.3 G/DL (ref 6–8.5)
RBC # BLD AUTO: 5.53 10*6/MM3 (ref 4.14–5.8)
SODIUM SERPL-SCNC: 139 MMOL/L (ref 136–145)
WBC NRBC COR # BLD AUTO: 4.1 10*3/MM3 (ref 3.4–10.8)

## 2024-05-31 PROCEDURE — 82784 ASSAY IGA/IGD/IGG/IGM EACH: CPT | Performed by: INTERNAL MEDICINE

## 2024-05-31 PROCEDURE — 83521 IG LIGHT CHAINS FREE EACH: CPT | Performed by: INTERNAL MEDICINE

## 2024-05-31 PROCEDURE — 84165 PROTEIN E-PHORESIS SERUM: CPT | Performed by: INTERNAL MEDICINE

## 2024-05-31 PROCEDURE — 82746 ASSAY OF FOLIC ACID SERUM: CPT | Performed by: INTERNAL MEDICINE

## 2024-05-31 PROCEDURE — 85025 COMPLETE CBC W/AUTO DIFF WBC: CPT | Performed by: INTERNAL MEDICINE

## 2024-05-31 PROCEDURE — 36415 COLL VENOUS BLD VENIPUNCTURE: CPT

## 2024-05-31 PROCEDURE — 86334 IMMUNOFIX E-PHORESIS SERUM: CPT | Performed by: INTERNAL MEDICINE

## 2024-05-31 PROCEDURE — 80053 COMPREHEN METABOLIC PANEL: CPT | Performed by: INTERNAL MEDICINE

## 2024-06-03 LAB
ALBUMIN SERPL ELPH-MCNC: 3.9 G/DL (ref 2.9–4.4)
ALBUMIN/GLOB SERPL: 1.3 {RATIO} (ref 0.7–1.7)
ALPHA1 GLOB SERPL ELPH-MCNC: 0.2 G/DL (ref 0–0.4)
ALPHA2 GLOB SERPL ELPH-MCNC: 0.5 G/DL (ref 0.4–1)
B-GLOBULIN SERPL ELPH-MCNC: 1 G/DL (ref 0.7–1.3)
GAMMA GLOB SERPL ELPH-MCNC: 1.4 G/DL (ref 0.4–1.8)
GLOBULIN SER-MCNC: 3.2 G/DL (ref 2.2–3.9)
IGA SERPL-MCNC: 144 MG/DL (ref 61–437)
IGG SERPL-MCNC: 1524 MG/DL (ref 603–1613)
IGM SERPL-MCNC: 46 MG/DL (ref 20–172)
INTERPRETATION SERPL IEP-IMP: ABNORMAL
KAPPA LC FREE SER-MCNC: 29.9 MG/L (ref 3.3–19.4)
KAPPA LC FREE/LAMBDA FREE SER: 1.26 {RATIO} (ref 0.26–1.65)
LABORATORY COMMENT REPORT: ABNORMAL
LAMBDA LC FREE SERPL-MCNC: 23.7 MG/L (ref 5.7–26.3)
M PROTEIN SERPL ELPH-MCNC: ABNORMAL G/DL
PROT SERPL-MCNC: 7.1 G/DL (ref 6–8.5)

## 2024-06-17 ENCOUNTER — OFFICE VISIT (OUTPATIENT)
Dept: FAMILY MEDICINE CLINIC | Facility: CLINIC | Age: 64
End: 2024-06-17
Payer: COMMERCIAL

## 2024-06-17 VITALS
WEIGHT: 193 LBS | OXYGEN SATURATION: 99 % | DIASTOLIC BLOOD PRESSURE: 82 MMHG | HEIGHT: 70 IN | SYSTOLIC BLOOD PRESSURE: 124 MMHG | HEART RATE: 78 BPM | TEMPERATURE: 97.7 F | BODY MASS INDEX: 27.63 KG/M2 | RESPIRATION RATE: 18 BRPM

## 2024-06-17 DIAGNOSIS — I10 PRIMARY HYPERTENSION: ICD-10-CM

## 2024-06-17 DIAGNOSIS — E11.9 TYPE 2 DIABETES MELLITUS WITHOUT COMPLICATION, WITHOUT LONG-TERM CURRENT USE OF INSULIN: Primary | ICD-10-CM

## 2024-06-17 DIAGNOSIS — E78.2 HYPERLIPEMIA, MIXED: ICD-10-CM

## 2024-06-17 PROCEDURE — 99214 OFFICE O/P EST MOD 30 MIN: CPT | Performed by: FAMILY MEDICINE

## 2024-06-17 RX ORDER — AMOXICILLIN 250 MG/1
CAPSULE ORAL
COMMUNITY
Start: 2024-06-15

## 2024-06-17 NOTE — PROGRESS NOTES
"Chief Complaint  Diabetes (6m f/u ) and Hypertension (6m f/u )    Subjective        Duy Owens presents to [facility]  Diabetes    Hypertension      Patient presents today for labs, refills, and  Diabetes - sugars are in good range per pt. No numbness or tingling in feet.    Hypertension-no chest pains or headaches  Hyperlipidemia-on medication; active  He has doc checking his PSA levels.    Objective   Vital Signs:  /82 (BP Location: Left arm, Patient Position: Sitting, Cuff Size: Adult)   Pulse 78   Temp 97.7 °F (36.5 °C) (Tympanic)   Resp 18   Ht 177 cm (69.69\")   Wt 87.5 kg (193 lb)   SpO2 99%   BMI 27.94 kg/m²   Estimated body mass index is 27.94 kg/m² as calculated from the following:    Height as of this encounter: 177 cm (69.69\").    Weight as of this encounter: 87.5 kg (193 lb).               Physical Exam  Vitals and nursing note reviewed.   Constitutional:       Appearance: Normal appearance. He is well-developed.   Cardiovascular:      Rate and Rhythm: Normal rate and regular rhythm.      Heart sounds: Normal heart sounds. No murmur heard.  Pulmonary:      Effort: Pulmonary effort is normal. No respiratory distress.      Breath sounds: Normal breath sounds. No stridor. No wheezing or rhonchi.   Neurological:      General: No focal deficit present.      Mental Status: He is alert and oriented to person, place, and time. He is not disoriented.   Psychiatric:         Mood and Affect: Mood normal.         Behavior: Behavior normal.        Result Review :                     Assessment and Plan     Diagnoses and all orders for this visit:    1. Type 2 diabetes mellitus without complication, without long-term current use of insulin (Primary)  -     Hemoglobin A1c  -     Microalbumin / Creatinine Urine Ratio - Urine, Clean Catch    2. Hyperlipemia, mixed  -     Lipid Panel    3. Primary hypertension             Follow Up     Return in about 6 months (around " 12/17/2024) for diabetes, hypertension, hyperlipidema.  Patient was given instructions and counseling regarding his condition or for health maintenance advice. Please see specific information pulled into the AVS if appropriate.     Refills, labs and continue diet, exercise.    I have reviewed cmp done few weeks ago by specialist.

## 2024-06-18 LAB
ALBUMIN/CREAT UR: 25 MG/G CREAT (ref 0–29)
CHOLEST SERPL-MCNC: 150 MG/DL (ref 0–200)
CREAT UR-MCNC: 127.2 MG/DL
HBA1C MFR BLD: 5.8 % (ref 4.8–5.6)
HDLC SERPL-MCNC: 49 MG/DL (ref 40–60)
LDLC SERPL CALC-MCNC: 88 MG/DL (ref 0–100)
MICROALBUMIN UR-MCNC: 31.6 UG/ML
TRIGL SERPL-MCNC: 65 MG/DL (ref 0–150)
VLDLC SERPL CALC-MCNC: 13 MG/DL (ref 5–40)

## 2024-06-28 ENCOUNTER — SPECIALTY PHARMACY (OUTPATIENT)
Dept: PHARMACY | Facility: HOSPITAL | Age: 64
End: 2024-06-28
Payer: COMMERCIAL

## 2024-06-28 RX ORDER — LENALIDOMIDE 10 MG/1
10 CAPSULE ORAL DAILY
Qty: 21 CAPSULE | Refills: 0 | Status: SHIPPED | OUTPATIENT
Start: 2024-06-28

## 2024-06-28 NOTE — PROGRESS NOTES
Re: Refills of Oral Specialty Medication - Revlimid (lenalidomide)    Drug-Drug Interactions: The current medication list was reviewed and there are no relevant drug-drug interactions with the specialty medication.  Medication Allergies: The patient has no relevant allergies as it relates to their oral specialty medication  Review of Labs/Dose Adjustments: NO DOSE CHANGE - I reviewed the most recent note and labs and the patient will continue without any dose changes.  I sent refills as described below.    Drug: Revlimid (lenalidomide)  Strength: 10 mg  Directions: Take 1 capsule by mouth daily on days 1-21 of each 28 day cycle  Quantity: 21  Refills: 0  Pharmacy prescription sent to: [facility]    Name/Credentials: Figueroa Pleitez, ReinaD, BCOP  Clinical Oncology Pharmacist    6/28/2024  09:48 EDT

## 2024-06-28 NOTE — PROGRESS NOTES
Specialty Pharmacy Note: Revlimid (lenalidomide)    Drug: Revlimid (lenalidomide)  Strength: 10 mg  Directions: Take 1 capsule by mouth daily Take for 21 days on then 7 days off, then repeat.  Quantity: 21  Refills: 0    Pharmacy prescription sent to: [facility]    Completed independent double check on medication order/RX.      Thanks,    Jade BERNSTEIN, PharmD

## 2024-07-03 ENCOUNTER — LAB (OUTPATIENT)
Dept: OTHER | Facility: HOSPITAL | Age: 64
End: 2024-07-03
Payer: COMMERCIAL

## 2024-07-03 ENCOUNTER — OFFICE VISIT (OUTPATIENT)
Dept: ONCOLOGY | Facility: CLINIC | Age: 64
End: 2024-07-03
Payer: COMMERCIAL

## 2024-07-03 VITALS
OXYGEN SATURATION: 100 % | TEMPERATURE: 97.7 F | WEIGHT: 193 LBS | HEIGHT: 70 IN | SYSTOLIC BLOOD PRESSURE: 129 MMHG | DIASTOLIC BLOOD PRESSURE: 78 MMHG | BODY MASS INDEX: 27.63 KG/M2 | HEART RATE: 58 BPM | RESPIRATION RATE: 16 BRPM

## 2024-07-03 DIAGNOSIS — D52.0 DIETARY FOLATE DEFICIENCY ANEMIA: ICD-10-CM

## 2024-07-03 DIAGNOSIS — D84.9 IMMUNOCOMPROMISED PATIENT: ICD-10-CM

## 2024-07-03 DIAGNOSIS — E87.6 HYPOKALEMIA: ICD-10-CM

## 2024-07-03 DIAGNOSIS — R74.8 ELEVATED LIVER ENZYMES: ICD-10-CM

## 2024-07-03 DIAGNOSIS — E85.81 AL AMYLOIDOSIS: ICD-10-CM

## 2024-07-03 DIAGNOSIS — Z79.899 ENCOUNTER FOR LONG-TERM (CURRENT) USE OF HIGH-RISK MEDICATION: ICD-10-CM

## 2024-07-03 DIAGNOSIS — D63.0 ANEMIA IN NEOPLASTIC DISEASE: ICD-10-CM

## 2024-07-03 DIAGNOSIS — C90.00 MULTIPLE MYELOMA NOT HAVING ACHIEVED REMISSION: ICD-10-CM

## 2024-07-03 DIAGNOSIS — C90.00 MULTIPLE MYELOMA NOT HAVING ACHIEVED REMISSION: Primary | ICD-10-CM

## 2024-07-03 LAB
ALBUMIN SERPL-MCNC: 4.3 G/DL (ref 3.5–5.2)
ALBUMIN/GLOB SERPL: 1.4 G/DL
ALP SERPL-CCNC: 117 U/L (ref 39–117)
ALT SERPL W P-5'-P-CCNC: 51 U/L (ref 1–41)
ANION GAP SERPL CALCULATED.3IONS-SCNC: 8.9 MMOL/L (ref 5–15)
AST SERPL-CCNC: 38 U/L (ref 1–40)
BASOPHILS # BLD AUTO: 0.05 10*3/MM3 (ref 0–0.2)
BASOPHILS NFR BLD AUTO: 1 % (ref 0–1.5)
BILIRUB SERPL-MCNC: 0.3 MG/DL (ref 0–1.2)
BUN SERPL-MCNC: 17 MG/DL (ref 8–23)
BUN/CREAT SERPL: 17.9 (ref 7–25)
CALCIUM SPEC-SCNC: 9.2 MG/DL (ref 8.6–10.5)
CHLORIDE SERPL-SCNC: 106 MMOL/L (ref 98–107)
CO2 SERPL-SCNC: 26.1 MMOL/L (ref 22–29)
CREAT SERPL-MCNC: 0.95 MG/DL (ref 0.76–1.27)
DEPRECATED RDW RBC AUTO: 45.3 FL (ref 37–54)
EGFRCR SERPLBLD CKD-EPI 2021: 90.5 ML/MIN/1.73
EOSINOPHIL # BLD AUTO: 0.27 10*3/MM3 (ref 0–0.4)
EOSINOPHIL NFR BLD AUTO: 5.4 % (ref 0.3–6.2)
ERYTHROCYTE [DISTWIDTH] IN BLOOD BY AUTOMATED COUNT: 16.3 % (ref 12.3–15.4)
FERRITIN SERPL-MCNC: 303.8 NG/ML (ref 30–400)
FOLATE SERPL-MCNC: >20 NG/ML (ref 4.78–24.2)
GLOBULIN UR ELPH-MCNC: 3.1 GM/DL
GLUCOSE SERPL-MCNC: 89 MG/DL (ref 65–99)
HCT VFR BLD AUTO: 41.9 % (ref 37.5–51)
HGB BLD-MCNC: 12.6 G/DL (ref 13–17.7)
IMM GRANULOCYTES # BLD AUTO: 0.02 10*3/MM3 (ref 0–0.05)
IMM GRANULOCYTES NFR BLD AUTO: 0.4 % (ref 0–0.5)
IRON 24H UR-MRATE: 90 MCG/DL (ref 59–158)
IRON SATN MFR SERPL: 25 % (ref 20–50)
LYMPHOCYTES # BLD AUTO: 1.54 10*3/MM3 (ref 0.7–3.1)
LYMPHOCYTES NFR BLD AUTO: 30.7 % (ref 19.6–45.3)
MCH RBC QN AUTO: 23.2 PG (ref 26.6–33)
MCHC RBC AUTO-ENTMCNC: 30.1 G/DL (ref 31.5–35.7)
MCV RBC AUTO: 77.2 FL (ref 79–97)
MONOCYTES # BLD AUTO: 0.82 10*3/MM3 (ref 0.1–0.9)
MONOCYTES NFR BLD AUTO: 16.4 % (ref 5–12)
NEUTROPHILS NFR BLD AUTO: 2.31 10*3/MM3 (ref 1.7–7)
NEUTROPHILS NFR BLD AUTO: 46.1 % (ref 42.7–76)
NRBC BLD AUTO-RTO: 0 /100 WBC (ref 0–0.2)
PLATELET # BLD AUTO: 186 10*3/MM3 (ref 140–450)
PMV BLD AUTO: 8.8 FL (ref 6–12)
POTASSIUM SERPL-SCNC: 4.5 MMOL/L (ref 3.5–5.2)
PROT SERPL-MCNC: 7.4 G/DL (ref 6–8.5)
RBC # BLD AUTO: 5.43 10*6/MM3 (ref 4.14–5.8)
SODIUM SERPL-SCNC: 141 MMOL/L (ref 136–145)
TIBC SERPL-MCNC: 367 MCG/DL (ref 298–536)
TRANSFERRIN SERPL-MCNC: 246 MG/DL (ref 200–360)
VIT B12 BLD-MCNC: 575 PG/ML (ref 211–946)
WBC NRBC COR # BLD AUTO: 5.01 10*3/MM3 (ref 3.4–10.8)

## 2024-07-03 PROCEDURE — 80053 COMPREHEN METABOLIC PANEL: CPT | Performed by: INTERNAL MEDICINE

## 2024-07-03 PROCEDURE — 83521 IG LIGHT CHAINS FREE EACH: CPT | Performed by: INTERNAL MEDICINE

## 2024-07-03 PROCEDURE — 84165 PROTEIN E-PHORESIS SERUM: CPT | Performed by: INTERNAL MEDICINE

## 2024-07-03 PROCEDURE — 82784 ASSAY IGA/IGD/IGG/IGM EACH: CPT | Performed by: INTERNAL MEDICINE

## 2024-07-03 PROCEDURE — 99214 OFFICE O/P EST MOD 30 MIN: CPT | Performed by: INTERNAL MEDICINE

## 2024-07-03 PROCEDURE — 82728 ASSAY OF FERRITIN: CPT | Performed by: INTERNAL MEDICINE

## 2024-07-03 PROCEDURE — 82607 VITAMIN B-12: CPT | Performed by: INTERNAL MEDICINE

## 2024-07-03 PROCEDURE — 36415 COLL VENOUS BLD VENIPUNCTURE: CPT

## 2024-07-03 PROCEDURE — 84466 ASSAY OF TRANSFERRIN: CPT | Performed by: INTERNAL MEDICINE

## 2024-07-03 PROCEDURE — 83540 ASSAY OF IRON: CPT | Performed by: INTERNAL MEDICINE

## 2024-07-03 PROCEDURE — 86334 IMMUNOFIX E-PHORESIS SERUM: CPT | Performed by: INTERNAL MEDICINE

## 2024-07-03 PROCEDURE — 85025 COMPLETE CBC W/AUTO DIFF WBC: CPT | Performed by: INTERNAL MEDICINE

## 2024-07-03 PROCEDURE — 82746 ASSAY OF FOLIC ACID SERUM: CPT | Performed by: INTERNAL MEDICINE

## 2024-07-03 NOTE — PROGRESS NOTES
"Subjective     CHIEF COMPLAINT:      Chief Complaint   Patient presents with    Follow-up     HISTORY OF PRESENT ILLNESS:     Duy Owens is a 63 y.o. male patient who returns today for follow up on his multiple myeloma and secondary amyloidosis.  He returns today for follow-up reporting no new symptoms.  He is tolerating Revlimid.  No diarrhea.  No skin rash.  No new areas of bone pain.  No recent infections.    ROS:  Pertinent ROS is in the HPI.     Past medical, surgical, social and family history were reviewed.     MEDICATIONS:    Current Outpatient Medications:     aspirin 81 MG EC tablet, Take 1 tablet by mouth Daily., Disp: , Rfl:     folic acid (FOLVITE) 1 MG tablet, Take 1 tablet by mouth Daily., Disp: , Rfl:     lenalidomide (REVLIMID) 10 MG capsule, Take 1 capsule by mouth Daily. Take for 21 days on, then 7 days off, then repeat., Disp: 21 capsule, Rfl: 0    losartan (COZAAR) 100 MG tablet, TAKE 1 TABLET BY MOUTH DAILY, Disp: 90 tablet, Rfl: 1    metFORMIN (GLUCOPHAGE) 500 MG tablet, Take 1 tablet by mouth 2 (Two) Times a Day., Disp: 180 tablet, Rfl: 1      Objective     VITAL SIGNS:     Vitals:    07/03/24 0916   BP: 129/78   Pulse: 58   Resp: 16   Temp: 97.7 °F (36.5 °C)   TempSrc: Oral   SpO2: 100%   Weight: 87.5 kg (193 lb)   Height: 177 cm (69.69\")   PainSc: 0-No pain     Body mass index is 27.94 kg/m².     Wt Readings from Last 5 Encounters:   07/03/24 87.5 kg (193 lb)   06/17/24 87.5 kg (193 lb)   05/01/24 88.1 kg (194 lb 4.8 oz)   03/04/24 91.6 kg (202 lb)   01/03/24 91.7 kg (202 lb 1.6 oz)     PHYSICAL EXAMINATION:   GENERAL: The patient appears in good general condition, not in acute distress.   SKIN: No ecchymosis.  EYES: No jaundice. No Pallor.  CHEST: Normal respiratory effort.  Lungs clear bilaterally.  No added sounds.  CVS: Normal S1-S2.  No murmurs.  ABDOMEN: Soft. No tenderness. No Hepatomegaly. No Splenomegaly. No masses.  EXTREMITIES: No edema.  No calf tenderness.    DIAGNOSTIC " DATA:     Results from last 7 days   Lab Units 07/03/24  0854   WBC 10*3/mm3 5.01   NEUTROS ABS 10*3/mm3 2.31   HEMOGLOBIN g/dL 12.6*   HEMATOCRIT % 41.9   PLATELETS 10*3/mm3 186     Results from last 7 days   Lab Units 07/03/24  0854   SODIUM mmol/L 141   POTASSIUM mmol/L 4.5   CHLORIDE mmol/L 106   CO2 mmol/L 26.1   BUN mg/dL 17   CREATININE mg/dL 0.95   CALCIUM mg/dL 9.2   ALBUMIN g/dL 4.3   BILIRUBIN mg/dL 0.3   ALK PHOS U/L 117   ALT (SGPT) U/L 51*   AST (SGOT) U/L 38   GLUCOSE mg/dL 89         Lab 07/03/24  0854   IRON 90   IRON SATURATION (TSAT) 25   TIBC 367   TRANSFERRIN 246   FERRITIN 303.80   FOLATE >20.00   VITAMIN B 12 575      Component      Latest Ref Rng 3/27/2024 5/1/2024 5/31/2024   IgG      603 - 1613 mg/dL 1421  1384  1524    IgA      61 - 437 mg/dL 146  129  144    IgM      20 - 172 mg/dL 43  38  46    Total Protein      6.0 - 8.5 g/dL 7.0  6.9  7.1    Albumin      2.9 - 4.4 g/dL 3.9  3.7  3.9    Alpha-1-Globulin      0.0 - 0.4 g/dL 0.2  0.3  0.2    Alpha-2-Globulin      0.4 - 1.0 g/dL 0.6  0.5  0.5    Beta Globulin      0.7 - 1.3 g/dL 1.0  1.0  1.0    Gamma Globulin      0.4 - 1.8 g/dL 1.3  1.3  1.4    M-Werner      Not Observed g/dL Not Observed  Not Observed  Not Observed    Globulin      2.2 - 3.9 g/dL 3.1  3.2  3.2    A/G Ratio      0.7 - 1.7  1.3  1.2  1.3    Immunofixation Reflex, Serum Comment  Comment  Comment    Please note Comment  Comment  Comment    Kappa FLC      3.3 - 19.4 mg/L 27.0 (H)  28.0 (H)  29.9 (H)    Free Lambda Light Chains      5.7 - 26.3 mg/L 23.6  24.1  23.7    Kappa/Lambda Ratio      0.26 - 1.65  1.14  1.16  1.26       Assessment & Plan    *Lambda light chain multiple myeloma with lymphadenopathy and development of AL amyloidosis.  Patient was found to have lymph node involvement and amyloid deposition in the involved lymph nodes.  Patient started having dry cough around July 2021.    CT scans 9/23/2021-9/24/2021 revealed inferior mediastinal adenopathy and  retroperitoneal adenopathy extending to the right iliac chain.  The Largest lymph node was in the retroperitoneal area measuring 4.8 x 3.5 cm.  The common iliac lymph node measured 3.5 x 2.7 cm.  The left external iliac chain lymph node measured 2.9 x 2 cm.    PET scan on 10/5/2021 revealed the retroperitoneal and left pelvic lymphadenopathy to be hypermetabolic.  The left periaortic lymph nodes had SUV of 6.9 and the left pelvic sidewall lymph nodes had an SUV of 5.4.  No bone involvement.  CT-guided biopsy on 10/6/2021- pathologist at [facility] reported involvement with monotypic lambda restricted plasma cells concerning for involvement with plasma cell dyscrasia.  Bone marrow biopsy on 10/29/2021 revealed a normocellular marrow (50%) with involvement with plasma cell dyscrasia (plasma cells representing 20-30% of total cells by  stain).   FISH was negative for gain of 1q, monosomy/deletions of chromosomes 13 and 17, IGH rearrangement, gain of chromosomes 9 and 11, IGH-CCND1 (11;14) fusion.   Treatment with the VRD started on 12/22/2021.  Free lambda light chain started to improve after the patient started treatment.  CT scan on 3/11/2022 revealed decrease in the size of lymph nodes in the periaortic area.  There is a slight increase in a lymph node in the left axillary area that increased from 7 to 10 mm.    PET scan on 4/28/2022 revealed significant reduction in the size and hypermetabolism of the involved lymph nodes.  SPEP REGINO FLC on 5/25/2022 revealed no M protein.  Free lambda light chain was 41.8.  Kappa to lambda ratio was normal at 0.26.  B2 M was 1.6.  Bone marrow biopsy on 5/24/2022 revealed normocellular marrow with 30-40% cellularity with involvement by plasma cell neoplasm representing 5-10% by IHC.  Negative for amyloid.  He was considered to be SD-1.  S/p high-dose melphalan with autologous stem cell transplant.  Day 0 was 7/7/2022.  Patient did well with the transplant but had neutropenic  fever of unclear source necessitating hospitalization between 7/16/2022 and 7/18/2022.    Bone marrow biopsy on 9/29/2022 revealed monotypic plasma cells identified on on flow cytometry but not on IHC.  CT on 9/29/2022 showed decrease in the size of the enlarged lymph nodes.  Patient started Revlimid 10 mg daily for 21 out of 28-day cycle on 11/1/2022.    CT on 2/23/2023 showed decrease in the size of the mediastinal and retroperitoneal lymph nodes indicating response to treatment.  2/23/2023-Free kappa light chain 1.94, free lambda light chain 2.19, kappa/lambda ratio normal at 0.89.  5/17/2023: Free kappa light chain was 31.5.  Free lambda light chain was 24.5.  Kappa 3 lambda ratio normal at 1.29.  6/14/2023: Free kappa light chain was 31.0.  Free lambda light chain was 23.7.  Kappa to lambda ratio was 1.31.   Patient had follow-up at [place] on 8/22/2023.    CT scan showed decrease in the size of the enlarged lymph nodes.  No new abnormalities were seen.  9/6/2023: Kappa FLC 30.0.  Lambda FLC 25.8.  Kappa to lambda ratio 1.16.  No M protein.  11/1/2023: Kappa FLC 32.9. Lambda FLC 25.8. K:L ratio 1.28.  CT on 2/20/2024 showed stable lymphadenopathy.  3/27/2024: Kappa FLC 27.0.  Lambda FLC 23.6.  K/L ratio 1.14.  5/31/2024: SPEP REGINO FLC revealed no M protein.  Kappa FLC 29.9.  Lambda FLC 23.7.  Kappa/lambda ratio 1.26.  No evidence of relapse of his multiple myeloma.  He is tolerating the maintenance Revlimid well.  He is today on day #15.    *Anemia secondary to multiple myeloma and secondary to treatment.  Hemoglobin was 13.1 on 9/29/2021.    Iron, folate and vitamin B12 were normal in September/October 2021.    Hemoglobin was 11.4 on 8/24/2022.  Hemoglobin improved to the 11-12 g range.  Folate was low at 5.5.   He was started on folic acid 1 mg daily.  2/28/2024: Folate increased to >20.  5/1/2024: Hemoglobin 11.8.  7/3/2024: Hemoglobin improved to 12.6.  He has adequate iron stores.  Vitamin B12 575.   Folate improved to >20.    *Prophylaxis.  He was previously on acyclovir 400 mg twice daily.    He is now on Valtrex.  He received Evusheld on 8/20/2022.  Patient received vaccinations as recommended by [place].  He received COVID-19 Moderna booster on 10/25/2022.  He received vaccines in April and June.  He received the first Shingrix vaccine in August 2023 and in February 2024.  No recent infections.    *Elevated liver enzymes.  Liver enzymes have fluctuated over time.  CT on 2/20/2024 showed no liver or gallbladder abnormality.  Patient does not drink alcohol.  5/1/2024: ALT 63.  AST 52.  Bilirubin 0.3.  Alkaline phosphatase 118.  7/3/2024: ALT improved to 51.  AST improved to 38.    *Hypokalemia.  5/1/2024: Potassium decreased to 3.4.  He was advised to increase intake of potassium rich foods.  7/3/2024: Potassium improved to 4.5.    *Syncope.  Patient reports having an episode of passing out while he was outdoors watching a game.  He did not feel dizzy prior to the episode. He was well hydrated.  He regained consciousness quickly.   EMS was called. He declined going to ER at that time.   He was referred to cardiology.  He is going to have echocardiogram done.  No recurrence of the episodes.  He has mild bradycardia today but he is asymptomatic.    *Severe muscle spasm of the cervical-thoracic spine.  He reports having weakness of the right lower extremity.  He fell off a ladder recently due to weakness of the right LE.  I recommended evaluation with an MRI of the spine.   He complained of muscle spasm of other muscles.  MRI showed no lesions from multiple myeloma.  He was referred to neurosurgery.  Conservative management was recommended for DJD.  No recurrence of the symptoms.    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  There was mild wall thickening.  Ejection fraction was normal.  Echocardiogram at [facility] on 4/21/2022 did not reveal wall thickening.    PLAN:    1.  Continue  maintenance Revlimid 10 mg daily for 21 days out of a 28-day cycle.  He is on day #15 today.  2.  Continue aspirin 81 mg daily.  3.  Reduce folic acid to once weekly.  4.  Return in 1 month for CBC CMP SPEP REGINO and FLC.  5.  He has follow-up at [place] in late August.  We will see him in follow-up in 3 months.  We will obtain CBC CMP SPEP REGINO FLC.        Tima Mendez MD  07/03/24

## 2024-07-05 ENCOUNTER — TELEPHONE (OUTPATIENT)
Dept: ONCOLOGY | Facility: CLINIC | Age: 64
End: 2024-07-05
Payer: COMMERCIAL

## 2024-07-05 LAB
ALBUMIN SERPL ELPH-MCNC: 4.1 G/DL (ref 2.9–4.4)
ALBUMIN/GLOB SERPL: 1.4 {RATIO} (ref 0.7–1.7)
ALPHA1 GLOB SERPL ELPH-MCNC: 0.2 G/DL (ref 0–0.4)
ALPHA2 GLOB SERPL ELPH-MCNC: 0.5 G/DL (ref 0.4–1)
B-GLOBULIN SERPL ELPH-MCNC: 1.1 G/DL (ref 0.7–1.3)
GAMMA GLOB SERPL ELPH-MCNC: 1.3 G/DL (ref 0.4–1.8)
GLOBULIN SER-MCNC: 3.1 G/DL (ref 2.2–3.9)
IGA SERPL-MCNC: 154 MG/DL (ref 61–437)
IGG SERPL-MCNC: 1510 MG/DL (ref 603–1613)
IGM SERPL-MCNC: 37 MG/DL (ref 20–172)
INTERPRETATION SERPL IEP-IMP: ABNORMAL
KAPPA LC FREE SER-MCNC: 31.7 MG/L (ref 3.3–19.4)
KAPPA LC FREE/LAMBDA FREE SER: 1.21 {RATIO} (ref 0.26–1.65)
LABORATORY COMMENT REPORT: ABNORMAL
LAMBDA LC FREE SERPL-MCNC: 26.2 MG/L (ref 5.7–26.3)
M PROTEIN SERPL ELPH-MCNC: ABNORMAL G/DL
PROT SERPL-MCNC: 7.2 G/DL (ref 6–8.5)

## 2024-07-05 NOTE — TELEPHONE ENCOUNTER
----- Message from Tima Mendez sent at 7/3/2024  6:06 PM EDT -----  Please inform the patient that folate level is good.  I recommend reducing folic acid to once a week.    Thank you

## 2024-07-08 ENCOUNTER — SPECIALTY PHARMACY (OUTPATIENT)
Dept: PHARMACY | Facility: HOSPITAL | Age: 64
End: 2024-07-08
Payer: COMMERCIAL

## 2024-07-08 NOTE — PROGRESS NOTES
Specialty Pharmacy Note: Revlimid (lenalidomide)    Duy Owens is a 63 y.o. male with multiple myeloma was seen 7/3/24 by Dr. Mendez. Per provider dictation, no changes to oral oncology regimen Revlimid (lenalidomide).  Labs Review: The CMP and CBC from 7/3/24 have been reviewed. No dose adjustments are needed for the oral specialty medication(s) based on the labs.    Specialty pharmacy will continue to follow patient.    Figueroa Pleitez, Virginie, [facility]  Clinical Oncology Pharmacist    7/8/2024  16:05 EDT

## 2024-07-24 ENCOUNTER — SPECIALTY PHARMACY (OUTPATIENT)
Dept: PHARMACY | Facility: HOSPITAL | Age: 64
End: 2024-07-24
Payer: COMMERCIAL

## 2024-07-24 RX ORDER — LENALIDOMIDE 10 MG/1
10 CAPSULE ORAL DAILY
Qty: 21 CAPSULE | Refills: 0 | Status: SHIPPED | OUTPATIENT
Start: 2024-07-24

## 2024-07-24 NOTE — PROGRESS NOTES
Re: Refills of Oral Specialty Medication - Revlimid (lenalidomide)    Drug-Drug Interactions: The current medication list was reviewed and there are no relevant drug-drug interactions with the specialty medication.  Medication Allergies: The patient has no relevant allergies as it relates to their oral specialty medication  Review of Labs/Dose Adjustments: NO DOSE CHANGE - I reviewed the most recent note and labs and the patient will continue without any dose changes.  I sent refills as described below.    Drug: Revlimid (lenalidomide)  Strength: 10 mg  Directions: Take 1 capsule by mouth daily on days 1-21 of each 28 day cycle  Quantity: 21  Refills: 0  Pharmacy prescription sent to: [facility] (free drug[facility]    Name/Credentials: Figueroa Pleitez, ReinaD, BCOP  Clinical Oncology Pharmacist    7/24/2024  11:39 EDT

## 2024-07-25 ENCOUNTER — SPECIALTY PHARMACY (OUTPATIENT)
Dept: PHARMACY | Facility: HOSPITAL | Age: 64
End: 2024-07-25
Payer: COMMERCIAL

## 2024-07-25 NOTE — PROGRESS NOTES
Called patient for 6-month check in, no answer. Left voicemail to return my call at direct line [phone].    Bijan Larson  Pharmacy Intern

## 2024-07-26 ENCOUNTER — SPECIALTY PHARMACY (OUTPATIENT)
Dept: PHARMACY | Facility: HOSPITAL | Age: 64
End: 2024-07-26
Payer: COMMERCIAL

## 2024-07-26 NOTE — PROGRESS NOTES
Specialty Pharmacy Patient Management Program  Oncology 6-Month Clinical Assessment       Duy Owens is a 63 y.o. male with multiple myeloma seen today to assess adherence and side effects.    Regimen: Revlimid 10 mg po daily for 21 days on, then 7 days off    Reason for Outreach: Routine medication check-in .       Problem list reviewed by Sepideh Avila RPH on 7/26/2024 at  8:26 AM  Medicines reviewed by Sepideh Avila RPH on 7/26/2024 at  8:26 AM  Allergies reviewed by Sepideh Avila RPH on 7/26/2024 at  8:26 AM     Goals Addressed Today        Specialty Pharmacy General Goal      SPEP REGINO FLC  will show no monoclonal proteins.   7/26/24 per dictation  5/31/2024: SPEP REGINO FLC revealed no M protein. Kappa FLC 29.9. Lambda FLC 23.7. Kappa/lambda ratio 1.26.             Medication Assessment:  Medication Assessment  Follow Up Clinical Assessment  Linked Medication(s) Assessed: Lenalidomide  Therapeutic appropriateness: Appropriate  Medication tolerability: Tolerating with no to minimal ADRs  Medication plan: Continue therapy with normal follow-up  Quality of Life Improvement Scale: 9-A good deal better  Comments on Quality of Life: No issues reported today  Administration: as prescribed .   Patient can self administer medications: yes  Medication Follow-Up Plan: Next clinical assessment  Lab Review: The labs listed below have been reviewed. No dose adjustments are needed for the oral specialty medication(s) based on the labs.    Lab Results   Component Value Date    GLUCOSE 89 07/03/2024    CALCIUM 9.2 07/03/2024     07/03/2024    K 4.5 07/03/2024    CO2 26.1 07/03/2024     07/03/2024    BUN 17 07/03/2024    CREATININE 0.95 07/03/2024    EGFRIFAFRI 88 02/16/2022    EGFRIFNONA 84 05/03/2021    BCR 17.9 07/03/2024    ANIONGAP 8.9 07/03/2024     Lab Results   Component Value Date    WBC 5.01 07/03/2024    RBC 5.43 07/03/2024    HGB 12.6 (L) 07/03/2024    HCT 41.9 07/03/2024    MCV 77.2 (L)  07/03/2024    MCH 23.2 (L) 07/03/2024    MCHC 30.1 (L) 07/03/2024    RDW 16.3 (H) 07/03/2024    RDWSD 45.3 07/03/2024    MPV 8.8 07/03/2024     07/03/2024    NEUTRORELPCT 46.1 07/03/2024    LYMPHORELPCT 30.7 07/03/2024    MONORELPCT 16.4 (H) 07/03/2024    EOSRELPCT 5.4 07/03/2024    BASORELPCT 1.0 07/03/2024    AUTOIGPER 0.4 07/03/2024    NEUTROABS 2.31 07/03/2024    LYMPHSABS 1.54 07/03/2024    MONOSABS 0.82 07/03/2024    EOSABS 0.27 07/03/2024    BASOSABS 0.05 07/03/2024    AUTOIGNUM 0.02 07/03/2024    NRBC 0.0 07/03/2024     Drug-drug interactions  Completed medication reconciliation today to assess for drug interactions. Patient denies starting or stopping any medications.    Assessed medication list for interactions, no significant drug interactions noted.   Advised patient to call the clinic if any new medications are started so we can assess for drug-drug interactions.  Drug-food interactions discussed:  none of concern  Vaccines are coordinated by the patient's oncologist and primary care provider.    Allergies  Known allergies and reactions were discussed with the patient. The patient's chart has been reviewed for allergy information and updated as necessary.   Allergies   Allergen Reactions    Lisinopril Cough        Hospitalizations and Urgent Care Visits Since Last Assessment:  Unplanned hospitalizations or inpatient admissions: no  ED Visits: no  Urgent Office Visits: no    Adherence Assessment:  Adherence Questions  Linked Medication(s) Assessed: Lenalidomide  On average, how many doses/injections does the patient miss per month?: 0  What are the identified reasons for non-adherence or missed doses? : no problems identified  What is the estimated medication adherence level?: %    Quality of Life Assessment:  Quality of Life Assessment  Quality of Life Improvement Scale: 9-A good deal better  Comments on Quality of Life: No issues reported today  -- Quality of Life: 9/10    Financial  Assessment:  Medication availability/affordability: Patient has had no issues obtaining medication from pharmacy.    Attestation     I attest the patient was actively involved in and has agreed to the above plan of care.  If the prescribed therapy is at any point deemed not appropriate based on the current or future assessments, a consultation will be initiated with the patient's specialty care provider to determine the best course of action. The revised plan of therapy will be documented along with any required assessments and/or additional patient education provided.       All questions addressed and patient had no additional concerns. Patient has pharmacy contact information.    Name/Credentials Sepideh Avila Rph, GILLIAN    7/26/2024  08:31 EDT

## 2024-07-29 ENCOUNTER — SPECIALTY PHARMACY (OUTPATIENT)
Dept: PHARMACY | Facility: HOSPITAL | Age: 64
End: 2024-07-29
Payer: COMMERCIAL

## 2024-07-30 ENCOUNTER — SPECIALTY PHARMACY (OUTPATIENT)
Dept: PHARMACY | Facility: HOSPITAL | Age: 64
End: 2024-07-30
Payer: COMMERCIAL

## 2024-07-30 NOTE — PROGRESS NOTES
I have submitted a Prior Authorization Request for Brand Revlimid through the [employer] Website and I'm waiting for a determination.     Diane De La O Coordinator   7/30/2024  16:35 EDT    ADDENDUM  The Prior Authorization for Brand Revlimid is denied. I am moving forward with the Free Drug Renewal Application.     Diane De La O Coordinator   8/2/2024  10:31 EDT

## 2024-07-30 NOTE — PROGRESS NOTES
Per Dustin mayberry/[employer], The patient must use [facility] so [email or web address] can not offer any assistance.     I am going to submit a PA for Brand Revlimid in an attempt to secure a co-pay card. If the brand is denied, we will submit a renewal for Free Revlimid.     Diane Higginbotham - Care Coordinator   7/30/2024  13:29 EDT

## 2024-08-01 ENCOUNTER — TELEPHONE (OUTPATIENT)
Dept: ONCOLOGY | Facility: CLINIC | Age: 64
End: 2024-08-01
Payer: COMMERCIAL

## 2024-08-01 NOTE — TELEPHONE ENCOUNTER
Caller: Duy Owens    Relationship: Self    Best call back number: [phone]    What is the best time to reach you: ANYTIME    Who are you requesting to speak with (clinical staff, provider,  specific staff member): CLINICAL    What was the call regarding: PATIENT IS GOING TO [place] FOR HIS APPT 8/20 - F/U WITH DR MESSER IS SCHEDULED 9/25, IS THIS CORRECT OR SHOULD IT BE CLOSER TO THE [place] APPT?     PLEASE CALL TO ADVISE.

## 2024-08-02 NOTE — TELEPHONE ENCOUNTER
Tima Mendez MD  You1 hour ago (10:20 AM)       Please keep the appointments as scheduled-to be seen 1 month after his [place] visit.     Reviewed Dr. Mendez's message with the pt, he v/u.

## 2024-08-07 ENCOUNTER — LAB (OUTPATIENT)
Dept: OTHER | Facility: HOSPITAL | Age: 64
End: 2024-08-07
Payer: COMMERCIAL

## 2024-08-07 DIAGNOSIS — E85.81 AL AMYLOIDOSIS: ICD-10-CM

## 2024-08-07 DIAGNOSIS — C90.00 MULTIPLE MYELOMA NOT HAVING ACHIEVED REMISSION: ICD-10-CM

## 2024-08-07 DIAGNOSIS — D63.0 ANEMIA IN NEOPLASTIC DISEASE: ICD-10-CM

## 2024-08-07 LAB
ALBUMIN SERPL-MCNC: 4.1 G/DL (ref 3.5–5.2)
ALBUMIN/GLOB SERPL: 1.2 G/DL
ALP SERPL-CCNC: 104 U/L (ref 39–117)
ALT SERPL W P-5'-P-CCNC: 52 U/L (ref 1–41)
ANION GAP SERPL CALCULATED.3IONS-SCNC: 8.9 MMOL/L (ref 5–15)
AST SERPL-CCNC: 30 U/L (ref 1–40)
BASOPHILS # BLD AUTO: 0.06 10*3/MM3 (ref 0–0.2)
BASOPHILS NFR BLD AUTO: 1.4 % (ref 0–1.5)
BILIRUB SERPL-MCNC: 0.6 MG/DL (ref 0–1.2)
BUN SERPL-MCNC: 13 MG/DL (ref 8–23)
BUN/CREAT SERPL: 12.6 (ref 7–25)
CALCIUM SPEC-SCNC: 8.9 MG/DL (ref 8.6–10.5)
CHLORIDE SERPL-SCNC: 104 MMOL/L (ref 98–107)
CO2 SERPL-SCNC: 26.1 MMOL/L (ref 22–29)
CREAT SERPL-MCNC: 1.03 MG/DL (ref 0.76–1.27)
DEPRECATED RDW RBC AUTO: 44.8 FL (ref 37–54)
EGFRCR SERPLBLD CKD-EPI 2021: 82.1 ML/MIN/1.73
EOSINOPHIL # BLD AUTO: 0.28 10*3/MM3 (ref 0–0.4)
EOSINOPHIL NFR BLD AUTO: 6.6 % (ref 0.3–6.2)
ERYTHROCYTE [DISTWIDTH] IN BLOOD BY AUTOMATED COUNT: 16.3 % (ref 12.3–15.4)
GLOBULIN UR ELPH-MCNC: 3.4 GM/DL
GLUCOSE SERPL-MCNC: 87 MG/DL (ref 65–99)
HCT VFR BLD AUTO: 40.2 % (ref 37.5–51)
HGB BLD-MCNC: 12.2 G/DL (ref 13–17.7)
IMM GRANULOCYTES # BLD AUTO: 0.02 10*3/MM3 (ref 0–0.05)
IMM GRANULOCYTES NFR BLD AUTO: 0.5 % (ref 0–0.5)
LYMPHOCYTES # BLD AUTO: 1.42 10*3/MM3 (ref 0.7–3.1)
LYMPHOCYTES NFR BLD AUTO: 33.3 % (ref 19.6–45.3)
MCH RBC QN AUTO: 23.2 PG (ref 26.6–33)
MCHC RBC AUTO-ENTMCNC: 30.3 G/DL (ref 31.5–35.7)
MCV RBC AUTO: 76.4 FL (ref 79–97)
MONOCYTES # BLD AUTO: 0.68 10*3/MM3 (ref 0.1–0.9)
MONOCYTES NFR BLD AUTO: 15.9 % (ref 5–12)
NEUTROPHILS NFR BLD AUTO: 1.81 10*3/MM3 (ref 1.7–7)
NEUTROPHILS NFR BLD AUTO: 42.3 % (ref 42.7–76)
NRBC BLD AUTO-RTO: 0 /100 WBC (ref 0–0.2)
PLATELET # BLD AUTO: 196 10*3/MM3 (ref 140–450)
PMV BLD AUTO: 9.7 FL (ref 6–12)
POTASSIUM SERPL-SCNC: 3.8 MMOL/L (ref 3.5–5.2)
PROT SERPL-MCNC: 7.5 G/DL (ref 6–8.5)
RBC # BLD AUTO: 5.26 10*6/MM3 (ref 4.14–5.8)
SODIUM SERPL-SCNC: 139 MMOL/L (ref 136–145)
WBC NRBC COR # BLD AUTO: 4.27 10*3/MM3 (ref 3.4–10.8)

## 2024-08-07 PROCEDURE — 84165 PROTEIN E-PHORESIS SERUM: CPT | Performed by: INTERNAL MEDICINE

## 2024-08-07 PROCEDURE — 86334 IMMUNOFIX E-PHORESIS SERUM: CPT | Performed by: INTERNAL MEDICINE

## 2024-08-07 PROCEDURE — 36415 COLL VENOUS BLD VENIPUNCTURE: CPT

## 2024-08-07 PROCEDURE — 85025 COMPLETE CBC W/AUTO DIFF WBC: CPT | Performed by: INTERNAL MEDICINE

## 2024-08-07 PROCEDURE — 83521 IG LIGHT CHAINS FREE EACH: CPT | Performed by: INTERNAL MEDICINE

## 2024-08-07 PROCEDURE — 82784 ASSAY IGA/IGD/IGG/IGM EACH: CPT | Performed by: INTERNAL MEDICINE

## 2024-08-07 PROCEDURE — 80053 COMPREHEN METABOLIC PANEL: CPT | Performed by: INTERNAL MEDICINE

## 2024-08-08 LAB
ALBUMIN SERPL ELPH-MCNC: 3.9 G/DL (ref 2.9–4.4)
ALBUMIN/GLOB SERPL: 1.3 {RATIO} (ref 0.7–1.7)
ALPHA1 GLOB SERPL ELPH-MCNC: 0.2 G/DL (ref 0–0.4)
ALPHA2 GLOB SERPL ELPH-MCNC: 0.5 G/DL (ref 0.4–1)
B-GLOBULIN SERPL ELPH-MCNC: 1.1 G/DL (ref 0.7–1.3)
GAMMA GLOB SERPL ELPH-MCNC: 1.4 G/DL (ref 0.4–1.8)
GLOBULIN SER-MCNC: 3.1 G/DL (ref 2.2–3.9)
IGA SERPL-MCNC: 165 MG/DL (ref 61–437)
IGG SERPL-MCNC: 1482 MG/DL (ref 603–1613)
IGM SERPL-MCNC: 36 MG/DL (ref 20–172)
INTERPRETATION SERPL IEP-IMP: ABNORMAL
KAPPA LC FREE SER-MCNC: 33.6 MG/L (ref 3.3–19.4)
KAPPA LC FREE/LAMBDA FREE SER: 1.33 {RATIO} (ref 0.26–1.65)
LABORATORY COMMENT REPORT: ABNORMAL
LAMBDA LC FREE SERPL-MCNC: 25.3 MG/L (ref 5.7–26.3)
M PROTEIN SERPL ELPH-MCNC: ABNORMAL G/DL
PROT SERPL-MCNC: 7 G/DL (ref 6–8.5)

## 2024-08-21 ENCOUNTER — SPECIALTY PHARMACY (OUTPATIENT)
Dept: PHARMACY | Facility: HOSPITAL | Age: 64
End: 2024-08-21
Payer: COMMERCIAL

## 2024-08-21 NOTE — PROGRESS NOTES
COVERING FOR MASON GONZALEZ    I left a vm for Mr. Owens asking for: the number of people in his household, an estimate of his total household income and an electronic signature for the BMS application.     I also sent this request in a text message via the [email or web address] jatinder.    Diane Higginbotham - Care Coordinator   8/21/2024  15:08 EDT    ADDENDUM  I have received the above information from Mr. Owens and faxed the application to [employer].    Diane Higginbotham - Care Coordinator   8/23/2024  16:16 EDT

## 2024-08-22 ENCOUNTER — SPECIALTY PHARMACY (OUTPATIENT)
Dept: PHARMACY | Facility: HOSPITAL | Age: 64
End: 2024-08-22
Payer: COMMERCIAL

## 2024-08-22 RX ORDER — LENALIDOMIDE 10 MG/1
10 CAPSULE ORAL DAILY
Qty: 21 CAPSULE | Refills: 0 | Status: SHIPPED | OUTPATIENT
Start: 2024-08-22

## 2024-08-22 NOTE — PROGRESS NOTES
Re: Refills of Oral Specialty Medication - Revlimid (lenalidomide)    Drug-Drug Interactions: The current medication list was reviewed and there are no relevant drug-drug interactions with the specialty medication.  Medication Allergies: The patient has no relevant allergies as it relates to their oral specialty medication  Review of Labs/Dose Adjustments: NO DOSE CHANGE - I reviewed the most recent note and labs and the patient will continue without any dose changes.  I sent refills as described below.    Drug: Revlimid (lenalidomide)  Strength: 10 mg  Directions: Take 1 capsule by mouth daily on days 1-21 of each 28 day cycle  Quantity: 21  Refills: 0  Pharmacy prescription sent to: [facility] (free drug) Specialty Pharm[facility]    Name/Credentials: Figueroa Pleitez, Virginie, BCOP  Clinical Oncology Pharmacist    8/22/2024  14:41 EDT

## 2024-08-25 DIAGNOSIS — E11.9 TYPE 2 DIABETES MELLITUS WITHOUT COMPLICATION, WITHOUT LONG-TERM CURRENT USE OF INSULIN: ICD-10-CM

## 2024-08-27 ENCOUNTER — SPECIALTY PHARMACY (OUTPATIENT)
Dept: PHARMACY | Facility: HOSPITAL | Age: 64
End: 2024-08-27
Payer: COMMERCIAL

## 2024-08-27 NOTE — PROGRESS NOTES
I received a fax from [facility] asking for signatures on the Patient assitance application and front and back copies of Mr. Owens's insurance card.    I REfaxed those items in today.    Diane Higginbotham - Care Coordinator   8/27/2024  13:36 EDT    ADDENDUM  Per Ashley with [facility], the PATIENT AGREEMENT & CONSENT page is cutoff at the bottom and doesn't show Mr. Owens's initials.     I have uploaded this page to the patient support portal.    Diane Higginbotham - Care Coordinator   8/29/2024  10:18 EDT

## 2024-09-11 ENCOUNTER — SPECIALTY PHARMACY (OUTPATIENT)
Dept: PHARMACY | Facility: HOSPITAL | Age: 64
End: 2024-09-11
Payer: COMMERCIAL

## 2024-09-11 NOTE — PROGRESS NOTES
Paola with [facility] ([phone]) confirmed that all pages of Mr. Owens's application have been received and are legible. She is requesting that it move forward for processing.     She advised me to call back by 9/24 if I haven't received a determination by then.    Diane Higginbotham - Care Coordinator   9/11/2024  11:04 EDT

## 2024-09-16 ENCOUNTER — SPECIALTY PHARMACY (OUTPATIENT)
Dept: PHARMACY | Facility: HOSPITAL | Age: 64
End: 2024-09-16
Payer: COMMERCIAL

## 2024-09-19 DIAGNOSIS — I10 HYPERTENSION, ESSENTIAL: ICD-10-CM

## 2024-09-20 RX ORDER — LOSARTAN POTASSIUM 100 MG/1
100 TABLET ORAL DAILY
Qty: 90 TABLET | Refills: 1 | Status: SHIPPED | OUTPATIENT
Start: 2024-09-20

## 2024-09-23 ENCOUNTER — SPECIALTY PHARMACY (OUTPATIENT)
Dept: PHARMACY | Facility: HOSPITAL | Age: 64
End: 2024-09-23
Payer: COMMERCIAL

## 2024-09-23 ENCOUNTER — TELEPHONE (OUTPATIENT)
Dept: ONCOLOGY | Facility: CLINIC | Age: 64
End: 2024-09-23

## 2024-09-23 RX ORDER — LENALIDOMIDE 10 MG/1
10 CAPSULE ORAL DAILY
Qty: 21 CAPSULE | Refills: 0 | Status: SHIPPED | OUTPATIENT
Start: 2024-09-23

## 2024-09-23 NOTE — TELEPHONE ENCOUNTER
Caller: Duy Owens    Relationship: Self    Best call back number:     [phone]     What is the best time to reach you: ASAP    Who are you requesting to speak with (clinical staff, provider,  specific staff member): OZIEL    What was the call regarding: PT IS CALLING FOR OZIEL AND WOULD LIKE TO MAKE SURE THAT SHE RECEIVED HIS EMAIL OVER THE WEEKEND? OKAY TO JUST EMAIL BACK.     Is it okay if the provider responds through [employer]: YES

## 2024-10-02 ENCOUNTER — TELEPHONE (OUTPATIENT)
Dept: ONCOLOGY | Facility: CLINIC | Age: 64
End: 2024-10-02
Payer: COMMERCIAL

## 2024-10-16 ENCOUNTER — LAB (OUTPATIENT)
Dept: OTHER | Facility: HOSPITAL | Age: 64
End: 2024-10-16
Payer: COMMERCIAL

## 2024-10-16 ENCOUNTER — OFFICE VISIT (OUTPATIENT)
Dept: ONCOLOGY | Facility: CLINIC | Age: 64
End: 2024-10-16
Payer: COMMERCIAL

## 2024-10-16 VITALS
SYSTOLIC BLOOD PRESSURE: 126 MMHG | TEMPERATURE: 98.2 F | WEIGHT: 196.5 LBS | OXYGEN SATURATION: 99 % | BODY MASS INDEX: 28.13 KG/M2 | DIASTOLIC BLOOD PRESSURE: 75 MMHG | HEIGHT: 70 IN | HEART RATE: 60 BPM

## 2024-10-16 DIAGNOSIS — D63.0 ANEMIA IN NEOPLASTIC DISEASE: ICD-10-CM

## 2024-10-16 DIAGNOSIS — C90.00 MULTIPLE MYELOMA NOT HAVING ACHIEVED REMISSION: ICD-10-CM

## 2024-10-16 DIAGNOSIS — D52.0 DIETARY FOLATE DEFICIENCY ANEMIA: ICD-10-CM

## 2024-10-16 DIAGNOSIS — D84.9 IMMUNOCOMPROMISED PATIENT: ICD-10-CM

## 2024-10-16 DIAGNOSIS — E85.81 AL AMYLOIDOSIS: ICD-10-CM

## 2024-10-16 DIAGNOSIS — Z79.899 ENCOUNTER FOR LONG-TERM (CURRENT) USE OF HIGH-RISK MEDICATION: ICD-10-CM

## 2024-10-16 DIAGNOSIS — R74.8 ELEVATED LIVER ENZYMES: ICD-10-CM

## 2024-10-16 DIAGNOSIS — C90.00 MULTIPLE MYELOMA NOT HAVING ACHIEVED REMISSION: Primary | ICD-10-CM

## 2024-10-16 LAB
ALBUMIN SERPL-MCNC: 4.4 G/DL (ref 3.5–5.2)
ALBUMIN/GLOB SERPL: 1.2 G/DL
ALP SERPL-CCNC: 92 U/L (ref 39–117)
ALT SERPL W P-5'-P-CCNC: 25 U/L (ref 1–41)
ANION GAP SERPL CALCULATED.3IONS-SCNC: 9.9 MMOL/L (ref 5–15)
AST SERPL-CCNC: 21 U/L (ref 1–40)
BASOPHILS # BLD AUTO: 0.06 10*3/MM3 (ref 0–0.2)
BASOPHILS NFR BLD AUTO: 1.4 % (ref 0–1.5)
BILIRUB SERPL-MCNC: 0.4 MG/DL (ref 0–1.2)
BUN SERPL-MCNC: 11 MG/DL (ref 8–23)
BUN/CREAT SERPL: 10.5 (ref 7–25)
CALCIUM SPEC-SCNC: 9.4 MG/DL (ref 8.6–10.5)
CHLORIDE SERPL-SCNC: 103 MMOL/L (ref 98–107)
CO2 SERPL-SCNC: 26.1 MMOL/L (ref 22–29)
CREAT SERPL-MCNC: 1.05 MG/DL (ref 0.76–1.27)
DEPRECATED RDW RBC AUTO: 45 FL (ref 37–54)
EGFRCR SERPLBLD CKD-EPI 2021: 80.3 ML/MIN/1.73
EOSINOPHIL # BLD AUTO: 0.17 10*3/MM3 (ref 0–0.4)
EOSINOPHIL NFR BLD AUTO: 4 % (ref 0.3–6.2)
ERYTHROCYTE [DISTWIDTH] IN BLOOD BY AUTOMATED COUNT: 16.2 % (ref 12.3–15.4)
GLOBULIN UR ELPH-MCNC: 3.7 GM/DL
GLUCOSE SERPL-MCNC: 98 MG/DL (ref 65–99)
HCT VFR BLD AUTO: 44 % (ref 37.5–51)
HGB BLD-MCNC: 13.3 G/DL (ref 13–17.7)
IMM GRANULOCYTES # BLD AUTO: 0.03 10*3/MM3 (ref 0–0.05)
IMM GRANULOCYTES NFR BLD AUTO: 0.7 % (ref 0–0.5)
LYMPHOCYTES # BLD AUTO: 1.5 10*3/MM3 (ref 0.7–3.1)
LYMPHOCYTES NFR BLD AUTO: 34.9 % (ref 19.6–45.3)
MCH RBC QN AUTO: 23.4 PG (ref 26.6–33)
MCHC RBC AUTO-ENTMCNC: 30.2 G/DL (ref 31.5–35.7)
MCV RBC AUTO: 77.3 FL (ref 79–97)
MONOCYTES # BLD AUTO: 0.28 10*3/MM3 (ref 0.1–0.9)
MONOCYTES NFR BLD AUTO: 6.5 % (ref 5–12)
NEUTROPHILS NFR BLD AUTO: 2.26 10*3/MM3 (ref 1.7–7)
NEUTROPHILS NFR BLD AUTO: 52.5 % (ref 42.7–76)
NRBC BLD AUTO-RTO: 0 /100 WBC (ref 0–0.2)
PLATELET # BLD AUTO: 210 10*3/MM3 (ref 140–450)
PMV BLD AUTO: 10 FL (ref 6–12)
POTASSIUM SERPL-SCNC: 3.8 MMOL/L (ref 3.5–5.2)
PROT SERPL-MCNC: 8.1 G/DL (ref 6–8.5)
RBC # BLD AUTO: 5.69 10*6/MM3 (ref 4.14–5.8)
SODIUM SERPL-SCNC: 139 MMOL/L (ref 136–145)
WBC NRBC COR # BLD AUTO: 4.3 10*3/MM3 (ref 3.4–10.8)

## 2024-10-16 PROCEDURE — 86334 IMMUNOFIX E-PHORESIS SERUM: CPT | Performed by: INTERNAL MEDICINE

## 2024-10-16 PROCEDURE — 85025 COMPLETE CBC W/AUTO DIFF WBC: CPT | Performed by: INTERNAL MEDICINE

## 2024-10-16 PROCEDURE — 36415 COLL VENOUS BLD VENIPUNCTURE: CPT

## 2024-10-16 PROCEDURE — 83521 IG LIGHT CHAINS FREE EACH: CPT | Performed by: INTERNAL MEDICINE

## 2024-10-16 PROCEDURE — 80053 COMPREHEN METABOLIC PANEL: CPT | Performed by: INTERNAL MEDICINE

## 2024-10-16 PROCEDURE — 84165 PROTEIN E-PHORESIS SERUM: CPT | Performed by: INTERNAL MEDICINE

## 2024-10-16 PROCEDURE — 82784 ASSAY IGA/IGD/IGG/IGM EACH: CPT | Performed by: INTERNAL MEDICINE

## 2024-10-16 PROCEDURE — 99214 OFFICE O/P EST MOD 30 MIN: CPT | Performed by: INTERNAL MEDICINE

## 2024-10-16 NOTE — PROGRESS NOTES
"Subjective     CHIEF COMPLAINT:      Chief Complaint   Patient presents with    Follow-up     HISTORY OF PRESENT ILLNESS:     Duy Owens is a 63 y.o. male patient who returns today for follow up on his multiple myeloma and amyloidosis.  He returns today for follow-up reporting that he is feeling good.  He is not having chest pain or shortness of breath.  He complains of fatigue.  After he does his regular activities, he becomes very tired and has to rest.  He is taking aspirin regularly.  No problem with bleeding.  No leg pain/swelling.    ROS:  Pertinent ROS is in the HPI.     Past medical, surgical, social and family history were reviewed.     MEDICATIONS:    Current Outpatient Medications:     aspirin 81 MG EC tablet, Take 1 tablet by mouth Daily., Disp: , Rfl:     folic acid (FOLVITE) 1 MG tablet, Take 1 tablet by mouth 1 (One) Time Per Week., Disp: , Rfl:     lenalidomide (REVLIMID) 10 MG capsule, Take 1 capsule by mouth Daily. Take for 21 days on, then 7 days off, then repeat., Disp: 21 capsule, Rfl: 0    losartan (COZAAR) 100 MG tablet, TAKE 1 TABLET BY MOUTH DAILY, Disp: 90 tablet, Rfl: 1    metFORMIN (GLUCOPHAGE) 500 MG tablet, TAKE 1 TABLET BY MOUTH TWICE A DAY, Disp: 60 tablet, Rfl: 1  Objective     VITAL SIGNS:     Vitals:    10/16/24 1525   BP: 126/75   Pulse: 60   Temp: 98.2 °F (36.8 °C)   TempSrc: Oral   SpO2: 99%   Weight: 89.1 kg (196 lb 8 oz)   Height: 177 cm (69.69\")   PainSc: 0-No pain     Body mass index is 28.45 kg/m².     Wt Readings from Last 5 Encounters:   10/16/24 89.1 kg (196 lb 8 oz)   07/03/24 87.5 kg (193 lb)   06/17/24 87.5 kg (193 lb)   05/01/24 88.1 kg (194 lb 4.8 oz)   03/04/24 91.6 kg (202 lb)     PHYSICAL EXAMINATION:   GENERAL: The patient appears in good general condition, not in acute distress.   SKIN: No Ecchymosis.  EYES: No jaundice. No Pallor.  CHEST: Normal respiratory effort. Normal breathing sounds bilaterally. No added sounds.  CVS: Normal S1 and S2. No " Murmur.  ABDOMEN: Soft. No tenderness. No Hepatomegaly. No Splenomegaly. No masses.  EXTREMITIES: No edema.  No calf tenderness.    DIAGNOSTIC DATA:     Results from last 7 days   Lab Units 10/16/24  1509   WBC 10*3/mm3 4.30   NEUTROS ABS 10*3/mm3 2.26   HEMOGLOBIN g/dL 13.3   HEMATOCRIT % 44.0   PLATELETS 10*3/mm3 210     Results from last 7 days   Lab Units 10/16/24  1509   SODIUM mmol/L 139   POTASSIUM mmol/L 3.8   CHLORIDE mmol/L 103   CO2 mmol/L 26.1   BUN mg/dL 11   CREATININE mg/dL 1.05   CALCIUM mg/dL 9.4   ALBUMIN g/dL 4.4   BILIRUBIN mg/dL 0.4   ALK PHOS U/L 92   ALT (SGPT) U/L 25   AST (SGOT) U/L 21   GLUCOSE mg/dL 98     Component      Latest Ref Rng 5/31/2024 7/3/2024 8/7/2024   IgG      603 - 1613 mg/dL 1524  1510  1482    IgA      61 - 437 mg/dL 144  154  165    IgM      20 - 172 mg/dL 46  37  36    Total Protein      6.0 - 8.5 g/dL 7.1  7.2  7.0    Albumin      2.9 - 4.4 g/dL 3.9  4.1  3.9    Alpha-1-Globulin      0.0 - 0.4 g/dL 0.2  0.2  0.2    Alpha-2-Globulin      0.4 - 1.0 g/dL 0.5  0.5  0.5    Beta Globulin      0.7 - 1.3 g/dL 1.0  1.1  1.1    Gamma Globulin      0.4 - 1.8 g/dL 1.4  1.3  1.4    M-Werner      Not Observed g/dL Not Observed  Not Observed  Not Observed    Globulin      2.2 - 3.9 g/dL 3.2  3.1  3.1    A/G Ratio      0.7 - 1.7  1.3  1.4  1.3    Immunofixation Reflex, Serum Comment  Comment  Comment    Please note Comment  Comment  Comment    Kappa FLC      3.3 - 19.4 mg/L 29.9 (H)  31.7 (H)  33.6 (H)    Free Lambda Light Chains      5.7 - 26.3 mg/L 23.7  26.2  25.3    Kappa/Lambda Ratio      0.26 - 1.65  1.26  1.21  1.33       CT chest abdomen pelvis on 8/20/2024:  1.  Compared to 2/20/2024, overall similar thoracic and abdominopelvic adenopathy.     2.  No evidence of new metastatic disease in the chest, abdomen, and pelvis.     Assessment & Plan    *Lambda light chain multiple myeloma with lymphadenopathy and development of AL amyloidosis.  Patient was found to have lymph node  involvement and amyloid deposition in the involved lymph nodes.  Patient started having dry cough around July 2021.    CT scans 9/23/2021-9/24/2021 revealed inferior mediastinal adenopathy and retroperitoneal adenopathy extending to the right iliac chain.  The Largest lymph node was in the retroperitoneal area measuring 4.8 x 3.5 cm.  The common iliac lymph node measured 3.5 x 2.7 cm.  The left external iliac chain lymph node measured 2.9 x 2 cm.    PET scan on 10/5/2021 revealed the retroperitoneal and left pelvic lymphadenopathy to be hypermetabolic.  The left periaortic lymph nodes had SUV of 6.9 and the left pelvic sidewall lymph nodes had an SUV of 5.4.  No bone involvement.  CT-guided biopsy on 10/6/2021- pathologist at [facility] reported involvement with monotypic lambda restricted plasma cells concerning for involvement with plasma cell dyscrasia.  Bone marrow biopsy on 10/29/2021 revealed a normocellular marrow (50%) with involvement with plasma cell dyscrasia (plasma cells representing 20-30% of total cells by  stain).   FISH was negative for gain of 1q, monosomy/deletions of chromosomes 13 and 17, IGH rearrangement, gain of chromosomes 9 and 11, IGH-CCND1 (11;14) fusion.   Treatment with the VRD started on 12/22/2021.  Free lambda light chain started to improve after the patient started treatment.  CT scan on 3/11/2022 revealed decrease in the size of lymph nodes in the periaortic area.  There is a slight increase in a lymph node in the left axillary area that increased from 7 to 10 mm.    PET scan on 4/28/2022 revealed significant reduction in the size and hypermetabolism of the involved lymph nodes.  SPEP REGINO FLC on 5/25/2022 revealed no M protein.  Free lambda light chain was 41.8.  Kappa to lambda ratio was normal at 0.26.  B2 M was 1.6.  Bone marrow biopsy on 5/24/2022 revealed normocellular marrow with 30-40% cellularity with involvement by plasma cell neoplasm representing 5-10% by IHC.   Negative for amyloid.  He was considered to be W[place]-1.  S/p high-dose melphalan with autologous stem cell transplant.  Day 0 was 7/7/2022.  Patient did well with the transplant but had neutropenic fever of unclear source necessitating hospitalization between 7/16/2022 and 7/18/2022.    Bone marrow biopsy on 9/29/2022 revealed monotypic plasma cells identified on on flow cytometry but not on IHC.  CT on 9/29/2022 showed decrease in the size of the enlarged lymph nodes.  Patient started Revlimid 10 mg daily for 21 out of 28-day cycle on 11/1/2022.    CT on 2/23/2023 showed decrease in the size of the mediastinal and retroperitoneal lymph nodes indicating response to treatment.  2/23/2023-Free kappa light chain 1.94, free lambda light chain 2.19, kappa/lambda ratio normal at 0.89.  5/17/2023: Free kappa light chain was 31.5.  Free lambda light chain was 24.5.  Kappa 3 lambda ratio normal at 1.29.  6/14/2023: Free kappa light chain was 31.0.  Free lambda light chain was 23.7.  Kappa to lambda ratio was 1.31.   Patient had follow-up at [place] on 8/22/2023.    CT scan showed decrease in the size of the enlarged lymph nodes.  No new abnormalities were seen.  9/6/2023: Kappa FLC 30.0.  Lambda FLC 25.8.  Kappa to lambda ratio 1.16.  No M protein.  11/1/2023: Kappa FLC 32.9. Lambda FLC 25.8. K:L ratio 1.28.  CT on 2/20/2024 showed stable lymphadenopathy.  3/27/2024: Kappa FLC 27.0.  Lambda FLC 23.6.  K/L ratio 1.14.  5/31/2024: SPEP REGINO FLC revealed no M protein.  Kappa FLC 29.9.  Lambda FLC 23.7.  Kappa/lambda ratio 1.26.      *Anemia secondary to multiple myeloma and secondary to treatment.  Hemoglobin was 13.1 on 9/29/2021.    Iron, folate and vitamin B12 were normal in September/October 2021.    Hemoglobin was 11.4 on 8/24/2022.  Hemoglobin improved to the 11-12 g range.  Folate was low at 5.5.   He was started on folic acid 1 mg daily.  2/28/2024: Folate increased to >20.  5/1/2024: Hemoglobin 11.8.  7/3/2024:  Hemoglobin improved to 12.6.  He has adequate iron stores.  Vitamin B12 575.  Folate improved to >20.  It was reduced to once weekly.    *Prophylaxis.  He was previously on acyclovir 400 mg twice daily.    He is now on Valtrex.  He received Evusheld on 8/20/2022.  Patient received vaccinations as recommended by [place].  He received COVID-19 Moderna booster on 10/25/2022.  He received vaccines in April and June.  He received the first Shingrix vaccine in August 2023 and in February 2024.  No recent infections.    *Elevated liver enzymes.  Liver enzymes have fluctuated over time.  CT on 2/20/2024 showed no liver or gallbladder abnormality.  Patient does not drink alcohol.  5/1/2024: ALT 63.  AST 52.  Bilirubin 0.3.  Alkaline phosphatase 118.  7/3/2024: ALT improved to 51.  AST improved to 38.  10/16/2024: ALT improved to 25.  AST improved to 21.    *Syncope.  Patient reports having an episode of passing out while he was outdoors watching a game.  He did not feel dizzy prior to the episode. He was well hydrated.  He regained consciousness quickly.   EMS was called. He declined going to ER at that time.   He was referred to cardiology.  He is going to have echocardiogram done.  No recurrence of the episodes.  He has mild bradycardia today but he is asymptomatic.    *Severe muscle spasm of the cervical-thoracic spine.  He reports having weakness of the right lower extremity.  He fell off a ladder recently due to weakness of the right LE.  I recommended evaluation with an MRI of the spine.   He complained of muscle spasm of other muscles.  MRI showed no lesions from multiple myeloma.  He was referred to neurosurgery.  Conservative management was recommended for DJD.  No recurrence of the symptoms.    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  There was mild wall thickening.  Ejection fraction was normal.  Echocardiogram at [facility] on 4/21/2022 did not reveal wall thickening.    PLAN:    1.   Continue Revlimid maintenance at 10 mg daily for 21 days of a 28-day cycle.  2.  Continue aspirin 81 mg daily.  3.  Continue folic acid 1 mg weekly.  4.  Return in 1 and 2 months for CBC CMP SPEP REGINO FLC.  5.  I will see him in follow-up in 3 months with repeat labs.  6.  He has follow-up at [facility] in February 2025.  He is going to have a follow-up CT scan at that time.  7.  Recommendation from [facility] is to continue maintenance Revlimid through July 20 27-5 years post auto-transplant.        Tima Mendez MD  10/16/24

## 2024-10-17 ENCOUNTER — SPECIALTY PHARMACY (OUTPATIENT)
Dept: LAB | Facility: HOSPITAL | Age: 64
End: 2024-10-17
Payer: COMMERCIAL

## 2024-10-17 NOTE — PROGRESS NOTES
Specialty Pharmacy Note: Revlimid (lenalidomide)    Duy Owens is a 63 y.o. male with multiple myeloma was seen 10/16/24 by Dr. Mendez. Per provider dictation, no changes to oral oncology regimen Revlimid (lenalidomide).  Labs Review: The CMP and CBC from 10/16/24 have been reviewed. All labs are within normal limits. No dose adjustments are needed for the oral specialty medication(s) based on the labs.    Specialty pharmacy will continue to follow patient.    Figueroa Pleitez, PharmD, [facility]  Clinical Oncology Pharmacist    10/17/2024  08:32 EDT

## 2024-10-18 LAB
ALBUMIN SERPL ELPH-MCNC: 4 G/DL (ref 2.9–4.4)
ALBUMIN/GLOB SERPL: 1.1 {RATIO} (ref 0.7–1.7)
ALPHA1 GLOB SERPL ELPH-MCNC: 0.3 G/DL (ref 0–0.4)
ALPHA2 GLOB SERPL ELPH-MCNC: 0.6 G/DL (ref 0.4–1)
B-GLOBULIN SERPL ELPH-MCNC: 1.2 G/DL (ref 0.7–1.3)
GAMMA GLOB SERPL ELPH-MCNC: 1.6 G/DL (ref 0.4–1.8)
GLOBULIN SER-MCNC: 3.8 G/DL (ref 2.2–3.9)
IGA SERPL-MCNC: 178 MG/DL (ref 61–437)
IGG SERPL-MCNC: 1579 MG/DL (ref 603–1613)
IGM SERPL-MCNC: 47 MG/DL (ref 20–172)
INTERPRETATION SERPL IEP-IMP: ABNORMAL
KAPPA LC FREE SER-MCNC: 33.7 MG/L (ref 3.3–19.4)
KAPPA LC FREE/LAMBDA FREE SER: 1.35 {RATIO} (ref 0.26–1.65)
LABORATORY COMMENT REPORT: ABNORMAL
LAMBDA LC FREE SERPL-MCNC: 24.9 MG/L (ref 5.7–26.3)
M PROTEIN SERPL ELPH-MCNC: ABNORMAL G/DL
PROT SERPL-MCNC: 7.8 G/DL (ref 6–8.5)

## 2024-10-30 ENCOUNTER — SPECIALTY PHARMACY (OUTPATIENT)
Dept: PHARMACY | Facility: HOSPITAL | Age: 64
End: 2024-10-30
Payer: COMMERCIAL

## 2024-10-30 RX ORDER — LENALIDOMIDE 10 MG/1
10 CAPSULE ORAL DAILY
Qty: 21 CAPSULE | Refills: 0 | Status: SHIPPED | OUTPATIENT
Start: 2024-10-30

## 2024-10-30 NOTE — PROGRESS NOTES
Specialty Pharmacy Patient Management Program  Per Protocol Prescription Order or Refill       Requested Prescriptions     Signed Prescriptions Disp Refills    lenalidomide (REVLIMID) 10 MG capsule 21 capsule 0     Sig: Take 1 capsule by mouth Daily. Take for 21 days on, then 7 days off, then repeat.     Prescription orders above were sent to [facility] per Collaborative Care Agreement Protocol.     Completed independent double check on medication order/RX.    Sunita Avila Rph, BCOP  Clinical Specialty Pharmacist, Oncology  10/30/2024  13:21 EDT

## 2024-10-30 NOTE — PROGRESS NOTES
Specialty Pharmacy Patient Management Program  Per Protocol Prescription Order or Refill     Patient will be filling or currently fills medications at [facility] and is enrolled in the Patient Management Program.    Requested Prescriptions     Signed Prescriptions Disp Refills    lenalidomide (REVLIMID) 10 MG capsule 21 capsule 0     Sig: Take 1 capsule by mouth Daily. Take for 21 days on, then 7 days off, then repeat.     Prescription orders above were sent to the pharmacy per Collaborative Care Agreement Protocol.     Last Office Visit: 10/17/24  Next Office Visit: 1/8/25    Figueroa Pleitez PharmD, BCOP  Clinical Specialty Pharmacist, Oncology  10/30/2024  13:09 EDT

## 2024-11-10 DIAGNOSIS — E11.9 TYPE 2 DIABETES MELLITUS WITHOUT COMPLICATION, WITHOUT LONG-TERM CURRENT USE OF INSULIN: ICD-10-CM

## 2024-11-11 ENCOUNTER — SPECIALTY PHARMACY (OUTPATIENT)
Dept: PHARMACY | Facility: HOSPITAL | Age: 64
End: 2024-11-11
Payer: COMMERCIAL

## 2024-11-11 NOTE — TELEPHONE ENCOUNTER
LOV                  6/17/2024                    NOV                  Due 12/17/2024  LAST REFILL     8/26/2024  PROTOCOL       Met

## 2024-11-13 ENCOUNTER — CLINICAL SUPPORT (OUTPATIENT)
Dept: ONCOLOGY | Facility: HOSPITAL | Age: 64
End: 2024-11-13
Payer: COMMERCIAL

## 2024-11-13 ENCOUNTER — LAB (OUTPATIENT)
Dept: OTHER | Facility: HOSPITAL | Age: 64
End: 2024-11-13
Payer: COMMERCIAL

## 2024-11-13 DIAGNOSIS — E85.81 AL AMYLOIDOSIS: ICD-10-CM

## 2024-11-13 DIAGNOSIS — C90.00 MULTIPLE MYELOMA NOT HAVING ACHIEVED REMISSION: ICD-10-CM

## 2024-11-13 LAB
ALBUMIN SERPL-MCNC: 4.2 G/DL (ref 3.5–5.2)
ALBUMIN/GLOB SERPL: 1.2 G/DL
ALP SERPL-CCNC: 108 U/L (ref 39–117)
ALT SERPL W P-5'-P-CCNC: 55 U/L (ref 1–41)
ANION GAP SERPL CALCULATED.3IONS-SCNC: 9.5 MMOL/L (ref 5–15)
AST SERPL-CCNC: 54 U/L (ref 1–40)
BASOPHILS # BLD AUTO: 0.05 10*3/MM3 (ref 0–0.2)
BASOPHILS NFR BLD AUTO: 1 % (ref 0–1.5)
BILIRUB SERPL-MCNC: 0.6 MG/DL (ref 0–1.2)
BUN SERPL-MCNC: 12 MG/DL (ref 8–23)
BUN/CREAT SERPL: 11.3 (ref 7–25)
CALCIUM SPEC-SCNC: 8.8 MG/DL (ref 8.6–10.5)
CHLORIDE SERPL-SCNC: 102 MMOL/L (ref 98–107)
CO2 SERPL-SCNC: 26.5 MMOL/L (ref 22–29)
CREAT SERPL-MCNC: 1.06 MG/DL (ref 0.76–1.27)
DEPRECATED RDW RBC AUTO: 43.6 FL (ref 37–54)
EGFRCR SERPLBLD CKD-EPI 2021: 79.4 ML/MIN/1.73
EOSINOPHIL # BLD AUTO: 0.2 10*3/MM3 (ref 0–0.4)
EOSINOPHIL NFR BLD AUTO: 4 % (ref 0.3–6.2)
ERYTHROCYTE [DISTWIDTH] IN BLOOD BY AUTOMATED COUNT: 15.9 % (ref 12.3–15.4)
GLOBULIN UR ELPH-MCNC: 3.6 GM/DL
GLUCOSE SERPL-MCNC: 100 MG/DL (ref 65–99)
HCT VFR BLD AUTO: 42 % (ref 37.5–51)
HGB BLD-MCNC: 12.7 G/DL (ref 13–17.7)
IMM GRANULOCYTES # BLD AUTO: 0.01 10*3/MM3 (ref 0–0.05)
IMM GRANULOCYTES NFR BLD AUTO: 0.2 % (ref 0–0.5)
LYMPHOCYTES # BLD AUTO: 1 10*3/MM3 (ref 0.7–3.1)
LYMPHOCYTES NFR BLD AUTO: 19.8 % (ref 19.6–45.3)
MCH RBC QN AUTO: 23.3 PG (ref 26.6–33)
MCHC RBC AUTO-ENTMCNC: 30.2 G/DL (ref 31.5–35.7)
MCV RBC AUTO: 76.9 FL (ref 79–97)
MONOCYTES # BLD AUTO: 0.48 10*3/MM3 (ref 0.1–0.9)
MONOCYTES NFR BLD AUTO: 9.5 % (ref 5–12)
NEUTROPHILS NFR BLD AUTO: 3.31 10*3/MM3 (ref 1.7–7)
NEUTROPHILS NFR BLD AUTO: 65.5 % (ref 42.7–76)
NRBC BLD AUTO-RTO: 0 /100 WBC (ref 0–0.2)
PLATELET # BLD AUTO: 175 10*3/MM3 (ref 140–450)
PMV BLD AUTO: 9.3 FL (ref 6–12)
POTASSIUM SERPL-SCNC: 3.6 MMOL/L (ref 3.5–5.2)
PROT SERPL-MCNC: 7.8 G/DL (ref 6–8.5)
RBC # BLD AUTO: 5.46 10*6/MM3 (ref 4.14–5.8)
SODIUM SERPL-SCNC: 138 MMOL/L (ref 136–145)
WBC NRBC COR # BLD AUTO: 5.05 10*3/MM3 (ref 3.4–10.8)

## 2024-11-13 PROCEDURE — 84165 PROTEIN E-PHORESIS SERUM: CPT | Performed by: INTERNAL MEDICINE

## 2024-11-13 PROCEDURE — 83521 IG LIGHT CHAINS FREE EACH: CPT | Performed by: INTERNAL MEDICINE

## 2024-11-13 PROCEDURE — 85025 COMPLETE CBC W/AUTO DIFF WBC: CPT | Performed by: INTERNAL MEDICINE

## 2024-11-13 PROCEDURE — 86334 IMMUNOFIX E-PHORESIS SERUM: CPT | Performed by: INTERNAL MEDICINE

## 2024-11-13 PROCEDURE — 82784 ASSAY IGA/IGD/IGG/IGM EACH: CPT | Performed by: INTERNAL MEDICINE

## 2024-11-13 PROCEDURE — 36415 COLL VENOUS BLD VENIPUNCTURE: CPT

## 2024-11-13 PROCEDURE — 80053 COMPREHEN METABOLIC PANEL: CPT | Performed by: INTERNAL MEDICINE

## 2024-11-13 NOTE — PROGRESS NOTES
Duy Owens is a 63 y.o. male with Multiple Myeloma seen by Hematology/Oncology provider, Dr. Mendez, and is scheduled with Clinical Review offered by [facility]. Patient's visit was held by phone today.     Therapy plan  Revlimid 10mg D1-21 q28d    Allergies  Allergies   Allergen Reactions    Lisinopril Cough        Current Medication List  This medication list has been reviewed with the patient and updated to include all prescription medications, OTC medications, and supplements the patient reports taking.    Prior to Admission medications    Medication Sig Start Date End Date Taking? Authorizing Provider   aspirin 81 MG EC tablet Take 1 tablet by mouth Daily. 11/9/22   Tima Mendez MD   folic acid (FOLVITE) 1 MG tablet Not taking per Tima Baxter MD   lenalidomide (REVLIMID) 10 MG capsule Take 1 capsule by mouth Daily. Take for 21 days on, then 7 days off, then repeat. 10/30/24   Tima Mendez MD   losartan (COZAAR) 100 MG tablet TAKE 1 TABLET BY MOUTH DAILY 9/20/24   Nara Dobson MD   metFORMIN (GLUCOPHAGE) 500 MG tablet TAKE 1 TABLET BY MOUTH 2 TIMES A DAY 11/11/24   Nara Dobson MD       Relevant Laboratory Values  Lab Results   Component Value Date    GLUCOSE 100 (H) 11/13/2024    BUN 12 11/13/2024    CREATININE 1.06 11/13/2024     11/13/2024    K 3.6 11/13/2024     11/13/2024    CALCIUM 8.8 11/13/2024    PROTEINTOT 7.8 11/13/2024    ALBUMIN 4.2 11/13/2024    ALT 55 (H) 11/13/2024    AST 54 (H) 11/13/2024    ALKPHOS 108 11/13/2024    BILITOT 0.6 11/13/2024    GLOB 3.6 11/13/2024    AGRATIO 1.2 11/13/2024    BCR 11.3 11/13/2024    ANIONGAP 9.5 11/13/2024    EGFR 79.4 11/13/2024       Lab Results   Component Value Date    WBC 5.05 11/13/2024    RBC 5.46 11/13/2024    HGB 12.7 (L) 11/13/2024    HCT 42.0 11/13/2024    MCV 76.9 (L) 11/13/2024    MCH 23.3 (L) 11/13/2024    MCHC 30.2 (L) 11/13/2024    RDW 15.9 (H) 11/13/2024     "RDWSD 43.6 11/13/2024    MPV 9.3 11/13/2024     11/13/2024    NEUTRORELPCT 65.5 11/13/2024    LYMPHORELPCT 19.8 11/13/2024    MONORELPCT 9.5 11/13/2024    EOSRELPCT 4.0 11/13/2024    BASORELPCT 1.0 11/13/2024    AUTOIGPER 0.2 11/13/2024    NEUTROABS 3.31 11/13/2024    LYMPHSABS 1.00 11/13/2024    MONOSABS 0.48 11/13/2024    EOSABS 0.20 11/13/2024    BASOSABS 0.05 11/13/2024    AUTOIGNUM 0.01 11/13/2024    NRBC 0.0 11/13/2024     Adverse Effect(s) Assessment   Patient reports experiencing some diarrhea periodically which is not uncommon, but reports feeling \"great\"    Education Provided for Hematology/Oncology Therapy  The patient has been provided with the following education. All questions and concerns regarding hematology/oncology therapy have been addressed, and the patient has verbalized understanding of the education and any materials provided.     Clinical Review  Patient reports adherence to Revlimid 10mg D1-21/28 and ASA 81mg daily    5/1/24: Per Dr. Mendez documentation, CT on 2/20/2024 showed no liver or gallbladder abnormality. Patient counseled to avoid alcohol.     The patient's current therapy plan and above labs have been reviewed.     Plan  CBC and CMP reviewed, results are stable for this patient at this time. - Immunofixation will result in 3-4 days - see above in Laboratory Results  Will instruct Duy CRISTIANA Roman to Continue Revlimid as prescribed.   Follow up in 1 month. Next scheduled appointment(s) reviewed with patient.  Patient is instructed to call the office with any concerns or new symptoms prior to next visit.  Verbal and written information provided. Patient expresses understanding and has no further questions at this time.            Mercy Ford, PharmD  Clinical Pharmacy Services   [facility]  11/13/2024  07:51 EST   "

## 2024-11-14 ENCOUNTER — TELEPHONE (OUTPATIENT)
Dept: ONCOLOGY | Facility: CLINIC | Age: 64
End: 2024-11-14
Payer: COMMERCIAL

## 2024-11-15 LAB
ALBUMIN SERPL ELPH-MCNC: 3.8 G/DL (ref 2.9–4.4)
ALBUMIN/GLOB SERPL: 1.1 {RATIO} (ref 0.7–1.7)
ALPHA1 GLOB SERPL ELPH-MCNC: 0.3 G/DL (ref 0–0.4)
ALPHA2 GLOB SERPL ELPH-MCNC: 0.7 G/DL (ref 0.4–1)
B-GLOBULIN SERPL ELPH-MCNC: 1.1 G/DL (ref 0.7–1.3)
GAMMA GLOB SERPL ELPH-MCNC: 1.5 G/DL (ref 0.4–1.8)
GLOBULIN SER-MCNC: 3.5 G/DL (ref 2.2–3.9)
IGA SERPL-MCNC: 169 MG/DL (ref 61–437)
IGG SERPL-MCNC: 1494 MG/DL (ref 603–1613)
IGM SERPL-MCNC: 45 MG/DL (ref 20–172)
INTERPRETATION SERPL IEP-IMP: ABNORMAL
KAPPA LC FREE SER-MCNC: 32.1 MG/L (ref 3.3–19.4)
KAPPA LC FREE/LAMBDA FREE SER: 1.25 {RATIO} (ref 0.26–1.65)
LABORATORY COMMENT REPORT: ABNORMAL
LAMBDA LC FREE SERPL-MCNC: 25.6 MG/L (ref 5.7–26.3)
M PROTEIN SERPL ELPH-MCNC: ABNORMAL G/DL
PROT SERPL-MCNC: 7.3 G/DL (ref 6–8.5)

## 2024-11-26 RX ORDER — LENALIDOMIDE 10 MG/1
10 CAPSULE ORAL DAILY
Qty: 21 CAPSULE | Refills: 0 | Status: SHIPPED | OUTPATIENT
Start: 2024-11-26

## 2024-11-26 NOTE — TELEPHONE ENCOUNTER
Specialty Pharmacy Patient Management Program  Per Protocol Prescription Order or Refill       Requested Prescriptions     Signed Prescriptions Disp Refills    lenalidomide (REVLIMID) 10 MG capsule 21 capsule 0     Sig: TAKE 1 CAPSULE BY MOUTH DAILY  FOR 21 DAYS, THEN 7 DAYS OFF     Authorizing Provider: ROBINSON MESSER     Ordering User: TRUE NEW     Prescription orders above were sent to [facility] per Collaborative Care Agreement Protocol.     Completed independent double check on medication order/RX.    Sunita Avila Rph, BCOP  Clinical Specialty Pharmacist, Oncology  11/26/2024  09:34 EST

## 2024-11-26 NOTE — TELEPHONE ENCOUNTER
Specialty Pharmacy Patient Management Program  Per Protocol Prescription Order or Refill     Patient will be filling or currently fills medications at hospitals [facility] and is enrolled in the Patient Management Program.    Requested Prescriptions     Pending Prescriptions Disp Refills    lenalidomide (REVLIMID) 10 MG capsule [Pharmacy Med Name: LENALIDOMIDE  10MG  CAP] 21 capsule 0     Sig: TAKE 1 CAPSULE BY MOUTH DAILY  FOR 21 DAYS, THEN 7 DAYS OFF     Prescription orders above were sent to the pharmacy per Collaborative Care Agreement Protocol.     Last Office Visit: 10/16/24  Next Office Visit: 1/8/25    Figueroa Pleitez PharmD, [facility]  Clinical Specialty Pharmacist, Oncology  11/26/2024  09:21 EST

## 2024-12-06 ENCOUNTER — TELEPHONE (OUTPATIENT)
Dept: ONCOLOGY | Facility: CLINIC | Age: 64
End: 2024-12-06
Payer: COMMERCIAL

## 2024-12-11 ENCOUNTER — LAB (OUTPATIENT)
Dept: OTHER | Facility: HOSPITAL | Age: 64
End: 2024-12-11
Payer: COMMERCIAL

## 2024-12-11 ENCOUNTER — CLINICAL SUPPORT (OUTPATIENT)
Dept: ONCOLOGY | Facility: HOSPITAL | Age: 64
End: 2024-12-11
Payer: COMMERCIAL

## 2024-12-11 DIAGNOSIS — E85.81 AL AMYLOIDOSIS: ICD-10-CM

## 2024-12-11 DIAGNOSIS — C90.00 MULTIPLE MYELOMA NOT HAVING ACHIEVED REMISSION: ICD-10-CM

## 2024-12-11 LAB
ALBUMIN SERPL-MCNC: 4.3 G/DL (ref 3.5–5.2)
ALBUMIN/GLOB SERPL: 1.3 G/DL
ALP SERPL-CCNC: 91 U/L (ref 39–117)
ALT SERPL W P-5'-P-CCNC: 26 U/L (ref 1–41)
ANION GAP SERPL CALCULATED.3IONS-SCNC: 9.4 MMOL/L (ref 5–15)
AST SERPL-CCNC: 25 U/L (ref 1–40)
BASOPHILS # BLD AUTO: 0.06 10*3/MM3 (ref 0–0.2)
BASOPHILS NFR BLD AUTO: 1.5 % (ref 0–1.5)
BILIRUB SERPL-MCNC: 0.5 MG/DL (ref 0–1.2)
BUN SERPL-MCNC: 10 MG/DL (ref 8–23)
BUN/CREAT SERPL: 10.6 (ref 7–25)
CALCIUM SPEC-SCNC: 9 MG/DL (ref 8.6–10.5)
CHLORIDE SERPL-SCNC: 103 MMOL/L (ref 98–107)
CO2 SERPL-SCNC: 26.6 MMOL/L (ref 22–29)
CREAT SERPL-MCNC: 0.94 MG/DL (ref 0.76–1.27)
DEPRECATED RDW RBC AUTO: 45.1 FL (ref 37–54)
EGFRCR SERPLBLD CKD-EPI 2021: 91.7 ML/MIN/1.73
EOSINOPHIL # BLD AUTO: 0.24 10*3/MM3 (ref 0–0.4)
EOSINOPHIL NFR BLD AUTO: 6.1 % (ref 0.3–6.2)
ERYTHROCYTE [DISTWIDTH] IN BLOOD BY AUTOMATED COUNT: 16.3 % (ref 12.3–15.4)
GLOBULIN UR ELPH-MCNC: 3.3 GM/DL
GLUCOSE SERPL-MCNC: 84 MG/DL (ref 65–99)
HCT VFR BLD AUTO: 41.5 % (ref 37.5–51)
HGB BLD-MCNC: 12.6 G/DL (ref 13–17.7)
IMM GRANULOCYTES # BLD AUTO: 0.03 10*3/MM3 (ref 0–0.05)
IMM GRANULOCYTES NFR BLD AUTO: 0.8 % (ref 0–0.5)
LYMPHOCYTES # BLD AUTO: 1.35 10*3/MM3 (ref 0.7–3.1)
LYMPHOCYTES NFR BLD AUTO: 34.2 % (ref 19.6–45.3)
MCH RBC QN AUTO: 23.5 PG (ref 26.6–33)
MCHC RBC AUTO-ENTMCNC: 30.4 G/DL (ref 31.5–35.7)
MCV RBC AUTO: 77.4 FL (ref 79–97)
MONOCYTES # BLD AUTO: 0.35 10*3/MM3 (ref 0.1–0.9)
MONOCYTES NFR BLD AUTO: 8.9 % (ref 5–12)
NEUTROPHILS NFR BLD AUTO: 1.92 10*3/MM3 (ref 1.7–7)
NEUTROPHILS NFR BLD AUTO: 48.5 % (ref 42.7–76)
NRBC BLD AUTO-RTO: 0 /100 WBC (ref 0–0.2)
PLATELET # BLD AUTO: 213 10*3/MM3 (ref 140–450)
PMV BLD AUTO: 9.6 FL (ref 6–12)
POTASSIUM SERPL-SCNC: 3.9 MMOL/L (ref 3.5–5.2)
PROT SERPL-MCNC: 7.6 G/DL (ref 6–8.5)
RBC # BLD AUTO: 5.36 10*6/MM3 (ref 4.14–5.8)
SODIUM SERPL-SCNC: 139 MMOL/L (ref 136–145)
WBC NRBC COR # BLD AUTO: 3.95 10*3/MM3 (ref 3.4–10.8)

## 2024-12-11 PROCEDURE — 83521 IG LIGHT CHAINS FREE EACH: CPT | Performed by: INTERNAL MEDICINE

## 2024-12-11 PROCEDURE — 82784 ASSAY IGA/IGD/IGG/IGM EACH: CPT | Performed by: INTERNAL MEDICINE

## 2024-12-11 PROCEDURE — 85025 COMPLETE CBC W/AUTO DIFF WBC: CPT | Performed by: INTERNAL MEDICINE

## 2024-12-11 PROCEDURE — 80053 COMPREHEN METABOLIC PANEL: CPT | Performed by: INTERNAL MEDICINE

## 2024-12-11 PROCEDURE — 84165 PROTEIN E-PHORESIS SERUM: CPT | Performed by: INTERNAL MEDICINE

## 2024-12-11 PROCEDURE — 86334 IMMUNOFIX E-PHORESIS SERUM: CPT | Performed by: INTERNAL MEDICINE

## 2024-12-11 PROCEDURE — 36415 COLL VENOUS BLD VENIPUNCTURE: CPT

## 2024-12-11 NOTE — PROGRESS NOTES
Duy Owens is a 63 y.o. male with Multiple Myeloma seen by Hematology/Oncology provider, Dr. Mendez, and is scheduled with Clinical Review offered by [facility]. Patient's visit was held by phone today.     Therapy plan  Revlimid 10 mg D1-21 q28d    Allergies  Allergies   Allergen Reactions    Lisinopril Cough        Current Medication List  Medication Sig Start Date End Date Taking? Authorizing Provider   aspirin 81 MG EC tablet Take 1 tablet by mouth Daily. 11/9/22   Tima Mendez MD   folic acid (FOLVITE) 1 MG tablet Not taking per Tima Baxter MD   lenalidomide (REVLIMID) 10 MG capsule Take 1 capsule by mouth Daily. Take for 21 days on, then 7 days off, then repeat. 10/30/24   Tima Mendez MD   losartan (COZAAR) 100 MG tablet TAKE 1 TABLET BY MOUTH DAILY 9/20/24   Nara Dobson MD   metFORMIN (GLUCOPHAGE) 500 MG tablet TAKE 1 TABLET BY MOUTH 2 TIMES A DAY 11/11/24   Nara Dobson MD       Relevant Laboratory Values  Lab Results   Component Value Date    GLUCOSE 84 12/11/2024    BUN 10 12/11/2024    CREATININE 0.94 12/11/2024     12/11/2024    K 3.9 12/11/2024     12/11/2024    CALCIUM 9.0 12/11/2024    PROTEINTOT 7.6 12/11/2024    ALBUMIN 4.3 12/11/2024    ALT 26 12/11/2024    AST 25 12/11/2024    ALKPHOS 91 12/11/2024    BILITOT 0.5 12/11/2024    GLOB 3.3 12/11/2024    AGRATIO 1.3 12/11/2024    BCR 10.6 12/11/2024    ANIONGAP 9.4 12/11/2024    EGFR 91.7 12/11/2024       Lab Results   Component Value Date    WBC 3.95 12/11/2024    RBC 5.36 12/11/2024    HGB 12.6 (L) 12/11/2024    HCT 41.5 12/11/2024    MCV 77.4 (L) 12/11/2024    MCH 23.5 (L) 12/11/2024    MCHC 30.4 (L) 12/11/2024    RDW 16.3 (H) 12/11/2024    RDWSD 45.1 12/11/2024    MPV 9.6 12/11/2024     12/11/2024    NEUTRORELPCT 48.5 12/11/2024    LYMPHORELPCT 34.2 12/11/2024    MONORELPCT 8.9 12/11/2024    EOSRELPCT 6.1 12/11/2024    BASORELPCT 1.5 12/11/2024     AUTOIGPER 0.8 (H) 12/11/2024    NEUTROABS 1.92 12/11/2024    LYMPHSABS 1.35 12/11/2024    MONOSABS 0.35 12/11/2024    EOSABS 0.24 12/11/2024    BASOSABS 0.06 12/11/2024    AUTOIGNUM 0.03 12/11/2024    NRBC 0.0 12/11/2024       Clinical Review  Patient reports no new symptoms or adverse effects. Patient reports adherence to aspirin 81 mg daily. Patient also reports adherence to Revlimid D1-21/28.      Creatinine 0.94  Alkaline phos 91   AST 25  ALT 26      The patient's current therapy plan and above labs have been reviewed.     Plan  CBC and CMP reviewed, results are stable for this patient at this time. - see above in Laboratory Results  Will instruct Dyu Owens to Continue Revlimid as prescribed.   Follow up in 1 month when scheduled with Dr. Mendez. Next scheduled appointment(s) reviewed with patient.  Patient is instructed to call the office with any concerns or new symptoms prior to next visit.  Verbal information provided. Patient expresses understanding and has no further questions at this time.            Ladonna Lorenz, PharmD  Clinical Pharmacy Services   [facility]  12/11/2024  09:53 EST

## 2024-12-13 LAB
ALBUMIN SERPL ELPH-MCNC: 3.8 G/DL (ref 2.9–4.4)
ALBUMIN/GLOB SERPL: 1.3 {RATIO} (ref 0.7–1.7)
ALPHA1 GLOB SERPL ELPH-MCNC: 0.2 G/DL (ref 0–0.4)
ALPHA2 GLOB SERPL ELPH-MCNC: 0.5 G/DL (ref 0.4–1)
B-GLOBULIN SERPL ELPH-MCNC: 1 G/DL (ref 0.7–1.3)
GAMMA GLOB SERPL ELPH-MCNC: 1.4 G/DL (ref 0.4–1.8)
GLOBULIN SER-MCNC: 3.1 G/DL (ref 2.2–3.9)
IGA SERPL-MCNC: 176 MG/DL (ref 61–437)
IGG SERPL-MCNC: 1542 MG/DL (ref 603–1613)
IGM SERPL-MCNC: 42 MG/DL (ref 20–172)
INTERPRETATION SERPL IEP-IMP: ABNORMAL
KAPPA LC FREE SER-MCNC: 31 MG/L (ref 3.3–19.4)
KAPPA LC FREE/LAMBDA FREE SER: 1.33 {RATIO} (ref 0.26–1.65)
LABORATORY COMMENT REPORT: ABNORMAL
LAMBDA LC FREE SERPL-MCNC: 23.3 MG/L (ref 5.7–26.3)
M PROTEIN SERPL ELPH-MCNC: ABNORMAL G/DL
PROT SERPL-MCNC: 6.9 G/DL (ref 6–8.5)

## 2024-12-18 ENCOUNTER — SPECIALTY PHARMACY (OUTPATIENT)
Dept: PHARMACY | Facility: HOSPITAL | Age: 64
End: 2024-12-18
Payer: COMMERCIAL

## 2024-12-18 RX ORDER — LENALIDOMIDE 10 MG/1
10 CAPSULE ORAL DAILY
Qty: 21 CAPSULE | Refills: 0 | Status: SHIPPED | OUTPATIENT
Start: 2024-12-18

## 2024-12-18 NOTE — PROGRESS NOTES
Specialty Pharmacy Patient Management Program  Per Protocol Prescription Order or Refill       Requested Prescriptions     Signed Prescriptions Disp Refills    lenalidomide (REVLIMID) 10 MG capsule 21 capsule 0     Sig: Take 1 capsule by mouth Daily. Take for 21 days on, then 7 days off.     Prescription orders above were sent to \[facility]\"" [facility] per Collaborative Care Agreement Protocol.     Completed independent double check on medication order/RX.    Gnia Naylor, PharmD, BCPS  Clinical Specialty Pharmacist, Oncology  12/18/2024  16:06 EST

## 2024-12-18 NOTE — PROGRESS NOTES
Specialty Pharmacy Patient Management Program  Per Protocol Prescription Order or Refill     Patient will be filling or currently fills medications at [facility] and is enrolled in the Patient Management Program.    Requested Prescriptions     Signed Prescriptions Disp Refills    lenalidomide (REVLIMID) 10 MG capsule 21 capsule 0     Sig: Take 1 capsule by mouth Daily. Take for 21 days on, then 7 days off.     Prescription orders above were sent to the pharmacy per Collaborative Care Agreement Protocol.     Last Office Visit: 10/16/24  Next Office Visit: 1/29/25    Figueroa Pleitez PharmD, BCOP  Clinical Specialty Pharmacist, Oncology  12/18/2024  15:26 EST

## 2025-01-09 ENCOUNTER — SPECIALTY PHARMACY (OUTPATIENT)
Dept: PHARMACY | Facility: HOSPITAL | Age: 65
End: 2025-01-09
Payer: COMMERCIAL

## 2025-01-09 RX ORDER — LENALIDOMIDE 10 MG/1
10 CAPSULE ORAL DAILY
Qty: 21 CAPSULE | Refills: 0 | Status: SHIPPED | OUTPATIENT
Start: 2025-01-09

## 2025-01-09 NOTE — PROGRESS NOTES
Specialty Pharmacy Patient Management Program  Per Protocol Prescription Order or Refill       Requested Prescriptions     Signed Prescriptions Disp Refills    lenalidomide (REVLIMID) 10 MG capsule 21 capsule 0     Sig: Take 1 capsule by mouth Daily. Take for 21 days on, then 7 days off.     Prescription orders above were sent to [facility] per Collaborative Care Agreement Protocol.     Completed independent double check on medication order/RX.    Sunita Avila RPH, BCOP  Clinical Specialty Pharmacist, Oncology  1/9/2025  16:01 EST

## 2025-01-09 NOTE — PROGRESS NOTES
Specialty Pharmacy Patient Management Program  Per Protocol Prescription Order or Refill     Patient will be filling or currently fills medications at [facility] and is enrolled in the Patient Management Program.    Requested Prescriptions     Signed Prescriptions Disp Refills    lenalidomide (REVLIMID) 10 MG capsule 21 capsule 0     Sig: Take 1 capsule by mouth Daily. Take for 21 days on, then 7 days off.     Prescription orders above were sent to the pharmacy per Collaborative Care Agreement Protocol.     Last Office Visit: 10/16/24  Next Office Visit: 1/29/25    Figueroa Pleitez PharmD, BCOP  Clinical Specialty Pharmacist, Oncology  1/9/2025  15:52 EST

## 2025-01-12 DIAGNOSIS — E11.9 TYPE 2 DIABETES MELLITUS WITHOUT COMPLICATION, WITHOUT LONG-TERM CURRENT USE OF INSULIN: ICD-10-CM

## 2025-01-13 ENCOUNTER — TELEPHONE (OUTPATIENT)
Dept: FAMILY MEDICINE CLINIC | Facility: CLINIC | Age: 65
End: 2025-01-13
Payer: MEDICARE

## 2025-01-13 NOTE — TELEPHONE ENCOUNTER
Appointment scheduled   
LOV                  6/17/2024                    NOV                  Visit date not found  LAST REFILL     11/11/2024  PROTOCOL       Not Met     
No

## 2025-01-14 ENCOUNTER — SPECIALTY PHARMACY (OUTPATIENT)
Dept: PHARMACY | Facility: HOSPITAL | Age: 65
End: 2025-01-14
Payer: MEDICARE

## 2025-01-14 NOTE — PROGRESS NOTES
Specialty Pharmacy Patient Management Program  Clinical Outreach     I called Duy Owens on 1/14/2025 09:46 EST who is enrolled in the Oncology Patient Management program offered by [facility].  Patient did not answer today. I left a voice message with my call back number, [phone].    SARTHAK Myers, Pharmacy Intern  Clinical Specialty Pharmacist, Oncology  1/14/2025  09:46 EST

## 2025-01-14 NOTE — PROGRESS NOTES
"Subjective     CHIEF COMPLAINT:      Chief Complaint   Patient presents with   • Follow-up     discuss PET Scan and biopsy       HISTORY OF PRESENT ILLNESS:     Duy Owens is a 60 y.o. male patient who returns today for follow up on his lymphadenopathy.  He returns today for follow-up accompanied by his significant other.  He reports improvement in the cough since he had a change in his antihypertensive medicine.  He reports having back pain but he has a longstanding history of back pain with frequent exacerbations.    Patient tolerated the CT-guided biopsy on 10/6/2021 well.      ROS:  Pertinent ROS is in the HPI.     Past medical, surgical, social and family history were reviewed.     MEDICATIONS:    Current Outpatient Medications:   •  losartan (Cozaar) 100 MG tablet, Take 1 tablet by mouth Daily., Disp: 30 tablet, Rfl: 2  •  meclizine (ANTIVERT) 25 MG tablet, Take 25 mg by mouth 2 (two) times a day., Disp: , Rfl:     Objective   VITAL SIGNS:     Vitals:    10/13/21 1457   BP: 156/87   Pulse: 73   Resp: 18   Temp: 98.2 °F (36.8 °C)   TempSrc: Temporal   SpO2: 98%   Weight: 96.8 kg (213 lb 6.4 oz)   Height: 175 cm (68.9\")   PainSc: 0-No pain     Body mass index is 31.61 kg/m².     Wt Readings from Last 5 Encounters:   10/13/21 96.8 kg (213 lb 6.4 oz)   10/06/21 97.5 kg (215 lb)   09/29/21 97.6 kg (215 lb 3.2 oz)   09/09/21 99.3 kg (219 lb)   08/18/21 95.3 kg (210 lb)       PHYSICAL EXAMINATION:   GENERAL: The patient appears in good general condition, not in acute distress.   SKIN: No ecchymosis.  EYES: No jaundice.  LYMPHATICS: No cervical lymphadenopathy.  CHEST: Normal respiratory effort.   CVS: No edema.    DIAGNOSTIC DATA:     Results from last 7 days   Lab Units 10/13/21  1447   WBC 10*3/mm3 6.25   NEUTROS ABS 10*3/mm3 3.41   HEMOGLOBIN g/dL 12.6*   HEMATOCRIT % 40.6   PLATELETS 10*3/mm3 253       Component      Latest Ref Rng & Units 9/29/2021   LDH      135 - 225 U/L 202   Beta-2 Microglobulin      0.8 " Pulmonary/Critical Care Inpatient Progress Note    Interval History     No overnight events   Stable on oxygen 2 liters/min via NC   No acute complaints       ROS:  As mentioned above    No intake/output data recorded.  Recent Labs   Lab 01/14/25  0623 01/13/25  0650 01/12/25  0641   WBC 11.0 8.6 9.0   RBC 4.91 4.86 4.78   HGB 14.3 13.8 13.5   HCT 43.6 43.5 42.7   MCV 88.8 89.5 89.3   MCH 29.1 28.4 28.2   MCHC 32.8 31.7* 31.6*    317 320     Recent Labs   Lab 01/14/25  0623 01/13/25  0650 01/12/25  0641   CO2 29 28 27   BUN 19 18 21*   AST 22 37 80*     No results found     I reviewed all relevant chest x-rays and CT chest scans     Physical Exam     Visit Vitals  /83 (BP Location: RUE - Right upper extremity, Patient Position: Supine)   Pulse 87   Temp 98.1 °F (36.7 °C) (Oral)   Resp 16   Ht 5' 10\" (1.778 m)   Wt 116 kg (255 lb 11.7 oz)   SpO2 92%   BMI 36.69 kg/m²     Physical Exam:    General: no acute distress.  Head: atraumatic   Neck: supple   Resp: Clear to auscultate bilaterally, no wheezes or crackles   CV: regular rhythm rate, no audible murmur  Abd: soft, non-distended and non-tender   Ext: no clubbing or edema   Skin: no visible rashes     Assessment and Plan:     Assessment:      Acute hypoxic respiratory failure   Acute Influenza A infection   Possible superimposed bacterial pneumonia  -CT chest with bilateral infiltrates      Mechanical fall      History of right upper lobe adenocarcinoma  -s/p right sided VATS on 12/27/24 with RUL posterior segmentectomy, mediastinal lymph node dissection and rib cryoablation      Seizures   Type II DM      Plan:      Continue oxygen supplementation as needed keeping oxygen saturation >90%   Stable on NC   Wean off as tolerated   Continue Albuterol inhaler as needed   Finished a course of Tamiflu   Finished a course of antibiotics   Pulmonary hygiene   Aspiration precautions   DVT PPx on Heparin subcu       Stable for discharge from the pulmonary  - 2.2 mg/L 1.8   Uric Acid      3.4 - 7.0 mg/dL 6.7     PET scan on 10/5/2021:  1.  FDG avid mediastinal and abdominopelvic adenopathy likely  representing malignancy and most suggestive of lymphoma. Correlation  with most recent biopsy results is recommended.  2.  A few subcentimeter pulmonary nodules bilaterally are below PET  resolution and indeterminate. Continued attention on follow-up is  recommended with chest CT in 3 months to ensure stability.    I personally reviewed the PET scan with the patient during today's visit.  There is significant hypermetabolism in the left para-aortic lymphadenopathy and the left pelvic sidewall lymph nodes.  Mild hyper metabolism was seen in the lymph nodes in the inferior mediastinum posterior to the esophagus.    Pathology exam from 10/6/2021:  1. Lymph Node, Retroperitoneal, Biopsy: Benign lymph node with                 A. Numerous foreign body giant cells surrounding homogenous slightly haematoxyphilic  material.     Comment: The lymph node section shows numerous histiocytes and foreign body giant cells.  These cells surround haematoxyphilic material.    beryl/jse      2. Lymph Node, Retroperitoneal, Flow Cytometry:                 A. Immunophenotyping fails to reveal a monoclonal B-cell papulation.               B.  Flow cytometry revealed no evidence of lymphoma or leukemia.  They were CD45 negative population suspicious for plasma cells representing 3% of the events.    Assessment/Plan   *Lymphadenopathy.    · Patient started having dry cough around July 2021.    · CT chest on 9/23/2021 revealed inferior mediastinal adenopathy.    · CT abdomen and pelvis on 9/24/2021 revealed retroperitoneal lymphadenopathy extending to the iliac chain lymph nodes in the right.    · Largest lymph node is in the retroperitoneal area and measures 4.8 x 3.5 cm.  The lymphadenopathy in the common iliac chain lymph node measured 3.5 x 2.7 cm.  The left external iliac chain lymph node measured  standpoint      Follow up with pulmonary as outpatient in 2 weeks from discharge        Cony Hoffman MD   [facility]        2.9 x 2 cm.    · The lymphadenopathy was concerning for lymphoma.  · PET scan on 10/5/2021 revealed the retroperitoneal and left pelvic lymphadenopathy to be hypermetabolic.  The left periaortic lymph nodes had SUV of 6.9 and the left pelvic sidewall lymph nodes had an SUV of 5.4.  · Patient had CT-guided biopsy on 10/6/2021.  · Pathology exam revealed benign lymph nodes with foreign body giant cells surrounding homogeneous material.  There were numerous histiocytes and foreign body giant cells.  · Flow cytometry of the lymph node showed no leukemia or lymphoma.  There were CD45 negative population suspicious for plasma cells representing 3% of the cells.  · I discussed the case with the pathologist, Dr. Foley.  According to Dr. Foley, the findings are not consistent with sarcoidosis.  · I explained the findings to the patient.  I explained that the findings are more consistent with inflammation than malignancy.  · The patient did not have any prior history of procedures to the area of the retroperitoneum to explain the presence of the foreign body reaction.  · Since the exact diagnosis could not be made, I requested the specimen to be sent to [facility] for second opinion.    *Microcytic anemia.    · Hemoglobin was 13.1 on 9/29/2021.    · Ferritin and B12 were normal.    · Hemoglobin is 12.6 today.    · This likely represents anemia secondary to inflammation.     PLAN:    1.  Await second opinion review of the biopsy at [facility].  2.  I will see the patient in follow-up in 2 weeks.  We will obtain CBC LDH vitamin B12 and folate levels    I spent 40 minutes caring for Duy on this date of service. This time includes time spent by me in the following activities: preparing for the visit, reviewing tests, obtaining and/or reviewing a separately obtained history, counseling and educating the patient/family/caregiver, ordering medications, tests, or procedures, referring and communicating with other health care  professionals, documenting information in the medical record, independently interpreting results and communicating that information with the patient/family/caregiver and care coordination     Tima Mendez MD  10/13/21

## 2025-01-15 ENCOUNTER — SPECIALTY PHARMACY (OUTPATIENT)
Dept: PHARMACY | Facility: HOSPITAL | Age: 65
End: 2025-01-15
Payer: MEDICARE

## 2025-01-15 NOTE — PROGRESS NOTES
I have secured a [employer] to cover Mr. Owens's $2000 co-pay for lenalidomide.    [employer] ID: [id number]  Amount: $12,000  From 12/16/2024 to 12/15/2025  BIN:[id number]  PCN:[id number]  GRP: [id number]  Pharmacy Card ID: [id number]     requires proof of Medicare enrollment. I have uploaded a copy of Mr. Owens's card to the  website.     I have given this information to Faviola mayberry/[facility] via the Provider Chat Portal and she has confirmed that his co-pay is $0. Faviola states that the prescription requires further review before they contact Mr. Owens to schedule delivery.     Diane Higginbotham - Care Coordinator   1/15/2025  13:14 EST

## 2025-01-16 ENCOUNTER — OFFICE VISIT (OUTPATIENT)
Dept: FAMILY MEDICINE CLINIC | Facility: CLINIC | Age: 65
End: 2025-01-16
Payer: MEDICARE

## 2025-01-16 VITALS
HEIGHT: 70 IN | SYSTOLIC BLOOD PRESSURE: 128 MMHG | BODY MASS INDEX: 28.09 KG/M2 | TEMPERATURE: 97.9 F | DIASTOLIC BLOOD PRESSURE: 86 MMHG | HEART RATE: 50 BPM | WEIGHT: 196.2 LBS | OXYGEN SATURATION: 98 % | RESPIRATION RATE: 14 BRPM

## 2025-01-16 DIAGNOSIS — I10 PRIMARY HYPERTENSION: ICD-10-CM

## 2025-01-16 DIAGNOSIS — Z00.00 WELCOME TO MEDICARE PREVENTIVE VISIT: Primary | ICD-10-CM

## 2025-01-16 DIAGNOSIS — I10 HYPERTENSION, ESSENTIAL: ICD-10-CM

## 2025-01-16 DIAGNOSIS — C90.00 MULTIPLE MYELOMA NOT HAVING ACHIEVED REMISSION: ICD-10-CM

## 2025-01-16 DIAGNOSIS — E78.2 HYPERLIPEMIA, MIXED: ICD-10-CM

## 2025-01-16 DIAGNOSIS — E11.9 TYPE 2 DIABETES MELLITUS WITHOUT COMPLICATION, WITHOUT LONG-TERM CURRENT USE OF INSULIN: ICD-10-CM

## 2025-01-16 DIAGNOSIS — D50.9 CHRONIC IRON DEFICIENCY ANEMIA: ICD-10-CM

## 2025-01-16 PROCEDURE — G0402 INITIAL PREVENTIVE EXAM: HCPCS | Performed by: FAMILY MEDICINE

## 2025-01-16 PROCEDURE — 3079F DIAST BP 80-89 MM HG: CPT | Performed by: FAMILY MEDICINE

## 2025-01-16 PROCEDURE — 1160F RVW MEDS BY RX/DR IN RCRD: CPT | Performed by: FAMILY MEDICINE

## 2025-01-16 PROCEDURE — 99213 OFFICE O/P EST LOW 20 MIN: CPT | Performed by: FAMILY MEDICINE

## 2025-01-16 PROCEDURE — 1159F MED LIST DOCD IN RCRD: CPT | Performed by: FAMILY MEDICINE

## 2025-01-16 PROCEDURE — 1170F FXNL STATUS ASSESSED: CPT | Performed by: FAMILY MEDICINE

## 2025-01-16 PROCEDURE — 3074F SYST BP LT 130 MM HG: CPT | Performed by: FAMILY MEDICINE

## 2025-01-16 PROCEDURE — 1126F AMNT PAIN NOTED NONE PRSNT: CPT | Performed by: FAMILY MEDICINE

## 2025-01-16 RX ORDER — LOSARTAN POTASSIUM 100 MG/1
100 TABLET ORAL DAILY
Qty: 90 TABLET | Refills: 1 | Status: SHIPPED | OUTPATIENT
Start: 2025-01-16

## 2025-01-16 NOTE — PROGRESS NOTES
Subjective   The ABCs of the Annual Wellness Visit  Medicare Wellness Visit    Pt presents for Wellcome to medicare   Duy Owens is a 64 y.o. patient who presents for a Medicare Wellness Visit.    The following portions of the patient's history were reviewed and   updated as appropriate: allergies, current medications, past family history, past medical history, past social history, past surgical history, and problem list.    Compared to one year ago, the patient's physical   health is the same.  Compared to one year ago, the patient's mental   health is the same.    Recent Hospitalizations:  He was not admitted to the hospital during the last year.     Current Medical Providers:  Patient Care Team:  Nara Dobson MD as PCP - General (Family Medicine)  Tima Mendez MD as Consulting Physician (Hematology and Oncology)  Clive Mars MD as Consulting Physician (Gastroenterology)  Allan Buenrostro MD as Cardiologist (Cardiology)  Meena Avina APRN as Referring Physician (Family Medicine)    Outpatient Medications Prior to Visit   Medication Sig Dispense Refill    aspirin 81 MG EC tablet Take 1 tablet by mouth Daily.      lenalidomide (REVLIMID) 10 MG capsule Take 1 capsule by mouth Daily. Take for 21 days on, then 7 days off. 21 capsule 0    losartan (COZAAR) 100 MG tablet TAKE 1 TABLET BY MOUTH DAILY 90 tablet 1    metFORMIN (GLUCOPHAGE) 500 MG tablet TAKE 1 TABLET BY MOUTH 2 TIMES A DAY 60 tablet 0    folic acid (FOLVITE) 1 MG tablet Take 1 tablet by mouth 1 (One) Time Per Week. (Patient not taking: Reported on 1/16/2025)       No facility-administered medications prior to visit.     No opioid medication identified on active medication list. I have reviewed chart for other potential  high risk medication/s and harmful drug interactions in the elderly.      Aspirin is on active medication list. Aspirin use is indicated based on review of current medical condition/s. Pros and cons of this therapy  "have been discussed today. Benefits of this medication outweigh potential harm.  Patient has been encouraged to continue taking this medication.  .      Patient Active Problem List   Diagnosis    Primary hypertension    Seasonal allergies    Hyperlipemia, mixed    Colon polyp    Chronic cough    Gastroesophageal reflux disease without esophagitis    Multiple myeloma not having achieved remission    Encounter for long-term (current) use of high-risk medication    AL amyloidosis    Elevated liver enzymes    Type 2 diabetes mellitus without complication, without long-term current use of insulin    DDD (degenerative disc disease), lumbar    Cervical spinal stenosis    Spinal stenosis of lumbar region without neurogenic claudication    Chronic iron deficiency anemia    Welcome to Medicare preventive visit     Advance Care Planning Advance Directive is not on file.  ACP discussion was held with the patient during this visit. Patient does not have an advance directive, information provided.            Objective   Vitals:    01/16/25 0843   BP: 128/86   BP Location: Left arm   Patient Position: Sitting   Pulse: 50   Resp: 14   Temp: 97.9 °F (36.6 °C)   SpO2: 98%   Weight: 89 kg (196 lb 3.2 oz)   Height: 177 cm (69.69\")   PainSc: 0-No pain       Estimated body mass index is 28.4 kg/m² as calculated from the following:    Height as of this encounter: 177 cm (69.69\").    Weight as of this encounter: 89 kg (196 lb 3.2 oz).    BMI is >= 25 and <30. (Overweight) The following options were offered after discussion;: weight loss educational material (shared in after visit summary)           Does the patient have evidence of cognitive impairment? No                                                                                                Health  Risk Assessment    Smoking Status:  Social History     Tobacco Use   Smoking Status Former    Current packs/day: 0.00    Average packs/day: 0.5 packs/day for 10.5 years (5.3 ttl pk-yrs) "    Types: Cigarettes    Start date: 1999    Quit date:     Years since quitting: 15.0   Smokeless Tobacco Never     Alcohol Consumption:  Social History     Substance and Sexual Activity   Alcohol Use Not Currently       Fall Risk Screen  TARIKADI Fall Risk Assessment has not been completed.    Depression Screening   Little interest or pleasure in doing things? Not at all   Feeling down, depressed, or hopeless? Not at all   PHQ-2 Total Score 0      Health Habits and Functional and Cognitive Screenin/16/2025     8:00 AM   Functional & Cognitive Status   Do you have difficulty preparing food and eating? No   Do you have difficulty bathing yourself, getting dressed or grooming yourself? No   Do you have difficulty using the toilet? No   Do you have difficulty moving around from place to place? No   Do you have trouble with steps or getting out of a bed or a chair? No   Current Diet Well Balanced Diet   Dental Exam Up to date   Eye Exam Up to date   Exercise (times per week) 5 times per week   Do you need help using the phone?  No   Are you deaf or do you have serious difficulty hearing?  No   Do you need help to go to places out of walking distance? No   Do you need help shopping? No   Do you need help preparing meals?  No   Do you need help with housework?  No   Do you need help with laundry? No   Do you need help taking your medications? No   Do you need help managing money? No   Do you ever drive or ride in a car without wearing a seat belt? No   Have you felt unusual stress, anger or loneliness in the last month? No   Who do you live with? Other   If you need help, do you have trouble finding someone available to you? No   Have you been bothered in the last four weeks by sexual problems? No   Do you have difficulty concentrating, remembering or making decisions? No           Age-appropriate Screening Schedule:  Refer to the list below for future screening recommendations based on patient's age, sex  and/or medical conditions. Orders for these recommended tests are listed in the plan section. The patient has been provided with a written plan.    Health Maintenance List  Health Maintenance   Topic Date Due    DIABETIC FOOT EXAM  Never done    DIABETIC EYE EXAM  Never done    TDAP/TD VACCINES (1 - Tdap) Never done    ZOSTER VACCINE (2 of 2) 04/19/2024    COVID-19 Vaccine (7 - 2024-25 season) 11/05/2024    HEMOGLOBIN A1C  12/17/2024    LIPID PANEL  06/17/2025    ANNUAL WELLNESS VISIT  01/16/2026    BMI FOLLOWUP  01/16/2026    COLORECTAL CANCER SCREENING  05/19/2027    HEPATITIS C SCREENING  Completed    Pneumococcal Vaccine 0-64  Completed    INFLUENZA VACCINE  Completed    URINE MICROALBUMIN  Discontinued                                                                                                                                                CMS Preventative Services Quick Reference  Risk Factors Identified During Encounter  None Identified    The above risks/problems have been discussed with the patient.  Pertinent information has been shared with the patient in the After Visit Summary.  An After Visit Summary and PPPS were made available to the patient.    Follow Up:   Next Medicare Wellness visit to be scheduled in 1 year.      Preventive Counseling for MWE:  Work on diet and exercise .    Follow up with eye doctor    Additional E&M Note during same encounter follows:  Patient has additional, significant, and separately identifiable condition(s)/problem(s) that require work above and beyond the Medicare Wellness Visit     Chief Complaint  Diabetes    Subjective   HPI  Duy is also being seen today for additional medical problem/s.  Patient presents today for labs, refills, and  Diabetes -   Hypertension - no chest pains or headaches  Hyperlipidemia - no medication; active  He has MM and anemia- anemia is still presents and chronic.  H and H are a little low still.    Specialist checks pts psa per pt.       "          Objective   Vital Signs:  /86 (BP Location: Left arm, Patient Position: Sitting)   Pulse 50   Temp 97.9 °F (36.6 °C)   Resp 14   Ht 177 cm (69.69\")   Wt 89 kg (196 lb 3.2 oz)   SpO2 98%   BMI 28.40 kg/m²   Physical Exam  Vitals and nursing note reviewed.   Constitutional:       Appearance: Normal appearance. He is well-developed.   Neck:      Vascular: No carotid bruit.   Cardiovascular:      Rate and Rhythm: Normal rate and regular rhythm.      Heart sounds: Normal heart sounds. No murmur heard.  Pulmonary:      Effort: Pulmonary effort is normal. No respiratory distress.      Breath sounds: Normal breath sounds. No stridor. No wheezing or rhonchi.   Neurological:      General: No focal deficit present.      Mental Status: He is alert and oriented to person, place, and time. He is not disoriented.   Psychiatric:         Mood and Affect: Mood normal.         Behavior: Behavior normal.                       Assessment and Plan            Type 2 diabetes mellitus without complication, without long-term current use of insulin      Orders:    Hemoglobin A1c    metFORMIN (GLUCOPHAGE) 500 MG tablet; Take 1 tablet by mouth 2 (Two) Times a Day.    Primary hypertension      Orders:    Lipid Panel    Hyperlipemia, mixed       Orders:    Lipid Panel    Chronic iron deficiency anemia         Multiple myeloma not having achieved remission         Hypertension, essential      Orders:    losartan (COZAAR) 100 MG tablet; Take 1 tablet by mouth Daily.    Welcome to Medicare preventive visit                 Follow Up   Return in about 6 months (around 7/16/2025) for diabetes, hyperlipidema, hypertension.  Patient was given instructions and counseling regarding his condition or for health maintenance advice. Please see specific information pulled into the AVS if appropriate.      Labs, refills, work on diet and exercise.   Anemia chronic due to MM and is managed by his specialist as well.    Follow up with eye " doctor.

## 2025-01-16 NOTE — ASSESSMENT & PLAN NOTE
Orders:    Hemoglobin A1c    metFORMIN (GLUCOPHAGE) 500 MG tablet; Take 1 tablet by mouth 2 (Two) Times a Day.

## 2025-01-17 ENCOUNTER — SPECIALTY PHARMACY (OUTPATIENT)
Dept: PHARMACY | Facility: HOSPITAL | Age: 65
End: 2025-01-17
Payer: MEDICARE

## 2025-01-17 LAB
CHOLEST SERPL-MCNC: 163 MG/DL (ref 0–200)
HBA1C MFR BLD: 5.8 % (ref 4.8–5.6)
HDLC SERPL-MCNC: 56 MG/DL (ref 40–60)
LDLC SERPL CALC-MCNC: 93 MG/DL (ref 0–100)
TRIGL SERPL-MCNC: 75 MG/DL (ref 0–150)
VLDLC SERPL CALC-MCNC: 14 MG/DL (ref 5–40)

## 2025-01-17 NOTE — PROGRESS NOTES
Specialty Pharmacy Patient Management Program  Oncology Reassessment     Duy Owens was referred by an their provider to the Oncology Patient Management program offered by [facility] for Multiple Myeloma. A follow-up outreach was conducted, including assessment of continued therapy appropriateness, medication adherence, and side effect incidence and management for lenalidomide (Revlimid).    Changes to Insurance Coverage or Financial Support  None    Relevant Past Medical History and Comorbidities  Relevant medical history and concomitant health conditions were discussed with the patient. The patient's chart has been reviewed for relevant past medical history and comorbid health conditions and updated as necessary.   Past Medical History:   Diagnosis Date    AL amyloidosis     GERD (gastroesophageal reflux disease)     History of colon polyps     History of snoring     Hypertension     Multiple myeloma      Social History     Socioeconomic History    Marital status: Single   Tobacco Use    Smoking status: Former     Current packs/day: 0.00     Average packs/day: 0.5 packs/day for 10.5 years (5.3 ttl pk-yrs)     Types: Cigarettes     Start date: 07/1999     Quit date: 2010     Years since quitting: 15.0    Smokeless tobacco: Never   Vaping Use    Vaping status: Never Used   Substance and Sexual Activity    Alcohol use: Not Currently    Drug use: Never    Sexual activity: Defer     Problem list reviewed by Arlene Pleitez RPH on 1/17/2025 at 10:57 AM    Hospitalizations and Urgent Care Since Last Assessment  ED Visits, Admissions, or Hospitalizations: None  Urgent Office Visits: None    Allergies  Known allergies and reactions were discussed with the patient. The patient's chart has been reviewed for allergy information and updated as necessary.   Allergies   Allergen Reactions    Lisinopril Cough     Allergies reviewed by Arlene Pleitez RPH on 1/17/2025 at 10:57 AM    Relevant  Laboratory Values  Relevant laboratory values were discussed with the patient. The following specialty medication dose adjustment(s) are recommended: No dose adjustments are needed for the oral specialty medication(s) based on the labs.    Lab Results   Component Value Date    GLUCOSE 84 12/11/2024    CALCIUM 9.0 12/11/2024     12/11/2024    K 3.9 12/11/2024    CO2 26.6 12/11/2024     12/11/2024    BUN 10 12/11/2024    CREATININE 0.94 12/11/2024    EGFRIFAFRI 88 02/16/2022    EGFRIFNONA 84 05/03/2021    BCR 10.6 12/11/2024    ANIONGAP 9.4 12/11/2024     Lab Results   Component Value Date    WBC 3.95 12/11/2024    RBC 5.36 12/11/2024    HGB 12.6 (L) 12/11/2024    HCT 41.5 12/11/2024    MCV 77.4 (L) 12/11/2024    MCH 23.5 (L) 12/11/2024    MCHC 30.4 (L) 12/11/2024    RDW 16.3 (H) 12/11/2024    RDWSD 45.1 12/11/2024    MPV 9.6 12/11/2024     12/11/2024    NEUTRORELPCT 48.5 12/11/2024    LYMPHORELPCT 34.2 12/11/2024    MONORELPCT 8.9 12/11/2024    EOSRELPCT 6.1 12/11/2024    BASORELPCT 1.5 12/11/2024    AUTOIGPER 0.8 (H) 12/11/2024    NEUTROABS 1.92 12/11/2024    LYMPHSABS 1.35 12/11/2024    MONOSABS 0.35 12/11/2024    EOSABS 0.24 12/11/2024    BASOSABS 0.06 12/11/2024    AUTOIGNUM 0.03 12/11/2024    NRBC 0.0 12/11/2024       Current Medication List  This medication list has been reviewed with the patient and evaluated for any interactions or necessary modifications/recommendations, and updated to include all prescription medications, OTC medications, and supplements the patient is currently taking.  This list reflects what is contained in the patient's profile, which has also been marked as reviewed to communicate to other providers it is the most up to date version of the patient's current medication therapy.     Current Outpatient Medications:     aspirin 81 MG EC tablet, Take 1 tablet by mouth Daily., Disp: , Rfl:     folic acid (FOLVITE) 1 MG tablet, Take 1 tablet by mouth 1 (One) Time Per Week.  (Patient not taking: Reported on 1/16/2025), Disp: , Rfl:     lenalidomide (REVLIMID) 10 MG capsule, Take 1 capsule by mouth Daily. Take for 21 days on, then 7 days off., Disp: 21 capsule, Rfl: 0    losartan (COZAAR) 100 MG tablet, Take 1 tablet by mouth Daily., Disp: 90 tablet, Rfl: 1    metFORMIN (GLUCOPHAGE) 500 MG tablet, Take 1 tablet by mouth 2 (Two) Times a Day., Disp: 180 tablet, Rfl: 1    Medicines reviewed by Arlene Pleitez RP on 1/17/2025 at 10:57 AM    Drug Interactions  Assessed medication list for interactions, no significant drug interactions noted.   Advised patient to call the clinic if any new medications are started so we can assess for drug-drug interactions.  Drug-food interactions discussed:  None    Adverse Drug Reactions  Medication tolerability: Tolerating with no to minimal ADRs  Medication plan: Continue therapy with normal follow-up  Plan for ADR Management: None    Adherence, Self-Administration, and Current Therapy Problems  Adherence related to the patient's specialty therapy was discussed with the patient. The Adherence segment of this outreach has been reviewed and updated.     Adherence Questions  Linked Medication(s) Assessed: Lenalidomide (REVLIMID)  On average, how many doses/injections does the patient miss per month?: 0  What are the identified reasons for non-adherence or missed doses? : no problems identified  What is the estimated medication adherence level?: %  Based on the patient/caregiver response and refill history, does this patient require an MTP to track adherence improvements?: no    Additional Barriers to Patient Self-Administration: None  Methods for Supporting Patient Self-Administration: None  Patient has had no issues obtaining medication from pharmacy.    Open Medication Therapy Problems  No medication therapy recommendations to display    Goals of Therapy  Goals related to the patient's specialty therapy were discussed with the patient. The  Patient Goals segment of this outreach has been reviewed and updated.   Goals Addressed Today        Specialty Pharmacy General Goal      SPEP REGINO FLC  will show no monoclonal proteins.   7/26/24 per dictation  5/31/2024: SPEP REGINO FLC revealed no M protein. Kappa FLC 29.9. Lambda FLC 23.7. Kappa/lambda ratio 1.26.   1/17/25: Most recent labs from 12/13/24 showed no observed M spike, Kappa FLC 31, Lambda FLC 23.3, and Kappa/Lambda ratio 1.33.  Stable for patient. He is tolerating oral regimen well -- no changes at this time.              Quality of Life Assessment   Quality of Life related to the patient's enrollment in the patient management program and services provided was discussed with the patient. The QOL segment of this outreach has been reviewed and updated.  Quality of Life Improvement Scale: 5-No change    Discussed aforementioned material with patient over the phone.     Reassessment Plan & Follow-Up  1. Medication Therapy Changes: None  2. Related Plans, Therapy Recommendations, or Issues to Be Addressed: None  3. Pharmacist to perform regular assessments no more than (6) months from the previous assessment.   4. Care Coordinator to set up future refill outreaches, coordinate prescription delivery, and escalate clinical questions to pharmacist.    Attestation  Therapeutic appropriateness: Appropriate   I attest the patient was actively involved in and has agreed to the above plan of care.  If the prescribed therapy is at any point deemed not appropriate based on the current or future assessments, a consultation will be initiated with the patient's specialty care provider to determine the best course of action. The revised plan of therapy will be documented along with any required assessments and/or additional patient education provided.     SARTHAK Myers, Pharmacy Intern    1/17/2025  11:00 HENRRY Pleitez, PharmD, [facility]  Clinical Oncology Pharmacist

## 2025-01-27 ENCOUNTER — TELEPHONE (OUTPATIENT)
Dept: NEUROSURGERY | Facility: CLINIC | Age: 65
End: 2025-01-27

## 2025-01-27 NOTE — TELEPHONE ENCOUNTER
Left Vmail for PT that jatinder is cancelled due to Provider being out of office, and to call back to reschedule.    Thank you

## 2025-01-29 ENCOUNTER — LAB (OUTPATIENT)
Dept: OTHER | Facility: HOSPITAL | Age: 65
End: 2025-01-29
Payer: MEDICARE

## 2025-01-29 ENCOUNTER — OFFICE VISIT (OUTPATIENT)
Dept: ONCOLOGY | Facility: CLINIC | Age: 65
End: 2025-01-29
Payer: MEDICARE

## 2025-01-29 VITALS
SYSTOLIC BLOOD PRESSURE: 144 MMHG | OXYGEN SATURATION: 100 % | HEART RATE: 62 BPM | HEIGHT: 70 IN | BODY MASS INDEX: 28.62 KG/M2 | WEIGHT: 199.9 LBS | DIASTOLIC BLOOD PRESSURE: 70 MMHG | TEMPERATURE: 97.5 F

## 2025-01-29 DIAGNOSIS — C90.00 MULTIPLE MYELOMA NOT HAVING ACHIEVED REMISSION: ICD-10-CM

## 2025-01-29 DIAGNOSIS — D52.0 DIETARY FOLATE DEFICIENCY ANEMIA: ICD-10-CM

## 2025-01-29 DIAGNOSIS — E85.81 AL AMYLOIDOSIS: ICD-10-CM

## 2025-01-29 DIAGNOSIS — Z79.899 ENCOUNTER FOR LONG-TERM (CURRENT) USE OF HIGH-RISK MEDICATION: ICD-10-CM

## 2025-01-29 DIAGNOSIS — D84.9 IMMUNOCOMPROMISED PATIENT: ICD-10-CM

## 2025-01-29 DIAGNOSIS — C90.00 MULTIPLE MYELOMA NOT HAVING ACHIEVED REMISSION: Primary | ICD-10-CM

## 2025-01-29 DIAGNOSIS — D63.0 ANEMIA IN NEOPLASTIC DISEASE: ICD-10-CM

## 2025-01-29 LAB
ALBUMIN SERPL-MCNC: 4.2 G/DL (ref 3.5–5.2)
ALBUMIN/GLOB SERPL: 1.2 G/DL
ALP SERPL-CCNC: 99 U/L (ref 39–117)
ALT SERPL W P-5'-P-CCNC: 38 U/L (ref 1–41)
ANION GAP SERPL CALCULATED.3IONS-SCNC: 7.7 MMOL/L (ref 5–15)
AST SERPL-CCNC: 26 U/L (ref 1–40)
BASOPHILS # BLD AUTO: 0.06 10*3/MM3 (ref 0–0.2)
BASOPHILS NFR BLD AUTO: 1.7 % (ref 0–1.5)
BILIRUB SERPL-MCNC: 0.5 MG/DL (ref 0–1.2)
BUN SERPL-MCNC: 15 MG/DL (ref 8–23)
BUN/CREAT SERPL: 16 (ref 7–25)
CALCIUM SPEC-SCNC: 9.1 MG/DL (ref 8.6–10.5)
CHLORIDE SERPL-SCNC: 108 MMOL/L (ref 98–107)
CO2 SERPL-SCNC: 25.3 MMOL/L (ref 22–29)
CREAT SERPL-MCNC: 0.94 MG/DL (ref 0.76–1.27)
DEPRECATED RDW RBC AUTO: 45.1 FL (ref 37–54)
EGFRCR SERPLBLD CKD-EPI 2021: 91.1 ML/MIN/1.73
EOSINOPHIL # BLD AUTO: 0.15 10*3/MM3 (ref 0–0.4)
EOSINOPHIL NFR BLD AUTO: 4.2 % (ref 0.3–6.2)
ERYTHROCYTE [DISTWIDTH] IN BLOOD BY AUTOMATED COUNT: 16.1 % (ref 12.3–15.4)
GLOBULIN UR ELPH-MCNC: 3.4 GM/DL
GLUCOSE SERPL-MCNC: 88 MG/DL (ref 65–99)
HCT VFR BLD AUTO: 41.7 % (ref 37.5–51)
HGB BLD-MCNC: 12.7 G/DL (ref 13–17.7)
IMM GRANULOCYTES # BLD AUTO: 0.01 10*3/MM3 (ref 0–0.05)
IMM GRANULOCYTES NFR BLD AUTO: 0.3 % (ref 0–0.5)
LYMPHOCYTES # BLD AUTO: 1.56 10*3/MM3 (ref 0.7–3.1)
LYMPHOCYTES NFR BLD AUTO: 43.9 % (ref 19.6–45.3)
MCH RBC QN AUTO: 23.6 PG (ref 26.6–33)
MCHC RBC AUTO-ENTMCNC: 30.5 G/DL (ref 31.5–35.7)
MCV RBC AUTO: 77.5 FL (ref 79–97)
MONOCYTES # BLD AUTO: 0.45 10*3/MM3 (ref 0.1–0.9)
MONOCYTES NFR BLD AUTO: 12.7 % (ref 5–12)
NEUTROPHILS NFR BLD AUTO: 1.32 10*3/MM3 (ref 1.7–7)
NEUTROPHILS NFR BLD AUTO: 37.2 % (ref 42.7–76)
NRBC BLD AUTO-RTO: 0 /100 WBC (ref 0–0.2)
PLATELET # BLD AUTO: 203 10*3/MM3 (ref 140–450)
PMV BLD AUTO: 8.8 FL (ref 6–12)
POTASSIUM SERPL-SCNC: 4.1 MMOL/L (ref 3.5–5.2)
PROT SERPL-MCNC: 7.6 G/DL (ref 6–8.5)
RBC # BLD AUTO: 5.38 10*6/MM3 (ref 4.14–5.8)
SODIUM SERPL-SCNC: 141 MMOL/L (ref 136–145)
WBC NRBC COR # BLD AUTO: 3.55 10*3/MM3 (ref 3.4–10.8)

## 2025-01-29 PROCEDURE — 83521 IG LIGHT CHAINS FREE EACH: CPT | Performed by: INTERNAL MEDICINE

## 2025-01-29 PROCEDURE — 85025 COMPLETE CBC W/AUTO DIFF WBC: CPT | Performed by: INTERNAL MEDICINE

## 2025-01-29 PROCEDURE — 82746 ASSAY OF FOLIC ACID SERUM: CPT | Performed by: INTERNAL MEDICINE

## 2025-01-29 PROCEDURE — 82607 VITAMIN B-12: CPT | Performed by: INTERNAL MEDICINE

## 2025-01-29 PROCEDURE — 83921 ORGANIC ACID SINGLE QUANT: CPT | Performed by: INTERNAL MEDICINE

## 2025-01-29 PROCEDURE — 82784 ASSAY IGA/IGD/IGG/IGM EACH: CPT | Performed by: INTERNAL MEDICINE

## 2025-01-29 PROCEDURE — 80053 COMPREHEN METABOLIC PANEL: CPT | Performed by: INTERNAL MEDICINE

## 2025-01-29 PROCEDURE — 84165 PROTEIN E-PHORESIS SERUM: CPT | Performed by: INTERNAL MEDICINE

## 2025-01-29 PROCEDURE — 86334 IMMUNOFIX E-PHORESIS SERUM: CPT | Performed by: INTERNAL MEDICINE

## 2025-01-29 PROCEDURE — 36415 COLL VENOUS BLD VENIPUNCTURE: CPT

## 2025-01-29 NOTE — PROGRESS NOTES
"Subjective     CHIEF COMPLAINT:      Chief Complaint   Patient presents with    Follow-up     Pt fell [place] he was at the park . Pt notice beforehand he felt like strained his back.     HISTORY OF PRESENT ILLNESS:     Duy Owens is a 64 y.o. male patient who returns today for follow up on his multiple myeloma.  He returns today for follow-up reporting that he had a fall in the park.  He felt that he strained his back. He did not develop severe headache.        ROS:  Pertinent ROS is in the HPI.     Past medical, surgical, social and family history were reviewed.     MEDICATIONS:    Current Outpatient Medications:     aspirin 81 MG EC tablet, Take 1 tablet by mouth Daily., Disp: , Rfl:     folic acid (FOLVITE) 1 MG tablet, Take 1 tablet by mouth 1 (One) Time Per Week. (Patient not taking: Reported on 1/29/2025), Disp: , Rfl:     lenalidomide (REVLIMID) 10 MG capsule, Take 1 capsule by mouth Daily. Take for 21 days on, then 7 days off., Disp: 21 capsule, Rfl: 0    losartan (COZAAR) 100 MG tablet, Take 1 tablet by mouth Daily., Disp: 90 tablet, Rfl: 1    metFORMIN (GLUCOPHAGE) 500 MG tablet, Take 1 tablet by mouth 2 (Two) Times a Day., Disp: 180 tablet, Rfl: 1  Objective     VITAL SIGNS:     Vitals:    01/29/25 0925   BP: 144/70   Pulse: 62   Temp: 97.5 °F (36.4 °C)   TempSrc: Oral   SpO2: 100%   Weight: 90.7 kg (199 lb 14.4 oz)   Height: 177 cm (69.69\")   PainSc: 0-No pain     Body mass index is 28.94 kg/m².     Wt Readings from Last 5 Encounters:   01/29/25 90.7 kg (199 lb 14.4 oz)   01/16/25 89 kg (196 lb 3.2 oz)   10/16/24 89.1 kg (196 lb 8 oz)   07/03/24 87.5 kg (193 lb)   06/17/24 87.5 kg (193 lb)     PHYSICAL EXAMINATION:   GENERAL: The patient appears in good general condition, not in acute distress.   SKIN: No Ecchymosis.  EYES: No jaundice. No Pallor.  CHEST: Normal respiratory effort. Normal breathing sounds bilaterally. No added sounds.  CVS: Normal S1 and S2. No Murmur.  ABDOMEN: Soft. No tenderness. " No Hepatomegaly. No Splenomegaly. No masses.  EXTREMITIES: No edema.  No calf tenderness.    DIAGNOSTIC DATA:     Results from last 7 days   Lab Units 01/29/25  0837   WBC 10*3/mm3 3.55   NEUTROS ABS 10*3/mm3 1.32*   HEMOGLOBIN g/dL 12.7*   HEMATOCRIT % 41.7   PLATELETS 10*3/mm3 203     Results from last 7 days   Lab Units 01/29/25  0837   SODIUM mmol/L 141   POTASSIUM mmol/L 4.1   CHLORIDE mmol/L 108*   CO2 mmol/L 25.3   BUN mg/dL 15   CREATININE mg/dL 0.94   CALCIUM mg/dL 9.1   ALBUMIN g/dL 4.2   BILIRUBIN mg/dL 0.5   ALK PHOS U/L 99   ALT (SGPT) U/L 38   AST (SGOT) U/L 26   GLUCOSE mg/dL 88     Component      Latest Ref Rng 10/16/2024 11/13/2024 12/11/2024   IgG      603 - 1613 mg/dL 1579  1494  1542    IgA      61 - 437 mg/dL 178  169  176    IgM      20 - 172 mg/dL 47  45  42    Total Protein      6.0 - 8.5 g/dL 7.8  7.3  6.9    Albumin      2.9 - 4.4 g/dL 4.0  3.8  3.8    Alpha-1-Globulin      0.0 - 0.4 g/dL 0.3  0.3  0.2    Alpha-2-Globulin      0.4 - 1.0 g/dL 0.6  0.7  0.5    Beta Globulin      0.7 - 1.3 g/dL 1.2  1.1  1.0    Gamma Globulin      0.4 - 1.8 g/dL 1.6  1.5  1.4    M-Werner      Not Observed g/dL Not Observed  Not Observed  Not Observed    Globulin      2.2 - 3.9 g/dL 3.8  3.5  3.1    A/G Ratio      0.7 - 1.7  1.1  1.1  1.3    Immunofixation Reflex, Serum Comment  Comment  Comment    Please note Comment  Comment  Comment    Kappa FLC      3.3 - 19.4 mg/L 33.7 (H)  32.1 (H)  31.0 (H)    Free Lambda Light Chains      5.7 - 26.3 mg/L 24.9  25.6  23.3    Kappa/Lambda Ratio      0.26 - 1.65  1.35  1.25  1.33       Assessment & Plan    *Lambda light chain multiple myeloma with lymphadenopathy and development of AL amyloidosis.  Patient was found to have lymph node involvement and amyloid deposition in the involved lymph nodes.  Patient started having dry cough around July 2021.    CT scans 9/23/2021-9/24/2021 revealed inferior mediastinal adenopathy and retroperitoneal adenopathy extending to the right  iliac chain.  The Largest lymph node was in the retroperitoneal area measuring 4.8 x 3.5 cm.  The common iliac lymph node measured 3.5 x 2.7 cm.  The left external iliac chain lymph node measured 2.9 x 2 cm.    PET scan on 10/5/2021 revealed the retroperitoneal and left pelvic lymphadenopathy to be hypermetabolic.  The left periaortic lymph nodes had SUV of 6.9 and the left pelvic sidewall lymph nodes had an SUV of 5.4.  No bone involvement.  CT-guided biopsy on 10/6/2021- pathologist at [facility] reported involvement with monotypic lambda restricted plasma cells concerning for involvement with plasma cell dyscrasia.  Bone marrow biopsy on 10/29/2021 revealed a normocellular marrow (50%) with involvement with plasma cell dyscrasia (plasma cells representing 20-30% of total cells by  stain).   FISH was negative for gain of 1q, monosomy/deletions of chromosomes 13 and 17, IGH rearrangement, gain of chromosomes 9 and 11, IGH-CCND1 (11;14) fusion.   Treatment with the VRD started on 12/22/2021.  Free lambda light chain started to improve after the patient started treatment.  CT scan on 3/11/2022 revealed decrease in the size of lymph nodes in the periaortic area.  There is a slight increase in a lymph node in the left axillary area that increased from 7 to 10 mm.    PET scan on 4/28/2022 revealed significant reduction in the size and hypermetabolism of the involved lymph nodes.  SPEP REGINO FLC on 5/25/2022 revealed no M protein.  Free lambda light chain was 41.8.  Kappa to lambda ratio was normal at 0.26.  B2 M was 1.6.  Bone marrow biopsy on 5/24/2022 revealed normocellular marrow with 30-40% cellularity with involvement by plasma cell neoplasm representing 5-10% by IHC.  Negative for amyloid.  He was considered to be KS-1.  S/p high-dose melphalan with autologous stem cell transplant.  Day 0 was 7/7/2022.  Patient did well with the transplant but had neutropenic fever of unclear source necessitating  hospitalization between 7/16/2022 and 7/18/2022.    Bone marrow biopsy on 9/29/2022 revealed monotypic plasma cells identified on on flow cytometry but not on IHC.  CT on 9/29/2022 showed decrease in the size of the enlarged lymph nodes.  Patient started Revlimid 10 mg daily for 21 out of 28-day cycle on 11/1/2022.    CT on 2/23/2023 showed decrease in the size of the mediastinal and retroperitoneal lymph nodes indicating response to treatment.  2/23/2023-Free kappa light chain 1.94, free lambda light chain 2.19, kappa/lambda ratio normal at 0.89.  5/17/2023: Free kappa light chain was 31.5.  Free lambda light chain was 24.5.  Kappa 3 lambda ratio normal at 1.29.  6/14/2023: Free kappa light chain was 31.0.  Free lambda light chain was 23.7.  Kappa to lambda ratio was 1.31.   Patient had follow-up at [place] on 8/22/2023.    CT scan showed decrease in the size of the enlarged lymph nodes.  No new abnormalities were seen.  9/6/2023: Kappa FLC 30.0.  Lambda FLC 25.8.  Kappa to lambda ratio 1.16.  No M protein.  11/1/2023: Kappa FLC 32.9. Lambda FLC 25.8. K:L ratio 1.28.  CT on 2/20/2024 showed stable lymphadenopathy.  12/11/2024: No M protein was identified.  Kappa FLC 31.0.  Lambda FLC 23.3.    Kappa/lambda ratio normal at 1.33.  No evidence of relapse of the multiple myeloma.    *Anemia secondary to multiple myeloma and secondary to treatment.  Hemoglobin was 13.1 on 9/29/2021.    Iron, folate and vitamin B12 were normal in September/October 2021.    Hemoglobin was 11.4 on 8/24/2022.  Hemoglobin improved to the 11-12 g range.  Folate was low at 5.5.   He was started on folic acid 1 mg daily.  2/28/2024: Folate increased to >20.  5/1/2024: Hemoglobin 11.8.  7/3/2024: Hemoglobin improved to 12.6.  He has adequate iron stores.  Vitamin B12 575.  Folate improved to >20.  Folic acid was reduced to once weekly.  He reports that he stopped taking the folic acid.  1/29/2025: Hemoglobin 12.7.    *Neutropenia due to  chemotherapy.  It is attributed to Revlimid.  1/29/2025: Neutrophil count 1320.    *Prophylaxis.  He was previously on acyclovir 400 mg twice daily.    He is now on Valtrex.  He received Evusheld on 8/20/2022.  Patient received vaccinations as recommended by [facility].  He received COVID-19 Moderna booster on 10/25/2022.  He received vaccines in April and June.  He received the first Shingrix vaccine in August 2023 and in February 2024.  No recent infection.    *Elevated liver enzymes.  Liver enzymes have fluctuated over time.  CT on 2/20/2024 showed no liver or gallbladder abnormality.  Patient does not drink alcohol.  5/1/2024: ALT 63.  AST 52.  Bilirubin 0.3.  Alkaline phosphatase 118.  7/3/2024: ALT improved to 51.  AST improved to 38.  10/16/2024: ALT improved to 25.  AST improved to 21.  1/29/2025: ALT 38.  AST 26.  Alkaline phosphatase 99.  Bilirubin 0.5.    *Syncope.  Patient reports having an episode of passing out while he was outdoors watching a game.  He did not feel dizzy prior to the episode. He was well hydrated.  He regained consciousness quickly.   EMS was called. He declined going to ER at that time.   He was referred to cardiology.  He is going to have echocardiogram done.  No recurrence of the episodes.  He has mild bradycardia today but he is asymptomatic.    *Severe muscle spasm of the cervical-thoracic spine.  He reports having weakness of the right lower extremity.  He fell off a ladder recently due to weakness of the right LE.  I recommended evaluation with an MRI of the spine.   He complained of muscle spasm of other muscles.  MRI showed no lesions from multiple myeloma.  He was referred to neurosurgery.  Conservative management was recommended for DJD.  No recurrence of the symptoms.    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  There was mild wall thickening.  Ejection fraction was normal.  Echocardiogram at [facility] on 4/21/2022 did not reveal wall  thickening.    PLAN:    1.  Continue Revlimid 10 mg daily for 21 days of a 28-day cycle.  He is starting new cycle today.    2.  Continue aspirin 81 mg daily.  3.  Restart folic acid 1 mg weekly.  4.  Since he has follow-up at [facility] in February 2025, I will see him in follow-up in 2 months.  We will obtain CBC CMP SPEP REGINO FLC.  5.  Recommendations from [place] was to continue maintenance Revlimid for 5 years-through July 2027.      Tima Mendez MD  01/29/25

## 2025-01-30 ENCOUNTER — SPECIALTY PHARMACY (OUTPATIENT)
Dept: PHARMACY | Facility: HOSPITAL | Age: 65
End: 2025-01-30
Payer: MEDICARE

## 2025-01-30 NOTE — PROGRESS NOTES
Specialty Pharmacy Note: Revlimid (lenalidomide)    Duy Owens is a 64 y.o. male with multiple myeloma was seen 1/29/25 by Dr. Mendez. Per provider dictation, no changes to oral oncology regimen  Revlimid 10 mg daily for 21 days of a 28-day cycle .  Labs Review: The CMP and CBC from 1/29/25 have been reviewed. No dose adjustments are needed for the oral specialty medication(s) based on the labs.    Specialty pharmacy will continue to follow patient.    Figueroa Pleitez PharmD, [facility]  Clinical Oncology Pharmacist    1/30/2025  08:35 EST

## 2025-01-30 NOTE — PROGRESS NOTES
Requested a new PA for Lenalinomide in CMM. Vickie returned that a PA is not required.    Rima Ibrahim, [facility]  Care Coordinator   01/30/25  08:47 EST

## 2025-01-31 LAB
ALBUMIN SERPL ELPH-MCNC: 3.8 G/DL (ref 2.9–4.4)
ALBUMIN/GLOB SERPL: 1.3 {RATIO} (ref 0.7–1.7)
ALPHA1 GLOB SERPL ELPH-MCNC: 0.2 G/DL (ref 0–0.4)
ALPHA2 GLOB SERPL ELPH-MCNC: 0.5 G/DL (ref 0.4–1)
B-GLOBULIN SERPL ELPH-MCNC: 1 G/DL (ref 0.7–1.3)
GAMMA GLOB SERPL ELPH-MCNC: 1.4 G/DL (ref 0.4–1.8)
GLOBULIN SER-MCNC: 3.1 G/DL (ref 2.2–3.9)
IGA SERPL-MCNC: 173 MG/DL (ref 61–437)
IGG SERPL-MCNC: 1472 MG/DL (ref 603–1613)
IGM SERPL-MCNC: 37 MG/DL (ref 20–172)
INTERPRETATION SERPL IEP-IMP: ABNORMAL
KAPPA LC FREE SER-MCNC: 27.8 MG/L (ref 3.3–19.4)
KAPPA LC FREE/LAMBDA FREE SER: 1.38 {RATIO} (ref 0.26–1.65)
LABORATORY COMMENT REPORT: ABNORMAL
LAMBDA LC FREE SERPL-MCNC: 20.1 MG/L (ref 5.7–26.3)
M PROTEIN SERPL ELPH-MCNC: ABNORMAL G/DL
PROT SERPL-MCNC: 6.9 G/DL (ref 6–8.5)
SPECIMEN STATUS: NORMAL

## 2025-02-01 LAB
FOLATE SERPL-MCNC: 6.7 NG/ML
METHYLMALONATE SERPL-SCNC: 123 NMOL/L (ref 0–378)
VIT B12 SERPL-MCNC: 458 PG/ML (ref 232–1245)

## 2025-02-03 LAB
FOLATE SERPL-MCNC: 6.7 NG/ML
SPECIMEN STATUS: NORMAL
VIT B12 SERPL-MCNC: 458 PG/ML (ref 232–1245)

## 2025-02-10 ENCOUNTER — SPECIALTY PHARMACY (OUTPATIENT)
Dept: PHARMACY | Facility: HOSPITAL | Age: 65
End: 2025-02-10
Payer: MEDICARE

## 2025-02-10 RX ORDER — LENALIDOMIDE 10 MG/1
10 CAPSULE ORAL DAILY
Qty: 21 CAPSULE | Refills: 0 | Status: SHIPPED | OUTPATIENT
Start: 2025-02-10

## 2025-02-10 NOTE — PROGRESS NOTES
Specialty Pharmacy Patient Management Program  Per Protocol Prescription Order or Refill     Patient will be filling or currently fills medications at [facility] and is enrolled in the Patient Management Program.    Requested Prescriptions     Signed Prescriptions Disp Refills    lenalidomide (REVLIMID) 10 MG capsule 21 capsule 0     Sig: Take 1 capsule by mouth Daily. Take for 21 days on, then 7 days off.     Prescription orders above were sent to the pharmacy per Collaborative Care Agreement Protocol.     Last Office Visit: 1/29/25  Next Office Visit: 4/2/25    Figueroa Pleitez PharmD, BCOP  Clinical Specialty Pharmacist, Oncology  2/10/2025  13:18 EST

## 2025-02-10 NOTE — PROGRESS NOTES
Specialty Pharmacy Patient Management Program  Per Protocol Prescription Order or Refill       Requested Prescriptions     Signed Prescriptions Disp Refills    lenalidomide (REVLIMID) 10 MG capsule 21 capsule 0     Sig: Take 1 capsule by mouth Daily. Take for 21 days on, then 7 days off.     Prescription orders above were sent to [facility] per Collaborative Care Agreement Protocol.     Completed independent double check on medication order/RX.    Sunita Avila Rph, BCOP  Clinical Specialty Pharmacist, Oncology  2/10/2025  13:49 EST

## 2025-03-06 ENCOUNTER — SPECIALTY PHARMACY (OUTPATIENT)
Dept: PHARMACY | Facility: HOSPITAL | Age: 65
End: 2025-03-06
Payer: MEDICARE

## 2025-03-06 RX ORDER — LENALIDOMIDE 10 MG/1
10 CAPSULE ORAL DAILY
Qty: 21 CAPSULE | Refills: 0 | Status: SHIPPED | OUTPATIENT
Start: 2025-03-06

## 2025-03-06 NOTE — PROGRESS NOTES
Specialty Pharmacy Patient Management Program  Per Protocol Prescription Order or Refill       Requested Prescriptions     Signed Prescriptions Disp Refills    lenalidomide (REVLIMID) 10 MG capsule 21 capsule 0     Sig: Take 1 capsule by mouth Daily. Take for 21 days on, then 7 days off.  Indications: Multiple Myeloma     Prescription orders above were sent to [facility] per Collaborative Care Agreement Protocol.     Completed independent double check on medication order/RX.    Gina Naylor PharmD, [facility]  Clinical Specialty Pharmacist, Oncology  3/6/2025  14:03 EST

## 2025-03-06 NOTE — PROGRESS NOTES
Specialty Pharmacy Patient Management Program  Per Protocol Prescription Order or Refill     Patient will be filling or currently fills medications at [facility] and is enrolled in the Patient Management Program.    Requested Prescriptions     Signed Prescriptions Disp Refills    lenalidomide (REVLIMID) 10 MG capsule 21 capsule 0     Sig: Take 1 capsule by mouth Daily. Take for 21 days on, then 7 days off.  Indications: Multiple Myeloma     Prescription orders above were sent to the pharmacy per Collaborative Care Agreement Protocol.     Last Office Visit: 1/29/25  Next Office Visit: 4/2/25    Figueroa Pleitez PharmD, BCOP  Clinical Specialty Pharmacist, Oncology  3/6/2025  13:54 EST

## 2025-04-02 ENCOUNTER — LAB (OUTPATIENT)
Dept: OTHER | Facility: HOSPITAL | Age: 65
End: 2025-04-02
Payer: MEDICARE

## 2025-04-02 ENCOUNTER — OFFICE VISIT (OUTPATIENT)
Dept: ONCOLOGY | Facility: CLINIC | Age: 65
End: 2025-04-02
Payer: MEDICARE

## 2025-04-02 VITALS
SYSTOLIC BLOOD PRESSURE: 123 MMHG | TEMPERATURE: 98.3 F | HEART RATE: 60 BPM | RESPIRATION RATE: 17 BRPM | DIASTOLIC BLOOD PRESSURE: 79 MMHG | BODY MASS INDEX: 28.22 KG/M2 | WEIGHT: 197.1 LBS | OXYGEN SATURATION: 96 % | HEIGHT: 70 IN

## 2025-04-02 DIAGNOSIS — E85.81 AL AMYLOIDOSIS: ICD-10-CM

## 2025-04-02 DIAGNOSIS — D84.9 IMMUNOCOMPROMISED PATIENT: ICD-10-CM

## 2025-04-02 DIAGNOSIS — C90.00 MULTIPLE MYELOMA NOT HAVING ACHIEVED REMISSION: Primary | ICD-10-CM

## 2025-04-02 DIAGNOSIS — Z79.899 ENCOUNTER FOR LONG-TERM (CURRENT) USE OF HIGH-RISK MEDICATION: ICD-10-CM

## 2025-04-02 DIAGNOSIS — C90.00 MULTIPLE MYELOMA NOT HAVING ACHIEVED REMISSION: ICD-10-CM

## 2025-04-02 DIAGNOSIS — R74.8 ELEVATED LIVER ENZYMES: ICD-10-CM

## 2025-04-02 DIAGNOSIS — D52.0 DIETARY FOLATE DEFICIENCY ANEMIA: ICD-10-CM

## 2025-04-02 DIAGNOSIS — D63.0 ANEMIA IN NEOPLASTIC DISEASE: ICD-10-CM

## 2025-04-02 LAB
ALBUMIN SERPL-MCNC: 4.3 G/DL (ref 3.5–5.2)
ALBUMIN/GLOB SERPL: 1.3 G/DL
ALP SERPL-CCNC: 120 U/L (ref 39–117)
ALT SERPL W P-5'-P-CCNC: 62 U/L (ref 1–41)
ANION GAP SERPL CALCULATED.3IONS-SCNC: 8.7 MMOL/L (ref 5–15)
AST SERPL-CCNC: 31 U/L (ref 1–40)
BASOPHILS # BLD AUTO: 0.07 10*3/MM3 (ref 0–0.2)
BASOPHILS NFR BLD AUTO: 1.9 % (ref 0–1.5)
BILIRUB SERPL-MCNC: 0.3 MG/DL (ref 0–1.2)
BUN SERPL-MCNC: 18 MG/DL (ref 8–23)
BUN/CREAT SERPL: 17.8 (ref 7–25)
CALCIUM SPEC-SCNC: 9.1 MG/DL (ref 8.6–10.5)
CHLORIDE SERPL-SCNC: 105 MMOL/L (ref 98–107)
CO2 SERPL-SCNC: 25.3 MMOL/L (ref 22–29)
CREAT SERPL-MCNC: 1.01 MG/DL (ref 0.76–1.27)
DEPRECATED RDW RBC AUTO: 44.9 FL (ref 37–54)
EGFRCR SERPLBLD CKD-EPI 2021: 83.6 ML/MIN/1.73
EOSINOPHIL # BLD AUTO: 0.19 10*3/MM3 (ref 0–0.4)
EOSINOPHIL NFR BLD AUTO: 5.1 % (ref 0.3–6.2)
ERYTHROCYTE [DISTWIDTH] IN BLOOD BY AUTOMATED COUNT: 15.8 % (ref 12.3–15.4)
GLOBULIN UR ELPH-MCNC: 3.2 GM/DL
GLUCOSE SERPL-MCNC: 86 MG/DL (ref 65–99)
HCT VFR BLD AUTO: 41.1 % (ref 37.5–51)
HGB BLD-MCNC: 12.6 G/DL (ref 13–17.7)
HYPOCHROMIA BLD QL: NORMAL
IMM GRANULOCYTES # BLD AUTO: 0.02 10*3/MM3 (ref 0–0.05)
IMM GRANULOCYTES NFR BLD AUTO: 0.5 % (ref 0–0.5)
LYMPHOCYTES # BLD AUTO: 1.12 10*3/MM3 (ref 0.7–3.1)
LYMPHOCYTES NFR BLD AUTO: 30.1 % (ref 19.6–45.3)
MCH RBC QN AUTO: 23.9 PG (ref 26.6–33)
MCHC RBC AUTO-ENTMCNC: 30.7 G/DL (ref 31.5–35.7)
MCV RBC AUTO: 78 FL (ref 79–97)
MONOCYTES # BLD AUTO: 0.28 10*3/MM3 (ref 0.1–0.9)
MONOCYTES NFR BLD AUTO: 7.5 % (ref 5–12)
NEUTROPHILS NFR BLD AUTO: 2.04 10*3/MM3 (ref 1.7–7)
NEUTROPHILS NFR BLD AUTO: 54.9 % (ref 42.7–76)
NRBC BLD AUTO-RTO: 0 /100 WBC (ref 0–0.2)
PLAT MORPH BLD: NORMAL
PLATELET # BLD AUTO: 207 10*3/MM3 (ref 140–450)
PMV BLD AUTO: 10.2 FL (ref 6–12)
POTASSIUM SERPL-SCNC: 4.7 MMOL/L (ref 3.5–5.2)
PROT SERPL-MCNC: 7.5 G/DL (ref 6–8.5)
RBC # BLD AUTO: 5.27 10*6/MM3 (ref 4.14–5.8)
SODIUM SERPL-SCNC: 139 MMOL/L (ref 136–145)
WBC MORPH BLD: NORMAL
WBC NRBC COR # BLD AUTO: 3.72 10*3/MM3 (ref 3.4–10.8)

## 2025-04-02 PROCEDURE — 83521 IG LIGHT CHAINS FREE EACH: CPT | Performed by: INTERNAL MEDICINE

## 2025-04-02 PROCEDURE — 84165 PROTEIN E-PHORESIS SERUM: CPT | Performed by: INTERNAL MEDICINE

## 2025-04-02 PROCEDURE — 85007 BL SMEAR W/DIFF WBC COUNT: CPT | Performed by: INTERNAL MEDICINE

## 2025-04-02 PROCEDURE — 85025 COMPLETE CBC W/AUTO DIFF WBC: CPT | Performed by: INTERNAL MEDICINE

## 2025-04-02 PROCEDURE — 82784 ASSAY IGA/IGD/IGG/IGM EACH: CPT | Performed by: INTERNAL MEDICINE

## 2025-04-02 PROCEDURE — 80053 COMPREHEN METABOLIC PANEL: CPT | Performed by: INTERNAL MEDICINE

## 2025-04-02 PROCEDURE — 86334 IMMUNOFIX E-PHORESIS SERUM: CPT | Performed by: INTERNAL MEDICINE

## 2025-04-02 PROCEDURE — 36415 COLL VENOUS BLD VENIPUNCTURE: CPT

## 2025-04-02 NOTE — PROGRESS NOTES
"Subjective     CHIEF COMPLAINT:      Chief Complaint   Patient presents with    Follow-up     HISTORY OF PRESENT ILLNESS:     Duy Owens is a 64 y.o. male patient who returns today for follow up on his multiple myeloma and AL amyloidosis. He returns today for follow up reporting no new symptoms. He was seen at [facility] in February. He had CT scans done. His next follow up is going to be in 1 year.     ROS:  Pertinent ROS is in the HPI.     Past medical, surgical, social and family history were reviewed.     MEDICATIONS:    Current Outpatient Medications:     aspirin 81 MG EC tablet, Take 1 tablet by mouth Daily., Disp: , Rfl:     folic acid (FOLVITE) 1 MG tablet, Take 1 tablet by mouth 1 (One) Time Per Week., Disp: , Rfl:     lenalidomide (REVLIMID) 10 MG capsule, Take 1 capsule by mouth Daily. Take for 21 days on, then 7 days off.  Indications: Multiple Myeloma, Disp: 21 capsule, Rfl: 0    losartan (COZAAR) 100 MG tablet, Take 1 tablet by mouth Daily., Disp: 90 tablet, Rfl: 1    metFORMIN (GLUCOPHAGE) 500 MG tablet, Take 1 tablet by mouth 2 (Two) Times a Day., Disp: 180 tablet, Rfl: 1  Objective     VITAL SIGNS:     Vitals:    04/02/25 0922   BP: 123/79   Pulse: 60   Resp: 17   Temp: 98.3 °F (36.8 °C)   TempSrc: Oral   SpO2: 96%   Weight: 89.4 kg (197 lb 1.6 oz)   Height: 177 cm (69.69\")   PainSc: 0-No pain     Body mass index is 28.54 kg/m².     Wt Readings from Last 5 Encounters:   04/02/25 89.4 kg (197 lb 1.6 oz)   01/29/25 90.7 kg (199 lb 14.4 oz)   01/16/25 89 kg (196 lb 3.2 oz)   10/16/24 89.1 kg (196 lb 8 oz)   07/03/24 87.5 kg (193 lb)     PHYSICAL EXAMINATION:   GENERAL: The patient appears in good general condition, not in acute distress.   SKIN: No Ecchymosis.  EYES: No jaundice. No Pallor.  CHEST: Normal respiratory effort. Normal breathing sounds bilaterally. No added sounds.  CVS: Normal S1 and S2. No Murmur.  ABDOMEN: Soft. No tenderness. No Hepatomegaly. No Splenomegaly. No " masses.  EXTREMITIES: No edema.    DIAGNOSTIC DATA:     Results from last 7 days   Lab Units 04/02/25  0914   WBC 10*3/mm3 3.72   NEUTROS ABS 10*3/mm3 2.04   HEMOGLOBIN g/dL 12.6*   HEMATOCRIT % 41.1   PLATELETS 10*3/mm3 207     Results from last 7 days   Lab Units 04/02/25  0914   SODIUM mmol/L 139   POTASSIUM mmol/L 4.7   CHLORIDE mmol/L 105   CO2 mmol/L 25.3   BUN mg/dL 18   CREATININE mg/dL 1.01   CALCIUM mg/dL 9.1   ALBUMIN g/dL 4.3   BILIRUBIN mg/dL 0.3   ALK PHOS U/L 120*   ALT (SGPT) U/L 62*   AST (SGOT) U/L 31   GLUCOSE mg/dL 86     Component      Latest Ref Rng 11/13/2024 12/11/2024 1/29/2025   IgG      603 - 1613 mg/dL 1494  1542  1472    IgA      61 - 437 mg/dL 169  176  173    IgM      20 - 172 mg/dL 45  42  37    Total Protein      6.0 - 8.5 g/dL 7.3  6.9  6.9    Albumin      2.9 - 4.4 g/dL 3.8  3.8  3.8    Alpha-1-Globulin      0.0 - 0.4 g/dL 0.3  0.2  0.2    Alpha-2-Globulin      0.4 - 1.0 g/dL 0.7  0.5  0.5    Beta Globulin      0.7 - 1.3 g/dL 1.1  1.0  1.0    Gamma Globulin      0.4 - 1.8 g/dL 1.5  1.4  1.4    M-Werner      Not Observed g/dL Not Observed  Not Observed  Not Observed    Globulin      2.2 - 3.9 g/dL 3.5  3.1  3.1    A/G Ratio      0.7 - 1.7  1.1  1.3  1.3    Immunofixation Reflex, Serum Comment  Comment  Comment    Please note Comment  Comment  Comment    Kappa FLC      3.3 - 19.4 mg/L 32.1 (H)  31.0 (H)  27.8 (H)    Free Lambda Light Chains      5.7 - 26.3 mg/L 25.6  23.3  20.1    Kappa/Lambda Ratio      0.26 - 1.65  1.25  1.33  1.38       Assessment & Plan    *Lambda light chain multiple myeloma with lymphadenopathy and development of AL amyloidosis.  Patient was found to have lymph node involvement and amyloid deposition in the involved lymph nodes.  Patient started having dry cough around July 2021.    CT scans 9/23/2021-9/24/2021 revealed inferior mediastinal adenopathy and retroperitoneal adenopathy extending to the right iliac chain.  The Largest lymph node was in the  retroperitoneal area measuring 4.8 x 3.5 cm.  The common iliac lymph node measured 3.5 x 2.7 cm.  The left external iliac chain lymph node measured 2.9 x 2 cm.    PET scan on 10/5/2021 revealed the retroperitoneal and left pelvic lymphadenopathy to be hypermetabolic.  The left periaortic lymph nodes had SUV of 6.9 and the left pelvic sidewall lymph nodes had an SUV of 5.4.  No bone involvement.  CT-guided biopsy on 10/6/2021- pathologist at [facility] reported involvement with monotypic lambda restricted plasma cells concerning for involvement with plasma cell dyscrasia.  Bone marrow biopsy on 10/29/2021 revealed a normocellular marrow (50%) with involvement with plasma cell dyscrasia (plasma cells representing 20-30% of total cells by  stain).   FISH was negative for gain of 1q, monosomy/deletions of chromosomes 13 and 17, IGH rearrangement, gain of chromosomes 9 and 11, IGH-CCND1 (11;14) fusion.   Treatment with the VRD started on 12/22/2021.  Free lambda light chain started to improve after the patient started treatment.  CT scan on 3/11/2022 revealed decrease in the size of lymph nodes in the periaortic area.  There is a slight increase in a lymph node in the left axillary area that increased from 7 to 10 mm.    PET scan on 4/28/2022 revealed significant reduction in the size and hypermetabolism of the involved lymph nodes.  SPEP REGINO FLC on 5/25/2022 revealed no M protein.  Free lambda light chain was 41.8.  Kappa to lambda ratio was normal at 0.26.  B2 M was 1.6.  Bone marrow biopsy on 5/24/2022 revealed normocellular marrow with 30-40% cellularity with involvement by plasma cell neoplasm representing 5-10% by IHC.  Negative for amyloid.  He was considered to be W[place]-1.  S/p high-dose melphalan with autologous stem cell transplant.  Day 0 was 7/7/2022.  Patient did well with the transplant but had neutropenic fever of unclear source necessitating hospitalization between 7/16/2022 and 7/18/2022.    Bone  marrow biopsy on 9/29/2022 revealed monotypic plasma cells identified on on flow cytometry but not on IHC.  CT on 9/29/2022 showed decrease in the size of the enlarged lymph nodes.  Patient started Revlimid 10 mg daily for 21 out of 28-day cycle on 11/1/2022.    CT on 2/23/2023 showed decrease in the size of the mediastinal and retroperitoneal lymph nodes indicating response to treatment.  2/23/2023-Free kappa light chain 1.94, free lambda light chain 2.19, kappa/lambda ratio normal at 0.89.  5/17/2023: Free kappa light chain was 31.5.  Free lambda light chain was 24.5.  Kappa 3 lambda ratio normal at 1.29.  6/14/2023: Free kappa light chain was 31.0.  Free lambda light chain was 23.7.  Kappa to lambda ratio was 1.31.   Patient had follow-up at [facility] on 8/22/2023.    CT scan showed decrease in the size of the enlarged lymph nodes.  No new abnormalities were seen.  9/6/2023: Kappa FLC 30.0.  Lambda FLC 25.8.  Kappa to lambda ratio 1.16.  No M protein.  11/1/2023: Kappa FLC 32.9. Lambda FLC 25.8. K:L ratio 1.28.  CT on 2/20/2024 showed stable lymphadenopathy.  1/29/2025: No M protein.  Kappa FLC 27.8.  Lambda FLC 20.1.  Kappa/lambda ratio 1.38.  CT chest abdomen pelvis on 2/11/2025 at [facility] revealed small mediastinal and retrocrural lymph nodes that were stable.  Moderate calcified periaortic nodes were stable.    He is doing well with no evidence of relapse of his multiple myeloma.  Maintenance Revlimid through 7/2027 was recommended    *Anemia secondary to multiple myeloma and secondary to treatment.  Hemoglobin was 13.1 on 9/29/2021.    Iron, folate and vitamin B12 were normal in September/October 2021.    Hemoglobin was 11.4 on 8/24/2022.  Hemoglobin improved to the 11-12 g range.  Folate was low at 5.5.   He was started on folic acid 1 mg daily.  2/28/2024: Folate increased to >20.  5/1/2024: Hemoglobin 11.8.  7/3/2024: Hemoglobin improved to 12.6.  He has adequate iron stores.  Vitamin B12  575.  Folate improved to >20.  Folic acid was reduced to once weekly.  4/2/2025: Hemoglobin 12.6.    *Neutropenia due to chemotherapy.  It is attributed to Revlimid.  4/2/2025: Neutrophil count 2040.    *Prophylaxis.  He was previously on acyclovir 400 mg twice daily.    He is now on Valtrex.  He received Evusheld on 8/20/2022.  Patient received vaccinations as recommended by [facility].  He received COVID-19 Moderna booster on 10/25/2022.  He received vaccines in April and June.  He received the first Shingrix vaccine in August 2023 and in February 2024.  No patient infections.    *Elevated liver enzymes.  Liver enzymes have fluctuated over time.  CT on 2/20/2024 showed no liver or gallbladder abnormality.  Patient does not drink alcohol.  5/1/2024: ALT 63.  AST 52.  Bilirubin 0.3.  Alkaline phosphatase 118.  7/3/2024: ALT improved to 51.  AST improved to 38.  10/16/2024: ALT improved to 25.  AST improved to 21.  4/2/2025: ALT increased to 62.  AST 31.  Alkaline phosphatase 120.  Patient does not take Tylenol frequently.  ?  Due to food items high in fat/sugar.    *Syncope.  Patient reports having an episode of passing out while he was outdoors watching a game.  He did not feel dizzy prior to the episode. He was well hydrated.  He regained consciousness quickly.   EMS was called. He declined going to ER at that time.   He was referred to cardiology.  He is going to have echocardiogram done.  No recurrence of the episodes.  He has mild bradycardia today but he is asymptomatic.    *Severe muscle spasm of the cervical-thoracic spine.  He reports having weakness of the right lower extremity.  He fell off a ladder recently due to weakness of the right LE.  I recommended evaluation with an MRI of the spine.   He complained of muscle spasm of other muscles.  MRI showed no lesions from multiple myeloma.  He was referred to neurosurgery.  Conservative management was recommended for DJD.  No recurrence of the  symptoms.    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  There was mild wall thickening.  Ejection fraction was normal.  Echocardiogram at [facility] on 4/21/2022 did not reveal wall thickening.    PLAN:    1.  Continue Revlimid 10 mg daily for 21 days of a 28-day cycle.  2.  Continue aspirin 81 mg daily.  3.  Continue folic acid 1 mg weekly.  4.  Repeat CBC CMP SPEP REGINO FLC in 6 weeks.  5.  I will see him in follow-up in 3 months with repeat labs.  6.  Plan to repeat CT scans chest abdomen pelvis in August 2025. Since he will not go back to [place] until February 2026, we will obtain the follow up CT scans locally.       Tima Mendez MD  04/02/25

## 2025-04-03 ENCOUNTER — SPECIALTY PHARMACY (OUTPATIENT)
Dept: PHARMACY | Facility: HOSPITAL | Age: 65
End: 2025-04-03
Payer: MEDICARE

## 2025-04-03 NOTE — PROGRESS NOTES
Specialty Pharmacy Note: Revlimid (lenalidomide)    Duy Owens is a 64 y.o. male with multiple myeloma and AL amyloidosis was seen 4/2/25 by Dr. Mendez. Per provider dictation, no changes to oral oncology regimen  Revlimid 10 mg daily for 21 days of a 28-day cycle .  Labs Review: The CMP and CBC from 4/2/25 have been reviewed. No dose adjustments are needed for the oral specialty medication(s) based on the labs.    Specialty pharmacy will continue to follow patient.    Figueroa Pleitez PharmD, [facility]  Clinical Oncology Pharmacist    4/3/2025  08:34 EDT

## 2025-04-04 LAB
ALBUMIN SERPL ELPH-MCNC: 3.9 G/DL (ref 2.9–4.4)
ALBUMIN/GLOB SERPL: 1.2 {RATIO} (ref 0.7–1.7)
ALPHA1 GLOB SERPL ELPH-MCNC: 0.2 G/DL (ref 0–0.4)
ALPHA2 GLOB SERPL ELPH-MCNC: 0.6 G/DL (ref 0.4–1)
B-GLOBULIN SERPL ELPH-MCNC: 1.1 G/DL (ref 0.7–1.3)
GAMMA GLOB SERPL ELPH-MCNC: 1.5 G/DL (ref 0.4–1.8)
GLOBULIN SER-MCNC: 3.4 G/DL (ref 2.2–3.9)
IGA SERPL-MCNC: 192 MG/DL (ref 61–437)
IGG SERPL-MCNC: 1581 MG/DL (ref 603–1613)
IGM SERPL-MCNC: 35 MG/DL (ref 20–172)
INTERPRETATION SERPL IEP-IMP: ABNORMAL
KAPPA LC FREE SER-MCNC: 31.4 MG/L (ref 3.3–19.4)
KAPPA LC FREE/LAMBDA FREE SER: 1.43 {RATIO} (ref 0.26–1.65)
LABORATORY COMMENT REPORT: ABNORMAL
LAMBDA LC FREE SERPL-MCNC: 22 MG/L (ref 5.7–26.3)
M PROTEIN SERPL ELPH-MCNC: ABNORMAL G/DL
PROT SERPL-MCNC: 7.3 G/DL (ref 6–8.5)

## 2025-04-08 ENCOUNTER — SPECIALTY PHARMACY (OUTPATIENT)
Dept: PHARMACY | Facility: HOSPITAL | Age: 65
End: 2025-04-08
Payer: MEDICARE

## 2025-04-08 RX ORDER — LENALIDOMIDE 10 MG/1
10 CAPSULE ORAL DAILY
Qty: 21 CAPSULE | Refills: 0 | Status: SHIPPED | OUTPATIENT
Start: 2025-04-08

## 2025-04-08 NOTE — PROGRESS NOTES
Specialty Pharmacy Patient Management Program  Per Protocol Prescription Order or Refill       Requested Prescriptions     Signed Prescriptions Disp Refills    lenalidomide (REVLIMID) 10 MG capsule 21 capsule 0     Sig: Take 1 capsule by mouth Daily. Take for 21 days on, then 7 days off.  Indications: Multiple Myeloma     Prescription orders above were sent to [facility] per Collaborative Care Agreement Protocol.     Completed independent double check on medication order/RX.    Sunita Avila Rph, BCOP  Clinical Specialty Pharmacist, Oncology  4/8/2025  10:39 EDT

## 2025-04-08 NOTE — PROGRESS NOTES
Specialty Pharmacy Patient Management Program  Per Protocol Prescription Order or Refill     Patient will be filling or currently fills medications at [facility] and is enrolled in the Patient Management Program.    Requested Prescriptions     Signed Prescriptions Disp Refills    lenalidomide (REVLIMID) 10 MG capsule 21 capsule 0     Sig: Take 1 capsule by mouth Daily. Take for 21 days on, then 7 days off.  Indications: Multiple Myeloma     Prescription orders above were sent to the pharmacy per Collaborative Care Agreement Protocol.     Last Office Visit: 4/2/25  Next Office Visit: 7/2/25    Figueroa Pleitez, Virginie, BCOP  Clinical Specialty Pharmacist, Oncology  4/8/2025  10:34 EDT

## 2025-04-14 NOTE — PROGRESS NOTES
Subjective   Patient ID: Duy Owens is a 64 y.o. male is here today for 1 year follow-up.    History of Present Illness    I been following this very nice gentleman for approximately 2 years.  He has a history of myeloma but I was seeing him because of some rather severe radiographic cervical stenosis and a thoracic arachnoid cyst.  He has some mild stiffness but has no radiculopathy or myelopathy.  His balance is fine and has no motor deficits.  I think it still prudent for me to see him on a yearly basis to detect any signs of early myelopathy which are not present at this time.  He will come back in a year    The following portions of the patient's history were reviewed and updated as appropriate: allergies, current medications, past family history, past medical history, past social history, past surgical history, and problem list.    Review of Systems   All other systems reviewed and are negative.      Objective   Physical Exam  Constitutional:       General: He is awake.      Appearance: He is well-developed.   HENT:      Head: Normocephalic and atraumatic.   Eyes:      General: Lids are normal.      Extraocular Movements: Extraocular movements intact.      Conjunctiva/sclera: Conjunctivae normal.      Pupils: Pupils are equal, round, and reactive to light.   Neck:      Vascular: No carotid bruit.   Neurological:      Mental Status: He is alert.      Coordination: Coordination is intact.      Deep Tendon Reflexes:      Reflex Scores:       Tricep reflexes are 2+ on the right side and 2+ on the left side.       Bicep reflexes are 2+ on the right side and 2+ on the left side.       Brachioradialis reflexes are 2+ on the right side and 2+ on the left side.       Patellar reflexes are 2+ on the right side and 2+ on the left side.       Achilles reflexes are 2+ on the right side and 2+ on the left side.  Psychiatric:         Speech: Speech normal.       Neurological Exam  Mental Status  Awake and alert. Oriented  only to person, place, time and situation. Recent and remote memory are intact. Speech is normal. Language is fluent with no aphasia. Attention and concentration are normal. Fund of knowledge is appropriate for level of education.    Cranial Nerves  CN II: Visual acuity is normal. Visual fields full to confrontation.  CN III, IV, VI: Extraocular movements intact bilaterally. Normal lids and orbits bilaterally. Pupils equal round and reactive to light bilaterally.  CN V: Facial sensation is normal.  CN VII: Full and symmetric facial movement.  CN IX, X: Palate elevates symmetrically. Normal gag reflex.  CN XI: Shoulder shrug strength is normal.  CN XII: Tongue midline without atrophy or fasciculations.    Motor  Normal muscle bulk throughout. Normal muscle tone.                                               Right                     Left  Rhomboids                            5                          5  Infraspinatus                          5                          5  Supraspinatus                       5                          5  Deltoid                                   5                          5   Biceps                                   5                          5  Brachioradialis                      5                          5   Triceps                                  5                          5   Pronator                                5                          5   Supinator                              5                           5   Wrist flexor                            5                          5   Wrist extensor                       5                          5   Finger flexor                          5                          5   Finger extensor                     5                          5   Interossei                              5                          5   Abductor pollicis brevis         5                          5   Flexor pollicis brevis             5                           5   Opponens pollicis                 5                          5  Extensor digitorum               5                          5  Abductor digiti minimi           5                          5   Abdominal                            5                          5  Glutei                                    5                          5  Hip abductor                         5                          5  Hip adductor                         5                          5   Iliopsoas                               5                          5   Quadriceps                           5                          5   Hamstring                             5                          5   Gastrocnemius                     5                           5   Anterior tibialis                      5                          5   Posterior tibialis                    5                          5   Peroneal                               5                          5  Ankle dorsiflexor                   5                          5  Ankle plantar flexor              5                           5  Extensor hallucis longus      5                           5    Sensory  Light touch is normal in upper and lower extremities. Proprioception is normal in upper and lower extremities.     Reflexes                                            Right                      Left  Brachioradialis                    2+                         2+  Biceps                                 2+                         2+  Triceps                                2+                         2+  Finger flex                           2+                         2+  Hamstring                            2+                         2+  Patellar                                2+                         2+  Achilles                                2+                         2+    Coordination    Finger-to-nose, rapid alternating movements and heel-to-shin normal bilaterally without  dysmetria.    Gait  Casual gait is normal including stance, stride, and arm swing.Normal toe walking. Normal heel walking. Normal tandem gait.       Assessment & Plan   Independent Review of Radiographic Studies:      The cervical, thoracic, and lumbar MRI were reviewed.  There is a small to moderate-sized dorsal thoracic arachnoid cyst with modest compression of the cord at T2-T3.  He has multilevel cervical stenosis moderate to early severe in nature in the neck and in the lumbar spine moderate to early severe stenosis at L3-L4 and L4-L5.  No sign of myeloma.  Any of these regions.  Agree with the report.     Medical Decision Making:      Clinically stable.  Because of the possibility of progression, I have asked him to come and see me again for another clinical exam in 1 year.    Diagnoses and all orders for this visit:    1. Degeneration of intervertebral disc of lumbar region, unspecified whether pain present (Primary)    2. Cervical spinal stenosis    3. Spinal stenosis of lumbar region without neurogenic claudication    4. Spinal arachnoid cyst      Return in about 1 year (around 4/16/2026) for face to face.

## 2025-04-16 ENCOUNTER — OFFICE VISIT (OUTPATIENT)
Dept: NEUROSURGERY | Facility: CLINIC | Age: 65
End: 2025-04-16
Payer: MEDICARE

## 2025-04-16 VITALS — SYSTOLIC BLOOD PRESSURE: 128 MMHG | DIASTOLIC BLOOD PRESSURE: 76 MMHG | OXYGEN SATURATION: 99 % | HEART RATE: 53 BPM

## 2025-04-16 DIAGNOSIS — M48.02 CERVICAL SPINAL STENOSIS: ICD-10-CM

## 2025-04-16 DIAGNOSIS — G96.198 SPINAL ARACHNOID CYST: ICD-10-CM

## 2025-04-16 DIAGNOSIS — M51.369 DEGENERATION OF INTERVERTEBRAL DISC OF LUMBAR REGION, UNSPECIFIED WHETHER PAIN PRESENT: Primary | ICD-10-CM

## 2025-04-16 DIAGNOSIS — M48.061 SPINAL STENOSIS OF LUMBAR REGION WITHOUT NEUROGENIC CLAUDICATION: ICD-10-CM

## 2025-04-16 PROCEDURE — 3078F DIAST BP <80 MM HG: CPT | Performed by: NEUROLOGICAL SURGERY

## 2025-04-16 PROCEDURE — 99213 OFFICE O/P EST LOW 20 MIN: CPT | Performed by: NEUROLOGICAL SURGERY

## 2025-04-16 PROCEDURE — 3074F SYST BP LT 130 MM HG: CPT | Performed by: NEUROLOGICAL SURGERY

## 2025-04-16 PROCEDURE — 1159F MED LIST DOCD IN RCRD: CPT | Performed by: NEUROLOGICAL SURGERY

## 2025-04-16 PROCEDURE — 1160F RVW MEDS BY RX/DR IN RCRD: CPT | Performed by: NEUROLOGICAL SURGERY

## 2025-05-06 ENCOUNTER — SPECIALTY PHARMACY (OUTPATIENT)
Dept: PHARMACY | Facility: HOSPITAL | Age: 65
End: 2025-05-06
Payer: MEDICARE

## 2025-05-06 RX ORDER — LENALIDOMIDE 10 MG/1
10 CAPSULE ORAL DAILY
Qty: 21 CAPSULE | Refills: 0 | Status: SHIPPED | OUTPATIENT
Start: 2025-05-06

## 2025-05-06 NOTE — PROGRESS NOTES
Specialty Pharmacy Patient Management Program  Per Protocol Prescription Order or Refill     Patient will be filling or currently fills medications at [facility] and is enrolled in the Patient Management Program.    Requested Prescriptions     Signed Prescriptions Disp Refills    lenalidomide (REVLIMID) 10 MG capsule 21 capsule 0     Sig: Take 1 capsule by mouth Daily. Take for 21 days on, then 7 days off.  Indications: Multiple Myeloma     Prescription orders above were sent to the pharmacy per Collaborative Care Agreement Protocol.     Last Office Visit: 4/2/25  Next Office Visit: 7/2/25    Figueroa Pleitez, Virginie, BCOP  Clinical Specialty Pharmacist, Oncology  5/6/2025  14:56 EDT

## 2025-05-06 NOTE — PROGRESS NOTES
Specialty Pharmacy Patient Management Program  Per Protocol Prescription Order or Refill       Requested Prescriptions     Signed Prescriptions Disp Refills    lenalidomide (REVLIMID) 10 MG capsule 21 capsule 0     Sig: Take 1 capsule by mouth Daily. Take for 21 days on, then 7 days off.  Indications: Multiple Myeloma     Prescription orders above were sent to [facility] per Collaborative Care Agreement Protocol.     Completed independent double check on medication order/RX.    Sunita Avila Rph, BCOP  Clinical Specialty Pharmacist, Oncology  5/6/2025  14:59 EDT

## 2025-05-14 ENCOUNTER — CLINICAL SUPPORT (OUTPATIENT)
Dept: ONCOLOGY | Facility: HOSPITAL | Age: 65
End: 2025-05-14
Payer: MEDICARE

## 2025-05-14 ENCOUNTER — LAB (OUTPATIENT)
Dept: OTHER | Facility: HOSPITAL | Age: 65
End: 2025-05-14
Payer: MEDICARE

## 2025-05-14 DIAGNOSIS — C90.00 MULTIPLE MYELOMA NOT HAVING ACHIEVED REMISSION: ICD-10-CM

## 2025-05-14 LAB
ALBUMIN SERPL-MCNC: 4.2 G/DL (ref 3.5–5.2)
ALBUMIN/GLOB SERPL: 1.3 G/DL
ALP SERPL-CCNC: 80 U/L (ref 39–117)
ALT SERPL W P-5'-P-CCNC: 26 U/L (ref 1–41)
ANION GAP SERPL CALCULATED.3IONS-SCNC: 8.9 MMOL/L (ref 5–15)
AST SERPL-CCNC: 25 U/L (ref 1–40)
BASOPHILS # BLD AUTO: 0.05 10*3/MM3 (ref 0–0.2)
BASOPHILS NFR BLD AUTO: 1.4 % (ref 0–1.5)
BILIRUB SERPL-MCNC: 0.6 MG/DL (ref 0–1.2)
BUN SERPL-MCNC: 10 MG/DL (ref 8–23)
BUN/CREAT SERPL: 10.9 (ref 7–25)
CALCIUM SPEC-SCNC: 9.1 MG/DL (ref 8.6–10.5)
CHLORIDE SERPL-SCNC: 105 MMOL/L (ref 98–107)
CO2 SERPL-SCNC: 26.1 MMOL/L (ref 22–29)
CREAT SERPL-MCNC: 0.92 MG/DL (ref 0.76–1.27)
DEPRECATED RDW RBC AUTO: 44.5 FL (ref 37–54)
EGFRCR SERPLBLD CKD-EPI 2021: 93.5 ML/MIN/1.73
EOSINOPHIL # BLD AUTO: 0.25 10*3/MM3 (ref 0–0.4)
EOSINOPHIL NFR BLD AUTO: 6.9 % (ref 0.3–6.2)
ERYTHROCYTE [DISTWIDTH] IN BLOOD BY AUTOMATED COUNT: 16 % (ref 12.3–15.4)
GLOBULIN UR ELPH-MCNC: 3.3 GM/DL
GLUCOSE SERPL-MCNC: 94 MG/DL (ref 65–99)
HCT VFR BLD AUTO: 39.3 % (ref 37.5–51)
HGB BLD-MCNC: 12.3 G/DL (ref 13–17.7)
IMM GRANULOCYTES # BLD AUTO: 0 10*3/MM3 (ref 0–0.05)
IMM GRANULOCYTES NFR BLD AUTO: 0 % (ref 0–0.5)
LYMPHOCYTES # BLD AUTO: 1.29 10*3/MM3 (ref 0.7–3.1)
LYMPHOCYTES NFR BLD AUTO: 35.6 % (ref 19.6–45.3)
MCH RBC QN AUTO: 24.2 PG (ref 26.6–33)
MCHC RBC AUTO-ENTMCNC: 31.3 G/DL (ref 31.5–35.7)
MCV RBC AUTO: 77.2 FL (ref 79–97)
MONOCYTES # BLD AUTO: 0.56 10*3/MM3 (ref 0.1–0.9)
MONOCYTES NFR BLD AUTO: 15.5 % (ref 5–12)
NEUTROPHILS NFR BLD AUTO: 1.47 10*3/MM3 (ref 1.7–7)
NEUTROPHILS NFR BLD AUTO: 40.6 % (ref 42.7–76)
NRBC BLD AUTO-RTO: 0 /100 WBC (ref 0–0.2)
PLATELET # BLD AUTO: 197 10*3/MM3 (ref 140–450)
PMV BLD AUTO: 9.7 FL (ref 6–12)
POTASSIUM SERPL-SCNC: 3.4 MMOL/L (ref 3.5–5.2)
PROT SERPL-MCNC: 7.5 G/DL (ref 6–8.5)
RBC # BLD AUTO: 5.09 10*6/MM3 (ref 4.14–5.8)
SODIUM SERPL-SCNC: 140 MMOL/L (ref 136–145)
WBC NRBC COR # BLD AUTO: 3.62 10*3/MM3 (ref 3.4–10.8)

## 2025-05-14 PROCEDURE — 86334 IMMUNOFIX E-PHORESIS SERUM: CPT | Performed by: INTERNAL MEDICINE

## 2025-05-14 PROCEDURE — 85025 COMPLETE CBC W/AUTO DIFF WBC: CPT | Performed by: INTERNAL MEDICINE

## 2025-05-14 PROCEDURE — 36415 COLL VENOUS BLD VENIPUNCTURE: CPT

## 2025-05-14 PROCEDURE — 83521 IG LIGHT CHAINS FREE EACH: CPT | Performed by: INTERNAL MEDICINE

## 2025-05-14 PROCEDURE — 82784 ASSAY IGA/IGD/IGG/IGM EACH: CPT | Performed by: INTERNAL MEDICINE

## 2025-05-14 PROCEDURE — 84165 PROTEIN E-PHORESIS SERUM: CPT | Performed by: INTERNAL MEDICINE

## 2025-05-14 PROCEDURE — 80053 COMPREHEN METABOLIC PANEL: CPT | Performed by: INTERNAL MEDICINE

## 2025-05-14 NOTE — PROGRESS NOTES
Duy Owens is a 64 y.o. male with Multiple Myeloma seen by Hematology/Oncology provider, Dr. Mendez, and is scheduled with Clinical Review offered by [facility]. Patient's visit was held via telephone today.     Therapy plan  Revlimid 10 mg D1-21 q28d    Allergies  Allergies   Allergen Reactions    Lisinopril Cough        Current Medication List  Medication Sig Start Date End Date Taking? Authorizing Provider   aspirin 81 MG EC tablet Take 1 tablet by mouth Daily. 11/9/22   Tima Mendez MD   folic acid (FOLVITE) 1 MG tablet Not taking per Tima Baxter MD   lenalidomide (REVLIMID) 10 MG capsule Take 1 capsule by mouth Daily. Take for 21 days on, then 7 days off, then repeat. 10/30/24   Tima Mendez MD   losartan (COZAAR) 100 MG tablet TAKE 1 TABLET BY MOUTH DAILY 9/20/24   Nara Dobson MD   metFORMIN (GLUCOPHAGE) 500 MG tablet TAKE 1 TABLET BY MOUTH 2 TIMES A DAY 11/11/24   Nara Dobson MD       Relevant Laboratory Values  Lab Results   Component Value Date    GLUCOSE 94 05/14/2025    BUN 10 05/14/2025    CREATININE 0.92 05/14/2025     05/14/2025    K 3.4 (L) 05/14/2025     05/14/2025    CALCIUM 9.1 05/14/2025    PROTEINTOT 7.5 05/14/2025    ALBUMIN 4.2 05/14/2025    ALT 26 05/14/2025    AST 25 05/14/2025    ALKPHOS 80 05/14/2025    BILITOT 0.6 05/14/2025    GLOB 3.3 05/14/2025    AGRATIO 1.3 05/14/2025    BCR 10.9 05/14/2025    ANIONGAP 8.9 05/14/2025    EGFR 93.5 05/14/2025       Lab Results   Component Value Date    WBC 3.62 05/14/2025    RBC 5.09 05/14/2025    HGB 12.3 (L) 05/14/2025    HCT 39.3 05/14/2025    MCV 77.2 (L) 05/14/2025    MCH 24.2 (L) 05/14/2025    MCHC 31.3 (L) 05/14/2025    RDW 16.0 (H) 05/14/2025    RDWSD 44.5 05/14/2025    MPV 9.7 05/14/2025     05/14/2025    NEUTRORELPCT 40.6 (L) 05/14/2025    LYMPHORELPCT 35.6 05/14/2025    MONORELPCT 15.5 (H) 05/14/2025    EOSRELPCT 6.9 (H) 05/14/2025    BASORELPCT  1.4 05/14/2025    AUTOIGPER 0.0 05/14/2025    NEUTROABS 1.47 (L) 05/14/2025    LYMPHSABS 1.29 05/14/2025    MONOSABS 0.56 05/14/2025    EOSABS 0.25 05/14/2025    BASOSABS 0.05 05/14/2025    AUTOIGNUM 0.00 05/14/2025    NRBC 0.0 05/14/2025       Clinical Review  Patient reports no new symptoms or adverse effects. Patient reports adherence to Revlimid 10 mg daily for 21 days of a 28-day cycle, aspirin 81 mg daily, and folic acid 1 mg weekly.     Potassium 3.4; encouraged patient to increase potassium intake in diet    Creatinine 0.92    AST 25  ALT 26     Hemoglobin 12.3  Platelets 197,000    WBC 3,620  ANC 1,470       The patient's current therapy plan and above labs have been reviewed with Dr. Mendez.     Plan  CBC and CMP reviewed, results are stable for this patient at this time. - see above in Laboratory Results  Will instruct Duy Owens to Continue Revlimid as prescribed.   Follow up in 1 month when scheduled with Dr. Mendez. Next scheduled appointment(s) reviewed with patient.  Patient is instructed to call the office with any concerns or new symptoms prior to next visit.  Verbal information provided. Patient expresses understanding and has no further questions at this time.            Ladonna Lorenz, PharmD  Clinical Pharmacy Services   [facility]  5/14/2025  12:37 EDT

## 2025-05-16 LAB
ALBUMIN SERPL ELPH-MCNC: 3.6 G/DL (ref 2.9–4.4)
ALBUMIN/GLOB SERPL: 1.1 {RATIO} (ref 0.7–1.7)
ALPHA1 GLOB SERPL ELPH-MCNC: 0.2 G/DL (ref 0–0.4)
ALPHA2 GLOB SERPL ELPH-MCNC: 0.5 G/DL (ref 0.4–1)
B-GLOBULIN SERPL ELPH-MCNC: 1 G/DL (ref 0.7–1.3)
GAMMA GLOB SERPL ELPH-MCNC: 1.5 G/DL (ref 0.4–1.8)
GLOBULIN SER-MCNC: 3.3 G/DL (ref 2.2–3.9)
IGA SERPL-MCNC: 173 MG/DL (ref 61–437)
IGG SERPL-MCNC: 1540 MG/DL (ref 603–1613)
IGM SERPL-MCNC: 30 MG/DL (ref 20–172)
INTERPRETATION SERPL IEP-IMP: ABNORMAL
KAPPA LC FREE SER-MCNC: 32.8 MG/L (ref 3.3–19.4)
KAPPA LC FREE/LAMBDA FREE SER: 1.4 {RATIO} (ref 0.26–1.65)
LABORATORY COMMENT REPORT: ABNORMAL
LAMBDA LC FREE SERPL-MCNC: 23.4 MG/L (ref 5.7–26.3)
M PROTEIN SERPL ELPH-MCNC: ABNORMAL G/DL
PROT SERPL-MCNC: 6.9 G/DL (ref 6–8.5)

## 2025-05-20 ENCOUNTER — SPECIALTY PHARMACY (OUTPATIENT)
Dept: PHARMACY | Facility: HOSPITAL | Age: 65
End: 2025-05-20
Payer: MEDICARE

## 2025-05-20 NOTE — PROGRESS NOTES
Specialty Pharmacy Patient Management Program  One-Time Clinical Outreach     Duy Owens is seen by an their provider for multiple myeloma and enrolled in the Oncology Patient Management program offered by [facility].      Patient called to report side effect of diarrhea on and off for some time now.  He states it is more frequently on than off.  Reports he has noticed some foods, such as greasy foods, and coffee exacerbate the symptom.  When asked if it is worse on days he takes Revlimid, patient was unsure.  He reports taking Imodium, which does provide relief.  Discussed patient may continue to take Imodium, up to 8 tablets daily for symptom.  Recommend patient start a food journal to help identify if certain foods make the symptom worse.  Also recommend keeping track of if diarrhea is worse on Revlimid days vs off week.  Discussed patient's recent labs are WNL, and have no concerns on our end at this time.  However, if diarrhea becomes uncontrolled on Imodium or patient feels he cannot keep up fluids, patient should notify our office for triage visit and follow up labs.  Patient expressed understanding and had no additional questions at this time.       Figueroa Pleitez PharmD, [facility]  Clinical Oncology Pharmacist    Figueroa Pleitez PharmD, BCPANDA  Clinical Specialty Pharmacist, Oncology  5/20/2025  12:04 EDT

## 2025-06-05 ENCOUNTER — SPECIALTY PHARMACY (OUTPATIENT)
Dept: PHARMACY | Facility: HOSPITAL | Age: 65
End: 2025-06-05
Payer: MEDICARE

## 2025-06-05 RX ORDER — LENALIDOMIDE 10 MG/1
10 CAPSULE ORAL DAILY
Qty: 21 CAPSULE | Refills: 0 | Status: SHIPPED | OUTPATIENT
Start: 2025-06-05

## 2025-06-05 NOTE — PROGRESS NOTES
Specialty Pharmacy Patient Management Program  Per Protocol Prescription Order or Refill     Patient will be filling or currently fills medications at [facility] and is enrolled in the Patient Management Program.    Requested Prescriptions     Signed Prescriptions Disp Refills    lenalidomide (REVLIMID) 10 MG capsule 21 capsule 0     Sig: Take 1 capsule by mouth Daily. Take for 21 days on, then 7 days off.  Indications: Multiple Myeloma     Prescription orders above were sent to the pharmacy per Collaborative Care Agreement Protocol.     Last Office Visit: 4/2/25  Next Office Visit: 7/2/25    Figueroa Pleitez, Virginie, BCOP  Clinical Specialty Pharmacist, Oncology  6/5/2025  13:16 EDT

## 2025-06-05 NOTE — PROGRESS NOTES
Specialty Pharmacy Patient Management Program  Per Protocol Prescription Order or Refill       Requested Prescriptions     Signed Prescriptions Disp Refills    lenalidomide (REVLIMID) 10 MG capsule 21 capsule 0     Sig: Take 1 capsule by mouth Daily. Take for 21 days on, then 7 days off.  Indications: Multiple Myeloma     Prescription orders above were sent to [facility] per Collaborative Care Agreement Protocol.     Completed independent double check on medication order/RX.    Gina Naylor PharmD, BCPS  Clinical Specialty Pharmacist, Oncology  6/5/2025  13:21 EDT

## 2025-06-05 NOTE — PROGRESS NOTES
Specialty Pharmacy Patient Management Program  Per Protocol Prescription Order or Refill       Requested Prescriptions     Signed Prescriptions Disp Refills    lenalidomide (REVLIMID) 10 MG capsule 21 capsule 0     Sig: Take 1 capsule by mouth Daily. Take for 21 days on, then 7 days off.  Indications: Multiple Myeloma     Prescription orders above were sent to [facility] per Collaborative Care Agreement Protocol.     Completed independent double check on medication order/RX.    Sunita Avila RPH, BCOP  Clinical Specialty Pharmacist, Oncology  6/5/2025  13:21 EDT

## 2025-06-25 ENCOUNTER — LAB (OUTPATIENT)
Dept: OTHER | Facility: HOSPITAL | Age: 65
End: 2025-06-25
Payer: MEDICARE

## 2025-06-25 ENCOUNTER — OFFICE VISIT (OUTPATIENT)
Dept: ONCOLOGY | Facility: CLINIC | Age: 65
End: 2025-06-25
Payer: MEDICARE

## 2025-06-25 VITALS
WEIGHT: 191.2 LBS | OXYGEN SATURATION: 97 % | BODY MASS INDEX: 27.37 KG/M2 | HEIGHT: 70 IN | RESPIRATION RATE: 17 BRPM | DIASTOLIC BLOOD PRESSURE: 78 MMHG | TEMPERATURE: 97.6 F | HEART RATE: 65 BPM | SYSTOLIC BLOOD PRESSURE: 116 MMHG

## 2025-06-25 DIAGNOSIS — E85.81 AL AMYLOIDOSIS: ICD-10-CM

## 2025-06-25 DIAGNOSIS — D63.0 ANEMIA IN NEOPLASTIC DISEASE: ICD-10-CM

## 2025-06-25 DIAGNOSIS — C90.00 MULTIPLE MYELOMA NOT HAVING ACHIEVED REMISSION: ICD-10-CM

## 2025-06-25 DIAGNOSIS — Z79.899 ENCOUNTER FOR LONG-TERM (CURRENT) USE OF HIGH-RISK MEDICATION: ICD-10-CM

## 2025-06-25 DIAGNOSIS — D84.9 IMMUNOCOMPROMISED PATIENT: ICD-10-CM

## 2025-06-25 DIAGNOSIS — D52.0 DIETARY FOLATE DEFICIENCY ANEMIA: ICD-10-CM

## 2025-06-25 DIAGNOSIS — C90.00 MULTIPLE MYELOMA NOT HAVING ACHIEVED REMISSION: Primary | ICD-10-CM

## 2025-06-25 DIAGNOSIS — R74.8 ELEVATED LIVER ENZYMES: ICD-10-CM

## 2025-06-25 LAB
ALBUMIN SERPL-MCNC: 4.3 G/DL (ref 3.5–5.2)
ALBUMIN/GLOB SERPL: 1.2 G/DL
ALP SERPL-CCNC: 87 U/L (ref 39–117)
ALT SERPL W P-5'-P-CCNC: 30 U/L (ref 1–41)
ANION GAP SERPL CALCULATED.3IONS-SCNC: 7.8 MMOL/L (ref 5–15)
AST SERPL-CCNC: 24 U/L (ref 1–40)
BASOPHILS # BLD AUTO: 0.04 10*3/MM3 (ref 0–0.2)
BASOPHILS NFR BLD AUTO: 1 % (ref 0–1.5)
BILIRUB SERPL-MCNC: 0.5 MG/DL (ref 0–1.2)
BUN SERPL-MCNC: 11.8 MG/DL (ref 8–23)
BUN/CREAT SERPL: 9.9 (ref 7–25)
CALCIUM SPEC-SCNC: 9.2 MG/DL (ref 8.6–10.5)
CHLORIDE SERPL-SCNC: 106 MMOL/L (ref 98–107)
CO2 SERPL-SCNC: 27.2 MMOL/L (ref 22–29)
CREAT SERPL-MCNC: 1.19 MG/DL (ref 0.76–1.27)
DEPRECATED RDW RBC AUTO: 45.9 FL (ref 37–54)
EGFRCR SERPLBLD CKD-EPI 2021: 68.6 ML/MIN/1.73
EOSINOPHIL # BLD AUTO: 0.17 10*3/MM3 (ref 0–0.4)
EOSINOPHIL NFR BLD AUTO: 4.1 % (ref 0.3–6.2)
ERYTHROCYTE [DISTWIDTH] IN BLOOD BY AUTOMATED COUNT: 16.1 % (ref 12.3–15.4)
GLOBULIN UR ELPH-MCNC: 3.5 GM/DL
GLUCOSE SERPL-MCNC: 90 MG/DL (ref 65–99)
HCT VFR BLD AUTO: 44.8 % (ref 37.5–51)
HGB BLD-MCNC: 13.6 G/DL (ref 13–17.7)
IMM GRANULOCYTES # BLD AUTO: 0.04 10*3/MM3 (ref 0–0.05)
IMM GRANULOCYTES NFR BLD AUTO: 1 % (ref 0–0.5)
LYMPHOCYTES # BLD AUTO: 1.19 10*3/MM3 (ref 0.7–3.1)
LYMPHOCYTES NFR BLD AUTO: 28.6 % (ref 19.6–45.3)
MCH RBC QN AUTO: 23.9 PG (ref 26.6–33)
MCHC RBC AUTO-ENTMCNC: 30.4 G/DL (ref 31.5–35.7)
MCV RBC AUTO: 78.7 FL (ref 79–97)
MONOCYTES # BLD AUTO: 0.29 10*3/MM3 (ref 0.1–0.9)
MONOCYTES NFR BLD AUTO: 7 % (ref 5–12)
NEUTROPHILS NFR BLD AUTO: 2.43 10*3/MM3 (ref 1.7–7)
NEUTROPHILS NFR BLD AUTO: 58.3 % (ref 42.7–76)
NRBC BLD AUTO-RTO: 0 /100 WBC (ref 0–0.2)
PLATELET # BLD AUTO: 196 10*3/MM3 (ref 140–450)
PMV BLD AUTO: 9.8 FL (ref 6–12)
POTASSIUM SERPL-SCNC: 4.5 MMOL/L (ref 3.5–5.2)
PROT SERPL-MCNC: 7.8 G/DL (ref 6–8.5)
RBC # BLD AUTO: 5.69 10*6/MM3 (ref 4.14–5.8)
SODIUM SERPL-SCNC: 141 MMOL/L (ref 136–145)
WBC NRBC COR # BLD AUTO: 4.16 10*3/MM3 (ref 3.4–10.8)

## 2025-06-25 PROCEDURE — 80053 COMPREHEN METABOLIC PANEL: CPT | Performed by: INTERNAL MEDICINE

## 2025-06-25 PROCEDURE — 83521 IG LIGHT CHAINS FREE EACH: CPT | Performed by: INTERNAL MEDICINE

## 2025-06-25 PROCEDURE — 36415 COLL VENOUS BLD VENIPUNCTURE: CPT

## 2025-06-25 PROCEDURE — 84165 PROTEIN E-PHORESIS SERUM: CPT | Performed by: INTERNAL MEDICINE

## 2025-06-25 PROCEDURE — 85025 COMPLETE CBC W/AUTO DIFF WBC: CPT | Performed by: INTERNAL MEDICINE

## 2025-06-25 PROCEDURE — 86334 IMMUNOFIX E-PHORESIS SERUM: CPT | Performed by: INTERNAL MEDICINE

## 2025-06-25 PROCEDURE — 82784 ASSAY IGA/IGD/IGG/IGM EACH: CPT | Performed by: INTERNAL MEDICINE

## 2025-06-25 NOTE — PROGRESS NOTES
"Subjective     CHIEF COMPLAINT:      Chief Complaint   Patient presents with    Follow-up     HISTORY OF PRESENT ILLNESS:     Duy Owens is a 64 y.o. male patient who returns today for follow up on his multiple myeloma and amyloidosis.  He returns today for follow-up reporting that he is feeling good.  He is tolerating Revlimid without problem with upset stomach or diarrhea.  He is taking aspirin regularly.  No leg pain or swelling.    ROS:  Pertinent ROS is in the HPI.     Past medical, surgical, social and family history were reviewed.     MEDICATIONS:    Current Outpatient Medications:     aspirin 81 MG EC tablet, Take 1 tablet by mouth Daily., Disp: , Rfl:     folic acid (FOLVITE) 1 MG tablet, Take 1 tablet by mouth 1 (One) Time Per Week., Disp: , Rfl:     lenalidomide (REVLIMID) 10 MG capsule, Take 1 capsule by mouth Daily. Take for 21 days on, then 7 days off.  Indications: Multiple Myeloma, Disp: 21 capsule, Rfl: 0    losartan (COZAAR) 100 MG tablet, Take 1 tablet by mouth Daily., Disp: 90 tablet, Rfl: 1    metFORMIN (GLUCOPHAGE) 500 MG tablet, Take 1 tablet by mouth 2 (Two) Times a Day., Disp: 180 tablet, Rfl: 1  Objective     VITAL SIGNS:     Vitals:    06/25/25 0914   BP: 116/78   Pulse: 65   Resp: 17   Temp: 97.6 °F (36.4 °C)   TempSrc: Oral   SpO2: 97%   Weight: 86.7 kg (191 lb 3.2 oz)   Height: 177 cm (69.69\")   PainSc: 0-No pain     Body mass index is 27.68 kg/m².     Wt Readings from Last 5 Encounters:   06/25/25 86.7 kg (191 lb 3.2 oz)   04/02/25 89.4 kg (197 lb 1.6 oz)   01/29/25 90.7 kg (199 lb 14.4 oz)   01/16/25 89 kg (196 lb 3.2 oz)   10/16/24 89.1 kg (196 lb 8 oz)     PHYSICAL EXAMINATION:   GENERAL: The patient appears in good general condition, not in acute distress.   SKIN: No Ecchymosis.  EYES: No jaundice. No Pallor.  CHEST: Normal respiratory effort. Normal breathing sounds bilaterally. No added sounds.  CVS: Normal S1 and S2. No Murmur.  ABDOMEN: Soft. No tenderness. No Hepatomegaly. " No Splenomegaly. No masses.  EXTREMITIES: No edema.  No calf tenderness.    DIAGNOSTIC DATA:     Results from last 7 days   Lab Units 06/25/25  0907   WBC 10*3/mm3 4.16   NEUTROS ABS 10*3/mm3 2.43   HEMOGLOBIN g/dL 13.6   HEMATOCRIT % 44.8   PLATELETS 10*3/mm3 196     Results from last 7 days   Lab Units 06/25/25  0907   SODIUM mmol/L 141   POTASSIUM mmol/L 4.5   CHLORIDE mmol/L 106   CO2 mmol/L 27.2   BUN mg/dL 11.8   CREATININE mg/dL 1.19   CALCIUM mg/dL 9.2   ALBUMIN g/dL 4.3   BILIRUBIN mg/dL 0.5   ALK PHOS U/L 87   ALT (SGPT) U/L 30   AST (SGOT) U/L 24   GLUCOSE mg/dL 90     Component      Latest Ref Rng 1/29/2025 4/2/2025 5/14/2025   IgG      603 - 1613 mg/dL 1472  1581  1540    IgA      61 - 437 mg/dL 173  192  173    IgM      20 - 172 mg/dL 37  35  30    Total Protein      6.0 - 8.5 g/dL 6.9  7.3  6.9    Albumin      2.9 - 4.4 g/dL 3.8  3.9  3.6    Alpha-1-Globulin      0.0 - 0.4 g/dL 0.2  0.2  0.2    Alpha-2-Globulin      0.4 - 1.0 g/dL 0.5  0.6  0.5    Beta Globulin      0.7 - 1.3 g/dL 1.0  1.1  1.0    Gamma Globulin      0.4 - 1.8 g/dL 1.4  1.5  1.5    M-Werner      Not Observed g/dL Not Observed  Not Observed  Not Observed    Globulin      2.2 - 3.9 g/dL 3.1  3.4  3.3    A/G Ratio      0.7 - 1.7  1.3  1.2  1.1    Immunofixation Reflex, Serum Comment  Comment  Comment    Please note Comment  Comment  Comment    Kappa FLC      3.3 - 19.4 mg/L 27.8 (H)  31.4 (H)  32.8 (H)    Free Lambda Light Chains      5.7 - 26.3 mg/L 20.1  22.0  23.4    Kappa/Lambda Ratio      0.26 - 1.65  1.38  1.43  1.40      Assessment & Plan    *Lambda light chain multiple myeloma with lymphadenopathy and development of AL amyloidosis.  Patient was found to have lymph node involvement and amyloid deposition in the involved lymph nodes.  Patient started having dry cough around July 2021.    CT scans 9/23/2021-9/24/2021 revealed inferior mediastinal adenopathy and retroperitoneal adenopathy extending to the right iliac chain.  The  Largest lymph node was in the retroperitoneal area measuring 4.8 x 3.5 cm.  The common iliac lymph node measured 3.5 x 2.7 cm.  The left external iliac chain lymph node measured 2.9 x 2 cm.    PET scan on 10/5/2021 revealed the retroperitoneal and left pelvic lymphadenopathy to be hypermetabolic.  The left periaortic lymph nodes had SUV of 6.9 and the left pelvic sidewall lymph nodes had an SUV of 5.4.  No bone involvement.  CT-guided biopsy on 10/6/2021- pathologist at [facility] reported involvement with monotypic lambda restricted plasma cells concerning for involvement with plasma cell dyscrasia.  Bone marrow biopsy on 10/29/2021 revealed a normocellular marrow (50%) with involvement with plasma cell dyscrasia (plasma cells representing 20-30% of total cells by  stain).   FISH was negative for gain of 1q, monosomy/deletions of chromosomes 13 and 17, IGH rearrangement, gain of chromosomes 9 and 11, IGH-CCND1 (11;14) fusion.   Treatment with the VRD started on 12/22/2021.  Free lambda light chain started to improve after the patient started treatment.  CT scan on 3/11/2022 revealed decrease in the size of lymph nodes in the periaortic area.  There is a slight increase in a lymph node in the left axillary area that increased from 7 to 10 mm.    PET scan on 4/28/2022 revealed significant reduction in the size and hypermetabolism of the involved lymph nodes.  SPEP REGINO FLC on 5/25/2022 revealed no M protein.  Free lambda light chain was 41.8.  Kappa to lambda ratio was normal at 0.26.  B2 M was 1.6.  Bone marrow biopsy on 5/24/2022 revealed normocellular marrow with 30-40% cellularity with involvement by plasma cell neoplasm representing 5-10% by IHC.  Negative for amyloid.  He was considered to be HI-1.  S/p high-dose melphalan with autologous stem cell transplant.  Day 0 was 7/7/2022.  Patient did well with the transplant but had neutropenic fever of unclear source necessitating hospitalization between  7/16/2022 and 7/18/2022.    Bone marrow biopsy on 9/29/2022 revealed monotypic plasma cells identified on on flow cytometry but not on IHC.  CT on 9/29/2022 showed decrease in the size of the enlarged lymph nodes.  Patient started Revlimid 10 mg daily for 21 out of 28-day cycle on 11/1/2022.    CT on 2/23/2023 showed decrease in the size of the mediastinal and retroperitoneal lymph nodes indicating response to treatment.  2/23/2023-Free kappa light chain 1.94, free lambda light chain 2.19, kappa/lambda ratio normal at 0.89.  5/17/2023: Free kappa light chain was 31.5.  Free lambda light chain was 24.5.  Kappa 3 lambda ratio normal at 1.29.  6/14/2023: Free kappa light chain was 31.0.  Free lambda light chain was 23.7.  Kappa to lambda ratio was 1.31.   Patient had follow-up at [facility] on 8/22/2023.    CT scan showed decrease in the size of the enlarged lymph nodes.  No new abnormalities were seen.  9/6/2023: Kappa FLC 30.0.  Lambda FLC 25.8.  Kappa to lambda ratio 1.16.  No M protein.  11/1/2023: Kappa FLC 32.9. Lambda FLC 25.8. K:L ratio 1.28.  CT on 2/20/2024 showed stable lymphadenopathy.  1/29/2025: No M protein.  Kappa FLC 27.8.  Lambda FLC 20.1.  Kappa/lambda ratio 1.38.  CT chest abdomen pelvis on 2/11/2025 at [facility] revealed small mediastinal and retrocrural lymph nodes that were stable.  Moderate calcified periaortic nodes were stable.    Maintenance Revlimid through 7/2027 was recommended.  5/14/2025: No M protein was detected.  Kappa FLC 32.8.  Lambda FLC 23.4.  Kappa/lambda ratio 1.40.  He continues to tolerate Revlimid well.    *Anemia secondary to multiple myeloma and secondary to treatment.  Hemoglobin was 13.1 on 9/29/2021.    Iron, folate and vitamin B12 were normal in September/October 2021.    Hemoglobin was 11.4 on 8/24/2022.  Hemoglobin improved to the 11-12 g range.  Folate was low at 5.5.   He was started on folic acid 1 mg daily.  2/28/2024: Folate increased to  >20.  5/1/2024: Hemoglobin 11.8.  7/3/2024: Hemoglobin improved to 12.6.  He has adequate iron stores.  Vitamin B12 575.  Folate improved to >20.  Folic acid was reduced to once weekly.  4/2/2025: Hemoglobin 12.6.  6/25/2025: Hemoglobin improved to 13.6    *Neutropenia due to chemotherapy.  It is attributed to Revlimid.  6/25/2025: Neutrophil count improved to 2230.    *Prophylaxis.  He was previously on acyclovir 400 mg twice daily.    He is now on Valtrex.  He received Evusheld on 8/20/2022.  Patient received vaccinations as recommended by [place].  He received COVID-19 Moderna booster on 10/25/2022.  He received vaccines in April and June.  He received the first Shingrix vaccine in August 2023 and in February 2024.  No recent infections.    *Elevated liver enzymes.  Liver enzymes have fluctuated over time.  CT on 2/20/2024 showed no liver or gallbladder abnormality.  Patient does not drink alcohol.  5/1/2024: ALT 63.  AST 52.  Bilirubin 0.3.  Alkaline phosphatase 118.  7/3/2024: ALT improved to 51.  AST improved to 38.  10/16/2024: ALT improved to 25.  AST improved to 21.  4/2/2025: ALT increased to 62.  AST 31.  Alkaline phosphatase 120.  Patient does not take Tylenol frequently.  ?  Due to food items high in fat/sugar.  6/25/2025: ALT improved to 30.  AST 24.  Alkaline phosphatase 87.  Bilirubin 0.5.    *Syncope.  Patient reports having an episode of passing out while he was outdoors watching a game.  He did not feel dizzy prior to the episode. He was well hydrated.  He regained consciousness quickly.   EMS was called. He declined going to ER at that time.   He was referred to cardiology.  He is going to have echocardiogram done.  No recurrence of the episodes.  He has mild bradycardia today but he is asymptomatic.    *Severe muscle spasm of the cervical-thoracic spine.  He reports having weakness of the right lower extremity.  He fell off a ladder recently due to weakness of the right LE.  I recommended  evaluation with an MRI of the spine.   He complained of muscle spasm of other muscles.  MRI showed no lesions from multiple myeloma.  He was referred to neurosurgery.  Conservative management was recommended for DJD.  No recurrence of the symptoms.    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  There was mild wall thickening.  Ejection fraction was normal.  Echocardiogram at [facility] [place] on 4/21/2022 did not reveal wall thickening.    PLAN:    1.  Continue Revlimid 10 mg daily for 21 days over 28-day cycle.  He is on day #8 today.  2.  Continue aspirin 81 mg daily.  3.  Continue folic acid 1 mg weekly.  4.  Obtain CBC CMP SPEP REGINO FLC in 4 weeks.  5.  Obtain CT scan of the chest abdomen pelvis in 8 weeks.  6.  I will see him in follow-up after the CT scan with repeat labs.        Tima Mendez MD  06/25/25

## 2025-06-30 LAB
ALBUMIN SERPL ELPH-MCNC: 3.9 G/DL (ref 2.9–4.4)
ALBUMIN/GLOB SERPL: 1.2 {RATIO} (ref 0.7–1.7)
ALPHA1 GLOB SERPL ELPH-MCNC: 0.2 G/DL (ref 0–0.4)
ALPHA2 GLOB SERPL ELPH-MCNC: 0.6 G/DL (ref 0.4–1)
B-GLOBULIN SERPL ELPH-MCNC: 1.1 G/DL (ref 0.7–1.3)
GAMMA GLOB SERPL ELPH-MCNC: 1.6 G/DL (ref 0.4–1.8)
GLOBULIN SER-MCNC: 3.4 G/DL (ref 2.2–3.9)
IGA SERPL-MCNC: 201 MG/DL (ref 61–437)
IGG SERPL-MCNC: 1735 MG/DL (ref 603–1613)
IGM SERPL-MCNC: 39 MG/DL (ref 20–172)
INTERPRETATION SERPL IEP-IMP: ABNORMAL
KAPPA LC FREE SER-MCNC: 33.7 MG/L (ref 3.3–19.4)
KAPPA LC FREE/LAMBDA FREE SER: 1.36 {RATIO} (ref 0.26–1.65)
LABORATORY COMMENT REPORT: ABNORMAL
LAMBDA LC FREE SERPL-MCNC: 24.8 MG/L (ref 5.7–26.3)
M PROTEIN SERPL ELPH-MCNC: ABNORMAL G/DL
PROT SERPL-MCNC: 7.3 G/DL (ref 6–8.5)

## 2025-07-03 ENCOUNTER — SPECIALTY PHARMACY (OUTPATIENT)
Dept: PHARMACY | Facility: HOSPITAL | Age: 65
End: 2025-07-03
Payer: MEDICARE

## 2025-07-03 NOTE — PROGRESS NOTES
Specialty Pharmacy Note: Revlimid (lenalidomide)    Duy Owens is a 64 y.o. male with multiple myeloma was seen 6/25/25 by Dr. Mendez. Per provider dictation, no changes to oral oncology regimen Revlimid 10 mg daily for 21 days over 28-day cycle.  Labs Review: The CMP and CBC from 6/25/25 have been reviewed. No dose adjustments are needed for the oral specialty medication(s) based on the labs.    Specialty pharmacy will continue to follow patient.    Figueroa Pleitez, PharmD, [facility]  Clinical Oncology Pharmacist    7/3/2025  08:51 EDT

## 2025-07-07 RX ORDER — LENALIDOMIDE 10 MG/1
10 CAPSULE ORAL DAILY
Qty: 21 CAPSULE | Refills: 0 | Status: SHIPPED | OUTPATIENT
Start: 2025-07-07

## 2025-07-07 NOTE — TELEPHONE ENCOUNTER
Specialty Pharmacy Patient Management Program  Per Protocol Prescription Order or Refill     Patient will be filling or currently fills medications at [facility] and is enrolled in the Patient Management Program.    Requested Prescriptions     Pending Prescriptions Disp Refills    lenalidomide (REVLIMID) 10 MG capsule [Pharmacy Med Name: LENALIDOMIDE  10MG  CAP] 21 capsule 0     Sig: TAKE 1 CAPSULE BY MOUTH DAILY  FOR 21 DAYS, THEN 7 DAYS OFF     Prescription orders above were sent to the pharmacy per Collaborative Care Agreement Protocol.     Last Office Visit: 6/25/25  Next Office Visit: 8/27/25    Figueroa Pleitez PharmD, [facility]  Clinical Specialty Pharmacist, Oncology  7/7/2025  15:30 EDT

## 2025-07-07 NOTE — TELEPHONE ENCOUNTER
Specialty Pharmacy Patient Management Program  Per Protocol Prescription Order or Refill     Patient will be filling or currently fills medications at [facility] and is enrolled in the Patient Management Program.    Requested Prescriptions     Signed Prescriptions Disp Refills    lenalidomide (REVLIMID) 10 MG capsule 21 capsule 0     Sig: TAKE 1 CAPSULE BY MOUTH DAILY  FOR 21 DAYS, THEN 7 DAYS OFF     Authorizing Provider: ROBINSON MESSER     Ordering User: TRUE NEW     Prescription orders above were sent to the pharmacy per Collaborative Care Agreement Protocol.     Last Office Visit: 6/25/25  Next Office Visit: 8/27/25    Sunita Avila Rph, BCOP  Clinical Specialty Pharmacist, Oncology  7/7/2025  15:39 EDT

## 2025-07-08 ENCOUNTER — TELEPHONE (OUTPATIENT)
Dept: ONCOLOGY | Facility: CLINIC | Age: 65
End: 2025-07-08
Payer: MEDICARE

## 2025-07-08 DIAGNOSIS — Z51.11 ENCOUNTER FOR ANTINEOPLASTIC CHEMOTHERAPY: ICD-10-CM

## 2025-07-08 DIAGNOSIS — R19.7 DIARRHEA OF PRESUMED INFECTIOUS ORIGIN: Primary | ICD-10-CM

## 2025-07-08 NOTE — TELEPHONE ENCOUNTER
Provider: Andrea  Caller:patient  Relationship to Patient: self  Call Back Phone Number: [phone]   Reason for Call: Pt has diarrhea.

## 2025-07-08 NOTE — TELEPHONE ENCOUNTER
Provider: Andrea  Caller: patient  Relationship to Patient: self  Call Back Phone Number: [phone]   Reason for Call: Pt returning call.

## 2025-07-08 NOTE — TELEPHONE ENCOUNTER
Returned pts call, Dr. Mendez recommends stool samples to evaluate for infection. The pt v/u. A message has been sent to scheduling for a lab appt.

## 2025-07-09 ENCOUNTER — LAB (OUTPATIENT)
Dept: OTHER | Facility: HOSPITAL | Age: 65
End: 2025-07-09
Payer: MEDICARE

## 2025-07-10 ENCOUNTER — LAB (OUTPATIENT)
Dept: OTHER | Facility: HOSPITAL | Age: 65
End: 2025-07-10
Payer: MEDICARE

## 2025-07-10 DIAGNOSIS — Z51.11 ENCOUNTER FOR ANTINEOPLASTIC CHEMOTHERAPY: ICD-10-CM

## 2025-07-10 DIAGNOSIS — R19.7 DIARRHEA OF PRESUMED INFECTIOUS ORIGIN: ICD-10-CM

## 2025-07-10 LAB
ADV 40+41 DNA STL QL NAA+NON-PROBE: NOT DETECTED
ASTRO TYP 1-8 RNA STL QL NAA+NON-PROBE: DETECTED
C CAYETANENSIS DNA STL QL NAA+NON-PROBE: NOT DETECTED
C COLI+JEJ+UPSA DNA STL QL NAA+NON-PROBE: NOT DETECTED
C DIFF TOX GENS STL QL NAA+PROBE: NEGATIVE
CRYPTOSP DNA STL QL NAA+NON-PROBE: NOT DETECTED
E HISTOLYT DNA STL QL NAA+NON-PROBE: NOT DETECTED
EAEC PAA PLAS AGGR+AATA ST NAA+NON-PRB: NOT DETECTED
EC STX1+STX2 GENES STL QL NAA+NON-PROBE: NOT DETECTED
EPEC EAE GENE STL QL NAA+NON-PROBE: DETECTED
ETEC LTA+ST1A+ST1B TOX ST NAA+NON-PROBE: NOT DETECTED
G LAMBLIA DNA STL QL NAA+NON-PROBE: NOT DETECTED
NOROVIRUS GI+II RNA STL QL NAA+NON-PROBE: NOT DETECTED
P SHIGELLOIDES DNA STL QL NAA+NON-PROBE: NOT DETECTED
RVA RNA STL QL NAA+NON-PROBE: NOT DETECTED
S ENT+BONG DNA STL QL NAA+NON-PROBE: NOT DETECTED
SAPO I+II+IV+V RNA STL QL NAA+NON-PROBE: NOT DETECTED
SHIGELLA SP+EIEC IPAH ST NAA+NON-PROBE: NOT DETECTED
V CHOL+PARA+VUL DNA STL QL NAA+NON-PROBE: NOT DETECTED
V CHOLERAE DNA STL QL NAA+NON-PROBE: NOT DETECTED
Y ENTEROCOL DNA STL QL NAA+NON-PROBE: NOT DETECTED

## 2025-07-10 PROCEDURE — 87493 C DIFF AMPLIFIED PROBE: CPT | Performed by: INTERNAL MEDICINE

## 2025-07-10 PROCEDURE — 87507 IADNA-DNA/RNA PROBE TQ 12-25: CPT | Performed by: INTERNAL MEDICINE

## 2025-07-10 NOTE — PROGRESS NOTES
On call note: Received a call from the laboratory regarding stool specimen from today which on GI PCR showed enteropathogenic E. coli and astrovirus.  Attempted to contact patient and left message to call back regarding severity of ongoing diarrhea and potential need for treatment with azithromycin.  If patient does not call back this evening we will need to attempt contacting him again in AM.

## 2025-07-11 ENCOUNTER — TELEPHONE (OUTPATIENT)
Dept: ONCOLOGY | Facility: CLINIC | Age: 65
End: 2025-07-11
Payer: MEDICARE

## 2025-07-11 NOTE — TELEPHONE ENCOUNTER
Returned call to pt to check the severity of his diarrhea, pt states since coming to our office his diarrhea has cleared up. His bowel movement today was soft and formed. I have relayed the information to Dr. Mendez. He recommends no tx since the diarrhea has stopped, the pt v/u.

## 2025-07-11 NOTE — TELEPHONE ENCOUNTER
[facility] staff attempted to follow warm transfer process and was unsuccessful     Caller: Duy Owens    Relationship to patient: Self    Best call back number: [phone]    Patient is needing: PT RETURNING DR BLAIR'S CALL

## 2025-07-15 ENCOUNTER — SPECIALTY PHARMACY (OUTPATIENT)
Dept: PHARMACY | Facility: HOSPITAL | Age: 65
End: 2025-07-15
Payer: MEDICARE

## 2025-07-15 NOTE — PROGRESS NOTES
Specialty Pharmacy Patient Management Program  Clinical Outreach     I called Duy Owens on 7/15/2025 12:58 EDT who is enrolled in the Oncology Patient Management program offered by [facility].  Patient did not answer today. I left a voice message with my call back number, [phone].    Figueroa Pleitez, PharmD, [facility]  Clinical Specialty Pharmacist, Oncology  7/15/2025  12:58 EDT

## 2025-07-18 ENCOUNTER — OFFICE VISIT (OUTPATIENT)
Dept: FAMILY MEDICINE CLINIC | Facility: CLINIC | Age: 65
End: 2025-07-18
Payer: MEDICARE

## 2025-07-18 VITALS
DIASTOLIC BLOOD PRESSURE: 82 MMHG | TEMPERATURE: 97.9 F | BODY MASS INDEX: 27.14 KG/M2 | SYSTOLIC BLOOD PRESSURE: 128 MMHG | WEIGHT: 189.6 LBS | HEIGHT: 70 IN | OXYGEN SATURATION: 94 % | HEART RATE: 84 BPM | RESPIRATION RATE: 16 BRPM

## 2025-07-18 DIAGNOSIS — E11.9 TYPE 2 DIABETES MELLITUS WITHOUT COMPLICATION, WITHOUT LONG-TERM CURRENT USE OF INSULIN: ICD-10-CM

## 2025-07-18 DIAGNOSIS — E78.2 HYPERLIPEMIA, MIXED: Primary | ICD-10-CM

## 2025-07-18 DIAGNOSIS — I10 PRIMARY HYPERTENSION: ICD-10-CM

## 2025-07-18 RX ORDER — LOSARTAN POTASSIUM 100 MG/1
100 TABLET ORAL DAILY
Qty: 90 TABLET | Refills: 1 | Status: SHIPPED | OUTPATIENT
Start: 2025-07-18

## 2025-07-18 NOTE — PROGRESS NOTES
"Chief Complaint  Diabetes    Subjective        Duy Owens presents to [facility]  Diabetes    Patient presents today for labs, refills, and  Diabetes -  in good range per pt    Hypertension - no chest pains or headaches  Hyperlipidemia - no medication; active  Specialist checks his PSA per patient  Objective   Vital Signs:  /82 (BP Location: Right arm, Patient Position: Sitting)   Pulse 84   Temp 97.9 °F (36.6 °C)   Resp 16   Ht 177 cm (69.69\")   Wt 86 kg (189 lb 9.6 oz)   SpO2 94%   BMI 27.45 kg/m²   Estimated body mass index is 27.45 kg/m² as calculated from the following:    Height as of this encounter: 177 cm (69.69\").    Weight as of this encounter: 86 kg (189 lb 9.6 oz).            Physical Exam  Vitals and nursing note reviewed.   Constitutional:       Appearance: Normal appearance. He is well-developed.   Cardiovascular:      Rate and Rhythm: Normal rate and regular rhythm.      Heart sounds: Normal heart sounds. No murmur heard.  Pulmonary:      Effort: Pulmonary effort is normal. No respiratory distress.      Breath sounds: Normal breath sounds. No stridor. No wheezing or rhonchi.   Neurological:      General: No focal deficit present.      Mental Status: He is alert and oriented to person, place, and time. He is not disoriented.   Psychiatric:         Mood and Affect: Mood normal.         Behavior: Behavior normal.        Result Review :                Assessment and Plan   Diagnoses and all orders for this visit:    1. Hyperlipemia, mixed (Primary)  -     Lipid Panel    2. Type 2 diabetes mellitus without complication, without long-term current use of insulin  -     metFORMIN (GLUCOPHAGE) 500 MG tablet; Take 1 tablet by mouth 2 (Two) Times a Day.  Dispense: 180 tablet; Refill: 1  -     Microalbumin / Creatinine Urine Ratio - Urine, Clean Catch  -     Hemoglobin A1c    3. Primary hypertension  -     losartan (COZAAR) 100 MG tablet; Take 1 tablet by mouth " Daily.  Dispense: 90 tablet; Refill: 1  -     Comprehensive Metabolic Panel             Follow Up   Return in about 6 months (around 1/18/2026) for diabetes, hypertension, hyperlipidema.  Patient was given instructions and counseling regarding his condition or for health maintenance advice. Please see specific information pulled into the AVS if appropriate.     Refills, labs and work on diet and exercise.

## 2025-07-19 ENCOUNTER — RESULTS FOLLOW-UP (OUTPATIENT)
Dept: FAMILY MEDICINE CLINIC | Facility: CLINIC | Age: 65
End: 2025-07-19
Payer: MEDICARE

## 2025-07-19 LAB
ALBUMIN SERPL-MCNC: 4.3 G/DL (ref 3.9–4.9)
ALBUMIN/CREAT UR: 16 MG/G CREAT (ref 0–29)
ALP SERPL-CCNC: 100 IU/L (ref 44–121)
ALT SERPL-CCNC: 32 IU/L (ref 0–44)
AST SERPL-CCNC: 21 IU/L (ref 0–40)
BILIRUB SERPL-MCNC: 0.4 MG/DL (ref 0–1.2)
BUN SERPL-MCNC: 16 MG/DL (ref 8–27)
BUN/CREAT SERPL: 17 (ref 10–24)
CALCIUM SERPL-MCNC: 9.2 MG/DL (ref 8.6–10.2)
CHLORIDE SERPL-SCNC: 104 MMOL/L (ref 96–106)
CHOLEST SERPL-MCNC: 147 MG/DL (ref 100–199)
CO2 SERPL-SCNC: 22 MMOL/L (ref 20–29)
CREAT SERPL-MCNC: 0.96 MG/DL (ref 0.76–1.27)
CREAT UR-MCNC: 146.1 MG/DL
EGFRCR SERPLBLD CKD-EPI 2021: 89 ML/MIN/1.73
GLOBULIN SER CALC-MCNC: 2.7 G/DL (ref 1.5–4.5)
GLUCOSE SERPL-MCNC: 83 MG/DL (ref 70–99)
HBA1C MFR BLD: 5.8 % (ref 4.8–5.6)
HDLC SERPL-MCNC: 49 MG/DL
LDLC SERPL CALC-MCNC: 87 MG/DL (ref 0–99)
MICROALBUMIN UR-MCNC: 22.8 UG/ML
POTASSIUM SERPL-SCNC: 4.4 MMOL/L (ref 3.5–5.2)
PROT SERPL-MCNC: 7 G/DL (ref 6–8.5)
SODIUM SERPL-SCNC: 139 MMOL/L (ref 134–144)
TRIGL SERPL-MCNC: 54 MG/DL (ref 0–149)
VLDLC SERPL CALC-MCNC: 11 MG/DL (ref 5–40)

## 2025-07-23 ENCOUNTER — SPECIALTY PHARMACY (OUTPATIENT)
Dept: PHARMACY | Facility: HOSPITAL | Age: 65
End: 2025-07-23
Payer: MEDICARE

## 2025-07-23 ENCOUNTER — LAB (OUTPATIENT)
Dept: OTHER | Facility: HOSPITAL | Age: 65
End: 2025-07-23
Payer: MEDICARE

## 2025-07-23 DIAGNOSIS — C90.00 MULTIPLE MYELOMA NOT HAVING ACHIEVED REMISSION: ICD-10-CM

## 2025-07-23 DIAGNOSIS — E85.81 AL AMYLOIDOSIS: ICD-10-CM

## 2025-07-23 LAB
ALBUMIN SERPL-MCNC: 4.3 G/DL (ref 3.5–5.2)
ALBUMIN/GLOB SERPL: 1.4 G/DL
ALP SERPL-CCNC: 94 U/L (ref 39–117)
ALT SERPL W P-5'-P-CCNC: 33 U/L (ref 1–41)
ANION GAP SERPL CALCULATED.3IONS-SCNC: 8.7 MMOL/L (ref 5–15)
AST SERPL-CCNC: 27 U/L (ref 1–40)
BASOPHILS # BLD AUTO: 0.06 10*3/MM3 (ref 0–0.2)
BASOPHILS NFR BLD AUTO: 1.6 % (ref 0–1.5)
BILIRUB SERPL-MCNC: 0.4 MG/DL (ref 0–1.2)
BUN SERPL-MCNC: 14.5 MG/DL (ref 8–23)
BUN/CREAT SERPL: 14.2 (ref 7–25)
CALCIUM SPEC-SCNC: 8.9 MG/DL (ref 8.6–10.5)
CHLORIDE SERPL-SCNC: 105 MMOL/L (ref 98–107)
CO2 SERPL-SCNC: 25.3 MMOL/L (ref 22–29)
CREAT SERPL-MCNC: 1.02 MG/DL (ref 0.76–1.27)
DEPRECATED RDW RBC AUTO: 45.6 FL (ref 37–54)
EGFRCR SERPLBLD CKD-EPI 2021: 82.6 ML/MIN/1.73
EOSINOPHIL # BLD AUTO: 0.21 10*3/MM3 (ref 0–0.4)
EOSINOPHIL NFR BLD AUTO: 5.5 % (ref 0.3–6.2)
ERYTHROCYTE [DISTWIDTH] IN BLOOD BY AUTOMATED COUNT: 16.2 % (ref 12.3–15.4)
GLOBULIN UR ELPH-MCNC: 3.1 GM/DL
GLUCOSE SERPL-MCNC: 87 MG/DL (ref 65–99)
HCT VFR BLD AUTO: 40.9 % (ref 37.5–51)
HGB BLD-MCNC: 12.7 G/DL (ref 13–17.7)
IMM GRANULOCYTES # BLD AUTO: 0.03 10*3/MM3 (ref 0–0.05)
IMM GRANULOCYTES NFR BLD AUTO: 0.8 % (ref 0–0.5)
LYMPHOCYTES # BLD AUTO: 1.21 10*3/MM3 (ref 0.7–3.1)
LYMPHOCYTES NFR BLD AUTO: 31.8 % (ref 19.6–45.3)
MCH RBC QN AUTO: 24.1 PG (ref 26.6–33)
MCHC RBC AUTO-ENTMCNC: 31.1 G/DL (ref 31.5–35.7)
MCV RBC AUTO: 77.6 FL (ref 79–97)
MONOCYTES # BLD AUTO: 0.25 10*3/MM3 (ref 0.1–0.9)
MONOCYTES NFR BLD AUTO: 6.6 % (ref 5–12)
NEUTROPHILS NFR BLD AUTO: 2.05 10*3/MM3 (ref 1.7–7)
NEUTROPHILS NFR BLD AUTO: 53.7 % (ref 42.7–76)
NRBC BLD AUTO-RTO: 0 /100 WBC (ref 0–0.2)
PLATELET # BLD AUTO: 200 10*3/MM3 (ref 140–450)
PMV BLD AUTO: 9.9 FL (ref 6–12)
POTASSIUM SERPL-SCNC: 4.2 MMOL/L (ref 3.5–5.2)
PROT SERPL-MCNC: 7.4 G/DL (ref 6–8.5)
RBC # BLD AUTO: 5.27 10*6/MM3 (ref 4.14–5.8)
SODIUM SERPL-SCNC: 139 MMOL/L (ref 136–145)
WBC NRBC COR # BLD AUTO: 3.81 10*3/MM3 (ref 3.4–10.8)

## 2025-07-23 PROCEDURE — 85025 COMPLETE CBC W/AUTO DIFF WBC: CPT | Performed by: INTERNAL MEDICINE

## 2025-07-23 PROCEDURE — 82784 ASSAY IGA/IGD/IGG/IGM EACH: CPT | Performed by: INTERNAL MEDICINE

## 2025-07-23 PROCEDURE — 83521 IG LIGHT CHAINS FREE EACH: CPT | Performed by: INTERNAL MEDICINE

## 2025-07-23 PROCEDURE — 36415 COLL VENOUS BLD VENIPUNCTURE: CPT

## 2025-07-23 PROCEDURE — 86334 IMMUNOFIX E-PHORESIS SERUM: CPT | Performed by: INTERNAL MEDICINE

## 2025-07-23 PROCEDURE — 84165 PROTEIN E-PHORESIS SERUM: CPT | Performed by: INTERNAL MEDICINE

## 2025-07-23 PROCEDURE — 80053 COMPREHEN METABOLIC PANEL: CPT | Performed by: INTERNAL MEDICINE

## 2025-07-23 NOTE — PROGRESS NOTES
Specialty Pharmacy Patient Management Program  Clinical Outreach     I called Duy Owens on 7/23/2025 13:44 EDT who is enrolled in the Oncology Patient Management program offered by [facility].  Patient did not answer today. I left a voice message with my call back number, [phone].    Figueroa Pleitez, PharmD, [facility]  Clinical Specialty Pharmacist, Oncology  7/23/2025  13:44 EDT

## 2025-07-25 LAB
ALBUMIN SERPL ELPH-MCNC: 3.7 G/DL (ref 2.9–4.4)
ALBUMIN/GLOB SERPL: 1.1 {RATIO} (ref 0.7–1.7)
ALPHA1 GLOB SERPL ELPH-MCNC: 0.2 G/DL (ref 0–0.4)
ALPHA2 GLOB SERPL ELPH-MCNC: 0.6 G/DL (ref 0.4–1)
B-GLOBULIN SERPL ELPH-MCNC: 1.1 G/DL (ref 0.7–1.3)
GAMMA GLOB SERPL ELPH-MCNC: 1.5 G/DL (ref 0.4–1.8)
GLOBULIN SER-MCNC: 3.4 G/DL (ref 2.2–3.9)
IGA SERPL-MCNC: 196 MG/DL (ref 61–437)
IGG SERPL-MCNC: 1538 MG/DL (ref 603–1613)
IGM SERPL-MCNC: 39 MG/DL (ref 20–172)
INTERPRETATION SERPL IEP-IMP: ABNORMAL
KAPPA LC FREE SER-MCNC: 33.2 MG/L (ref 3.3–19.4)
KAPPA LC FREE/LAMBDA FREE SER: 1.43 {RATIO} (ref 0.26–1.65)
LABORATORY COMMENT REPORT: ABNORMAL
LAMBDA LC FREE SERPL-MCNC: 23.2 MG/L (ref 5.7–26.3)
M PROTEIN SERPL ELPH-MCNC: ABNORMAL G/DL
PROT SERPL-MCNC: 7.1 G/DL (ref 6–8.5)

## 2025-07-31 ENCOUNTER — SPECIALTY PHARMACY (OUTPATIENT)
Dept: PHARMACY | Facility: HOSPITAL | Age: 65
End: 2025-07-31
Payer: MEDICARE

## 2025-07-31 NOTE — PROGRESS NOTES
Specialty Pharmacy Patient Management Program  Clinical Outreach     I called Duy Owens on 7/31/2025 12:48 EDT who is enrolled in the Oncology Patient Management program offered by [facility].  Patient did not answer today. I left a voice message with my call back number, [phone].    Figueroa Pleitez, PharmD, [facility]  Clinical Specialty Pharmacist, Oncology  7/31/2025  12:48 EDT

## 2025-08-04 RX ORDER — LENALIDOMIDE 10 MG/1
10 CAPSULE ORAL DAILY
Qty: 21 CAPSULE | Refills: 0 | Status: SHIPPED | OUTPATIENT
Start: 2025-08-04

## 2025-08-20 ENCOUNTER — HOSPITAL ENCOUNTER (OUTPATIENT)
Dept: CT IMAGING | Facility: HOSPITAL | Age: 65
Discharge: HOME | End: 2025-08-20
Admitting: INTERNAL MEDICINE
Payer: MEDICARE

## 2025-08-20 DIAGNOSIS — E85.81 AL AMYLOIDOSIS: ICD-10-CM

## 2025-08-20 DIAGNOSIS — C90.00 MULTIPLE MYELOMA NOT HAVING ACHIEVED REMISSION: ICD-10-CM

## 2025-08-20 PROCEDURE — 25510000002 DIATRIZOATE MEGLUMINE & SODIUM PER 1 ML: Performed by: INTERNAL MEDICINE

## 2025-08-20 PROCEDURE — 74177 CT ABD & PELVIS W/CONTRAST: CPT

## 2025-08-20 PROCEDURE — 25510000001 IOPAMIDOL 61 % SOLUTION: Performed by: INTERNAL MEDICINE

## 2025-08-20 PROCEDURE — 71260 CT THORAX DX C+: CPT

## 2025-08-20 RX ORDER — IOPAMIDOL 612 MG/ML
100 INJECTION, SOLUTION INTRAVASCULAR
Status: COMPLETED | OUTPATIENT
Start: 2025-08-20 | End: 2025-08-20

## 2025-08-20 RX ORDER — DIATRIZOATE MEGLUMINE AND DIATRIZOATE SODIUM 660; 100 MG/ML; MG/ML
30 SOLUTION ORAL; RECTAL
Status: COMPLETED | OUTPATIENT
Start: 2025-08-20 | End: 2025-08-20

## 2025-08-20 RX ADMIN — DIATRIZOATE MEGLUMINE AND DIATRIZOATE SODIUM 30 ML: 660; 100 LIQUID ORAL; RECTAL at 08:00

## 2025-08-20 RX ADMIN — IOPAMIDOL 85 ML: 612 INJECTION, SOLUTION INTRAVENOUS at 09:00

## 2025-08-21 ENCOUNTER — SPECIALTY PHARMACY (OUTPATIENT)
Dept: PHARMACY | Facility: HOSPITAL | Age: 65
End: 2025-08-21
Payer: MEDICARE

## 2025-08-27 ENCOUNTER — LAB (OUTPATIENT)
Dept: OTHER | Facility: HOSPITAL | Age: 65
End: 2025-08-27
Payer: MEDICARE

## 2025-08-27 ENCOUNTER — OFFICE VISIT (OUTPATIENT)
Dept: ONCOLOGY | Facility: CLINIC | Age: 65
End: 2025-08-27
Payer: MEDICARE

## 2025-08-27 ENCOUNTER — SPECIALTY PHARMACY (OUTPATIENT)
Dept: ONCOLOGY | Facility: HOSPITAL | Age: 65
End: 2025-08-27
Payer: MEDICARE

## 2025-08-27 VITALS
BODY MASS INDEX: 27.66 KG/M2 | HEART RATE: 50 BPM | HEIGHT: 70 IN | SYSTOLIC BLOOD PRESSURE: 124 MMHG | DIASTOLIC BLOOD PRESSURE: 76 MMHG | TEMPERATURE: 97.8 F | WEIGHT: 193.2 LBS | OXYGEN SATURATION: 98 %

## 2025-08-27 DIAGNOSIS — R74.8 ELEVATED LIVER ENZYMES: ICD-10-CM

## 2025-08-27 DIAGNOSIS — C90.00 MULTIPLE MYELOMA NOT HAVING ACHIEVED REMISSION: Primary | ICD-10-CM

## 2025-08-27 DIAGNOSIS — D52.0 DIETARY FOLATE DEFICIENCY ANEMIA: ICD-10-CM

## 2025-08-27 DIAGNOSIS — D63.0 ANEMIA IN NEOPLASTIC DISEASE: ICD-10-CM

## 2025-08-27 DIAGNOSIS — D84.9 IMMUNOCOMPROMISED PATIENT: ICD-10-CM

## 2025-08-27 DIAGNOSIS — C90.00 MULTIPLE MYELOMA NOT HAVING ACHIEVED REMISSION: ICD-10-CM

## 2025-08-27 DIAGNOSIS — Z79.899 ENCOUNTER FOR LONG-TERM (CURRENT) USE OF HIGH-RISK MEDICATION: ICD-10-CM

## 2025-08-27 DIAGNOSIS — E85.81 AL AMYLOIDOSIS: ICD-10-CM

## 2025-08-27 LAB
ALBUMIN SERPL-MCNC: 4.5 G/DL (ref 3.5–5.2)
ALBUMIN/GLOB SERPL: 1.3 G/DL
ALP SERPL-CCNC: 90 U/L (ref 39–117)
ALT SERPL W P-5'-P-CCNC: 41 U/L (ref 1–41)
ANION GAP SERPL CALCULATED.3IONS-SCNC: 7.5 MMOL/L (ref 5–15)
AST SERPL-CCNC: 25 U/L (ref 1–40)
BASOPHILS # BLD AUTO: 0.06 10*3/MM3 (ref 0–0.2)
BASOPHILS NFR BLD AUTO: 1.3 % (ref 0–1.5)
BILIRUB SERPL-MCNC: 0.7 MG/DL (ref 0–1.2)
BUN SERPL-MCNC: 14.1 MG/DL (ref 8–23)
BUN/CREAT SERPL: 14.7 (ref 7–25)
CALCIUM SPEC-SCNC: 9.6 MG/DL (ref 8.6–10.5)
CHLORIDE SERPL-SCNC: 105 MMOL/L (ref 98–107)
CO2 SERPL-SCNC: 26.5 MMOL/L (ref 22–29)
CREAT SERPL-MCNC: 0.96 MG/DL (ref 0.76–1.27)
DEPRECATED RDW RBC AUTO: 45.7 FL (ref 37–54)
EGFRCR SERPLBLD CKD-EPI 2021: 88.8 ML/MIN/1.73
EOSINOPHIL # BLD AUTO: 0.22 10*3/MM3 (ref 0–0.4)
EOSINOPHIL NFR BLD AUTO: 4.8 % (ref 0.3–6.2)
ERYTHROCYTE [DISTWIDTH] IN BLOOD BY AUTOMATED COUNT: 16.3 % (ref 12.3–15.4)
GLOBULIN UR ELPH-MCNC: 3.6 GM/DL
GLUCOSE SERPL-MCNC: 92 MG/DL (ref 65–99)
HCT VFR BLD AUTO: 42.9 % (ref 37.5–51)
HGB BLD-MCNC: 12.9 G/DL (ref 13–17.7)
IMM GRANULOCYTES # BLD AUTO: 0.03 10*3/MM3 (ref 0–0.05)
IMM GRANULOCYTES NFR BLD AUTO: 0.7 % (ref 0–0.5)
LYMPHOCYTES # BLD AUTO: 1.41 10*3/MM3 (ref 0.7–3.1)
LYMPHOCYTES NFR BLD AUTO: 30.7 % (ref 19.6–45.3)
MCH RBC QN AUTO: 23.5 PG (ref 26.6–33)
MCHC RBC AUTO-ENTMCNC: 30.1 G/DL (ref 31.5–35.7)
MCV RBC AUTO: 78.1 FL (ref 79–97)
MONOCYTES # BLD AUTO: 0.74 10*3/MM3 (ref 0.1–0.9)
MONOCYTES NFR BLD AUTO: 16.1 % (ref 5–12)
NEUTROPHILS NFR BLD AUTO: 2.13 10*3/MM3 (ref 1.7–7)
NEUTROPHILS NFR BLD AUTO: 46.4 % (ref 42.7–76)
NRBC BLD AUTO-RTO: 0 /100 WBC (ref 0–0.2)
PLATELET # BLD AUTO: 201 10*3/MM3 (ref 140–450)
PMV BLD AUTO: 10 FL (ref 6–12)
POTASSIUM SERPL-SCNC: 4.3 MMOL/L (ref 3.5–5.2)
PROT SERPL-MCNC: 8.1 G/DL (ref 6–8.5)
RBC # BLD AUTO: 5.49 10*6/MM3 (ref 4.14–5.8)
SODIUM SERPL-SCNC: 139 MMOL/L (ref 136–145)
WBC NRBC COR # BLD AUTO: 4.59 10*3/MM3 (ref 3.4–10.8)

## 2025-08-27 PROCEDURE — 80053 COMPREHEN METABOLIC PANEL: CPT | Performed by: INTERNAL MEDICINE

## 2025-08-27 PROCEDURE — 83521 IG LIGHT CHAINS FREE EACH: CPT | Performed by: INTERNAL MEDICINE

## 2025-08-27 PROCEDURE — 82784 ASSAY IGA/IGD/IGG/IGM EACH: CPT | Performed by: INTERNAL MEDICINE

## 2025-08-27 PROCEDURE — 84165 PROTEIN E-PHORESIS SERUM: CPT | Performed by: INTERNAL MEDICINE

## 2025-08-27 PROCEDURE — 36415 COLL VENOUS BLD VENIPUNCTURE: CPT

## 2025-08-27 PROCEDURE — 85025 COMPLETE CBC W/AUTO DIFF WBC: CPT | Performed by: INTERNAL MEDICINE

## 2025-08-27 PROCEDURE — 86334 IMMUNOFIX E-PHORESIS SERUM: CPT | Performed by: INTERNAL MEDICINE

## 2025-08-28 ENCOUNTER — SPECIALTY PHARMACY (OUTPATIENT)
Dept: PHARMACY | Facility: HOSPITAL | Age: 65
End: 2025-08-28
Payer: MEDICARE

## 2025-08-28 RX ORDER — LENALIDOMIDE 10 MG/1
10 CAPSULE ORAL DAILY
Qty: 21 CAPSULE | Refills: 0 | Status: SHIPPED | OUTPATIENT
Start: 2025-08-28 | End: 2025-08-28 | Stop reason: SDUPTHER

## 2025-08-28 RX ORDER — LENALIDOMIDE 10 MG/1
10 CAPSULE ORAL DAILY
Qty: 21 CAPSULE | Refills: 0 | Status: SHIPPED | OUTPATIENT
Start: 2025-08-28

## 2025-08-29 LAB
ALBUMIN SERPL ELPH-MCNC: 3.7 G/DL (ref 2.9–4.4)
ALBUMIN/GLOB SERPL: 1 {RATIO} (ref 0.7–1.7)
ALPHA1 GLOB SERPL ELPH-MCNC: 0.2 G/DL (ref 0–0.4)
ALPHA2 GLOB SERPL ELPH-MCNC: 0.6 G/DL (ref 0.4–1)
B-GLOBULIN SERPL ELPH-MCNC: 1.2 G/DL (ref 0.7–1.3)
GAMMA GLOB SERPL ELPH-MCNC: 1.7 G/DL (ref 0.4–1.8)
GLOBULIN SER-MCNC: 3.8 G/DL (ref 2.2–3.9)
IGA SERPL-MCNC: 221 MG/DL (ref 61–437)
IGG SERPL-MCNC: 1742 MG/DL (ref 603–1613)
IGM SERPL-MCNC: 38 MG/DL (ref 20–172)
INTERPRETATION SERPL IEP-IMP: ABNORMAL
KAPPA LC FREE SER-MCNC: 36.7 MG/L (ref 3.3–19.4)
KAPPA LC FREE/LAMBDA FREE SER: 1.38 {RATIO} (ref 0.26–1.65)
LABORATORY COMMENT REPORT: ABNORMAL
LAMBDA LC FREE SERPL-MCNC: 26.5 MG/L (ref 5.7–26.3)
M PROTEIN SERPL ELPH-MCNC: ABNORMAL G/DL
PROT SERPL-MCNC: 7.5 G/DL (ref 6–8.5)

## (undated) DEVICE — TUBING, SUCTION, 1/4" X 10', STRAIGHT: Brand: MEDLINE

## (undated) DEVICE — CANN O2 ETCO2 FITS ALL CONN CO2 SMPL A/ 7IN DISP LF

## (undated) DEVICE — SENSR O2 OXIMAX FNGR A/ 18IN NONSTR

## (undated) DEVICE — LN SMPL CO2 SHTRM SD STREAM W/M LUER

## (undated) DEVICE — ADAPT CLN BIOGUARD AIR/H2O DISP

## (undated) DEVICE — KT ORCA ORCAPOD DISP STRL